# Patient Record
Sex: MALE | Race: WHITE | NOT HISPANIC OR LATINO | Employment: UNEMPLOYED | URBAN - NONMETROPOLITAN AREA
[De-identification: names, ages, dates, MRNs, and addresses within clinical notes are randomized per-mention and may not be internally consistent; named-entity substitution may affect disease eponyms.]

---

## 2017-01-03 ENCOUNTER — TRANSFERRED RECORDS (OUTPATIENT)
Dept: HEALTH INFORMATION MANAGEMENT | Facility: HOSPITAL | Age: 32
End: 2017-01-03

## 2017-01-06 DIAGNOSIS — K21.9 GASTROESOPHAGEAL REFLUX DISEASE WITHOUT ESOPHAGITIS: Primary | ICD-10-CM

## 2017-01-06 RX ORDER — FAMOTIDINE 20 MG/1
20 TABLET, FILM COATED ORAL 2 TIMES DAILY
Qty: 60 TABLET | Refills: 3 | Status: SHIPPED | OUTPATIENT
Start: 2017-01-06 | End: 2017-04-27

## 2017-01-16 ENCOUNTER — TRANSFERRED RECORDS (OUTPATIENT)
Dept: HEALTH INFORMATION MANAGEMENT | Facility: HOSPITAL | Age: 32
End: 2017-01-16

## 2017-01-18 ENCOUNTER — OFFICE VISIT (OUTPATIENT)
Dept: PSYCHIATRY | Facility: OTHER | Age: 32
End: 2017-01-18
Attending: PSYCHIATRY & NEUROLOGY
Payer: COMMERCIAL

## 2017-01-18 VITALS
SYSTOLIC BLOOD PRESSURE: 142 MMHG | DIASTOLIC BLOOD PRESSURE: 78 MMHG | WEIGHT: 270 LBS | TEMPERATURE: 99.3 F | HEART RATE: 100 BPM | HEIGHT: 73 IN | BODY MASS INDEX: 35.78 KG/M2

## 2017-01-18 DIAGNOSIS — F20.0 CHRONIC PARANOID SCHIZOPHRENIA (H): Primary | ICD-10-CM

## 2017-01-18 PROCEDURE — 99214 OFFICE O/P EST MOD 30 MIN: CPT | Performed by: PSYCHIATRY & NEUROLOGY

## 2017-01-18 PROCEDURE — 99212 OFFICE O/P EST SF 10 MIN: CPT

## 2017-01-18 RX ORDER — BENZTROPINE MESYLATE 0.5 MG/1
0.5 TABLET ORAL 2 TIMES DAILY PRN
Qty: 60 TABLET | Refills: 3 | Status: SHIPPED | OUTPATIENT
Start: 2017-01-18 | End: 2017-08-20

## 2017-01-18 ASSESSMENT — ANXIETY QUESTIONNAIRES
3. WORRYING TOO MUCH ABOUT DIFFERENT THINGS: SEVERAL DAYS
IF YOU CHECKED OFF ANY PROBLEMS ON THIS QUESTIONNAIRE, HOW DIFFICULT HAVE THESE PROBLEMS MADE IT FOR YOU TO DO YOUR WORK, TAKE CARE OF THINGS AT HOME, OR GET ALONG WITH OTHER PEOPLE: EXTREMELY DIFFICULT
7. FEELING AFRAID AS IF SOMETHING AWFUL MIGHT HAPPEN: NOT AT ALL
6. BECOMING EASILY ANNOYED OR IRRITABLE: SEVERAL DAYS
2. NOT BEING ABLE TO STOP OR CONTROL WORRYING: SEVERAL DAYS
1. FEELING NERVOUS, ANXIOUS, OR ON EDGE: SEVERAL DAYS

## 2017-01-18 ASSESSMENT — PAIN SCALES - GENERAL: PAINLEVEL: NO PAIN (0)

## 2017-01-18 ASSESSMENT — PATIENT HEALTH QUESTIONNAIRE - PHQ9: 5. POOR APPETITE OR OVEREATING: NOT AT ALL

## 2017-01-18 NOTE — MR AVS SNAPSHOT
"              After Visit Summary   1/18/2017    Miles Montez    MRN: [id number]           Patient Information     Date Of Birth          1985        Visit Information        Provider Department      1/18/2017 3:20 PM Marysol Maki MD [facility]        Today's Diagnoses     Chronic paranoid schizophrenia (H)    -  1        Follow-ups after your visit        Your next 10 appointments already scheduled     Feb 27, 2017  2:00 PM   (Arrive by 1:45 PM)   Return Visit with Marysol Maki MD   [facility] ([facility])    [address] [place] [id number] [phone]              Who to contact     If you have questions or need follow up information about today's clinic visit or your schedule please contact [facility] directly at [phone].  Normal or non-critical lab and imaging results will be communicated to you by MyChart, letter or phone within 4 business days after the clinic has received the results. If you do not hear from us within 7 days, please contact the clinic through MyChart or phone. If you have a critical or abnormal lab result, we will notify you by phone as soon as possible.  Submit refill requests through [employer] or call your pharmacy and they will forward the refill request to us. Please allow 3 business days for your refill to be completed.          Additional Information About Your Visit        MyChart Information     incuBET lets you send messages to your doctor, view your test results, renew your prescriptions, schedule appointments and more. To sign up, go to [email or web address] . Click on \"Log in\" on the left side of the screen, which will take you to the Welcome page. Then click on \"Sign up Now\" on the right side of the page.     You will be asked to enter the access code listed below, as well as some personal information. Please follow the directions to create your username and password.     Your access code " "is: [email or web address]  Expires: 2017 11:24 AM     Your access code will  in 90 days. If you need help or a new code, please call your [facility] clinic or [phone].        Care EveryWhere ID     This is your Care EveryWhere ID. This could be used by other organizations to access your [facility] medical records  [id number]        Your Vitals Were     Pulse Temperature Height BMI (Body Mass Index)          100 99.3  F (37.4  C) (Tympanic) 6' 1\" (1.854 m) 35.63 kg/m2         Blood Pressure from Last 3 Encounters:   17 142/78   16 126/86   16 142/86    Weight from Last 3 Encounters:   17 270 lb (122.471 kg)   16 260 lb (117.935 kg)   16 260 lb (117.935 kg)              Today, you had the following     No orders found for display         Today's Medication Changes          These changes are accurate as of: 17  4:02 PM.  If you have any questions, ask your nurse or doctor.               Start taking these medicines.        Dose/Directions    benztropine 0.5 MG tablet   Commonly known as:  COGENTIN   Used for:  Chronic paranoid schizophrenia (H)   Started by:  Marysol Maki MD        Dose:  0.5 mg   Take 1 tablet (0.5 mg) by mouth 2 times daily as needed for agitation   Quantity:  60 tablet   Refills:  3            Where to get your medicines      These medications were sent to [facility] [address]     Phone:  [phone]    - benztropine 0.5 MG tablet             Primary Care Provider Office Phone # Fax #    Maria Eugenia Mcgrath [phone]       [facility] [address] [facility] [address]        Thank you!     Thank you for choosing [facility]  for your care. Our goal is always to provide you with excellent care. Hearing back from our patients is one way we can continue to improve our services. Please take a few minutes to complete the written survey that you may " receive in the mail after your visit with us. Thank you!             Your Updated Medication List - Protect others around you: Learn how to safely use, store and throw away your medicines at www.disposemymeds.org.          This list is accurate as of: 1/18/17  4:02 PM.  Always use your most recent med list.                   Brand Name Dispense Instructions for use    benztropine 0.5 MG tablet    COGENTIN    60 tablet    Take 1 tablet (0.5 mg) by mouth 2 times daily as needed for agitation       cholecalciferol 2000 UNITS tablet     30 tablet    Take 2,000 Units by mouth daily       * cloZAPine 100 MG tablet    CLOZARIL    7 tablet    TAKE 1 TABLET BY MOUTH ALONG WITH 200MG TAB AT BEDTIME FOR A DOSE OF 300MG       * Clozapine 200 MG tablet    CLOZARIL    14 tablet    TAKE 1 TABLET (200MG) BY MOUTH IN THE MORNING AND TAKE 1 TABLET AT BEDTIME ALONG WITH 100MG TAB FOR A TOTAL DAILY DOSE OF       famotidine 20 MG tablet    PEPCID    60 tablet    Take 1 tablet (20 mg) by mouth 2 times daily       * Notice:  This list has 2 medication(s) that are the same as other medications prescribed for you. Read the directions carefully, and ask your doctor or other care provider to review them with you.

## 2017-01-18 NOTE — NURSING NOTE
"Chief Complaint   Patient presents with     RECHECK     concerned with weight gain, stiffness and tremors.        Initial /78 mmHg  Pulse 100  Temp(Src) 99.3  F (37.4  C) (Tympanic)  Ht 6' 1\" (1.854 m)  Wt 270 lb (122.471 kg)  BMI 35.63 kg/m2 Estimated body mass index is 35.63 kg/(m^2) as calculated from the following:    Height as of this encounter: 6' 1\" (1.854 m).    Weight as of this encounter: 270 lb (122.471 kg).  BP completed using cuff size: frank Hoff LPN      "
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
## 2017-01-19 ASSESSMENT — PATIENT HEALTH QUESTIONNAIRE - PHQ9: SUM OF ALL RESPONSES TO PHQ QUESTIONS 1-9: 11

## 2017-01-23 ENCOUNTER — TRANSFERRED RECORDS (OUTPATIENT)
Dept: HEALTH INFORMATION MANAGEMENT | Facility: HOSPITAL | Age: 32
End: 2017-01-23

## 2017-01-27 DIAGNOSIS — F20.0 PARANOID SCHIZOPHRENIA (H): Primary | ICD-10-CM

## 2017-02-01 RX ORDER — CLOZAPINE 100 MG/1
TABLET ORAL
Qty: 7 TABLET | Refills: 0 | Status: SHIPPED | OUTPATIENT
Start: 2017-02-01 | End: 2017-02-08

## 2017-02-01 RX ORDER — CLOZAPINE 200 MG/1
TABLET ORAL
Qty: 14 TABLET | Refills: 0 | Status: SHIPPED | OUTPATIENT
Start: 2017-02-01 | End: 2017-02-10

## 2017-02-08 DIAGNOSIS — F20.0 PARANOID SCHIZOPHRENIA (H): Primary | ICD-10-CM

## 2017-02-10 RX ORDER — CLOZAPINE 200 MG/1
TABLET ORAL
Qty: 14 TABLET | Refills: 0 | Status: SHIPPED | OUTPATIENT
Start: 2017-02-10 | End: 2017-02-10

## 2017-02-10 RX ORDER — CLOZAPINE 100 MG/1
TABLET ORAL
Qty: 7 TABLET | Refills: 0 | Status: SHIPPED | OUTPATIENT
Start: 2017-02-10 | End: 2017-02-10

## 2017-02-20 ENCOUNTER — TRANSFERRED RECORDS (OUTPATIENT)
Dept: HEALTH INFORMATION MANAGEMENT | Facility: HOSPITAL | Age: 32
End: 2017-02-20

## 2017-02-22 ENCOUNTER — TELEPHONE (OUTPATIENT)
Dept: PSYCHIATRY | Facility: OTHER | Age: 32
End: 2017-02-22

## 2017-02-22 NOTE — TELEPHONE ENCOUNTER
Received VM from mom, Malika with some thoughts about medications (anti-psychotics) for Miles.  He has a f/u on 2-27.  She goes on to say that most anti-psychotics keep him calm, but the delusions are still there.  SE of fatigue and weight gain.  He really does not get rid of the delusions / hallucinations.  She is wondering about using some other medications that would keep him calm  and not use antipsychotics.  Malika wanted Dr. Maki to know these thoughts.  Thank you.

## 2017-03-13 ENCOUNTER — TRANSFERRED RECORDS (OUTPATIENT)
Dept: HEALTH INFORMATION MANAGEMENT | Facility: HOSPITAL | Age: 32
End: 2017-03-13

## 2017-03-24 DIAGNOSIS — F20.0 PARANOID SCHIZOPHRENIA (H): ICD-10-CM

## 2017-03-27 RX ORDER — CLOZAPINE 200 MG/1
TABLET ORAL
Qty: 14 TABLET | Refills: 0 | Status: SHIPPED | OUTPATIENT
Start: 2017-03-27 | End: 2017-04-05

## 2017-03-27 RX ORDER — CLOZAPINE 100 MG/1
TABLET ORAL
Qty: 7 TABLET | Refills: 0 | Status: SHIPPED | OUTPATIENT
Start: 2017-03-27 | End: 2017-04-05

## 2017-03-27 NOTE — TELEPHONE ENCOUNTER
Clozaril 100mg, Clozaril 200mg      Last Written Prescription Date:  3.18.17  Last Fill Quantity: #7, #14,   # refills: 0  Last Office Visit with [facility], [facility] or [facility] prescribing provider: 1.18.17  Future Office visit:    Next 5 appointments (look out 90 days)     Apr 05, 2017  2:40 PM CDT   (Arrive by 2:25 PM)   Return Visit with Marysol Maki MD   [facility] ([facility])    [address] [place] [id number] [phone]                   Routing refill request to provider for review/approval because:  Drug not on the [facility], [facility] or [facility] refill protocol or controlled substance

## 2017-03-31 DIAGNOSIS — F20.0 PARANOID SCHIZOPHRENIA (H): ICD-10-CM

## 2017-03-31 DIAGNOSIS — Z00.00 HEALTHCARE MAINTENANCE: Primary | ICD-10-CM

## 2017-04-04 NOTE — TELEPHONE ENCOUNTER
clozapine 200 mg  Last Written Prescription Date: 3/27/17  Last Fill Quantity: 14, # refills: 0  Last Office Visit with [facility], [facility] or  Health prescribing provider: 1/18/17  Next 5 appointments (look out 90 days)     Apr 05, 2017  2:40 PM CDT   (Arrive by 2:25 PM)   Return Visit with Marysol Maki MD   [facility] [place] ([facility] [place] [facility])    [address] [place] [address]  [place] [address] [phone]                   BP Readings from Last 3 Encounters:   01/18/17 142/78   12/23/16 126/86   12/02/16 142/86     Pulse Readings from Last 2 Encounters:   01/18/17 100   12/23/16 112     Lab Results   Component Value Date     10/04/2016     Lab Results   Component Value Date    WBC 8.2 10/11/2016     Lab Results   Component Value Date    RBC 4.74 10/02/2016     Lab Results   Component Value Date    HGB 14.6 10/02/2016     Lab Results   Component Value Date    HCT 42.4 10/02/2016     No components found for: MCT  Lab Results   Component Value Date    MCV 90 10/02/2016     Lab Results   Component Value Date    MCH 30.8 10/02/2016     Lab Results   Component Value Date    MCHC 34.4 10/02/2016     Lab Results   Component Value Date    RDW 13.0 10/02/2016     Lab Results   Component Value Date     10/02/2016     Lab Results   Component Value Date    CHOL 192 09/27/2016     Lab Results   Component Value Date    HDL 46 09/27/2016     Lab Results   Component Value Date     09/27/2016     Lab Results   Component Value Date    TRIG 193 09/27/2016     Lab Results   Component Value Date    CHOLHDLRATIO 3.7 07/09/2015     Clozapine 100 mg     Last Written Prescription Date: 3/27/17  Last Fill Quantity: 7, # refills: 0  Last Office Visit with [facility], [facility] or [facility] prescribing provider: 1/18/17  Next 5 appointments (look out 90 days)     Apr 05, 2017  2:40 PM CDT   (Arrive by 2:25 PM)   Return Visit with Marysol Maki MD   [facility] [place] ([facility] [place] [facility])    [address]  [place]  [address]   [phone]                   BP Readings from Last 3 Encounters:   01/18/17 142/78   12/23/16 126/86   12/02/16 142/86     Pulse Readings from Last 2 Encounters:   01/18/17 100   12/23/16 112     Lab Results   Component Value Date     10/04/2016     Lab Results   Component Value Date    WBC 8.2 10/11/2016     Lab Results   Component Value Date    RBC 4.74 10/02/2016     Lab Results   Component Value Date    HGB 14.6 10/02/2016     Lab Results   Component Value Date    HCT 42.4 10/02/2016     No components found for: MCT  Lab Results   Component Value Date    MCV 90 10/02/2016     Lab Results   Component Value Date    MCH 30.8 10/02/2016     Lab Results   Component Value Date    MCHC 34.4 10/02/2016     Lab Results   Component Value Date    RDW 13.0 10/02/2016     Lab Results   Component Value Date     10/02/2016     Lab Results   Component Value Date    CHOL 192 09/27/2016     Lab Results   Component Value Date    HDL 46 09/27/2016     Lab Results   Component Value Date     09/27/2016     Lab Results   Component Value Date    TRIG 193 09/27/2016     Lab Results   Component Value Date    CHOLHDLRATIO 3.7 07/09/2015     Vitamin d      Last Written Prescription Date: 10/15/16  Last Fill Quantity: 30,  # refills: 0   Last Office Visit with [facility], [facility] or [facility] prescribing provider: 1/18/17                                         Next 5 appointments (look out 90 days)     Apr 05, 2017  2:40 PM CDT   (Arrive by 2:25 PM)   Return Visit with Marysol Maki MD   [facility] [place] ([facility] [place] [facility])    [address] [place] [address]  [place] [address] [phone]

## 2017-04-05 ENCOUNTER — OFFICE VISIT (OUTPATIENT)
Dept: PSYCHIATRY | Facility: OTHER | Age: 32
End: 2017-04-05
Attending: PSYCHIATRY & NEUROLOGY
Payer: COMMERCIAL

## 2017-04-05 ENCOUNTER — TELEPHONE (OUTPATIENT)
Dept: PSYCHIATRY | Facility: OTHER | Age: 32
End: 2017-04-05

## 2017-04-05 VITALS
HEART RATE: 92 BPM | HEIGHT: 74 IN | WEIGHT: 260 LBS | TEMPERATURE: 98.2 F | BODY MASS INDEX: 33.37 KG/M2 | DIASTOLIC BLOOD PRESSURE: 60 MMHG | SYSTOLIC BLOOD PRESSURE: 130 MMHG

## 2017-04-05 DIAGNOSIS — Z71.89 ACP (ADVANCE CARE PLANNING): Chronic | ICD-10-CM

## 2017-04-05 DIAGNOSIS — Z00.00 HEALTHCARE MAINTENANCE: Primary | ICD-10-CM

## 2017-04-05 DIAGNOSIS — F20.0 PARANOID SCHIZOPHRENIA (H): ICD-10-CM

## 2017-04-05 PROCEDURE — 99212 OFFICE O/P EST SF 10 MIN: CPT

## 2017-04-05 PROCEDURE — 99214 OFFICE O/P EST MOD 30 MIN: CPT | Performed by: PSYCHIATRY & NEUROLOGY

## 2017-04-05 RX ORDER — CLOZAPINE 200 MG/1
TABLET ORAL
Qty: 14 TABLET | Refills: 0 | Status: CANCELLED | OUTPATIENT
Start: 2017-04-05

## 2017-04-05 RX ORDER — ACETAMINOPHEN 160 MG
TABLET,DISINTEGRATING ORAL
Qty: 28 CAPSULE | Refills: 11 | Status: ON HOLD | OUTPATIENT
Start: 2017-04-05 | End: 2017-11-05

## 2017-04-05 RX ORDER — CLOZAPINE 200 MG/1
TABLET ORAL
Qty: 7 TABLET | Refills: 0 | Status: SHIPPED | OUTPATIENT
Start: 2017-04-05 | End: 2017-04-07

## 2017-04-05 RX ORDER — CLOZAPINE 100 MG/1
TABLET ORAL
Qty: 7 TABLET | Refills: 0 | Status: CANCELLED | OUTPATIENT
Start: 2017-04-05

## 2017-04-05 RX ORDER — CLOZAPINE 100 MG/1
100 TABLET ORAL DAILY
Status: CANCELLED | OUTPATIENT
Start: 2017-04-05

## 2017-04-05 RX ORDER — CLOZAPINE 100 MG/1
TABLET ORAL
Qty: 18 TABLET | Refills: 0 | Status: SHIPPED | OUTPATIENT
Start: 2017-04-05 | End: 2017-04-07

## 2017-04-05 ASSESSMENT — ANXIETY QUESTIONNAIRES
6. BECOMING EASILY ANNOYED OR IRRITABLE: MORE THAN HALF THE DAYS
IF YOU CHECKED OFF ANY PROBLEMS ON THIS QUESTIONNAIRE, HOW DIFFICULT HAVE THESE PROBLEMS MADE IT FOR YOU TO DO YOUR WORK, TAKE CARE OF THINGS AT HOME, OR GET ALONG WITH OTHER PEOPLE: VERY DIFFICULT
5. BEING SO RESTLESS THAT IT IS HARD TO SIT STILL: NOT AT ALL
7. FEELING AFRAID AS IF SOMETHING AWFUL MIGHT HAPPEN: MORE THAN HALF THE DAYS
3. WORRYING TOO MUCH ABOUT DIFFERENT THINGS: MORE THAN HALF THE DAYS
1. FEELING NERVOUS, ANXIOUS, OR ON EDGE: MORE THAN HALF THE DAYS
GAD7 TOTAL SCORE: 12
2. NOT BEING ABLE TO STOP OR CONTROL WORRYING: MORE THAN HALF THE DAYS

## 2017-04-05 ASSESSMENT — PAIN SCALES - GENERAL: PAINLEVEL: NO PAIN (0)

## 2017-04-05 ASSESSMENT — PATIENT HEALTH QUESTIONNAIRE - PHQ9: 5. POOR APPETITE OR OVEREATING: MORE THAN HALF THE DAYS

## 2017-04-05 NOTE — NURSING NOTE
"Chief Complaint   Patient presents with     RECHECK     Mental Health.       Initial /60 (BP Location: Right arm, Patient Position: Chair, Cuff Size: Adult Large)  Pulse 92  Temp 98.2  F (36.8  C) (Tympanic)  Ht 6' 2\" (1.88 m)  Wt 260 lb (117.9 kg)  BMI 33.38 kg/m2 Estimated body mass index is 33.38 kg/(m^2) as calculated from the following:    Height as of this encounter: 6' 2\" (1.88 m).    Weight as of this encounter: 260 lb (117.9 kg).  Medication Reconciliation: complete   SUSHIL CISNEROS      "

## 2017-04-05 NOTE — MR AVS SNAPSHOT
"              After Visit Summary   4/5/2017    Miles Montez    MRN: [id number]           Patient Information     Date Of Birth          1985        Visit Information        Provider Department      4/5/2017 2:40 PM Marysol Maki MD [facility]        Today's Diagnoses     Healthcare maintenance    -  1    Paranoid schizophrenia (H)        ACP (advance care planning)           Follow-ups after your visit        Your next 10 appointments already scheduled     May 23, 2017  1:20 PM CDT   (Arrive by 1:05 PM)   Return Visit with Marysol Maki MD   [facility] ([facility] [place] [facility])    [address] [place] [address]  [place] [id number] [phone]              Who to contact     If you have questions or need follow up information about today's clinic visit or your schedule please contact [facility] directly at [phone].  Normal or non-critical lab and imaging results will be communicated to you by MyChart, letter or phone within 4 business days after the clinic has received the results. If you do not hear from us within 7 days, please contact the clinic through MyChart or phone. If you have a critical or abnormal lab result, we will notify you by phone as soon as possible.  Submit refill requests through [employer] or call your pharmacy and they will forward the refill request to us. Please allow 3 business days for your refill to be completed.          Additional Information About Your Visit        MyChart Information     [employer] lets you send messages to your doctor, view your test results, renew your prescriptions, schedule appointments and more. To sign up, go to [email or web address] . Click on \"Log in\" on the left side of the screen, which will take you to the Welcome page. Then click on \"Sign up Now\" on the right side of the page.     You will be asked to enter the access code listed below, as well as some personal information. Please follow the directions to " "create your username and password.     Your access code is: [email or web address]  Expires: 2017  3:26 PM     Your access code will  in 90 days. If you need help or a new code, please call your [facility] or [phone].        Care EveryWhere ID     This is your Care EveryWhere ID. This could be used by other organizations to access your [facility] medical records  [id number]        Your Vitals Were     Pulse Temperature Height BMI (Body Mass Index)          92 98.2  F (36.8  C) (Tympanic) 6' 2\" (1.88 m) 33.38 kg/m2         Blood Pressure from Last 3 Encounters:   17 130/60   17 142/78   16 126/86    Weight from Last 3 Encounters:   17 260 lb (117.9 kg)   17 270 lb (122.5 kg)   16 260 lb (117.9 kg)              Today, you had the following     No orders found for display         Today's Medication Changes          These changes are accurate as of: 17  3:30 PM.  If you have any questions, ask your nurse or doctor.               These medicines have changed or have updated prescriptions.        Dose/Directions    * cloZAPine 100 MG tablet   Commonly known as:  CLOZARIL   This may have changed:  See the new instructions.   Used for:  Paranoid schizophrenia (H)   Changed by:  Marysol Maki MD        Take 1-1/2 tab am and take 1 tab night along with 200 mg tab total daily dose of 450   Quantity:  18 tablet   Refills:  0       * Clozapine 200 MG tablet   Commonly known as:  CLOZARIL   This may have changed:  See the new instructions.   Used for:  Paranoid schizophrenia (H)   Changed by:  Marysol Maki MD        Take 1 tab at night along with 1 - 100 mg tab for 300 mg at night   Quantity:  7 tablet   Refills:  0       * Notice:  This list has 2 medication(s) that are the same as other medications prescribed for you. Read the directions carefully, and ask your doctor or other care provider to review them with you.         Where to get your medicines      These " medications were sent to [place], [place] - [address]  [address] [place] [address]     Phone:  [phone]     cloZAPine 100 MG tablet    Clozapine 200 MG tablet                Primary Care Provider Office Phone # Fax #    Maria Eugenia Mcgrath [phone]       [facility] [address] [facility] [address]        Thank you!     Thank you for choosing [facility]  for your care. Our goal is always to provide you with excellent care. Hearing back from our patients is one way we can continue to improve our services. Please take a few minutes to complete the written survey that you may receive in the mail after your visit with us. Thank you!             Your Updated Medication List - Protect others around you: Learn how to safely use, store and throw away your medicines at www.disposemymeds.org.          This list is accurate as of: 4/5/17  3:30 PM.  Always use your most recent med list.                   Brand Name Dispense Instructions for use    benztropine 0.5 MG tablet    COGENTIN    60 tablet    Take 1 tablet (0.5 mg) by mouth 2 times daily as needed for agitation       cholecalciferol 2000 UNITS tablet     30 tablet    Take 2,000 Units by mouth daily       * cloZAPine 100 MG tablet    CLOZARIL    18 tablet    Take 1-1/2 tab am and take 1 tab night along with 200 mg tab total daily dose of 450       * Clozapine 200 MG tablet    CLOZARIL    7 tablet    Take 1 tab at night along with 1 - 100 mg tab for 300 mg at night       famotidine 20 MG tablet    PEPCID    60 tablet    Take 1 tablet (20 mg) by mouth 2 times daily       * Notice:  This list has 2 medication(s) that are the same as other medications prescribed for you. Read the directions carefully, and ask your doctor or other care provider to review them with you.

## 2017-04-05 NOTE — TELEPHONE ENCOUNTER
Received VM from Malika, mom.  She has concerns about side effects with the medication Clozapine that Miles is taking.  Patient has c/o hard time breathing, throat, chest pain.  He is struggling with drowsiness, sedative feeling, no energy and sleeps a lot,  weight gain and other side effects that she has read up on.  She goes on to say that medication does not take care of delusions.  She would like to find a different medication for him.  She feels bad for what her son is struggling with.  He has an appt today, but  Malika finds it hard to discuss issues during appt. time when Miles is present.  Thank you
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
## 2017-04-06 ASSESSMENT — PATIENT HEALTH QUESTIONNAIRE - PHQ9: SUM OF ALL RESPONSES TO PHQ QUESTIONS 1-9: 10

## 2017-04-06 ASSESSMENT — ANXIETY QUESTIONNAIRES: GAD7 TOTAL SCORE: 12

## 2017-04-07 DIAGNOSIS — F20.0 PARANOID SCHIZOPHRENIA (H): ICD-10-CM

## 2017-04-10 RX ORDER — CLOZAPINE 100 MG/1
TABLET ORAL
Qty: 18 TABLET | Refills: 0 | Status: SHIPPED | OUTPATIENT
Start: 2017-04-10 | End: 2017-04-21

## 2017-04-10 RX ORDER — CLOZAPINE 200 MG/1
TABLET ORAL
Qty: 7 TABLET | Refills: 0 | Status: SHIPPED | OUTPATIENT
Start: 2017-04-10 | End: 2017-04-21

## 2017-04-24 ENCOUNTER — TRANSFERRED RECORDS (OUTPATIENT)
Dept: HEALTH INFORMATION MANAGEMENT | Facility: HOSPITAL | Age: 32
End: 2017-04-24

## 2017-04-27 DIAGNOSIS — K21.9 GASTROESOPHAGEAL REFLUX DISEASE WITHOUT ESOPHAGITIS: ICD-10-CM

## 2017-04-27 RX ORDER — FAMOTIDINE 20 MG/1
TABLET, FILM COATED ORAL
Qty: 56 TABLET | Refills: 11 | Status: SHIPPED | OUTPATIENT
Start: 2017-04-27 | End: 2018-01-09 | Stop reason: ALTCHOICE

## 2017-04-27 NOTE — TELEPHONE ENCOUNTER
pepcid      Last Written Prescription Date: 1/6/17  Last Fill Quantity: 60,  # refills: 3   Last Office Visit with [facility], [facility] or [facility] prescribing provider: 4/5/17                                         Next 5 appointments (look out 90 days)     May 23, 2017  1:20 PM CDT   (Arrive by 1:05 PM)   Return Visit with Marysol Maki MD   [facility] ([facility])    [facility] [address] [place] [id number] [phone]

## 2017-04-28 DIAGNOSIS — F20.0 PARANOID SCHIZOPHRENIA (H): ICD-10-CM

## 2017-05-01 ENCOUNTER — TRANSFERRED RECORDS (OUTPATIENT)
Dept: HEALTH INFORMATION MANAGEMENT | Facility: HOSPITAL | Age: 32
End: 2017-05-01

## 2017-05-01 RX ORDER — CLOZAPINE 200 MG/1
TABLET ORAL
Qty: 7 TABLET | Refills: 0 | Status: SHIPPED | OUTPATIENT
Start: 2017-05-01 | End: 2017-05-05

## 2017-05-01 RX ORDER — CLOZAPINE 100 MG/1
TABLET ORAL
Qty: 18 TABLET | Refills: 0 | Status: SHIPPED | OUTPATIENT
Start: 2017-05-01 | End: 2017-05-05

## 2017-05-01 NOTE — TELEPHONE ENCOUNTER
Clozapine 100 mg/200 mg      Last Written Prescription Date:  4/25/17  Last Fill Quantity: 100 mg-18; 200 mg-7,   # refills: 0  Last Office Visit with [facility] or [facility] prescribing provider: 4/5/17  Future Office visit:    Next 5 appointments (look out 90 days)     May 23, 2017  1:20 PM CDT   (Arrive by 1:05 PM)   Return Visit with Marysol Maki MD   [facility] ([facility])    [address] [place] [id number] [phone]                   Routing refill request to provider for review/approval because:  Drug not on the [facility], P or M reeplay.it refill protocol or controlled substance

## 2017-05-05 DIAGNOSIS — F20.0 PARANOID SCHIZOPHRENIA (H): ICD-10-CM

## 2017-05-08 RX ORDER — CLOZAPINE 100 MG/1
TABLET ORAL
Qty: 18 TABLET | Refills: 0 | Status: SHIPPED | OUTPATIENT
Start: 2017-05-08 | End: 2017-05-12

## 2017-05-08 RX ORDER — CLOZAPINE 200 MG/1
TABLET ORAL
Qty: 7 TABLET | Refills: 0 | Status: SHIPPED | OUTPATIENT
Start: 2017-05-08 | End: 2017-05-12

## 2017-05-12 DIAGNOSIS — F20.0 PARANOID SCHIZOPHRENIA (H): ICD-10-CM

## 2017-05-12 NOTE — TELEPHONE ENCOUNTER
Clozapine 200mg      Last Written Prescription Date: 5/8/2017  Last Fill Quantity: 7,   # refills: 0  Last Office Visit with [facility], [facility] or [facility] prescribing provider: 4/5/2017 Dr. Maki  Future Office visit:    Next 5 appointments (look out 90 days)     May 23, 2017  1:20 PM CDT   (Arrive by 1:05 PM)   Return Visit with Marysol Maki MD   [facility] ([facility])    [address] [place] [address]   [phone]                 Clozapine 100mg      Last Written Prescription Date:  5/8/2017  Last Fill Quantity: 18,   # refills: 0  Last Office Visit with [facility], [facility] or [facility] prescribing provider: 4/5/2017 Dr. Maki  Future Office visit:    Next 5 appointments (look out 90 days)     May 23, 2017  1:20 PM CDT   (Arrive by 1:05 PM)   Return Visit with Marysol Maki MD   [facility] ([facility])    [address] [place], [place] [address] [place] [address]   [phone]                   Routing refill request to provider for review/approval because:  Drug not on the [facility], [facility] or [facility] refill protocol or controlled substance

## 2017-05-15 ENCOUNTER — TRANSFERRED RECORDS (OUTPATIENT)
Dept: HEALTH INFORMATION MANAGEMENT | Facility: HOSPITAL | Age: 32
End: 2017-05-15
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
## 2017-05-19 RX ORDER — CLOZAPINE 100 MG/1
TABLET ORAL
Qty: 18 TABLET | Refills: 0 | Status: SHIPPED | OUTPATIENT
Start: 2017-05-19 | End: 2017-05-23

## 2017-05-19 RX ORDER — CLOZAPINE 200 MG/1
TABLET ORAL
Qty: 7 TABLET | Refills: 0 | Status: SHIPPED | OUTPATIENT
Start: 2017-05-19 | End: 2017-05-23

## 2017-05-23 ENCOUNTER — OFFICE VISIT (OUTPATIENT)
Dept: PSYCHIATRY | Facility: OTHER | Age: 32
End: 2017-05-23
Attending: PSYCHIATRY & NEUROLOGY
Payer: COMMERCIAL

## 2017-05-23 VITALS
DIASTOLIC BLOOD PRESSURE: 70 MMHG | SYSTOLIC BLOOD PRESSURE: 130 MMHG | HEIGHT: 73 IN | HEART RATE: 100 BPM | TEMPERATURE: 98 F | RESPIRATION RATE: 16 BRPM | WEIGHT: 250 LBS | BODY MASS INDEX: 33.13 KG/M2

## 2017-05-23 DIAGNOSIS — F20.0 PARANOID SCHIZOPHRENIA (H): ICD-10-CM

## 2017-05-23 PROCEDURE — 99212 OFFICE O/P EST SF 10 MIN: CPT

## 2017-05-23 PROCEDURE — 99214 OFFICE O/P EST MOD 30 MIN: CPT | Performed by: PSYCHIATRY & NEUROLOGY

## 2017-05-23 RX ORDER — CLOZAPINE 200 MG/1
TABLET ORAL
Qty: 7 TABLET | Refills: 0 | Status: SHIPPED | OUTPATIENT
Start: 2017-05-23 | End: 2017-05-26

## 2017-05-23 RX ORDER — CLOZAPINE 100 MG/1
TABLET ORAL
Qty: 14 TABLET | Refills: 0 | Status: SHIPPED | OUTPATIENT
Start: 2017-05-23 | End: 2017-05-26

## 2017-05-23 ASSESSMENT — PAIN SCALES - GENERAL: PAINLEVEL: NO PAIN (0)

## 2017-05-23 NOTE — MR AVS SNAPSHOT
"              After Visit Summary   2017    Miles Montez    MRN: [id number]           Patient Information     Date Of Birth          1985        Visit Information        Provider Department      2017 1:20 PM Marysol Maki MD [facility]        Today's Diagnoses     Paranoid schizophrenia (H)           Follow-ups after your visit        Who to contact     If you have questions or need follow up information about today's clinic visit or your schedule please contact [facility] directly at [phone].  Normal or non-critical lab and imaging results will be communicated to you by MyChart, letter or phone within 4 business days after the clinic has received the results. If you do not hear from us within 7 days, please contact the clinic through [employer] or phone. If you have a critical or abnormal lab result, we will notify you by phone as soon as possible.  Submit refill requests through [employer] or call your pharmacy and they will forward the refill request to us. Please allow 3 business days for your refill to be completed.          Additional Information About Your Visit        MyChart Information     [facility] lets you send messages to your doctor, view your test results, renew your prescriptions, schedule appointments and more. To sign up, go to [email or web address] . Click on \"Log in\" on the left side of the screen, which will take you to the Welcome page. Then click on \"Sign up Now\" on the right side of the page.     You will be asked to enter the access code listed below, as well as some personal information. Please follow the directions to create your username and password.     Your access code is: [email or web address]  Expires: 2017  3:26 PM     Your access code will  in 90 days. If you need help or a new code, please call your [facility] or [phone].        Care EveryWhere ID     This is your Care EveryWhere ID. This could be used by other " "organizations to access your [facility] medical records  [id number]        Your Vitals Were     Pulse Temperature Respirations Height BMI (Body Mass Index)       100 98  F (36.7  C) (Tympanic) 16 6' 1\" (1.854 m) 32.98 kg/m2        Blood Pressure from Last 3 Encounters:   05/23/17 130/70   04/05/17 130/60   01/18/17 142/78    Weight from Last 3 Encounters:   05/23/17 250 lb (113.4 kg)   04/05/17 260 lb (117.9 kg)   01/18/17 270 lb (122.5 kg)              Today, you had the following     No orders found for display         Today's Medication Changes          These changes are accurate as of: 5/23/17  6:14 PM.  If you have any questions, ask your nurse or doctor.               These medicines have changed or have updated prescriptions.        Dose/Directions    * cloZAPine 100 MG tablet   Commonly known as:  CLOZARIL   This may have changed:  See the new instructions.   Used for:  Paranoid schizophrenia (H)   Changed by:  Marysol Maki MD        Take 1 tablet every morning and take 1 tablet at night along with 200 mg tablet for total daily dose of 400 mg   Quantity:  14 tablet   Refills:  0       * Clozapine 200 MG tablet   Commonly known as:  CLOZARIL   This may have changed:  See the new instructions.   Used for:  Paranoid schizophrenia (H)   Changed by:  Marysol Maki MD        TAKE 1 TABLET (200MG) BY MOUTH EVERY EVENING ALONG WITH 1-100 MG TABLET FOR A TOTAL NIGHTLY DOSE  MG   Quantity:  7 tablet   Refills:  0       vitamin D3 2000 UNITS Caps   This may have changed:  Another medication with the same name was removed. Continue taking this medication, and follow the directions you see here.   Used for:  Healthcare maintenance   Changed by:  Marysol Maki MD        TAKE 1 CAPSULE BY MOUTH DAILY   Quantity:  28 capsule   Refills:  11       * Notice:  This list has 2 medication(s) that are the same as other medications prescribed for you. Read the directions carefully, and ask your doctor or other " care provider to review them with you.         Where to get your medicines      [place] [facility] [place] [address]  [address] [place] [address]     Phone:  [phone]     cloZAPine 100 MG tablet    Clozapine 200 MG tablet                Primary Care Provider Office Phone # Fax #    Maria Eugenia Mcgrath [phone]       [facility] [address] [facility] [address]        Thank you!     Thank you for choosing [facility]  for your care. Our goal is always to provide you with excellent care. Hearing back from our patients is one way we can continue to improve our services. Please take a few minutes to complete the written survey that you may receive in the mail after your visit with us. Thank you!             Your Updated Medication List - Protect others around you: Learn how to safely use, store and throw away your medicines at www.disposemymeds.org.          This list is accurate as of: 5/23/17  6:14 PM.  Always use your most recent med list.                   Brand Name Dispense Instructions for use    benztropine 0.5 MG tablet    COGENTIN    60 tablet    Take 1 tablet (0.5 mg) by mouth 2 times daily as needed for agitation       * cloZAPine 100 MG tablet    CLOZARIL    14 tablet    Take 1 tablet every morning and take 1 tablet at night along with 200 mg tablet for total daily dose of 400 mg       * Clozapine 200 MG tablet    CLOZARIL    7 tablet    TAKE 1 TABLET (200MG) BY MOUTH EVERY EVENING ALONG WITH 1-100 MG TABLET FOR A TOTAL NIGHTLY DOSE  MG       famotidine 20 MG tablet    PEPCID    56 tablet    TAKE 1 TABLET BY MOUTH TWICE A DAY       vitamin D3 2000 UNITS Caps     28 capsule    TAKE 1 CAPSULE BY MOUTH DAILY       * Notice:  This list has 2 medication(s) that are the same as other medications prescribed for you. Read the directions carefully, and ask your doctor or other care provider to review them with  you.

## 2017-05-23 NOTE — NURSING NOTE
"Chief Complaint   Patient presents with     Psychiatric Problem     Follow up.       Initial /70  Pulse 100  Temp 98  F (36.7  C) (Tympanic)  Resp 16  Ht 6' 1\" (1.854 m)  Wt 250 lb (113.4 kg)  BMI 32.98 kg/m2 Estimated body mass index is 32.98 kg/(m^2) as calculated from the following:    Height as of this encounter: 6' 1\" (1.854 m).    Weight as of this encounter: 250 lb (113.4 kg).  Medication Reconciliation: complete   Luz Kwan      "

## 2017-05-26 DIAGNOSIS — F20.0 PARANOID SCHIZOPHRENIA (H): ICD-10-CM

## 2017-05-30 RX ORDER — CLOZAPINE 100 MG/1
TABLET ORAL
Qty: 28 TABLET | Refills: 0 | Status: SHIPPED | OUTPATIENT
Start: 2017-05-30 | End: 2017-06-20

## 2017-05-30 RX ORDER — CLOZAPINE 100 MG/1
TABLET ORAL
Qty: 14 TABLET | Refills: 0 | Status: SHIPPED | OUTPATIENT
Start: 2017-05-30 | End: 2017-05-30

## 2017-05-30 RX ORDER — CLOZAPINE 200 MG/1
TABLET ORAL
Qty: 7 TABLET | Refills: 0 | Status: SHIPPED | OUTPATIENT
Start: 2017-05-30 | End: 2017-05-30

## 2017-05-30 RX ORDER — CLOZAPINE 200 MG/1
TABLET ORAL
Qty: 14 TABLET | Refills: 0 | Status: SHIPPED | OUTPATIENT
Start: 2017-05-30 | End: 2017-06-20

## 2017-05-30 NOTE — TELEPHONE ENCOUNTER
Last visit 5/23/17.  Last filled Clozaril 200 mg #7, 0 R on 5/23/17.  Last filled Clozaril 100 mg #14, 0 R on 5/23/17.  Thanks

## 2017-06-20 DIAGNOSIS — F20.0 PARANOID SCHIZOPHRENIA (H): ICD-10-CM

## 2017-06-22 ENCOUNTER — TELEPHONE (OUTPATIENT)
Dept: PSYCHIATRY | Facility: OTHER | Age: 32
End: 2017-06-22

## 2017-06-22 DIAGNOSIS — F20.0 PARANOID SCHIZOPHRENIA (H): Primary | ICD-10-CM

## 2017-06-22 NOTE — TELEPHONE ENCOUNTER
I spoke with Malika.  She is wondering if Dr. Maki had sent in an Rx for a medication that Miles could take that would calm him when he gets agitated.  She could not remember what the name of the medication was.  I discussed last visit notes with her and did not see a medication noting this.  Thank you, please advise.  Next f/u appt. Is 7-10-17.

## 2017-06-22 NOTE — TELEPHONE ENCOUNTER
Clozaril 100 MG  Last office visit: 05/23/17  Last refill: 05/30/17 #28    Clozaril 200 MG  Last office visit: 05/23/17  Last refill: 05/30/17 #14        Thank you.

## 2017-06-23 RX ORDER — CLOZAPINE 100 MG/1
TABLET ORAL
Qty: 28 TABLET | Refills: 0 | Status: SHIPPED | OUTPATIENT
Start: 2017-06-23 | End: 2017-07-21

## 2017-06-23 RX ORDER — HYDROXYZINE HYDROCHLORIDE 25 MG/1
50-100 TABLET, FILM COATED ORAL 2 TIMES DAILY PRN
Qty: 60 TABLET | Refills: 3 | Status: SHIPPED | OUTPATIENT
Start: 2017-06-23 | End: 2017-08-20

## 2017-06-23 RX ORDER — CLOZAPINE 200 MG/1
TABLET ORAL
Qty: 14 TABLET | Refills: 0 | Status: SHIPPED | OUTPATIENT
Start: 2017-06-23 | End: 2017-07-21

## 2017-06-23 NOTE — TELEPHONE ENCOUNTER
Thanks Jorge L. I will fill similar to last time with #28 of 100 mg tabs since he takes two tabs per day and #14 tabs of the 200 mg tabs since he takes once daily. THus enough for two weeks. He is on every other week of blood draws I have to sign off on before I can dispense clozapine and I have his most recent CBC from this past Monday so we're good.    THanks!

## 2017-06-23 NOTE — TELEPHONE ENCOUNTER
Okay, I sent in hydroxyzine 50 to 100 mg up to twice daily prn prn anxiety to [facility]. Main side effect: tired..

## 2017-06-23 NOTE — TELEPHONE ENCOUNTER
LM through  for Malika that a medication was sent by Dr. Maki to  pharmacy in [place]  for Miles.  Gave call back # for questions.

## 2017-07-03 ENCOUNTER — TRANSFERRED RECORDS (OUTPATIENT)
Dept: HEALTH INFORMATION MANAGEMENT | Facility: CLINIC | Age: 32
End: 2017-07-03

## 2017-07-10 ENCOUNTER — OFFICE VISIT (OUTPATIENT)
Dept: PSYCHIATRY | Facility: OTHER | Age: 32
End: 2017-07-10
Attending: PSYCHIATRY & NEUROLOGY
Payer: COMMERCIAL

## 2017-07-10 VITALS
HEIGHT: 73 IN | BODY MASS INDEX: 31.81 KG/M2 | DIASTOLIC BLOOD PRESSURE: 82 MMHG | OXYGEN SATURATION: 97 % | TEMPERATURE: 99.4 F | SYSTOLIC BLOOD PRESSURE: 136 MMHG | HEART RATE: 130 BPM | WEIGHT: 240 LBS

## 2017-07-10 DIAGNOSIS — F20.0 PARANOID SCHIZOPHRENIA (H): Primary | ICD-10-CM

## 2017-07-10 PROCEDURE — 99212 OFFICE O/P EST SF 10 MIN: CPT

## 2017-07-10 PROCEDURE — 99213 OFFICE O/P EST LOW 20 MIN: CPT | Performed by: PSYCHIATRY & NEUROLOGY

## 2017-07-10 ASSESSMENT — ANXIETY QUESTIONNAIRES
3. WORRYING TOO MUCH ABOUT DIFFERENT THINGS: NOT AT ALL
7. FEELING AFRAID AS IF SOMETHING AWFUL MIGHT HAPPEN: NOT AT ALL
2. NOT BEING ABLE TO STOP OR CONTROL WORRYING: NOT AT ALL
6. BECOMING EASILY ANNOYED OR IRRITABLE: NOT AT ALL
GAD7 TOTAL SCORE: 0
5. BEING SO RESTLESS THAT IT IS HARD TO SIT STILL: NOT AT ALL
1. FEELING NERVOUS, ANXIOUS, OR ON EDGE: NOT AT ALL

## 2017-07-10 ASSESSMENT — PATIENT HEALTH QUESTIONNAIRE - PHQ9: 5. POOR APPETITE OR OVEREATING: NOT AT ALL

## 2017-07-10 ASSESSMENT — PAIN SCALES - GENERAL: PAINLEVEL: NO PAIN (0)

## 2017-07-10 NOTE — NURSING NOTE
"Chief Complaint   Patient presents with     Psychiatric Problem       Initial /82 (BP Location: Right arm, Cuff Size: Adult Regular)  Pulse 130  Temp 99.4  F (37.4  C) (Tympanic)  Ht 6' 1\" (1.854 m)  Wt 240 lb (108.9 kg)  SpO2 97%  BMI 31.66 kg/m2 Estimated body mass index is 31.66 kg/(m^2) as calculated from the following:    Height as of this encounter: 6' 1\" (1.854 m).    Weight as of this encounter: 240 lb (108.9 kg).  Medication Reconciliation: complete   Martita Bueno LPN      "

## 2017-07-10 NOTE — MR AVS SNAPSHOT
"              After Visit Summary   7/10/2017    Miles Montez    MRN: [id number]           Patient Information     Date Of Birth          1985        Visit Information        Provider Department      7/10/2017 3:00 PM Marysol Maki MD [facility] [place]         Follow-ups after your visit        Your next 10 appointments already scheduled     Aug 02, 2017  2:40 PM CDT   (Arrive by 2:25 PM)   Return Visit with Marysol Maki MD   [facility] [place] ([facility] [place] )    [address] [place] [address]  [place] [address] [phone]              Who to contact     If you have questions or need follow up information about today's clinic visit or your schedule please contact [facility] directly at [phone].  Normal or non-critical lab and imaging results will be communicated to you by MyChart, letter or phone within 4 business days after the clinic has received the results. If you do not hear from us within 7 days, please contact the clinic through MyChart or phone. If you have a critical or abnormal lab result, we will notify you by phone as soon as possible.  Submit refill requests through [employer] or call your pharmacy and they will forward the refill request to us. Please allow 3 business days for your refill to be completed.          Additional Information About Your Visit        MyChart Information     [employer] lets you send messages to your doctor, view your test results, renew your prescriptions, schedule appointments and more. To sign up, go to [email or web address] . Click on \"Log in\" on the left side of the screen, which will take you to the Welcome page. Then click on \"Sign up Now\" on the right side of the page.     You will be asked to enter the access code listed below, as well as some personal information. Please follow the directions to create your username and password.     Your access code is: [email or web address]  Expires: 10/8/2017  5:31 PM     Your " "access code will  in 90 days. If you need help or a new code, please call your [facility] clinic or [phone].        Care EveryWhere ID     This is your Care EveryWhere ID. This could be used by other organizations to access your [facility] medical records  [id number]        Your Vitals Were     Pulse Temperature Height Pulse Oximetry BMI (Body Mass Index)       130 99.4  F (37.4  C) (Tympanic) 6' 1\" (1.854 m) 97% 31.66 kg/m2        Blood Pressure from Last 3 Encounters:   07/10/17 136/82   17 130/70   17 130/60    Weight from Last 3 Encounters:   07/10/17 240 lb (108.9 kg)   17 250 lb (113.4 kg)   17 260 lb (117.9 kg)              Today, you had the following     No orders found for display       Primary Care Provider Office Phone # Fax #    Maria Eugenia Mcgrath [phone]       [facility] [address] [facility] [address]        Equal Access to Services     [facility]: Hadii aad ku hadasho Soomaali, waaxda luqadaha, qaybta kaalmada adeegyajose m, vanessa lopez . So [facility] [phone].    ATENCIÓN: Si habla español, tiene a colon disposición servicios gratuitos de asistencia lingüística. Llame al [phone].    We comply with applicable federal civil rights laws and [place] laws. We do not discriminate on the basis of race, color, national origin, age, disability sex, sexual orientation or gender identity.            Thank you!     Thank you for choosing [facility]  for your care. Our goal is always to provide you with excellent care. Hearing back from our patients is one way we can continue to improve our services. Please take a few minutes to complete the written survey that you may receive in the mail after your visit with us. Thank you!             Your Updated Medication List - Protect others around you: Learn how to safely use, store and throw away your medicines at www.disposemymeds.org.          This list is accurate as " of: 7/10/17  5:31 PM.  Always use your most recent med list.                   Brand Name Dispense Instructions for use Diagnosis    benztropine 0.5 MG tablet    COGENTIN    60 tablet    Take 1 tablet (0.5 mg) by mouth 2 times daily as needed for agitation    Chronic paranoid schizophrenia (H)       * Clozapine 200 MG tablet    CLOZARIL    14 tablet    TAKE 1 TABLET (200MG) BY MOUTH EVERY EVENING ALONG WITH 100MG TABLET FOR A TOTAL NIGHTLY DOSE OF 300MG    Paranoid schizophrenia (H)       * cloZAPine 100 MG tablet    CLOZARIL    28 tablet    TAKE 1 TABLET EVERY MORNING AND TAKE 1 TABLET AT NIGHT ALONG WITH 200MG TABLET FOR A TOTAL DAILY DOSE OF 400MG    Paranoid schizophrenia (H)       famotidine 20 MG tablet    PEPCID    56 tablet    TAKE 1 TABLET BY MOUTH TWICE A DAY    Gastroesophageal reflux disease without esophagitis       hydrOXYzine 25 MG tablet    ATARAX    60 tablet    Take 2-4 tablets ( mg) by mouth 2 times daily as needed for anxiety    Paranoid schizophrenia (H)       OMEGA-3 PLUS 1000 MG Caps      Take 2,000 mg by mouth        vitamin D3 2000 UNITS Caps     28 capsule    TAKE 1 CAPSULE BY MOUTH DAILY    Healthcare maintenance       * Notice:  This list has 2 medication(s) that are the same as other medications prescribed for you. Read the directions carefully, and ask your doctor or other care provider to review them with you.
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
## 2017-07-11 ASSESSMENT — ANXIETY QUESTIONNAIRES: GAD7 TOTAL SCORE: 0

## 2017-07-21 DIAGNOSIS — F20.0 PARANOID SCHIZOPHRENIA (H): ICD-10-CM

## 2017-07-25 RX ORDER — CLOZAPINE 200 MG/1
TABLET ORAL
Qty: 14 TABLET | Refills: 0 | Status: SHIPPED | OUTPATIENT
Start: 2017-07-25 | End: 2017-08-04

## 2017-07-25 RX ORDER — CLOZAPINE 100 MG/1
TABLET ORAL
Qty: 28 TABLET | Refills: 0 | Status: SHIPPED | OUTPATIENT
Start: 2017-07-25 | End: 2017-08-04

## 2017-07-31 ENCOUNTER — TRANSFERRED RECORDS (OUTPATIENT)
Dept: HEALTH INFORMATION MANAGEMENT | Facility: HOSPITAL | Age: 32
End: 2017-07-31

## 2017-08-02 ENCOUNTER — TELEPHONE (OUTPATIENT)
Dept: PSYCHIATRY | Facility: OTHER | Age: 32
End: 2017-08-02

## 2017-08-02 NOTE — TELEPHONE ENCOUNTER
Pt mom called, pt has appt today with you and previous discussion was to stop pt's meds and try Latuda, but mom states pt has already tried this med in 2014.  She has done some research and wants you to consider

## 2017-08-02 NOTE — TELEPHONE ENCOUNTER
Pt cancelled appt today because he didn't feel like leaving the house, per his mother.  She also is wondering if pt could try Mirtzapine?  She thinks this would be a good med for him to try.  Please advise  [facility]  SUSHIL CISNEROS

## 2017-08-04 ENCOUNTER — TELEPHONE (OUTPATIENT)
Dept: PSYCHIATRY | Facility: OTHER | Age: 32
End: 2017-08-04

## 2017-08-04 DIAGNOSIS — F20.0 PARANOID SCHIZOPHRENIA (H): ICD-10-CM

## 2017-08-04 DIAGNOSIS — F33.1 MAJOR DEPRESSIVE DISORDER, RECURRENT EPISODE, MODERATE (H): Primary | ICD-10-CM

## 2017-08-04 RX ORDER — MIRTAZAPINE 15 MG/1
7.5 TABLET, FILM COATED ORAL AT BEDTIME
Qty: 15 TABLET | Refills: 3 | Status: ON HOLD | OUTPATIENT
Start: 2017-08-04 | End: 2017-11-05

## 2017-08-04 NOTE — TELEPHONE ENCOUNTER
K, mirtazapine 7.5 mg evening (if he'll even take it?) Sent [facility]. Side effects: tired hence I dose at night. Other SE I see most often appetite increase / weight gain.

## 2017-08-04 NOTE — TELEPHONE ENCOUNTER
Contacted and notified Malika that Dr. Maki sent Rx Mirtazapine 7.5 mg to pharmacy for Miles.  Went over dosage, instructions and side effects with her.  She is happy with this.

## 2017-08-04 NOTE — TELEPHONE ENCOUNTER
Spoke with Malika, Miles's mother.  She states that Miles is not taking his medications.  Also states that she would like to avoid a traumatic experience by calling the  and being hospitalized.  He has quit every medication in the last 10 - 12 years.  She has been doing research on the medication Mirtazepine and would like Dr. Maki to consider this medication for Miles.  Next f/u is 8-30-17.  Thank you, please advise.

## 2017-08-07 ENCOUNTER — MEDICAL CORRESPONDENCE (OUTPATIENT)
Dept: HEALTH INFORMATION MANAGEMENT | Facility: HOSPITAL | Age: 32
End: 2017-08-07

## 2017-08-07 RX ORDER — CLOZAPINE 100 MG/1
TABLET ORAL
Qty: 28 TABLET | Refills: 0 | Status: SHIPPED | OUTPATIENT
Start: 2017-08-07 | End: 2017-08-20

## 2017-08-07 RX ORDER — CLOZAPINE 200 MG/1
TABLET ORAL
Qty: 14 TABLET | Refills: 0 | Status: SHIPPED | OUTPATIENT
Start: 2017-08-07 | End: 2017-08-20

## 2017-08-18 DIAGNOSIS — F20.0 PARANOID SCHIZOPHRENIA (H): ICD-10-CM

## 2017-08-18 NOTE — TELEPHONE ENCOUNTER
Clozaril      Last Written Prescription Date: 8/7/17  Last Fill Quantity: 14,  # refills: 0   Last Office Visit with [facility], [facility] or [facility] prescribing provider: 7/10/17                                         Next 5 appointments (look out 90 days)     Aug 30, 2017  9:20 AM CDT   (Arrive by 9:05 AM)   Return Visit with Marysol Maki MD   [facility] ([facility] [place] )    [facility] [address] [place] [id number] [phone]

## 2017-08-20 ENCOUNTER — HOSPITAL ENCOUNTER (INPATIENT)
Facility: HOSPITAL | Age: 32
LOS: 78 days | Discharge: NURSING, REHAB OR LONG-TERM CARE | DRG: 885 | End: 2017-11-06
Admitting: PSYCHIATRY & NEUROLOGY
Payer: COMMERCIAL

## 2017-08-20 ENCOUNTER — TRANSFERRED RECORDS (OUTPATIENT)
Dept: HEALTH INFORMATION MANAGEMENT | Facility: HOSPITAL | Age: 32
End: 2017-08-20

## 2017-08-20 DIAGNOSIS — F39 EPISODIC MOOD DISORDER (H): ICD-10-CM

## 2017-08-20 DIAGNOSIS — Z00.00 HEALTHCARE MAINTENANCE: ICD-10-CM

## 2017-08-20 DIAGNOSIS — F20.0 SCHIZOPHRENIA, PARANOID, SUBCHRONIC WITH ACUTE EXACERBATION (H): ICD-10-CM

## 2017-08-20 DIAGNOSIS — E55.9 VITAMIN D DEFICIENCY: Primary | ICD-10-CM

## 2017-08-20 LAB
ALBUMIN SERPL-MCNC: 4.3 G/DL (ref 3.4–5)
ALBUMIN UR-MCNC: 30 MG/DL
ALP SERPL-CCNC: 61 U/L (ref 40–150)
ALT SERPL W P-5'-P-CCNC: 95 U/L (ref 0–70)
AMPHETAMINES UR QL SCN: NEGATIVE
ANION GAP SERPL CALCULATED.3IONS-SCNC: 10 MMOL/L (ref 3–14)
APAP SERPL-MCNC: <2 MG/L (ref 10–20)
APPEARANCE UR: ABNORMAL
AST SERPL W P-5'-P-CCNC: 41 U/L (ref 0–45)
BACTERIA #/AREA URNS HPF: ABNORMAL /HPF
BARBITURATES UR QL: NEGATIVE
BASOPHILS # BLD AUTO: 0.1 10E9/L (ref 0–0.2)
BASOPHILS NFR BLD AUTO: 0.7 %
BENZODIAZ UR QL: NEGATIVE
BILIRUB SERPL-MCNC: 0.8 MG/DL (ref 0.2–1.3)
BILIRUB UR QL STRIP: NEGATIVE
BUN SERPL-MCNC: 12 MG/DL (ref 7–30)
CALCIUM SERPL-MCNC: 9.4 MG/DL (ref 8.5–10.1)
CANNABINOIDS UR QL SCN: POSITIVE
CHLORIDE SERPL-SCNC: 107 MMOL/L (ref 94–109)
CO2 SERPL-SCNC: 21 MMOL/L (ref 20–32)
COCAINE UR QL: NEGATIVE
COLOR UR AUTO: YELLOW
CREAT SERPL-MCNC: 0.95 MG/DL (ref 0.66–1.25)
CRP SERPL-MCNC: <2.9 MG/L (ref 0–8)
DIFFERENTIAL METHOD BLD: ABNORMAL
EOSINOPHIL # BLD AUTO: 0.1 10E9/L (ref 0–0.7)
EOSINOPHIL NFR BLD AUTO: 0.7 %
ERYTHROCYTE [DISTWIDTH] IN BLOOD BY AUTOMATED COUNT: 13 % (ref 10–15)
ETHANOL SERPL-MCNC: <0.01 G/DL
GFR SERPL CREATININE-BSD FRML MDRD: >90 ML/MIN/1.7M2
GLUCOSE SERPL-MCNC: 94 MG/DL (ref 70–99)
GLUCOSE UR STRIP-MCNC: NEGATIVE MG/DL
HCT VFR BLD AUTO: 44.8 % (ref 40–53)
HGB BLD-MCNC: 15.8 G/DL (ref 13.3–17.7)
HGB UR QL STRIP: NEGATIVE
IMM GRANULOCYTES # BLD: 0.1 10E9/L (ref 0–0.4)
IMM GRANULOCYTES NFR BLD: 0.4 %
KETONES UR STRIP-MCNC: 80 MG/DL
LEUKOCYTE ESTERASE UR QL STRIP: NEGATIVE
LYMPHOCYTES # BLD AUTO: 2 10E9/L (ref 0.8–5.3)
LYMPHOCYTES NFR BLD AUTO: 15.1 %
MCH RBC QN AUTO: 30.3 PG (ref 26.5–33)
MCHC RBC AUTO-ENTMCNC: 35.3 G/DL (ref 31.5–36.5)
MCV RBC AUTO: 86 FL (ref 78–100)
METHADONE UR QL SCN: NEGATIVE
MONOCYTES # BLD AUTO: 1.1 10E9/L (ref 0–1.3)
MONOCYTES NFR BLD AUTO: 8.4 %
MUCOUS THREADS #/AREA URNS LPF: PRESENT /LPF
NEUTROPHILS # BLD AUTO: 10 10E9/L (ref 1.6–8.3)
NEUTROPHILS NFR BLD AUTO: 74.7 %
NITRATE UR QL: NEGATIVE
NRBC # BLD AUTO: 0 10*3/UL
NRBC BLD AUTO-RTO: 0 /100
OPIATES UR QL SCN: NEGATIVE
PCP UR QL SCN: NEGATIVE
PH UR STRIP: 7 PH (ref 4.7–8)
PLATELET # BLD AUTO: 377 10E9/L (ref 150–450)
POTASSIUM SERPL-SCNC: 3.7 MMOL/L (ref 3.4–5.3)
PROT SERPL-MCNC: 8.1 G/DL (ref 6.8–8.8)
RBC # BLD AUTO: 5.22 10E12/L (ref 4.4–5.9)
RBC #/AREA URNS AUTO: 0 /HPF (ref 0–2)
SALICYLATES SERPL-MCNC: 4 MG/DL
SODIUM SERPL-SCNC: 138 MMOL/L (ref 133–144)
SOURCE: ABNORMAL
SP GR UR STRIP: 1.03 (ref 1–1.03)
TSH SERPL DL<=0.005 MIU/L-ACNC: 1.38 MU/L (ref 0.4–4)
UROBILINOGEN UR STRIP-MCNC: 2 MG/DL (ref 0–2)
WBC # BLD AUTO: 13.4 10E9/L (ref 4–11)
WBC #/AREA URNS AUTO: 0 /HPF (ref 0–2)

## 2017-08-20 PROCEDURE — 99285 EMERGENCY DEPT VISIT HI MDM: CPT

## 2017-08-20 PROCEDURE — 80329 ANALGESICS NON-OPIOID 1 OR 2: CPT

## 2017-08-20 PROCEDURE — 80307 DRUG TEST PRSMV CHEM ANLYZR: CPT

## 2017-08-20 PROCEDURE — 36415 COLL VENOUS BLD VENIPUNCTURE: CPT

## 2017-08-20 PROCEDURE — 80320 DRUG SCREEN QUANTALCOHOLS: CPT

## 2017-08-20 PROCEDURE — 12400000 ZZH R&B MH

## 2017-08-20 PROCEDURE — 25000132 ZZH RX MED GY IP 250 OP 250 PS 637

## 2017-08-20 PROCEDURE — 74020 ZZHC X-RAY ABDOMEN COMPLETE: CPT | Mod: TC

## 2017-08-20 PROCEDURE — 85025 COMPLETE CBC W/AUTO DIFF WBC: CPT

## 2017-08-20 PROCEDURE — 86140 C-REACTIVE PROTEIN: CPT

## 2017-08-20 PROCEDURE — 80053 COMPREHEN METABOLIC PANEL: CPT

## 2017-08-20 PROCEDURE — 84443 ASSAY THYROID STIM HORMONE: CPT

## 2017-08-20 PROCEDURE — 81001 URINALYSIS AUTO W/SCOPE: CPT

## 2017-08-20 RX ORDER — HYDROXYZINE HYDROCHLORIDE 25 MG/1
25-50 TABLET, FILM COATED ORAL EVERY 4 HOURS PRN
Status: DISCONTINUED | OUTPATIENT
Start: 2017-08-20 | End: 2017-11-06 | Stop reason: HOSPADM

## 2017-08-20 RX ORDER — ACETAMINOPHEN 325 MG/1
650 TABLET ORAL EVERY 4 HOURS PRN
Status: DISCONTINUED | OUTPATIENT
Start: 2017-08-20 | End: 2017-11-06 | Stop reason: HOSPADM

## 2017-08-20 RX ORDER — LORAZEPAM 1 MG/1
1 TABLET ORAL ONCE
Status: COMPLETED | OUTPATIENT
Start: 2017-08-20 | End: 2017-08-20

## 2017-08-20 RX ORDER — ALUMINA, MAGNESIA, AND SIMETHICONE 2400; 2400; 240 MG/30ML; MG/30ML; MG/30ML
30 SUSPENSION ORAL EVERY 4 HOURS PRN
Status: DISCONTINUED | OUTPATIENT
Start: 2017-08-20 | End: 2017-11-06 | Stop reason: HOSPADM

## 2017-08-20 RX ORDER — OLANZAPINE 10 MG/2ML
10 INJECTION, POWDER, FOR SOLUTION INTRAMUSCULAR
Status: DISCONTINUED | OUTPATIENT
Start: 2017-08-20 | End: 2017-11-06 | Stop reason: HOSPADM

## 2017-08-20 RX ORDER — OLANZAPINE 10 MG/1
10 TABLET ORAL
Status: DISCONTINUED | OUTPATIENT
Start: 2017-08-20 | End: 2017-11-06 | Stop reason: HOSPADM

## 2017-08-20 RX ORDER — TRAZODONE HYDROCHLORIDE 50 MG/1
50 TABLET, FILM COATED ORAL
Status: DISCONTINUED | OUTPATIENT
Start: 2017-08-20 | End: 2017-11-06 | Stop reason: HOSPADM

## 2017-08-20 RX ADMIN — LORAZEPAM 1 MG: 1 TABLET ORAL at 17:00

## 2017-08-20 ASSESSMENT — ENCOUNTER SYMPTOMS
NERVOUS/ANXIOUS: 1
HEADACHES: 0
DECREASED CONCENTRATION: 1
NECK STIFFNESS: 0
HALLUCINATIONS: 1
CONSTIPATION: 1
ARTHRALGIAS: 0
FATIGUE: 1
ACTIVITY CHANGE: 1
DIFFICULTY URINATING: 0
CONFUSION: 0
FEVER: 0
EYE REDNESS: 0
COLOR CHANGE: 0
DYSPHORIC MOOD: 1
APPETITE CHANGE: 1
ABDOMINAL PAIN: 1
SLEEP DISTURBANCE: 1
SHORTNESS OF BREATH: 0

## 2017-08-20 ASSESSMENT — ACTIVITIES OF DAILY LIVING (ADL)
TRANSFERRING: 0-->INDEPENDENT
FALL_HISTORY_WITHIN_LAST_SIX_MONTHS: NO
TOILETING: 0-->INDEPENDENT
SWALLOWING: 0-->SWALLOWS FOODS/LIQUIDS WITHOUT DIFFICULTY
RETIRED_EATING: 0-->INDEPENDENT
COGNITION: 1 - ATTENTION OR MEMORY DEFICITS
ORAL_HYGIENE: PROMPTS
GROOMING: PROMPTS
DRESS: SCRUBS (BEHAVIORAL HEALTH)
BATHING: 0-->INDEPENDENT
DRESS: 0-->INDEPENDENT
AMBULATION: 0-->INDEPENDENT
RETIRED_COMMUNICATION: 0-->UNDERSTANDS/COMMUNICATES WITHOUT DIFFICULTY

## 2017-08-20 NOTE — IP AVS SNAPSHOT
MRN:[id number]                      After Visit Summary   8/20/2017    Miles Montez    MRN: [id number]           Thank you!     Thank you for choosing [facility] for your care. Our goal is always to provide you with excellent care. Hearing back from our patients is one way we can continue to improve our services. Please take a few minutes to complete the written survey that you may receive in the mail after you visit with us. Thank you!        Patient Information     Date Of Birth          1985        Designated Caregiver       Most Recent Value    Caregiver    Will someone help with your care after discharge? no      About your hospital stay     You were admitted on:  August 20, 2017 You last received care in the:  [facility]    You were discharged on:  November 6, 2017       Who to Call     For medical emergencies, please call 911.  For non-urgent questions about your medical care, please call your primary care provider or clinic, [phone]          Attending Provider     Provider Specialty    Lucina Beckham APRN FNP Nurse Practitioner - Keanu Brock MD Psychiatry    Kim Cardozo NP Nurse Practitioner       Primary Care Provider Office Phone # Fax #    Maria Eugenia Mcgrath [phone]      Further instructions from your care team       Behavioral Discharge Planning and Instructions    Summary: Miles was admitted to  with increased symptoms of psychosis.     Main Diagnosis:  Paranoid schizophrenia, schizo affective disorder bipolar type, Amphetamine use disorder, mild    Major Treatments, Procedures and Findings: Stabilize with medications, connect with community programs.    Symptoms to Report: feeling more aggressive, increased confusion, losing more sleep, mood getting worse or thoughts of suicide    Lifestyle Adjustment: Take all medications as prescribed, meet with doctor/ medication provider, out patient therapist, , and [facility]  worker as scheduled. Abstain from alcohol or any unprescribed drugs.    Psychiatry Follow-up:     [facility]  Med management - Marysol Maki -    Phone: [phone]   Fax: [phone]    [facility] ACT Team  Jenise Calix  [address], [address]  [place] [address]  Phone: [phone]  Fax: [phone]    [facility]  [address] [place] [address]  Phone: [phone]  Fax: [phone]    [facility]:   [address] [place] [address]  Phone:  [phone]   Fax - [phone]    [facility]   Care Coordinator- Ilda Hodge [phone]      Resources:   Crisis Intervention: 868.925.2587 or 707-893-2238 (TTY: 578.263.9977).  Call anytime for help.  National [place] on Mental Illness (www.[place].phu.org): 675.246.3832 or 607-557-2864.  Alcoholics Anonymous (www.alcoholics-anonymous.org): Check your phone book for your local chapter.  Suicide Awareness Voices of Education (SAVE) (www.save.org): 650-457-TFWL (9040)  National Suicide Prevention Line (www.mentalhealthmn.org): 836-763-ZPJJ (3404)  Mental Health Consumer/Survivor Network of [place] (www.mhcsn.net): 700.434.3237 or 707-558-3454  Mental Health Association of [place] (www.mentalhealth.org): 885.882.9710 or 064-497-0240    General Medication Instructions:   See your medication sheet(s) for instructions.   Take all medicines as directed.  Make no changes unless your doctor suggests them.   Go to all your doctor visits.  Be sure to have all your required lab tests. This way, your medicines can be refilled on time.  Do not use any drugs not prescribed by your doctor.  Avoid alcohol.    [place] Area:  [facility], [facility] stabilization [facility]- [phone]  [facility] Crisis Line: 4-386-960-1913  Advocates For Family Peace: 849-9949  Sexual Assault Program of [facility]: 820-971-8465 or 2-258-217-4564  [place] [facility] Women's Program: 9-213-438-9697 Ext: 279       Calls  "answered Mon-Fri-8:00 am--4:30 pm    [place]:  Advocates for Family Peace: 1-573.167.9697  [facility] first call for help: 1-469.986.9925  [facility] [place] [facility] Counseling Crisis Center:  (709) 555-1243      [place] Area:  Warm Line: 5-794-739-2878       Calls answered Tuesday--Saturday 4:00 pm--10:00 pm  [place] Crisis Line - 185.229.5424  [place] [place] [facility] 630-510-9585    [place] Statewide:  [place] Crisis and Referral Services: 1-433.228.4753  National Suicide Prevention Lifeline: 9-661-193-TALK (6966)   - joj4kljh- Text  Life  to [address]1446  First Call for Help:   [facility] Helpline- 4-616-AYHJ-HELP      Pending Results     No orders found from 2017 to 2017.            Statement of Approval     Ordered          17 1143  I have reviewed and agree with all the recommendations and orders detailed in this document.  EFFECTIVE NOW     Approved and electronically signed by:  Supa Espinoza NP             Admission Information     Date & Time Provider Department Dept. Phone    2017 Kim Cardozo NP [facility] [phone]      Your Vitals Were     Blood Pressure Pulse Temperature Respirations Height Weight    110/65 88 97.3  F (36.3  C) (Tympanic) 16 1.829 m (6') 108.6 kg (239 lb 6.4 oz)    Pulse Oximetry BMI (Body Mass Index)                96% 32.47 kg/m2          [employer] Information     [employer] lets you send messages to your doctor, view your test results, renew your prescriptions, schedule appointments and more. To sign up, go to [email or web address] . Click on \"Log in\" on the left side of the screen, which will take you to the Welcome page. Then click on \"Sign up Now\" on the right side of the page.     You will be asked to enter the access code listed below, as well as some personal information. Please follow the directions to create your username and password.     Your access code is: [email or web address]  Expires: 2018  7:38 AM     Your access code will  in 90 days. If " you need help or a new code, please call your [facility] clinic or [phone].        Care EveryWhere ID     This is your Care EveryWhere ID. This could be used by other organizations to access your [facility] medical records  [id number]        Equal Access to Services     BLAIREKIM YORDY : Hadii aad ku hadjadeinder Ruiz, wajudeda luqadaha, qaybta kaalmada adealfonzo, waxarcadio joan carminadalia nolanrhonda wilsonlashae avitia. So [facility] [phone].    ATENCIÓN: Si habla español, tiene a colon disposición servicios gratuitos de asistencia lingüística. Llame al [phone].    We comply with applicable federal civil rights and [place] laws. We do not discriminate on the basis of race, color, national origin, age, disability, sex, sexual orientation or gender identity.                             Review of your medicines      START taking        Dose / Directions    benztropine 0.5 MG tablet   Commonly known as:  COGENTIN   Used for:  Schizophrenia, paranoid, subchronic with acute exacerbation (H)        Dose:  0.5 mg   Take 1 tablet (0.5 mg) by mouth 2 times daily   Quantity:  60 tablet   Refills:  0       cholecalciferol 2000 UNITS tablet   Used for:  Vitamin D deficiency   Replaces:  vitamin D3 2000 UNITS Caps        Dose:  2000 Units   Take 2,000 Units by mouth daily   Quantity:  30 tablet   Refills:  0       cloNIDine 0.1 MG tablet   Commonly known as:  CATAPRES   Used for:  Schizophrenia, paranoid, subchronic with acute exacerbation (H)        Dose:  0.1 mg   Take 1 tablet (0.1 mg) by mouth 2 times daily   Quantity:  60 tablet   Refills:  0         CONTINUE these medicines which may have CHANGED, or have new prescriptions. If we are uncertain of the size of tablets/capsules you have at home, strength may be listed as something that might have changed.        Dose / Directions    * Clozapine 200 MG tablet   Commonly known as:  CLOZARIL   This may have changed:  See the new instructions.   Used for:  Schizophrenia, paranoid, subchronic  with acute exacerbation (H)        Dose:  200 mg   Take 1 tablet (200 mg) by mouth daily   Quantity:  30 tablet   Refills:  0       * cloZAPine 100 MG tablet   Commonly known as:  CLOZARIL   This may have changed:  See the new instructions.   Used for:  Schizophrenia, paranoid, subchronic with acute exacerbation (H)        Dose:  500 mg   Take 5 tablets (500 mg) by mouth At Bedtime   Quantity:  150 tablet   Refills:  0       * Notice:  This list has 2 medication(s) that are the same as other medications prescribed for you. Read the directions carefully, and ask your doctor or other care provider to review them with you.      CONTINUE these medicines which have NOT CHANGED        Dose / Directions    famotidine 20 MG tablet   Commonly known as:  PEPCID   Used for:  Gastroesophageal reflux disease without esophagitis        TAKE 1 TABLET BY MOUTH TWICE A DAY   Quantity:  56 tablet   Refills:  11         STOP taking     HYDROXYZINE HCL PO           mirtazapine 15 MG tablet   Commonly known as:  REMERON           vitamin D3 2000 UNITS Caps   Replaced by:  cholecalciferol 2000 UNITS tablet                Where to get your medicines      These medications were sent to [facility] [address] [address]     Phone:  [phone]     benztropine 0.5 MG tablet    cholecalciferol 2000 UNITS tablet    cloNIDine 0.1 MG tablet    cloZAPine 100 MG tablet    Clozapine 200 MG tablet                Protect others around you: Learn how to safely use, store and throw away your medicines at www.disposemymeds.org.             Medication List: This is a list of all your medications and when to take them. Check marks below indicate your daily home schedule. Keep this list as a reference.      Medications           Morning Afternoon Evening Bedtime As Needed    benztropine 0.5 MG tablet   Commonly known as:  COGENTIN   Take 1 tablet (0.5 mg) by mouth 2 times daily   Last time  this was given:  0.5 mg on 11/6/2017  8:06 AM                                cholecalciferol 2000 UNITS tablet   Take 2,000 Units by mouth daily   Last time this was given:  2,000 Units on 11/6/2017  8:06 AM                                cloNIDine 0.1 MG tablet   Commonly known as:  CATAPRES   Take 1 tablet (0.1 mg) by mouth 2 times daily   Last time this was given:  0.1 mg on 11/6/2017  8:06 AM                                * Clozapine 200 MG tablet   Commonly known as:  CLOZARIL   Take 1 tablet (200 mg) by mouth daily   Last time this was given:  200 mg on 11/6/2017  8:05 AM                                * cloZAPine 100 MG tablet   Commonly known as:  CLOZARIL   Take 5 tablets (500 mg) by mouth At Bedtime   Last time this was given:  200 mg on 11/6/2017  8:05 AM                                famotidine 20 MG tablet   Commonly known as:  PEPCID   TAKE 1 TABLET BY MOUTH TWICE A DAY   Last time this was given:  20 mg on 11/6/2017  8:06 AM                                * Notice:  This list has 2 medication(s) that are the same as other medications prescribed for you. Read the directions carefully, and ask your doctor or other care provider to review them with you.

## 2017-08-20 NOTE — ED NOTES
Pt presents to the ED with mom.  Mom states pt has not been taking his home medications for the past 2 weeks.  States she was able to get him to take his remeron the last two nights.  Mom states pt is paranoid, having auditory and visual hallucinations, and when she brought him to the ED he started breathing harder when he got here.   Mom states pt not eating or sleeping or bathing for 2-3 weeks.  Pt states he is seeing things and having flashbacks.  States he is seeing himself dead and being killed. And also his family being murdered.  States when he looks at his shoes he is seeing black.  Mom reports pt has been smoking marijuana which is being given to him by his younger brother who thought it would help him feel better.  Denies alcohol or drug use.  Pt is cooperative with assessment and then when provider gave him a warm blanket he became tearful and started to cry.  Mom at the bedside comforting him.  Assessment is complete.

## 2017-08-20 NOTE — IP AVS SNAPSHOT
[facility]    [address] [place] [address]    Phone:  [phone]    Fax:  [phone]                                       After Visit Summary   8/20/2017    Miles Montez    MRN: [id number]           After Visit Summary Signature Page     I have received my discharge instructions, and my questions have been answered. I have discussed any challenges I see with this plan with the nurse or doctor.    ..........................................................................................................................................  Patient/Patient Representative Signature      ..........................................................................................................................................  Patient Representative Print Name and Relationship to Patient    ..................................................               ................................................  Date                                            Time    ..........................................................................................................................................  Reviewed by Signature/Title    ...................................................              ..............................................  Date                                                            Time

## 2017-08-20 NOTE — IP AVS SNAPSHOT
MRN:[id number]                      After Visit Summary   8/20/2017    Miles Montez    MRN: [id number]           Thank you!     Thank you for choosing [facility] for your care. Our goal is always to provide you with excellent care. Hearing back from our patients is one way we can continue to improve our services. Please take a few minutes to complete the written survey that you may receive in the mail after you visit with us. Thank you!        Patient Information     Date Of Birth          1985        Designated Caregiver       Most Recent Value    Caregiver    Will someone help with your care after discharge? no      About your hospital stay     You were admitted on:  August 20, 2017 You last received care in the:  [facility]    You were discharged on:  November 6, 2017       Who to Call     For medical emergencies, please call 911.  For non-urgent questions about your medical care, please call your primary care provider or clinic, [phone]          Attending Provider     Provider Specialty    Lucina Beckham APRN FNP Nurse Practitioner - Keanu Brock MD Psychiatry    Kim Cardozo NP Nurse Practitioner       Primary Care Provider Office Phone # Fax #    Maria Eugenia Mcgrath [phone]      Further instructions from your care team       Behavioral Discharge Planning and Instructions    Summary: Miles was admitted to  with increased symptoms of psychosis.     Main Diagnosis:  Paranoid schizophrenia, schizo affective disorder bipolar type, Amphetamine use disorder, mild    Major Treatments, Procedures and Findings: Stabilize with medications, connect with community programs.    Symptoms to Report: feeling more aggressive, increased confusion, losing more sleep, mood getting worse or thoughts of suicide    Lifestyle Adjustment: Take all medications as prescribed, meet with doctor/ medication provider, out patient therapist, , and [facility]  worker as scheduled. Abstain from alcohol or any unprescribed drugs.    Psychiatry Follow-up:     [facility]  Med management - Marysol Maki -    Phone: [phone]   Fax: [phone]    [facility]  Jenise Calix  [address], [address]  [place] [address]  Phone: [phone]  Fax: [phone]    [facility]  [address] [place] [address]  Phone: [phone]  Fax: [phone]    [facility]:   [address] [place] [address]  Phone:  [phone]   Fax - [phone]    [facility]   Care Coordinator- Ilda Hodge [phone]      Resources:   Crisis Intervention: 309.864.6963 or 211-627-0758 (TTY: 594.199.5228).  Call anytime for help.  National [place] on Mental Illness (www.mn.phu.org): 576.580.8049 or 410-758-3862.  Alcoholics Anonymous (www.alcoholics-anonymous.org): Check your phone book for your local chapter.  Suicide Awareness Voices of Education (SAVE) (www.save.org): 607-491-QQKG (0226)  National Suicide Prevention Line (www.mentalhealthmn.org): 006-865-CXQP (2856)  Mental Health Consumer/Survivor Network of [place] (www.mhcsn.net): 133.135.9162 or 925-941-9956  Mental Health Association of [place] (www.mentalhealth.org): 944.608.4652 or 801-762-3821    General Medication Instructions:   See your medication sheet(s) for instructions.   Take all medicines as directed.  Make no changes unless your doctor suggests them.   Go to all your doctor visits.  Be sure to have all your required lab tests. This way, your medicines can be refilled on time.  Do not use any drugs not prescribed by your doctor.  Avoid alcohol.    [place] Area:  [facility], [facility] stabilization Lists of hospitals in the [place]- 455.198.5338  [facility] Crisis Line: 7-781-967-0784  Advocates For Family Peace: 087-3328  Sexual Assault Program of [facility]: 821-889-6750 or 1-284-675-7199  [facility] [place] Battered Women's Program: 3-432-579-9892 Ext: 279       Calls  "answered Mon-Fri-8:00 am--4:30 pm    [place]:  Advocates for Family Peace: 1-923.291.7365  [facility] first call for help: 1-969.915.3465  [facility] Crisis Center:  (764) 698-3174      [place] Area:  Warm Line: 1-346-644-1346       Calls answered Tuesday--Saturday 4:00 pm--10:00 pm  [place] Crisis Line - 417.663.7529  [place] [facility] Stabilization 955-064-4350    [place] Statewide:  [place] Crisis and Referral Services: 1-807.606.2799  National Suicide Prevention Lifeline: 6-707-251-TALK (5285)   - kxm1aokg- Text  Life  to 29417  First Call for Help:   [facility] Helpline- 1-522-NDST-HELP      Pending Results     No orders found from 2017 to 2017.            Statement of Approval     Ordered          17 1143  I have reviewed and agree with all the recommendations and orders detailed in this document.  EFFECTIVE NOW     Approved and electronically signed by:  Supa Espinoza NP             Admission Information     Date & Time Provider Department Dept. Phone    2017 Kim Cardozo NP [facility] [phone]      Your Vitals Were     Blood Pressure Pulse Temperature Respirations Height Weight    110/65 88 97.3  F (36.3  C) (Tympanic) 16 1.829 m (6') 108.6 kg (239 lb 6.4 oz)    Pulse Oximetry BMI (Body Mass Index)                96% 32.47 kg/m2          inSelly Information     inSelly lets you send messages to your doctor, view your test results, renew your prescriptions, schedule appointments and more. To sign up, go to [email or web address] . Click on \"Log in\" on the left side of the screen, which will take you to the Welcome page. Then click on \"Sign up Now\" on the right side of the page.     You will be asked to enter the access code listed below, as well as some personal information. Please follow the directions to create your username and password.     Your access code is: [email or web address]  Expires: 2018  7:38 AM     Your access code will  in 90 days. If " you need help or a new code, please call your [place] clinic or [phone].        Care EveryWhere ID     This is your Care EveryWhere ID. This could be used by other organizations to access your [place] medical records  [id number]        Equal Access to Services     BLAIREKIM YORDY : Hadii aad ku hadjadeinder Soeula, wajudeda luqadaha, qaybta kaalmada adealfonzo, waxarcadio joan carminadalia nolanrhonda wilsonlashae avitia. So [facility] [phone].    ATENCIÓN: Si habla español, tiene a colon disposición servicios gratuitos de asistencia lingüística. Llame al [phone].    We comply with applicable federal civil rights laws and [place] laws. We do not discriminate on the basis of race, color, national origin, age, disability, sex, sexual orientation, or gender identity.               Review of your medicines      START taking        Dose / Directions    benztropine 0.5 MG tablet   Commonly known as:  COGENTIN   Used for:  Schizophrenia, paranoid, subchronic with acute exacerbation (H)        Dose:  0.5 mg   Take 1 tablet (0.5 mg) by mouth 2 times daily   Quantity:  60 tablet   Refills:  0       cholecalciferol 2000 UNITS tablet   Used for:  Vitamin D deficiency   Replaces:  vitamin D3 2000 UNITS Caps        Dose:  2000 Units   Take 2,000 Units by mouth daily   Quantity:  30 tablet   Refills:  0       cloNIDine 0.1 MG tablet   Commonly known as:  CATAPRES   Used for:  Schizophrenia, paranoid, subchronic with acute exacerbation (H)        Dose:  0.1 mg   Take 1 tablet (0.1 mg) by mouth 2 times daily   Quantity:  60 tablet   Refills:  0         CONTINUE these medicines which may have CHANGED, or have new prescriptions. If we are uncertain of the size of tablets/capsules you have at home, strength may be listed as something that might have changed.        Dose / Directions    * Clozapine 200 MG tablet   Commonly known as:  CLOZARIL   This may have changed:  See the new instructions.   Used for:  Schizophrenia, paranoid, subchronic with acute  exacerbation (H)        Dose:  200 mg   Take 1 tablet (200 mg) by mouth daily   Quantity:  30 tablet   Refills:  0       * cloZAPine 100 MG tablet   Commonly known as:  CLOZARIL   This may have changed:  See the new instructions.   Used for:  Schizophrenia, paranoid, subchronic with acute exacerbation (H)        Dose:  500 mg   Take 5 tablets (500 mg) by mouth At Bedtime   Quantity:  150 tablet   Refills:  0       * Notice:  This list has 2 medication(s) that are the same as other medications prescribed for you. Read the directions carefully, and ask your doctor or other care provider to review them with you.      CONTINUE these medicines which have NOT CHANGED        Dose / Directions    famotidine 20 MG tablet   Commonly known as:  PEPCID   Used for:  Gastroesophageal reflux disease without esophagitis        TAKE 1 TABLET BY MOUTH TWICE A DAY   Quantity:  56 tablet   Refills:  11         STOP taking     HYDROXYZINE HCL PO           mirtazapine 15 MG tablet   Commonly known as:  REMERON           vitamin D3 2000 UNITS Caps   Replaced by:  cholecalciferol 2000 UNITS tablet                Where to get your medicines      These medications were sent to [facility] [address] [address]     Phone:  [phone]     benztropine 0.5 MG tablet    cholecalciferol 2000 UNITS tablet    cloNIDine 0.1 MG tablet    cloZAPine 100 MG tablet    Clozapine 200 MG tablet                Protect others around you: Learn how to safely use, store and throw away your medicines at www.disposemymeds.org.             Medication List: This is a list of all your medications and when to take them. Check marks below indicate your daily home schedule. Keep this list as a reference.      Medications           Morning Afternoon Evening Bedtime As Needed    benztropine 0.5 MG tablet   Commonly known as:  COGENTIN   Take 1 tablet (0.5 mg) by mouth 2 times daily   Last time this was  given:  0.5 mg on 11/5/2017  8:25 PM                                cholecalciferol 2000 UNITS tablet   Take 2,000 Units by mouth daily   Last time this was given:  2,000 Units on 11/5/2017  8:45 AM                                cloNIDine 0.1 MG tablet   Commonly known as:  CATAPRES   Take 1 tablet (0.1 mg) by mouth 2 times daily   Last time this was given:  0.1 mg on 11/5/2017  8:25 PM                                * Clozapine 200 MG tablet   Commonly known as:  CLOZARIL   Take 1 tablet (200 mg) by mouth daily   Last time this was given:  500 mg on 11/5/2017  8:25 PM                                * cloZAPine 100 MG tablet   Commonly known as:  CLOZARIL   Take 5 tablets (500 mg) by mouth At Bedtime   Last time this was given:  500 mg on 11/5/2017  8:25 PM                                famotidine 20 MG tablet   Commonly known as:  PEPCID   TAKE 1 TABLET BY MOUTH TWICE A DAY   Last time this was given:  20 mg on 11/5/2017  8:25 PM                                * Notice:  This list has 2 medication(s) that are the same as other medications prescribed for you. Read the directions carefully, and ask your doctor or other care provider to review them with you.

## 2017-08-20 NOTE — ED PROVIDER NOTES
History     Chief Complaint   Patient presents with     Psychiatric Evaluation     note taking his meds, eating, sleeping or taking his med for the last 2 weeks     HPI  Miles Montez is a 30 year old male with hx of paranoid schizophrenia, capgras syndrome, presents to ED with mother for evaluation of anxiety, poor self care, delusional behavior. Mom stated pt has not taken meds regularly over the past 2 months. Pt lives with mother, she states he has had increased delusions, hallucinations over the past month with exacerbation over the past couple days where he has not slept or ate a meal in 3 days. Mom states he became very anxious, talking irrational. Denies desire to hurt himself or others at present time. Pt reports to abdominal fullness, no cough or cold sx, no cp or sob.  Pt has not showered/bathed in weeks.    Allergies   Allergen Reactions     Pcn [Bicillin C-R,] Rash     Provider wants to try amoxicillin(benadryl ordered as well) 9/10/16     Bupropion Other (See Comments)     delusions      Depakote [Valproic Acid] Other (See Comments)     Heart racing     Divalproex Sodium-Fd&C Red #40      Increase heart rate       No current facility-administered medications on file prior to encounter.   Current Outpatient Prescriptions on File Prior to Encounter:  cloZAPine (CLOZARIL) 100 MG tablet TAKE 1 TABLET EVERY MORNING AND TAKE 1 TABLET AT NIGHT ALONG WITH 200MG TABLET FOR A TOTAL DAILY DOSE OF 400MG   Clozapine (CLOZARIL) 200 MG tablet TAKE 1 TABLET (200MG) BY MOUTH EVERY EVENING ALONG WITH 100MG TABLET FOR A TOTAL NIGHTLY DOSE OF 300MG   mirtazapine (REMERON) 15 MG tablet Take 0.5 tablets (7.5 mg) by mouth At Bedtime   famotidine (PEPCID) 20 MG tablet TAKE 1 TABLET BY MOUTH TWICE A DAY   Cholecalciferol (VITAMIN D3) 2000 UNITS CAPS TAKE 1 CAPSULE BY MOUTH DAILY     Patient Active Problem List   Diagnosis     Episodic mood disorder (H)     Poisoning by Methamphetamines     Cannabis abuse     Hypertension  goal BP (blood pressure) < 140/90     Psychosis     Depression     Paranoid schizophrenia, chronic condition with acute exacerbation (H)     Schizophrenia, paranoid, subchronic with acute exacerbation (H)     Schizophrenia, paranoid type (H)     Drug eruption     ACP (advance care planning)       Past Surgical History:   Procedure Laterality Date     HYDROCELECTOMY INGUINAL CHILD         Social History   Substance Use Topics     Smoking status: Current Every Day Smoker     Packs/day: 1.50     Years: 13.00     Types: Cigarettes     Smokeless tobacco: Never Used     Alcohol use No      Comment: in the past       Most Recent Immunizations   Administered Date(s) Administered     DTAP (<7y) 08/24/1993     HepB-Peds 10/12/2000     MMR 03/28/2000     OPV 08/24/1993     TD (ADULT, 7+) 03/28/2000     TDAP Vaccine (Adacel) 01/01/2009       BMI: Estimated body mass index is 30.22 kg/(m^2) as calculated from the following:    Height as of this encounter: 1.829 m (6').    Weight as of this encounter: 101.1 kg (222 lb 12.8 oz).      Review of Systems   Constitutional: Positive for activity change, appetite change and fatigue. Negative for fever.   HENT: Negative for congestion.    Eyes: Negative for redness.   Respiratory: Negative for shortness of breath.    Cardiovascular: Negative for chest pain.   Gastrointestinal: Positive for abdominal pain and constipation.   Genitourinary: Negative for difficulty urinating.   Musculoskeletal: Negative for arthralgias and neck stiffness.   Skin: Negative for color change.   Neurological: Negative for headaches.   Psychiatric/Behavioral: Positive for behavioral problems, decreased concentration, dysphoric mood, hallucinations and sleep disturbance. Negative for confusion. The patient is nervous/anxious.        Physical Exam   BP: 135/72  Heart Rate: 92  Temp: 98.6  F (37  C)  Resp: 22  SpO2: 99 %  Physical Exam   Constitutional: No distress.   Disheveled, very poor hygiene   HENT:   Head:  Normocephalic and atraumatic.   Eyes: Conjunctivae are normal.   Neck: Normal range of motion. Neck supple.   Cardiovascular: Normal rate and regular rhythm.    Pulmonary/Chest: Effort normal and breath sounds normal. No respiratory distress.   Abdominal: Soft. There is generalized tenderness.   Musculoskeletal: Normal range of motion.   Neurological: He is alert. No cranial nerve deficit.   Skin: Skin is warm and dry. He is not diaphoretic.   Dark thick curling toe nails both feet   Psychiatric: His mood appears anxious. His affect is blunt. His speech is tangential. He is actively hallucinating. Thought content is delusional. Cognition and memory are impaired.   Nursing note and vitals reviewed.    ED Course     Procedures         Labs Ordered and Resulted from Time of ED Arrival Up to the Time of Departure from the ED   UA MACROSCOPIC WITH REFLEX TO MICRO AND CULTURE - Abnormal; Notable for the following:        Result Value    Ketones Urine 80 (*)     Protein Albumin Urine 30 (*)     Bacteria Urine None (*)     Mucous Urine Present (*)     All other components within normal limits   DRUG SCREEN URINE (RANGE) - Abnormal; Notable for the following:     Cannabinoids Qual Urine Positive (*)     All other components within normal limits   CBC WITH PLATELETS DIFFERENTIAL - Abnormal; Notable for the following:     WBC 13.4 (*)     Absolute Neutrophil 10.0 (*)     All other components within normal limits   COMPREHENSIVE METABOLIC PANEL - Abnormal; Notable for the following:     ALT 95 (*)     All other components within normal limits   ACETAMINOPHEN LEVEL   ALCOHOL ETHYL   CRP INFLAMMATION   SALICYLATE LEVEL   TSH WITH FREE T4 REFLEX     ABDOMEN SUPINE AND UPRIGHT VIEWS    FINDINGS:  There is no free intraperitoneal air.  Gas and stool are  seen within the colon to the level of the rectosigmoid.  There are no  dilated gas-filled small bowel loops.    No mass or suspicious calcification is seen.  There is no  significant  spine abnormality.  There is some  narrowing of hip joint spaces  bilaterally, especially superolaterally.    IMPRESSION:  BOWEL GAS PATTERN IS WITHIN NORMAL LIMITS.  Exam Date: Aug 20, 2017 05:23:08 PM  Author: JUAN STEVENS    Assessments & Plan (with Medical Decision Making)   Pt with hx of paranoid schizophrenia with worsening sx over the past 2-3 weeks, poor self care. Vitally stable, medically cleared. Consulted with Central Intake, pt accepted to N, mom and pt verbalize understanding and agree with plan  Pt transferred to floor in stable condition.  I have reviewed the nursing notes.    I have reviewed the findings, diagnosis, plan and need for follow up with the patient.    Final diagnoses:   Schizophrenia, paranoid, subchronic with acute exacerbation (H)   Episodic mood disorder (H)       8/20/2017   [facility]     Lucina Beckham APRN FNP  08/22/17 [id number]

## 2017-08-20 NOTE — ED NOTES
Nurse to nurse report given to Vannesa GRAY.  Pt transferring to [address] [place] [address].  Cooperative with transfer.  VS as charted.  Denies pain.  Mom at pt side.  Prior to transfer pt starting to pace some in room.

## 2017-08-21 ENCOUNTER — TRANSFERRED RECORDS (OUTPATIENT)
Dept: HEALTH INFORMATION MANAGEMENT | Facility: HOSPITAL | Age: 32
End: 2017-08-21

## 2017-08-21 PROCEDURE — 25000132 ZZH RX MED GY IP 250 OP 250 PS 637: Performed by: NURSE PRACTITIONER

## 2017-08-21 PROCEDURE — S0136 CLOZAPINE, 25 MG: HCPCS | Performed by: NURSE PRACTITIONER

## 2017-08-21 PROCEDURE — 99223 1ST HOSP IP/OBS HIGH 75: CPT | Performed by: NURSE PRACTITIONER

## 2017-08-21 PROCEDURE — 12400000 ZZH R&B MH

## 2017-08-21 RX ORDER — BENZTROPINE MESYLATE 0.5 MG/1
0.5 TABLET ORAL 2 TIMES DAILY PRN
Status: DISCONTINUED | OUTPATIENT
Start: 2017-08-21 | End: 2017-11-04

## 2017-08-21 RX ORDER — CLOZAPINE 100 MG/1
TABLET ORAL
Qty: 28 TABLET | Refills: 0 | Status: SHIPPED | OUTPATIENT
Start: 2017-08-21 | End: 2017-11-05

## 2017-08-21 RX ORDER — CLOZAPINE 25 MG/1
25 TABLET ORAL AT BEDTIME
Status: DISCONTINUED | OUTPATIENT
Start: 2017-08-21 | End: 2017-08-22

## 2017-08-21 RX ORDER — FAMOTIDINE 20 MG/1
20 TABLET, FILM COATED ORAL 2 TIMES DAILY
Status: DISCONTINUED | OUTPATIENT
Start: 2017-08-21 | End: 2017-11-06 | Stop reason: HOSPADM

## 2017-08-21 RX ORDER — CLOZAPINE 200 MG/1
TABLET ORAL
Qty: 14 TABLET | Refills: 0 | Status: SHIPPED | OUTPATIENT
Start: 2017-08-21 | End: 2017-11-05

## 2017-08-21 RX ADMIN — FAMOTIDINE 20 MG: 20 TABLET ORAL at 20:45

## 2017-08-21 RX ADMIN — CLOZAPINE 25 MG: 25 TABLET ORAL at 20:45

## 2017-08-21 RX ADMIN — BENZTROPINE MESYLATE 0.5 MG: 0.5 TABLET ORAL at 18:01

## 2017-08-21 ASSESSMENT — ACTIVITIES OF DAILY LIVING (ADL)
ORAL_HYGIENE: PROMPTS
LAUNDRY: UNABLE TO COMPLETE
GROOMING: PROMPTS
DRESS: PROMPTS;SCRUBS (BEHAVIORAL HEALTH)
GROOMING: INDEPENDENT
ORAL_HYGIENE: INDEPENDENT
DRESS: INDEPENDENT;SCRUBS (BEHAVIORAL HEALTH)
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
## 2017-08-21 NOTE — PLAN OF CARE
"Face to face end of shift report received from MARINA Gaston. Rounding completed. Patient observed in hallway. Responses delayed, states \"Its like back to the future up here.\" Pt then laughs and walks away.     Lisa Dia  8/21/2017  3:46 PM        "

## 2017-08-21 NOTE — PROGRESS NOTES
"CLINICAL NUTRITION SERVICES  -  ASSESSMENT NOTE    REASON FOR ASSESSMENT  Miles Montez is a 31 year old male seen by Registered Dietitian for Admission Nutrition Risk Screen - Reduced oral intake      NUTRITION HISTORY  - Information obtained from patient's nurse. Po intake has increased since admission. Patient eating 100% of meals.    CURRENT NUTRITION ORDERS  Diet Order:     Regular     Current Intake/Tolerance:  Patient tolerating meals with 100% po intake.      PHYSICAL FINDINGS  Obtained from Chart/Interdisciplinary Team  None noted    ANTHROPOMETRICS  Height: 6' 0\"  Weight: 222 lbs 12.8 oz  Body mass index is 30.22 kg/(m^2).  Weight Status:  Obesity Grade I BMI 30-34.9  IBW: 178 lb  % IBW: 125%    LABS  Labs reviewed    MEDICATIONS  Medications reviewed    ASSESSED NUTRITION NEEDS PER APPROVED PRACTICE GUIDELINES:  Estimated Energy Needs: 2145-1725 kcals (20-25 Kcal/Kg)  Justification: overweight  Estimated Protein Needs: 65-81 g grams protein (0.8-1 g pro/Kg IBW)  Justification: obesity guidelines   Estimated Fluid Needs: 2000  mL (1 mL/Kcal)  Justification: maintenance    MALNUTRITION:  % Weight Loss:  None noted  % Intake:  No decreased intake noted    Malnutrition Diagnosis: Patient does not meet two of the above criteria necessary for diagnosing malnutrition  In Context of:  Chronic illness or disease    NUTRITION DIAGNOSIS:  No nutrition diagnosis identified at this time related to adequate po intake as evidenced by eating 100% of meals.      NUTRITION INTERVENTIONS  Recommendations / Nutrition Prescription  Recommend continue current diet order to meet 100% of estimated nutrition needs.    Implementation  Nutrition education: No education needs assessed at this time    Nutrition Goals  1) Continue adequate po intake % of meals.    MONITORING AND EVALUATION:  RD to monitor po intake, diet tolerance, weight trend and make recommendations as needed.    Negar Adams, LUL, LD              "

## 2017-08-21 NOTE — PLAN OF CARE
Problem: Discharge Planning  Goal: Discharge Planning (Adult, OB, Behavioral, Peds)  Writer called pt's case manger; Eri Pitts with [facility] who stated pt is not on commitment at this point.  Stated he does well when he is on commitment but when he is not his pattern is to go off of his medications and decompensate.

## 2017-08-21 NOTE — PLAN OF CARE
Face to face end of shift report received from Aly BOSTON RN. Rounding completed. Patient observed in room.     Marilin Vaz  8/21/2017  8:23 AM

## 2017-08-21 NOTE — PROVIDER NOTIFICATION
08/20/17 1905   Patient Belongings   Did you bring any home meds/supplements to the hospital?  No   Patient Belongings plastic bag;shoes;clothing   Disposition of Belongings inventoried/searched and put in pt belongings room   Belongings Search Yes   Clothing Search Yes   Second Staff Sara GRAY   General Info Comment Black carhart hoodie, 1 pair white socks, 1 black t shirt, 1 pair jeans, 1 pair black tennis shoes, 1 green belt    List items sent to safe: none    Addendum: shaving cream, deodorant, electric razor, razor heads, black bag    All other belongings put in assigned cubby in belongings room.     I have reviewed my belongings list on admission and verify that it is correct.     Patient signature_______________________________    Second staff witness (if patient unable to sign) ______________________________       I have received all my belongings at discharge.    Patient signature________________________________    Stacia Saunders  8/20/2017  7:07 PM

## 2017-08-21 NOTE — PLAN OF CARE
"Problem: General Plan of Care (Inpatient Behavioral)  Goal: Individualization/Patient Specific Goal (IP Behavioral)  The patient and/or their representative will achieve their patient-specific goals related to the plan of care.    The patient-specific goals include:   Patient will have an absence or decrease in hallucinations by time of discharge.  Patient will be medication compliant while hospitalized.  Patient will be able to have a reality based conversation prior to discharge.  Patient will be independent with his ADL s while hospitalized and maintain hygiene.  8/21/17 Pt able to wean to open unit if behaviors are apporiate. Will reassess in treatment team daily.    Outcome: No Change  Pt appears very preoccupied this shift. Is noted to be pacing the unit, talking and laughing to himself. Seems to be responding to internal stimuli. Pt's conversations have been nonsensical, responses are delayed. Pt appears untidy and disheveled, noted body odor. Pt has been weaning for majority of shift. Noted to be very restless.      1600- Approached pt to introduce myself. When pt is asked how he is doing today, pt pauses for quite some time then finally states \"I don't even know.\" Pt then stating \"This place is like back to the future man.\" Pt then laughs and walks away.     1720- Pt's mother called, was given an update on pt's status. Pt's mother states that she wrote out info about pt's medical history and was upset that staff had not read this document. Reassured her that this writer would read it and leave a sticky note for provider to review it. Pt's mother feels that pt's psychosis could be caused by \"pernicious anemia and hormonal imbalances.\" Mother was informed that pt had been restarted on clozaril. She states that she does not feel this med works for pt. Requests that the clozaril be stopped, wants pt started on mirtazapine because she read online that this med was successful in treating capgras syndrome.  Pt's " "mother states that she will be visiting [facility].      1730- Pt informed that his mother was coming to visit tonight. Pt continues to pace. When asked if he is feeling restless, pt states \"I feel like those metal things are on my ankles, you know like handcuffs?\" Pt states that he feet and ankles \"like sting.\" Unknown if this is a tactile hallucination or a symptom of restlessness. This writer encouraged pt to try a PRN cogentin but pt refuses, stating that he does not want to take any medication at this time.     1800- Pt continues to pace. This writer approached pt with PRN cogentin. Pt did reluctantly agree to take med after closely examining packaging and pill. Pt remains nonsensical, states that he is worried about going through withdrawal from taking meds. Pt was informed that this med would not cause him to go through withdrawal. Pt then walks away laughing.     1830- Pt's mother here to visit. Pt does sit with his mother for brief periods of time but then gets up to stand or pace.    1930- Pt showered after prompting from staff. Pt returned to open unit after shower.    2045- Pt seems less restless at this time, states that he does not know if the PRN cogentin he had earlier helped. Pt took scheduled HS clozaril and pepcid reluctantly after closely examining pill packages. When asked how his visit with his mother went, pt states that he does not want to go home because everyone is mad at him. When asked why they are mad, pt [place] \"[place]ed.\" Pt then walks away laughing. A few mins later, pt approached this writer, asking what meds he was given by this writer. Did explain to pt the meds he had received. Pt wrote down the names of these meds \"so I don't forget what I've gotten.\" Pt continues to pace on open unit.   "

## 2017-08-21 NOTE — PLAN OF CARE
Problem: General Plan of Care (Inpatient Behavioral)  Goal: Team Discussion  Team Plan:   BEHAVIORAL TEAM DISCUSSION     Participants: Kim Cardozo NP, Amy Briggs [facility] Center[facility],  Tish Boswell LICSW, Carolyn Holloway Discharge Plannner,Priscilla Henry RN, Geneiss Hines RN, Jazzy Sood OT, Nneka Sprague OT   Progress: None  Continued Stay Criteria/Rationale: Paranoid, delusional  Medical/Physical: None  Precautions:   Behavioral Orders   Procedures     Code 1 - Restrict to Unit     Routine Programming       As clinically indicated     Self Injury Precaution       Bathroom door to remain locked until after provider evaluation     Status 15       Every 15 minutes.     Plan: Stabilize on medications, discharge to home when stable.  Rationale for change in precautions or plan: None
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
## 2017-08-21 NOTE — PLAN OF CARE
Face to face end of shift report received from pm RN. Rounding completed. Patient observed.     Aly Delacruz  8/20/2017  11:40 PM
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
## 2017-08-21 NOTE — PLAN OF CARE
Problem: General Plan of Care (Inpatient Behavioral)  Goal: Individualization/Patient Specific Goal (IP Behavioral)  The patient and/or their representative will achieve their patient-specific goals related to the plan of care.    The patient-specific goals include:   Patient will have an absence or decrease in hallucinations by time of discharge.  Patient will be medication compliant while hospitalized.  Patient will be able to have a reality based conversation prior to discharge.  Patient will be independent with his ADL s while hospitalized and maintain hygiene.   8/21/17 Pt able to wean to open unit if behaviors are apporiate.  Will reassess in treatment team daily.   Pt is cooperative with writer.  Denies all criteria but dose appear to be responding to internal stimuli.  Pt answers question delayed.  He is disorganized and making paranoidal statements. Pt ate 100% of his breakfast and lunch this shift.  Has been pacing hallways in the MHICU.  Pt weaned open unit this afternoon.  Pt was appopriate with staff and peers.

## 2017-08-22 PROCEDURE — 25000132 ZZH RX MED GY IP 250 OP 250 PS 637: Performed by: NURSE PRACTITIONER

## 2017-08-22 PROCEDURE — S0136 CLOZAPINE, 25 MG: HCPCS | Performed by: NURSE PRACTITIONER

## 2017-08-22 PROCEDURE — 99232 SBSQ HOSP IP/OBS MODERATE 35: CPT | Performed by: NURSE PRACTITIONER

## 2017-08-22 PROCEDURE — 12400000 ZZH R&B MH

## 2017-08-22 RX ORDER — CLOZAPINE 25 MG/1
50 TABLET ORAL AT BEDTIME
Status: DISCONTINUED | OUTPATIENT
Start: 2017-08-22 | End: 2017-08-23

## 2017-08-22 RX ORDER — CLOTRIMAZOLE 1 %
CREAM (GRAM) TOPICAL 2 TIMES DAILY
Status: DISCONTINUED | OUTPATIENT
Start: 2017-08-22 | End: 2017-09-05

## 2017-08-22 RX ADMIN — FAMOTIDINE 20 MG: 20 TABLET ORAL at 20:38

## 2017-08-22 RX ADMIN — VITAMIN D, TAB 1000IU (100/BT) 2000 UNITS: 25 TAB at 08:15

## 2017-08-22 RX ADMIN — CLOZAPINE 50 MG: 25 TABLET ORAL at 20:38

## 2017-08-22 RX ADMIN — FAMOTIDINE 20 MG: 20 TABLET ORAL at 08:15

## 2017-08-22 ASSESSMENT — ACTIVITIES OF DAILY LIVING (ADL)
GROOMING: PROMPTS
ORAL_HYGIENE: PROMPTS
GROOMING: PROMPTS
DRESS: PROMPTS;SCRUBS (BEHAVIORAL HEALTH)
LAUNDRY: UNABLE TO COMPLETE
ORAL_HYGIENE: PROMPTS

## 2017-08-22 NOTE — PLAN OF CARE
Problem: General Plan of Care (Inpatient Behavioral)  Goal: Individualization/Patient Specific Goal (IP Behavioral)  The patient and/or their representative will achieve their patient-specific goals related to the plan of care.    The patient-specific goals include:   Patient will have an absence or decrease in hallucinations by time of discharge.  Patient will be medication compliant while hospitalized.  Patient will be able to have a reality based conversation prior to discharge.  Patient will be independent with his ADL s while hospitalized and maintain hygiene.  8/21/17 Pt able to wean to open unit if behaviors are apporiate. Will reassess in treatment team daily.    2330--in bed with eyes closed and breathing regular. 0544--still in bed with eyes closed and breathing regular.

## 2017-08-22 NOTE — PLAN OF CARE
Face to face end of shift report received from Marilin RAINES RN. Rounding completed. Patient observed.     Key Vaugahn  8/22/2017  4:10 PM

## 2017-08-22 NOTE — PLAN OF CARE
Face to face end of shift report received from Lisa ALBARADO RN. Rounding completed. Patient observed.     Maurice Mcintyre  8/21/2017  11:50 PM

## 2017-08-22 NOTE — PLAN OF CARE
Face to face end of shift report received from Maurice LEOS RN. Rounding completed. Patient observed in [facility].     Marilin Vaz  8/22/2017  7:46 AM

## 2017-08-22 NOTE — PLAN OF CARE
Problem: General Plan of Care (Inpatient Behavioral)  Goal: Team Discussion  Team Plan:   Outcome: No Change  BEHAVIORAL TEAM DISCUSSION     Participants:  Kim Cardozo NP, Amy [facility], Tish Boswell [facility], Carolyn Holloway Discharge Plannner, Priscilla Henry RN, Dyan GRAY, Nneka Sprague OT   Progress: will be moved out to open unit today  Continued Stay Criteria/Rationale: medication adjustment and managment  Medical/Physical: none  Precautions:   Behavioral Orders   Procedures     Code 1 - Restrict to Unit     Routine Programming       As clinically indicated     Status 15       Every 15 minutes.     Plan: continued medication management and adjustment  Rationale for change in precautions or plan:

## 2017-08-22 NOTE — PLAN OF CARE
"Problem: General Plan of Care (Inpatient Behavioral)  Goal: Individualization/Patient Specific Goal (IP Behavioral)  The patient and/or their representative will achieve their patient-specific goals related to the plan of care.    The patient-specific goals include:   Patient will have an absence or decrease in hallucinations by time of discharge.  Patient will be medication compliant while hospitalized.  Patient will be able to have a reality based conversation prior to discharge.  Patient will be independent with his ADL s while hospitalized and maintain hygiene.     Pt is disorganized and delusional today.  He continues to talk about \"flasbacks\" about his \"other self\".  He answers all questions to nursing assessment but is delayed with some answers at times and presents with a flight of ideas at other times.  Pt was moved to the open unit in late am.  Pt is doing well on open unit.  Has been pacing hallways talking and laughing to himself.  Cooperative with all medications this shift.       "

## 2017-08-23 PROCEDURE — 12400000 ZZH R&B MH

## 2017-08-23 PROCEDURE — S0136 CLOZAPINE, 25 MG: HCPCS | Performed by: NURSE PRACTITIONER

## 2017-08-23 PROCEDURE — 25000132 ZZH RX MED GY IP 250 OP 250 PS 637: Performed by: NURSE PRACTITIONER

## 2017-08-23 RX ORDER — CLOZAPINE 25 MG/1
75 TABLET ORAL AT BEDTIME
Status: DISCONTINUED | OUTPATIENT
Start: 2017-08-23 | End: 2017-08-24

## 2017-08-23 RX ADMIN — FAMOTIDINE 20 MG: 20 TABLET ORAL at 08:24

## 2017-08-23 RX ADMIN — FAMOTIDINE 20 MG: 20 TABLET ORAL at 20:06

## 2017-08-23 RX ADMIN — VITAMIN D, TAB 1000IU (100/BT) 2000 UNITS: 25 TAB at 08:24

## 2017-08-23 RX ADMIN — CLOZAPINE 75 MG: 25 TABLET ORAL at 20:06

## 2017-08-23 ASSESSMENT — ACTIVITIES OF DAILY LIVING (ADL)
ORAL_HYGIENE: PROMPTS
GROOMING: PROMPTS

## 2017-08-23 NOTE — PLAN OF CARE
Face to face end of shift report received from Samina MCCARTHY RN. Rounding completed. Patient observed.     Key Vaughan  8/23/2017  3:49 PM

## 2017-08-23 NOTE — PLAN OF CARE
Face to face end of shift report received from Key SARABIA RN. Rounding completed. Patient observed.     Maurice Mcintyre  8/23/2017  12:36 AM

## 2017-08-23 NOTE — PLAN OF CARE
Problem: General Plan of Care (Inpatient Behavioral)  Goal: Individualization/Patient Specific Goal (IP Behavioral)  The patient and/or their representative will achieve their patient-specific goals related to the plan of care.    The patient-specific goals include:   Patient will have an absence or decrease in hallucinations by time of discharge.  Patient will be medication compliant while hospitalized.  Patient will be able to have a reality based conversation prior to discharge.  Patient will be independent with his ADL s while hospitalized and maintain hygiene.     2345--in bed with eyes closed and breathing regular. 0611--in bed until awakened for vitals. 0639--continues to refuse vitals and wanted to go back to sleep.

## 2017-08-23 NOTE — PLAN OF CARE
Problem: General Plan of Care (Inpatient Behavioral)  Goal: Individualization/Patient Specific Goal (IP Behavioral)  The patient and/or their representative will achieve their patient-specific goals related to the plan of care.    The patient-specific goals include:   Patient will have an absence or decrease in hallucinations by time of discharge.  Patient will be medication compliant while hospitalized.  Patient will be able to have a reality based conversation prior to discharge.  Patient will be independent with his ADL s while hospitalized and maintain hygiene.     Outcome: Therapy, progress toward functional goals is gradual  Pt. Pacing in halls frequently, appears to be responding to internal stimuli, not able to hold a reality based conversation, independent with ADL's, but refusing to shower at this time, will continue to monitor.

## 2017-08-23 NOTE — PLAN OF CARE
Face to face end of shift report received from Maurice GRAY. Rounding completed. Patient observed in [facility].    Samina Ponce  8/23/2017  7:46 AM

## 2017-08-23 NOTE — PLAN OF CARE
Problem: General Plan of Care (Inpatient Behavioral)  Goal: Individualization/Patient Specific Goal (IP Behavioral)  The patient and/or their representative will achieve their patient-specific goals related to the plan of care.    The patient-specific goals include:   Patient will have an absence or decrease in hallucinations by time of discharge.  Patient will be medication compliant while hospitalized.  Patient will be able to have a reality based conversation prior to discharge.  Patient will be independent with his ADL s while hospitalized and maintain hygiene.     Patient pacing the halls most of the shift. Patient answered assessment question but was very short and irritable. Patient goes in and out of groups. Patient was cooperative with medications. Patient does appear to be responding to some internal stimuli. Patient has a flat affect. Patient was encouraged to shower. Patient not able to have a reality based conversation during assessment. Patient denied anxiety, depression, pain, SI, and SIB. Patient does contract to find staff if he is feeling like he needs PRN medication. Will continue to monitor.

## 2017-08-23 NOTE — PLAN OF CARE
Problem: General Plan of Care (Inpatient Behavioral)  Goal: Team Discussion  Team Plan:   BEHAVIORAL TEAM DISCUSSION     Participants: Rowan Alford NP, Supa Espinoza NP, Kim Cardozo NP, Yvonne Pedersen NP, Lilly Villarreal [facility], Amy Briggs [facility], Bianca Morales LSW, [facility], Celina Hannah Discharge Planner, Carolyn Holloway Discharge Plannner, Brina Choudhary RN, Priscilla Henry RN,  Alesha Quevedo [facility], Jazzy Sood OT, Nneka Sprague OT   Progress: Minimal  Continued Stay Criteria/Rationale: Increase clozapine  Medical/Physical: Foot fungus  Precautions:   Behavioral Orders   Procedures     Code 1 - Restrict to Unit     Routine Programming       As clinically indicated     Status 15       Every 15 minutes.     Plan: Continue to stabilize  Rationale for change in precautions or plan: none

## 2017-08-23 NOTE — PROGRESS NOTES
"[facility]  Psychiatric Progress Note      Impression:   This is a 31 year old yo male with a history of paranoid schizophrenia.    Miles is up and pacing the halls when I see him today. He tells me that he is \"fine\". He states that he is not happy that he is in the hospital, then starts talking about building several portions of the unit like the doors and the trim. When he is talking he will reference \"my other self\". He states that he does not feel that he needs medications, though will take them if he needs to. Clozaril was restarted last night. In review of recent notes from his outpatient provider, Clozaril was noted to have had the best effect in treating symptoms out of the antipsychotics tried up to this point. Reportedly his mother called and stated that she did not want him to take the Clozaril and would instead like him to take Remeron. He does appear to be somewhat preoccupied and is hesitant in answering some questions. His behavior has been controlled and cooperative and he has been pleasant in conversation. Nursing reports that he likely needs an antifungal ordered for his feet, as he had not been showering regularly for several weeks, so I will order this.       Educated regarding medication indications, risks, benefits, side effects, contraindications and possible interactions. Verbally expressed understanding.        DIagnoses:   Paranoid schizophrenia (by history)  R/O schizoaffective disorder- bipolar type      Attestation:  Patient has been seen and evaluated by me,  Kim Cardozo NP          Interim History:   The patient's care was discussed with the treatment team and chart notes were reviewed.          Medications:     Current Facility-Administered Medications Ordered in Epic   Medication Dose Route Frequency Last Rate Last Dose     clotrimazole (LOTRIMIN) 1 % cream   Topical BID         cloZAPine (CLOZARIL) tablet 50 mg  50 mg Oral At Bedtime         cholecalciferol " "(vitamin D) tablet 2,000 Units  2,000 Units Oral Daily   2,000 Units at 08/22/17 0815     famotidine (PEPCID) tablet 20 mg  20 mg Oral BID   20 mg at 08/22/17 0815     benztropine (COGENTIN) tablet 0.5 mg  0.5 mg Oral BID PRN   0.5 mg at 08/21/17 1801     hydrOXYzine (ATARAX) tablet 25-50 mg  25-50 mg Oral Q4H PRN         acetaminophen (TYLENOL) tablet 650 mg  650 mg Oral Q4H PRN         alum & mag hydroxide-simethicone (MYLANTA ES/MAALOX  ES) suspension 30 mL  30 mL Oral Q4H PRN         magnesium hydroxide (MILK OF MAGNESIA) suspension 30 mL  30 mL Oral At Bedtime PRN         traZODone (DESYREL) tablet 50 mg  50 mg Oral At Bedtime PRN         OLANZapine (zyPREXA) tablet 10 mg  10 mg Oral Q2H PRN        Or     OLANZapine (zyPREXA) injection 10 mg  10 mg Intramuscular Q2H PRN         nicotine polacrilex (NICORETTE) gum 2-4 mg  2-4 mg Buccal Q1H PRN         Current Outpatient Prescriptions Ordered in Epic   Medication     cloZAPine (CLOZARIL) 100 MG tablet     Clozapine (CLOZARIL) 200 MG tablet          10 point ROS reviewed- nurses report need for antifungal for feet, he did not show me his feet today       Allergies:     Allergies   Allergen Reactions     Pcn [Bicillin C-R,] Rash     Provider wants to try amoxicillin(benadryl ordered as well) 9/10/16     Bupropion Other (See Comments)     delusions      Depakote [Valproic Acid] Other (See Comments)     Heart racing     Divalproex Sodium-Fd&C Red #40      Increase heart rate            Psychiatric Examination:   /70  Pulse 76  Temp 98.7  F (37.1  C) (Tympanic)  Resp 18  Ht 1.829 m (6')  Wt 101.1 kg (222 lb 12.8 oz)  SpO2 99%  BMI 30.22 kg/m2  Weight is 222 lbs 12.8 oz  Body mass index is 30.22 kg/(m^2).    Appearance:  awake, alert, dressed in hospital scrubs and disheveled   Attitude:  cooperative  Eye Contact:  intermittent  Mood: \"fine\" appears mildly anxious  Affect:  intensity is blunted  Speech:  Clear, hesitant, regular rate and " rhythm  Psychomotor Behavior:  no evidence of tardive dyskinesia, dystonia, or tics  Thought Process:  disorganized and evidence of thought blocking present  Associations:  loosening of associations present  Thought Content:  no evidence of suicidal ideation or homicidal ideation and patient appears to be responding to internal stimuli  Insight:  limited  Judgment:  limited  Oriented to:  time, person, and place  Attention Span and Concentration:  limited  Recent and Remote Memory:  fair  Fund of Knowledge: low-normal  Muscle Strength and Tone: normal  Gait and Station: Normal           Labs:   No results found for this or any previous visit (from the past 24 hour(s)).           Plan:   Increase Clozaril as tolerated  Antifungal cream ordered for feet

## 2017-08-24 PROCEDURE — 25000132 ZZH RX MED GY IP 250 OP 250 PS 637: Performed by: NURSE PRACTITIONER

## 2017-08-24 PROCEDURE — 99232 SBSQ HOSP IP/OBS MODERATE 35: CPT | Performed by: NURSE PRACTITIONER

## 2017-08-24 PROCEDURE — 12400000 ZZH R&B MH

## 2017-08-24 PROCEDURE — S0136 CLOZAPINE, 25 MG: HCPCS | Performed by: NURSE PRACTITIONER

## 2017-08-24 RX ORDER — CLOZAPINE 100 MG/1
100 TABLET ORAL AT BEDTIME
Status: DISCONTINUED | OUTPATIENT
Start: 2017-08-24 | End: 2017-08-25

## 2017-08-24 RX ADMIN — VITAMIN D, TAB 1000IU (100/BT) 2000 UNITS: 25 TAB at 08:20

## 2017-08-24 RX ADMIN — CLOZAPINE 100 MG: 100 TABLET ORAL at 20:45

## 2017-08-24 RX ADMIN — FAMOTIDINE 20 MG: 20 TABLET ORAL at 08:20

## 2017-08-24 RX ADMIN — FAMOTIDINE 20 MG: 20 TABLET ORAL at 20:45

## 2017-08-24 ASSESSMENT — ACTIVITIES OF DAILY LIVING (ADL)
ORAL_HYGIENE: PROMPTS
DRESS: SCRUBS (BEHAVIORAL HEALTH)
LAUNDRY: UNABLE TO COMPLETE
GROOMING: PROMPTS

## 2017-08-24 NOTE — PLAN OF CARE
Face to face end of shift report received from Jackie CORRIGAN RN. Rounding completed. Patient observed in [place].     Abigail Frost  8/24/2017  4:41 PM

## 2017-08-24 NOTE — PLAN OF CARE
Face to face end of shift report received from Key GREENBERG RN. Rounding completed. Patient observed resting in bed.     Kana Mar  8/23/2017  11:53 PM

## 2017-08-24 NOTE — PLAN OF CARE
Problem: General Plan of Care (Inpatient Behavioral)  Goal: Team Discussion  Team Plan:   BEHAVIORAL TEAM DISCUSSION     Participants: Supa Espinoza NP, Kim Cardozo NP, Tish PHILLIPSSW, Carolyn Holloway Discharge Plannner, Priscilla Henry RN, Jackie Sotomayor RN, Alesha Quevedo [facility], Jazzy Sood OT, Nneka Sprague OT      Progress: minimal   Continued Stay Criteria/Rationale: medication adjustments, psychotic  Medical/Physical: None known  Precautions:   Behavioral Orders   Procedures     Code 1 - Restrict to Unit     Routine Programming       As clinically indicated     Status 15       Every 15 minutes.     Plan: continue with medication adjustments and to stabilize on medications  Rationale for change in precautions or plan: None

## 2017-08-24 NOTE — PROGRESS NOTES
"[facility]  Psychiatric Progress Note      Impression:   This is a 31 year old yo male with a history of paranoid schizophrenia.    Miles continues to pace the halls non-stop. He does speak with me today and tells me that he remembers me from his last stay. He is even able to recall my name.  He has been resting in bed at night, but it is unclear how much he is sleeping. He denies any issues with his medications but tells me, \"I don't know what the fuck is going on.\" He is distraught about how he got to be here and seems to feel that he has been locked up. Reminded him that he is actually here voluntarily, to which he replies, \"well, I don't have anywhere better to go.\" He then tells me about how his eyes changed brown and then he didn't know what happened. When asked what color his eyes are supposed to be he states, \"I have no idea.\" Miles then indicates that he recalls when we used to \"hang out all the time ten years ago and we lived around here and never got in trouble.\" Suggested that he is thinking of someone else and he states, \"you just must remind me of someone, but nope, it is you.\" I did approach the subject of an IRTS program following discharge as he does not seem to be doing well at his mother's right now. Miles tells me that he believes that he was \"doing just fine and taking my meds and I was healthy and then she took my meds away. It happens every time things are good and I'm healthy and they take everything away and here I am, why?.\" I do not believe Miles has showered since he was admitted and he is very disheveled. When this is discussed, he again refers to 10 years ago when he was healthy and then \"gassed\" and \"just like before I came here, the shower and the gas and then I was deathly ill.\" Reassured him that the shower here will not harm him. He agrees that he will ask for supplies and shower today. Will continue to need much encouragement.     Mother continues to call and " insist that Miles needs to be off of the Clozapine and on Remeron for capgras delusions. A review of Miles's records and notes, as well as experience working Miles on the unit during previous admissions and stays, demonstrates clear improvement with Clozapine as well as a clear pattern of digression when he stops taking it consistently or at all.           DIagnoses:   Paranoid schizophrenia with capgras delusions    Attestation:  Patient has been seen and evaluated by me,  Supa Espinoza NP          Interim History:   The patient's care was discussed with the treatment team and chart notes were reviewed.          Medications:     Current Facility-Administered Medications Ordered in Epic   Medication Dose Route Frequency Last Rate Last Dose     clotrimazole (LOTRIMIN) 1 % cream   Topical BID         cloZAPine (CLOZARIL) tablet 50 mg  50 mg Oral At Bedtime         cholecalciferol (vitamin D) tablet 2,000 Units  2,000 Units Oral Daily   2,000 Units at 08/22/17 0815     famotidine (PEPCID) tablet 20 mg  20 mg Oral BID   20 mg at 08/22/17 0815     benztropine (COGENTIN) tablet 0.5 mg  0.5 mg Oral BID PRN   0.5 mg at 08/21/17 1801     hydrOXYzine (ATARAX) tablet 25-50 mg  25-50 mg Oral Q4H PRN         acetaminophen (TYLENOL) tablet 650 mg  650 mg Oral Q4H PRN         alum & mag hydroxide-simethicone (MYLANTA ES/MAALOX  ES) suspension 30 mL  30 mL Oral Q4H PRN         magnesium hydroxide (MILK OF MAGNESIA) suspension 30 mL  30 mL Oral At Bedtime PRN         traZODone (DESYREL) tablet 50 mg  50 mg Oral At Bedtime PRN         OLANZapine (zyPREXA) tablet 10 mg  10 mg Oral Q2H PRN        Or     OLANZapine (zyPREXA) injection 10 mg  10 mg Intramuscular Q2H PRN         nicotine polacrilex (NICORETTE) gum 2-4 mg  2-4 mg Buccal Q1H PRN         Current Outpatient Prescriptions Ordered in Epic   Medication     cloZAPine (CLOZARIL) 100 MG tablet     Clozapine (CLOZARIL) 200 MG tablet          10 point ROS reviewed- nurses  report need for antifungal for feet, he did not show me his feet today       Allergies:     Allergies   Allergen Reactions     Pcn [Bicillin C-R,] Rash     Provider wants to try amoxicillin(benadryl ordered as well) 9/10/16     Bupropion Other (See Comments)     delusions      Depakote [Valproic Acid] Other (See Comments)     Heart racing     Divalproex Sodium-Fd&C Red #40      Increase heart rate            Psychiatric Examination:   /79  Pulse 97  Temp 97  F (36.1  C) (Tympanic)  Resp 16  Ht 1.829 m (6')  Wt 101.1 kg (222 lb 12.8 oz)  SpO2 98%  BMI 30.22 kg/m2  Weight is 222 lbs 12.8 oz  Body mass index is 30.22 kg/(m^2).    Appearance: awake, alert, pacing, disheveled  Attitude: cooperative  Eye Contact:  intermittent  Mood: anxious, mild lability  Affect:  Sometimes blunted, often inappropriate and labile  Speech:  Clear, illogical, sometimes rambling  Psychomotor Behavior:  no evidence of tardive dyskinesia, dystonia, or tics  Thought Process:  disorganized and evidence of thought blocking present frequently expresses feeling confused  Associations:  loosening of associations present  Thought Content:  no evidence of suicidal ideation or homicidal ideation and patient appears to be responding to internal stimuli  Insight:  limited  Judgment:  limited  Oriented to:  time, person, and place  Attention Span and Concentration:  limited  Recent and Remote Memory:  fair  Fund of Knowledge: low-normal  Muscle Strength and Tone: normal  Gait and Station: Normal           Labs:   No results found for this or any previous visit (from the past 24 hour(s)).           Plan:   Increase Clozaril again tonight.   Antifungal cream ordered for feet

## 2017-08-24 NOTE — PLAN OF CARE
"Problem: General Plan of Care (Inpatient Behavioral)  Goal: Individualization/Patient Specific Goal (IP Behavioral)  The patient and/or their representative will achieve their patient-specific goals related to the plan of care.    The patient-specific goals include:   Patient will have an absence or decrease in hallucinations by time of discharge.  Patient will be medication compliant while hospitalized.  Patient will be able to have a reality based conversation prior to discharge.  Patient will be independent with his ADL s while hospitalized and maintain hygiene.     Outcome: No Change  Patient disorganized and confused during assessment. Patient looking at floor with minimal responses of \"I don't know\" and quietly mumbles during conversation attempts from this writer. Patient appears very disheveled/untidy. This writer encouraged a shower numerous times this shift. Patient appeared confused and stated once, \"I actually feel dirtier with a shower\". Pacing hallways and day room all shift, declining all encouragements to rest. Appears to be responding to internal stimuli, quietly laughing and mumbling to self occasionally. Compliant with scheduled medications.   Patient's mother called and updated this shift.       "

## 2017-08-24 NOTE — PLAN OF CARE
Problem: General Plan of Care (Inpatient Behavioral)  Goal: Individualization/Patient Specific Goal (IP Behavioral)  The patient and/or their representative will achieve their patient-specific goals related to the plan of care.    The patient-specific goals include:   Patient will have an absence or decrease in hallucinations by time of discharge.  Patient will be medication compliant while hospitalized.  Patient will be able to have a reality based conversation prior to discharge.  Patient will be independent with his ADL s while hospitalized and maintain hygiene.     Outcome: No Change  Pt has been in bed with eyes closed and regular respirations observed all night. Will continue to monitor.

## 2017-08-24 NOTE — PLAN OF CARE
Problem: General Plan of Care (Inpatient Behavioral)  Goal: Individualization/Patient Specific Goal (IP Behavioral)  The patient and/or their representative will achieve their patient-specific goals related to the plan of care.    The patient-specific goals include:   Patient will have an absence or decrease in hallucinations by time of discharge.  Patient will be medication compliant while hospitalized.  Patient will be able to have a reality based conversation prior to discharge.  Patient will be independent with his ADL s while hospitalized and maintain hygiene.     Outcome: No Change  Patient cooperative with assessment questioning. Patient pacing the lounge area and halls. States he is tired of being prosecuted, and states he knows everyone and that he is sure he knows writer, that we have met before. States he has anxiety and depression due to be prosecuted by so many people. Is untidy and disheveled, affect is flat. Speech is pressured, rambling, tangential, with difficulty tracking thoughts. Has not been attending groups, or socializing with peers.   2045-writer encouraged patient to shower, patient states he gets dirtier from showering and that he showered an hour ago. States his birthdate is not the same as it used to be, he got a new one, and that his family is not really his family, they are different people. Is paranoid and delusional, pacing the halls and attending groups for a short time, then leaving abruptly. Posture is slouched, very little eye contact made with peers and staff.

## 2017-08-24 NOTE — PLAN OF CARE
Face to face end of shift report received from Kana LOMELI RN. Rounding completed. Patient observed pacing hallways. No requests at this time.     Jackie Sotomayor  8/24/2017  8:15 AM

## 2017-08-24 NOTE — PLAN OF CARE
Problem: Discharge Planning  Goal: Discharge Planning (Adult, OB, Behavioral, Peds)  Pt has been pacing the hallways all day.  When writer tried to engage with him he did not acknowledge writer and walked past.  Writer received a message from pt 's [facility]'s Care Coordinator Ilda Hodge [phone]- writer returned the call and left a voicemail.

## 2017-08-25 PROCEDURE — S0136 CLOZAPINE, 25 MG: HCPCS | Performed by: NURSE PRACTITIONER

## 2017-08-25 PROCEDURE — 25000132 ZZH RX MED GY IP 250 OP 250 PS 637: Performed by: NURSE PRACTITIONER

## 2017-08-25 PROCEDURE — 12400000 ZZH R&B MH

## 2017-08-25 RX ORDER — CLOZAPINE 25 MG/1
25 TABLET ORAL DAILY
Status: DISCONTINUED | OUTPATIENT
Start: 2017-08-26 | End: 2017-08-26

## 2017-08-25 RX ADMIN — VITAMIN D, TAB 1000IU (100/BT) 2000 UNITS: 25 TAB at 08:17

## 2017-08-25 RX ADMIN — FAMOTIDINE 20 MG: 20 TABLET ORAL at 20:59

## 2017-08-25 RX ADMIN — FAMOTIDINE 20 MG: 20 TABLET ORAL at 08:17

## 2017-08-25 RX ADMIN — CLOZAPINE 125 MG: 25 TABLET ORAL at 20:59

## 2017-08-25 ASSESSMENT — ACTIVITIES OF DAILY LIVING (ADL)
ORAL_HYGIENE: PROMPTS
GROOMING: PROMPTS
LAUNDRY: UNABLE TO COMPLETE
DRESS: PROMPTS

## 2017-08-25 NOTE — PROGRESS NOTES
Patient with adequate po intake, good appetite.    Noted no significant weight loss or gain.    Will continue to monitor.    Negra Adams RD, LD

## 2017-08-25 NOTE — PLAN OF CARE
Problem: General Plan of Care (Inpatient Behavioral)  Goal: Team Discussion  Team Plan:   BEHAVIORAL TEAM DISCUSSION     Participants:Supa Espinoza NP, Amy Briggs LICSW,Tish Boswell LICSW, Carolyn Holloway Discharge Plannner, Priscilla Henry RN, Marilin Vaz RN,  Jazzy Sood OT, Nneka Sprague OT   Progress: Slow   Continued Stay Criteria/Rationale:Psychosis. Increase in HS clozapine dose   Medical/Physical: None   Precautions:   Behavioral Orders   Procedures     Code 1 - Restrict to Unit     Routine Programming       As clinically indicated     Status 15       Every 15 minutes.     Plan: Continued stabilization and IRTS referral  Rationale for change in precautions or plan: None

## 2017-08-25 NOTE — PLAN OF CARE
Face to face end of shift report received from Abigail LENTZ RN. Rounding completed. Patient observed resting in bed.     Kana Mar  8/24/2017  11:43 PM

## 2017-08-25 NOTE — PLAN OF CARE
"Problem: General Plan of Care (Inpatient Behavioral)  Goal: Individualization/Patient Specific Goal (IP Behavioral)  The patient and/or their representative will achieve their patient-specific goals related to the plan of care.    The patient-specific goals include:   Patient will have an absence or decrease in hallucinations by time of discharge.  Patient will be medication compliant while hospitalized.  Patient will be able to have a reality based conversation prior to discharge.  Patient will be independent with his ADL s while hospitalized and maintain hygiene.     Pt has blunt affect.  Is cooperative with nursing assessment presents with flight of ideas. Presents delusional and paranoid.  Rambles on about his \"other self\" and \" being prosecuted\".  Admits to some anxiety denies all other criteria.  Has been isolative to self and pacing hallway this shift.  Pt talks and laughs to himself while pacing.  Dose not attend groups or interact with others.  Did encourage pt to shower this shift.  Cooperative with all medication       "

## 2017-08-25 NOTE — PLAN OF CARE
Face to face end of shift report received from Kana LOMELI RN. Rounding completed. Patient observed awake in room.     Marilin Vaz  8/25/2017  8:04 AM

## 2017-08-25 NOTE — PLAN OF CARE
Face to face end of shift report received from Marilin Basurto. Rounding completed. Patient observed pacing the unit.     Amy Browne  8/25/2017  3:55 PM

## 2017-08-26 PROCEDURE — 99232 SBSQ HOSP IP/OBS MODERATE 35: CPT | Performed by: NURSE PRACTITIONER

## 2017-08-26 PROCEDURE — S0136 CLOZAPINE, 25 MG: HCPCS | Performed by: NURSE PRACTITIONER

## 2017-08-26 PROCEDURE — 25000132 ZZH RX MED GY IP 250 OP 250 PS 637: Performed by: NURSE PRACTITIONER

## 2017-08-26 PROCEDURE — 12400000 ZZH R&B MH

## 2017-08-26 RX ORDER — CLOZAPINE 25 MG/1
50 TABLET ORAL DAILY
Status: DISCONTINUED | OUTPATIENT
Start: 2017-08-27 | End: 2017-08-27

## 2017-08-26 RX ADMIN — CLOZAPINE 125 MG: 25 TABLET ORAL at 20:09

## 2017-08-26 RX ADMIN — CLOZAPINE 25 MG: 25 TABLET ORAL at 08:19

## 2017-08-26 RX ADMIN — VITAMIN D, TAB 1000IU (100/BT) 2000 UNITS: 25 TAB at 08:16

## 2017-08-26 RX ADMIN — FAMOTIDINE 20 MG: 20 TABLET ORAL at 20:09

## 2017-08-26 RX ADMIN — FAMOTIDINE 20 MG: 20 TABLET ORAL at 08:16

## 2017-08-26 ASSESSMENT — ACTIVITIES OF DAILY LIVING (ADL)
LAUNDRY: UNABLE TO COMPLETE
DRESS: PROMPTS
GROOMING: PROMPTS
ORAL_HYGIENE: PROMPTS
ORAL_HYGIENE: PROMPTS
GROOMING: PROMPTS
LAUNDRY: UNABLE TO COMPLETE
DRESS: PROMPTS;INDEPENDENT

## 2017-08-26 NOTE — PLAN OF CARE
"Problem: General Plan of Care (Inpatient Behavioral)  Goal: Individualization/Patient Specific Goal (IP Behavioral)  The patient and/or their representative will achieve their patient-specific goals related to the plan of care.    The patient-specific goals include:   Patient will have an absence or decrease in hallucinations by time of discharge.  Patient will be medication compliant while hospitalized.  Patient will be able to have a reality based conversation prior to discharge.  Patient will be independent with his ADL s while hospitalized and maintain hygiene.     Pt denies SI, HI, and hallucinations. He paces the halls talking to himself. Denies anxiety and depression. Says \"sometimes I just get a bad memory from the past.\" Says \"weird things have been happening.\" Pt does not elaborate. Pt stops during conversation and laughs and then continues to talk. Encouraged pt to take a shower but he declines, points at his legs and finger nails and mumbles something about it making him more dirty. Pt hasn't attended group this shift. Encourages pt to attend group, he states, \"I try to sit in there for a few minutes.\" Will continue to monitor.       "

## 2017-08-26 NOTE — PLAN OF CARE
Face to face end of shift report received from Maurice LEOS RN. Rounding completed. Patient observed supine in bed.  Appears to be sleeping with a normal breathing pattern.  Did not further distrub.     Marilin Vaz  8/26/2017  7:52 AM

## 2017-08-26 NOTE — PLAN OF CARE
Face to face end of shift report received from Amy KAM RN. Rounding completed. Patient observed.     Maurice Mcintyre  8/26/2017  12:00 AM

## 2017-08-26 NOTE — PLAN OF CARE
Problem: General Plan of Care (Inpatient Behavioral)  Goal: Individualization/Patient Specific Goal (IP Behavioral)  The patient and/or their representative will achieve their patient-specific goals related to the plan of care.    The patient-specific goals include:   Patient will have an absence or decrease in hallucinations by time of discharge.  Patient will be medication compliant while hospitalized.  Patient will be able to have a reality based conversation prior to discharge.  Patient will be independent with his ADL s while hospitalized and maintain hygiene.     Pt is slightly irritable this shift.  Pt is upset that his medications are being increased.  He dose not belive he needs them.  Did educate the pt.  Pt denies all criteria this shift.  States that there is nothing wrong with him. Did encourage pt to shower.  Did get pt to change his socks this shift.  Pt has been pacing the hallways talking to himself this shift. Pt isolates to himself.  Did not attend any groups this shift.

## 2017-08-26 NOTE — PLAN OF CARE
Face to face end of shift report received from MARINA Gaston. Rounding completed. Patient observed walking halls.     Lety Evangelista  8/26/2017  3:45 PM

## 2017-08-26 NOTE — PLAN OF CARE
Problem: General Plan of Care (Inpatient Behavioral)  Goal: Individualization/Patient Specific Goal (IP Behavioral)  The patient and/or their representative will achieve their patient-specific goals related to the plan of care.    The patient-specific goals include:   Patient will have an absence or decrease in hallucinations by time of discharge.  Patient will be medication compliant while hospitalized.  Patient will be able to have a reality based conversation prior to discharge.  Patient will be independent with his ADL s while hospitalized and maintain hygiene.     2330--in bed with eyes closed and breathing regular. 0651--still in bed with eyes closed and breathing regular. Up to bathroom several times.

## 2017-08-27 LAB
BASOPHILS # BLD AUTO: 0.1 10E9/L (ref 0–0.2)
BASOPHILS NFR BLD AUTO: 1.5 %
DIFFERENTIAL METHOD BLD: ABNORMAL
EOSINOPHIL # BLD AUTO: 0.3 10E9/L (ref 0–0.7)
EOSINOPHIL NFR BLD AUTO: 5.3 %
ERYTHROCYTE [DISTWIDTH] IN BLOOD BY AUTOMATED COUNT: 13.2 % (ref 10–15)
HCT VFR BLD AUTO: 44.6 % (ref 40–53)
HGB BLD-MCNC: 15.4 G/DL (ref 13.3–17.7)
IMM GRANULOCYTES # BLD: 0 10E9/L (ref 0–0.4)
IMM GRANULOCYTES NFR BLD: 0.3 %
LYMPHOCYTES # BLD AUTO: 1.7 10E9/L (ref 0.8–5.3)
LYMPHOCYTES NFR BLD AUTO: 26.9 %
MCH RBC QN AUTO: 30.3 PG (ref 26.5–33)
MCHC RBC AUTO-ENTMCNC: 34.5 G/DL (ref 31.5–36.5)
MCV RBC AUTO: 88 FL (ref 78–100)
MONOCYTES # BLD AUTO: 1.5 10E9/L (ref 0–1.3)
MONOCYTES NFR BLD AUTO: 24.3 %
NEUTROPHILS # BLD AUTO: 2.6 10E9/L (ref 1.6–8.3)
NEUTROPHILS NFR BLD AUTO: 41.7 %
NRBC # BLD AUTO: 0 10*3/UL
NRBC BLD AUTO-RTO: 0 /100
PLATELET # BLD AUTO: 317 10E9/L (ref 150–450)
RBC # BLD AUTO: 5.08 10E12/L (ref 4.4–5.9)
WBC # BLD AUTO: 6.2 10E9/L (ref 4–11)

## 2017-08-27 PROCEDURE — 36415 COLL VENOUS BLD VENIPUNCTURE: CPT | Performed by: NURSE PRACTITIONER

## 2017-08-27 PROCEDURE — 25000132 ZZH RX MED GY IP 250 OP 250 PS 637: Performed by: NURSE PRACTITIONER

## 2017-08-27 PROCEDURE — 85025 COMPLETE CBC W/AUTO DIFF WBC: CPT | Performed by: NURSE PRACTITIONER

## 2017-08-27 PROCEDURE — 12400000 ZZH R&B MH

## 2017-08-27 PROCEDURE — 99232 SBSQ HOSP IP/OBS MODERATE 35: CPT | Performed by: NURSE PRACTITIONER

## 2017-08-27 PROCEDURE — S0136 CLOZAPINE, 25 MG: HCPCS | Performed by: NURSE PRACTITIONER

## 2017-08-27 RX ORDER — CLOZAPINE 25 MG/1
75 TABLET ORAL DAILY
Status: DISCONTINUED | OUTPATIENT
Start: 2017-08-28 | End: 2017-08-29

## 2017-08-27 RX ADMIN — FAMOTIDINE 20 MG: 20 TABLET ORAL at 08:17

## 2017-08-27 RX ADMIN — FAMOTIDINE 20 MG: 20 TABLET ORAL at 20:06

## 2017-08-27 RX ADMIN — CLOZAPINE 50 MG: 25 TABLET ORAL at 08:17

## 2017-08-27 RX ADMIN — CLOZAPINE 150 MG: 25 TABLET ORAL at 20:06

## 2017-08-27 RX ADMIN — VITAMIN D, TAB 1000IU (100/BT) 2000 UNITS: 25 TAB at 08:17

## 2017-08-27 RX ADMIN — ACETAMINOPHEN 650 MG: 325 TABLET, FILM COATED ORAL at 17:07

## 2017-08-27 ASSESSMENT — ACTIVITIES OF DAILY LIVING (ADL)
LAUNDRY: UNABLE TO COMPLETE
ORAL_HYGIENE: PROMPTS
DRESS: PROMPTS
LAUNDRY: UNABLE TO COMPLETE
ORAL_HYGIENE: PROMPTS
GROOMING: PROMPTS
GROOMING: PROMPTS
DRESS: PROMPTS;INDEPENDENT

## 2017-08-27 NOTE — PLAN OF CARE
Problem: General Plan of Care (Inpatient Behavioral)  Goal: Individualization/Patient Specific Goal (IP Behavioral)  The patient and/or their representative will achieve their patient-specific goals related to the plan of care.    The patient-specific goals include:   Patient will have an absence or decrease in hallucinations by time of discharge.  Patient will be medication compliant while hospitalized.  Patient will be able to have a reality based conversation prior to discharge.  Patient will be independent with his ADL s while hospitalized and maintain hygiene.     2330--in bed with eyes closed and breathing regular. 0610--still in bed with eyes closed and breathing regular. 0614--opened his eyes, but would not extend an arm or sit up for vitals; will attempt later.0713--very uncooperative with lab draw and very paranoid about it. Required much reassurance to obtain sample.

## 2017-08-27 NOTE — PROGRESS NOTES
"[facility]  Psychiatric Progress Note    Subjective   This is a 31 year old male with paranoid schizophrenia with capgras delusions. Miles remains up pacing about the hallway. He stopped to speak with me, smiled and was able to remain in place for 3-4 minutes. Sharif reports sleeping \"pretty fairly\". Describes mood as \"pretty much a lot of head shit\" some anxiety. Appears preoccupied. Less emotional today, no tears. .         DIagnoses:   Paranoid schizophrenia with capgras delusions      Attestation:  Patient has been seen and evaluated by me, Mara Rai, APRN CNP, in the presence of the house staff team          Interim History:   The patient's care was discussed with the treatment team and chart notes were reviewed.          Medications:       cloZAPine  150 mg Oral At Bedtime     cloZAPine  50 mg Oral Daily     clotrimazole   Topical BID     cholecalciferol  2,000 Units Oral Daily     famotidine  20 mg Oral BID     benztropine, hydrOXYzine, acetaminophen, alum & mag hydroxide-simethicone, magnesium hydroxide, traZODone, OLANZapine **OR** OLANZapine, nicotine polacrilex          Allergies:     Allergies   Allergen Reactions     Pcn [Bicillin C-R,] Rash     Provider wants to try amoxicillin(benadryl ordered as well) 9/10/16     Bupropion Other (See Comments)     delusions      Depakote [Valproic Acid] Other (See Comments)     Heart racing     Divalproex Sodium-Fd&C Red #40      Increase heart rate     10 point ROS negative       Psychiatric Examination:   /66  Pulse 99  Temp 99  F (37.2  C) (Tympanic)  Resp 16  Ht 6' (1.829 m)  Wt 228 lb (103.4 kg)  SpO2 98%  BMI 30.92 kg/m2  Weight is 228 lbs 0 oz  Body mass index is 30.92 kg/(m^2).    Appearance:  awake, alert, poorly groomed and disheveled   Attitude:  guarded  Eye Contact:  poor   Mood:  Some anxiety, see HPI  Affect:  intensity is blunted  Speech:  Improved, speaking more clearly today  Psychomotor Behavior:  no evidence of " tardive dyskinesia, dystonia, or tics  Thought Process:  disorganized  Associations:  loosening of associations present  Thought Content:  auditory hallucinations present  Insight:  limited  Judgment:  limited  Oriented to:  time, person, and place  Attention Span and Concentration:  limited  Recent and Remote Memory:  limited  Fund of Knowledge: delayed  Muscle Strength and Tone: normal  Gait and Station: Normal  Perception: pt is internally stimulated         Labs:     Recent Results (from the past 24 hour(s))   CBC with platelets differential    Collection Time: 08/27/17  7:04 AM   Result Value Ref Range    WBC 6.2 4.0 - 11.0 10e9/L    RBC Count 5.08 4.4 - 5.9 10e12/L    Hemoglobin 15.4 13.3 - 17.7 g/dL    Hematocrit 44.6 40.0 - 53.0 %    MCV 88 78 - 100 fl    MCH 30.3 26.5 - 33.0 pg    MCHC 34.5 31.5 - 36.5 g/dL    RDW 13.2 10.0 - 15.0 %    Platelet Count 317 150 - 450 10e9/L    Diff Method Automated Method     % Neutrophils 41.7 %    % Lymphocytes 26.9 %    % Monocytes 24.3 %    % Eosinophils 5.3 %    % Basophils 1.5 %    % Immature Granulocytes 0.3 %    Nucleated RBCs 0 0 /100    Absolute Neutrophil 2.6 1.6 - 8.3 10e9/L    Absolute Lymphocytes 1.7 0.8 - 5.3 10e9/L    Absolute Monocytes 1.5 (H) 0.0 - 1.3 10e9/L    Absolute Eosinophils 0.3 0.0 - 0.7 10e9/L    Absolute Basophils 0.1 0.0 - 0.2 10e9/L    Abs Immature Granulocytes 0.0 0 - 0.4 10e9/L    Absolute Nucleated RBC 0.0              Assessment/ Plan:    Will continue to increase AM clozaril 50 mg to 75 mg to begin 8.28.17

## 2017-08-27 NOTE — PLAN OF CARE
Problem: General Plan of Care (Inpatient Behavioral)  Goal: Individualization/Patient Specific Goal (IP Behavioral)  The patient and/or their representative will achieve their patient-specific goals related to the plan of care.    The patient-specific goals include:   Patient will have an absence or decrease in hallucinations by time of discharge.  Patient will be medication compliant while hospitalized.  Patient will be able to have a reality based conversation prior to discharge.  Patient will be independent with his ADL s while hospitalized and maintain hygiene.     Pt was quite irritable at the beginning of the shift.  States he was woken up and dose not know why his blood was needed.  Did educated pt on why blood draw was nessiary.  Pt was not very receptive to this.  Pt became upset when writer gave him his scheduled am medications.  Pt states I don't know what these are, I haven't had these before.  Why do the keep changing them.  Writer did educate pt on each medication, pt was not receptive to this. Pt took medications and then walked away from writer.  Pt was later cooperative with nursing assessment.  Denies all criteria.  States he is sometimes good and sometimes not.  Pt is not able to elaborate on this.  That been pacing in the hallways.  Did encourage pt to shower this shift.

## 2017-08-27 NOTE — PROGRESS NOTES
"[facility]  Psychiatric Progress Note    Subjective   This is a 31 year old male with paranoid schizophrenia with capgras delusions. Miles walked up to me as soon as I walked on to the unit. He became teary and stated \"please don't be upset with me, I don't know how this happened- it happens every time. I didn't do it you have to believe me\". Pt is visibly upset, he goes on to say he gets better then goes away only to get sick and come back. Miles is very unkept, he is pacing in the hallways. He is able to recall many of the staff by name \"you are my friends I don't mean to get you upset\". Despite multiple attempts to reassure Miles no one is upset with him he is rather fixated on the this.          DIagnoses:   Paranoid schizophrenia with capgras delusions      Attestation:  Patient has been seen and evaluated by me, Mara Rai, APRN CNP, in the presence of the house staff team          Interim History:   The patient's care was discussed with the treatment team and chart notes were reviewed.          Medications:       cloZAPine  125 mg Oral At Bedtime     cloZAPine  25 mg Oral Daily     clotrimazole   Topical BID     cholecalciferol  2,000 Units Oral Daily     famotidine  20 mg Oral BID     benztropine, hydrOXYzine, acetaminophen, alum & mag hydroxide-simethicone, magnesium hydroxide, traZODone, OLANZapine **OR** OLANZapine, nicotine polacrilex          Allergies:     Allergies   Allergen Reactions     Pcn [Bicillin C-R,] Rash     Provider wants to try amoxicillin(benadryl ordered as well) 9/10/16     Bupropion Other (See Comments)     delusions      Depakote [Valproic Acid] Other (See Comments)     Heart racing     Divalproex Sodium-Fd&C Red #40      Increase heart rate            Psychiatric Examination:   /85  Pulse 95  Temp 98.9  F (37.2  C) (Tympanic)  Resp 17  Ht 6' (1.829 m)  Wt 222 lb 12.8 oz (101.1 kg)  SpO2 99%  BMI 30.22 kg/m2  Weight is 222 lbs 12.8 oz  Body mass index " is 30.22 kg/(m^2).    Appearance:  awake, alert, poorly groomed and disheveled   Attitude:  guarded  Eye Contact:  poor   Mood:  anxious  Affect:  intensity is blunted  Speech:  mumbling  Psychomotor Behavior:  no evidence of tardive dyskinesia, dystonia, or tics  Thought Process:  disorganized  Associations:  loosening of associations present  Thought Content:  auditory hallucinations present  Insight:  limited  Judgment:  limited  Oriented to:  time, person, and place  Attention Span and Concentration:  limited  Recent and Remote Memory:  limited  Fund of Knowledge: delayed  Muscle Strength and Tone: normal  Gait and Station: Normal  Perception: pt is internally stimulated         Labs:   No results found for this or any previous visit (from the past 24 hour(s)).          Assessment/ Plan:    Will continue to increase clozaril.

## 2017-08-27 NOTE — PLAN OF CARE
Face to face end of shift report received from Lety SAMAYOA RN. Rounding completed. Patient observed.     Maurice Mcintyre  8/27/2017  12:55 AM

## 2017-08-27 NOTE — PLAN OF CARE
Face to face end of shift report received from Maurice LEOS RN. Rounding completed. Patient observed pacing hallways.     Marilin Vaz  8/27/2017  7:39 AM

## 2017-08-27 NOTE — PLAN OF CARE
"Problem: General Plan of Care (Inpatient Behavioral)  Goal: Individualization/Patient Specific Goal (IP Behavioral)  The patient and/or their representative will achieve their patient-specific goals related to the plan of care.    The patient-specific goals include:   Patient will have an absence or decrease in hallucinations by time of discharge.  Patient will be medication compliant while hospitalized.  Patient will be able to have a reality based conversation prior to discharge.  Patient will be independent with his ADL s while hospitalized and maintain hygiene.     Pt is calm and cooperative with nursing assessment. Denies SI, HI, and hallucinations. Though he does appear to be responding in internal stimuli. Pt spends much of the shift pacing the unit, talking to himself. He stops and looks over his shoulder a lot when walking. Says he feels good now but then \"I will feel bad again.\" Pt is frustrated, says \"they keep changing my med's.\" Educated pt that his medication have not been changed, the dosage as just been increased. Pt does not seem to be receptive of this. Pt denies anxiety and depression. Encouraged pt to shower but he continues to say that it will \"make me more dirty and my head gets hot.\" Pt did finally agree to get in the shower with lots of encouragement. This writer had to give pt step by step cues during the shower. He would just stand there appearing like he didn't know what to do next. Pt complained of pain in back, ankles and knees. PRN tylenol 650 mg po was given at 1707. Pt has poor boundaries with peers and staff. He has been told several times about walking so close to people. Will continue to monitor.       "

## 2017-08-27 NOTE — PHARMACY
Pharmacy Clozapine Note    Date of Service: 2017  Patient's : 1985  31 year old, male    Current clozapine regimen: 75 mg daily and 150 mg at bedtime.  Has there been a known interruption in therapy for greater than/equal to 48 hours? No    Recent ANC Value(s):  Recent Labs   Lab Test  17   0704  17   1640  10/11/16   0830  10/04/16   0812  10/02/16   0706  16   0811  16   0956   ANEU  2.6  10.0*  5.0  3.5  6.3  3.5  4.8       Is the patient enrolled in the clozapine REMS program? Yes  Ordering prescriber: pratik  Is this provider certified in the clozapine REMS program? Yes  Is the ANC within recommended limits? Yes  Does the patient have any signs or symptoms of infection, including fever or sore throat? No    Plan:  1. Continue clozapine therapy at 75 mg daily and 150 mg at bedtime PO.  2. A WBC with differential will be ordered at least weekly.  ANC values will be entered into the REMS program.  3. Signs/symptoms of infection will be monitored daily.    Ethan Araujo [facility]  Phone / Pager: [phone]

## 2017-08-27 NOTE — PLAN OF CARE
Face to face end of shift report received from MARINA Gaston. Rounding completed. Patient observed in [facility].     Lety Evangelista  8/27/2017  3:40 PM

## 2017-08-28 PROCEDURE — 12400000 ZZH R&B MH

## 2017-08-28 PROCEDURE — 25000132 ZZH RX MED GY IP 250 OP 250 PS 637: Performed by: NURSE PRACTITIONER

## 2017-08-28 PROCEDURE — S0136 CLOZAPINE, 25 MG: HCPCS | Performed by: NURSE PRACTITIONER

## 2017-08-28 RX ADMIN — CLOZAPINE 150 MG: 25 TABLET ORAL at 20:24

## 2017-08-28 RX ADMIN — FAMOTIDINE 20 MG: 20 TABLET ORAL at 20:24

## 2017-08-28 RX ADMIN — VITAMIN D, TAB 1000IU (100/BT) 2000 UNITS: 25 TAB at 08:36

## 2017-08-28 RX ADMIN — CLOZAPINE 75 MG: 25 TABLET ORAL at 08:36

## 2017-08-28 RX ADMIN — FAMOTIDINE 20 MG: 20 TABLET ORAL at 08:36

## 2017-08-28 ASSESSMENT — ACTIVITIES OF DAILY LIVING (ADL)
LAUNDRY: UNABLE TO COMPLETE
DRESS: SCRUBS (BEHAVIORAL HEALTH);INDEPENDENT
GROOMING: PROMPTS
DRESS: SCRUBS (BEHAVIORAL HEALTH);INDEPENDENT
ORAL_HYGIENE: PROMPTS
ORAL_HYGIENE: PROMPTS
GROOMING: PROMPTS

## 2017-08-28 NOTE — PLAN OF CARE
Face to face end of shift report received from Amy KAISER RN. Rounding completed. Patient observed in the lounge.      Sara Ho  8/28/2017  3:47 PM

## 2017-08-28 NOTE — PLAN OF CARE
"Problem: General Plan of Care (Inpatient Behavioral)  Goal: Individualization/Patient Specific Goal (IP Behavioral)  The patient and/or their representative will achieve their patient-specific goals related to the plan of care.    The patient-specific goals include:   Patient will have an absence or decrease in hallucinations by time of discharge.  Patient will be medication compliant while hospitalized.  Patient will be able to have a reality based conversation prior to discharge.  Patient will be independent with his ADL s while hospitalized and maintain hygiene.     Pt uncooperative with nursing assessment this morning. He became upset when writer was administering his morning medications. Stated, \"I don't take all these meds, what the fuck! \" Pt then proceeded to take the scheduled medications and walked away from writer. He has been pacing for most of this shift. Will continue to monitor.      "

## 2017-08-28 NOTE — PLAN OF CARE
"Problem: General Plan of Care (Inpatient Behavioral)  Goal: Individualization/Patient Specific Goal (IP Behavioral)  The patient and/or their representative will achieve their patient-specific goals related to the plan of care.    The patient-specific goals include:   Patient will have an absence or decrease in hallucinations by time of discharge.  Patient will be medication compliant while hospitalized.  Patient will be able to have a reality based conversation prior to discharge.  Patient will be independent with his ADL s while hospitalized and maintain hygiene.     Outcome: No Change  Patient has been up on the unit the majority of the shift and has been pacing in the hallway. Pt appears to be responding to internal stimuli. He made several grandiose and delusional statements this shift. He stated \"Everyone up here got jobs because of my other self. 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 Pt had rambling speech and a flight of ideas. Appearance is disheveled. Pt became irritable during medication administration. He was compliant with his PO medications. He refused his Lotrimin cream to his feet. Pt stated \"I used to be a  and I made all of the medications. There are some bad batches out there.\"       "

## 2017-08-28 NOTE — PLAN OF CARE
Face to face end of shift report received from Jaime LEOS RN. Rounding completed. Patient observed in bed sleeping, respirations even and unlabored.     Amy Colbert  8/28/2017  7:37 AM

## 2017-08-28 NOTE — PLAN OF CARE
Problem: General Plan of Care (Inpatient Behavioral)  Goal: Individualization/Patient Specific Goal (IP Behavioral)  The patient and/or their representative will achieve their patient-specific goals related to the plan of care.    The patient-specific goals include:   Patient will have an absence or decrease in hallucinations by time of discharge.  Patient will be medication compliant while hospitalized.  Patient will be able to have a reality based conversation prior to discharge.  Patient will be independent with his ADL s while hospitalized and maintain hygiene.     2330--in bed with eyes closed and breathing regular. 0606--still in bed with eyes closed and breathing regular. 0625--in bed, looked at staff, and turned away and not following instructions to allow vitals to be taken.

## 2017-08-28 NOTE — PLAN OF CARE
Face to face end of shift report received from Lety SAMAYOA RN. Rounding completed. Patient observed.     Maurice Mcintyre  8/28/2017  12:27 AM

## 2017-08-28 NOTE — PLAN OF CARE
Problem: General Plan of Care (Inpatient Behavioral)  Goal: Team Discussion  Team Plan:   BEHAVIORAL TEAM DISCUSSION     Participants: Rowan Alford NP, Supa Espinoza NP, Amy Briggs [facility], Bianca Morales LSW,, Carolyn Holloway Discharge Plannner,Alesha Quevedo [facility], Jazzy Sood OT, Nneka Sprague OT, Samina Ponce RN  Progress: Minimal   Continued Stay Criteria/Rationale: Psychosis, Medication adjustments- monitor for side effects    Medical/Physical: None known   Precautions:   Behavioral Orders   Procedures     Code 1 - Restrict to Unit     Routine Programming       As clinically indicated     Status 15       Every 15 minutes.     Plan: Continued stabilization- IRTS referrals, Increase services    Rationale for change in precautions or plan: None

## 2017-08-29 PROCEDURE — 12400000 ZZH R&B MH

## 2017-08-29 PROCEDURE — 25000132 ZZH RX MED GY IP 250 OP 250 PS 637: Performed by: NURSE PRACTITIONER

## 2017-08-29 PROCEDURE — S0136 CLOZAPINE, 25 MG: HCPCS | Performed by: NURSE PRACTITIONER

## 2017-08-29 PROCEDURE — 99232 SBSQ HOSP IP/OBS MODERATE 35: CPT | Performed by: NURSE PRACTITIONER

## 2017-08-29 RX ORDER — CLOZAPINE 100 MG/1
100 TABLET ORAL DAILY
Status: DISCONTINUED | OUTPATIENT
Start: 2017-08-30 | End: 2017-08-30

## 2017-08-29 RX ADMIN — VITAMIN D, TAB 1000IU (100/BT) 2000 UNITS: 25 TAB at 08:30

## 2017-08-29 RX ADMIN — CLOZAPINE 150 MG: 25 TABLET ORAL at 20:32

## 2017-08-29 RX ADMIN — FAMOTIDINE 20 MG: 20 TABLET ORAL at 20:32

## 2017-08-29 RX ADMIN — FAMOTIDINE 20 MG: 20 TABLET ORAL at 08:30

## 2017-08-29 RX ADMIN — CLOZAPINE 75 MG: 25 TABLET ORAL at 08:30

## 2017-08-29 ASSESSMENT — ACTIVITIES OF DAILY LIVING (ADL)
LAUNDRY: UNABLE TO COMPLETE
ORAL_HYGIENE: PROMPTS
GROOMING: PROMPTS
DRESS: SCRUBS (BEHAVIORAL HEALTH);INDEPENDENT

## 2017-08-29 NOTE — PLAN OF CARE
Face to face end of shift report received from Samina FROST RN. Rounding completed. Patient observed in [facility].     Amy Mcintoshnegro  8/29/2017  3:35 PM

## 2017-08-29 NOTE — PLAN OF CARE
Problem: General Plan of Care (Inpatient Behavioral)  Goal: Individualization/Patient Specific Goal (IP Behavioral)  The patient and/or their representative will achieve their patient-specific goals related to the plan of care.    The patient-specific goals include:   Patient will have an absence or decrease in hallucinations by time of discharge.  Patient will be medication compliant while hospitalized.  Patient will be able to have a reality based conversation prior to discharge.  Patient will be independent with his ADL s while hospitalized and maintain hygiene.     2330--in bed with eyes closed and breathing regular. 0619--still in bed with eyes closed and breathing regular.

## 2017-08-29 NOTE — PLAN OF CARE
Face to face end of shift report received from Maurice GRAY. Rounding completed. Patient observed in room.    Samina Ponce  8/29/2017  7:43 AM

## 2017-08-29 NOTE — PLAN OF CARE
"Problem: General Plan of Care (Inpatient Behavioral)  Goal: Individualization/Patient Specific Goal (IP Behavioral)  The patient and/or their representative will achieve their patient-specific goals related to the plan of care.    The patient-specific goals include:   Patient will have an absence or decrease in hallucinations by time of discharge.  Patient will be medication compliant while hospitalized.  Patient will be able to have a reality based conversation prior to discharge.  Patient will be independent with his ADL s while hospitalized and maintain hygiene.     Patient out pacing the halls most of the morning. Patient is in and out of group. Patient still irritable but seems to be decreased. Patient will smile and joke at times. Patient dosen't answer assessment questions. Patient did take all scheduled medications. Patient at times does appear to be responding to internal stimuli. Patient states \" Its crazy how many people up here are so stupid.\"      "

## 2017-08-29 NOTE — PLAN OF CARE
Problem: Discharge Planning  Goal: Discharge Planning (Adult, OB, Behavioral, Peds)  Outcome: Improving  Referrals were faxed to the following IRTS: [facility], [facility], [place]

## 2017-08-29 NOTE — PROGRESS NOTES
"[facility]  Psychiatric Progress Note      Impression:   This is a 31 year old yo male with a history of paranoid schizophrenia.    Miles does not say much to me today, but he does smile several times. He continues to pace and only stops briefly to converse with me. Reports sleep, appetite, and mood as \"a-ok,\" and then he laughs. Continues to appear preoccupied. He has showered and he changed his socks (they were pretty dirty) this morning. He continues to believe that the staff are all \"alters\" and tells me that he is also an imposter. Has been taking medications as ordered. When asked about side effects, he states, \"I still don't know what the hell is going on. That hasn't changed.\"       DIagnoses:   Paranoid schizophrenia with capgras delusions    Attestation:  Patient has been seen and evaluated by me,  Supa Espinoza NP          Interim History:   The patient's care was discussed with the treatment team and chart notes were reviewed.          Medications:     Current Facility-Administered Medications Ordered in Epic   Medication Dose Route Frequency Last Rate Last Dose     clotrimazole (LOTRIMIN) 1 % cream   Topical BID         cloZAPine (CLOZARIL) tablet 50 mg  50 mg Oral At Bedtime         cholecalciferol (vitamin D) tablet 2,000 Units  2,000 Units Oral Daily   2,000 Units at 08/22/17 0815     famotidine (PEPCID) tablet 20 mg  20 mg Oral BID   20 mg at 08/22/17 0815     benztropine (COGENTIN) tablet 0.5 mg  0.5 mg Oral BID PRN   0.5 mg at 08/21/17 1801     hydrOXYzine (ATARAX) tablet 25-50 mg  25-50 mg Oral Q4H PRN         acetaminophen (TYLENOL) tablet 650 mg  650 mg Oral Q4H PRN         alum & mag hydroxide-simethicone (MYLANTA ES/MAALOX  ES) suspension 30 mL  30 mL Oral Q4H PRN         magnesium hydroxide (MILK OF MAGNESIA) suspension 30 mL  30 mL Oral At Bedtime PRN         traZODone (DESYREL) tablet 50 mg  50 mg Oral At Bedtime PRN         OLANZapine (zyPREXA) tablet 10 mg  10 mg Oral " Q2H PRN        Or     OLANZapine (zyPREXA) injection 10 mg  10 mg Intramuscular Q2H PRN         nicotine polacrilex (NICORETTE) gum 2-4 mg  2-4 mg Buccal Q1H PRN         Current Outpatient Prescriptions Ordered in Epic   Medication     cloZAPine (CLOZARIL) 100 MG tablet     Clozapine (CLOZARIL) 200 MG tablet          10 point ROS attempted - offered no complaints.       Allergies:     Allergies   Allergen Reactions     Pcn [Bicillin C-R,] Rash     Provider wants to try amoxicillin(benadryl ordered as well) 9/10/16     Bupropion Other (See Comments)     delusions      Depakote [Valproic Acid] Other (See Comments)     Heart racing     Divalproex Sodium-Fd&C Red #40      Increase heart rate            Psychiatric Examination:   /69  Pulse 88  Temp 97.7  F (36.5  C) (Tympanic)  Resp 16  Ht 1.829 m (6')  Wt 103.4 kg (228 lb)  SpO2 97%  BMI 30.92 kg/m2  Weight is 228 lbs 0 oz  Body mass index is 30.92 kg/(m^2).    Appearance: awake, alert, continues to pace  Attitude: attempts to be cooperative  Eye Contact: brief  Mood: improving with some intermittent irritability  Affect:  Smiles more today, less blunted  Speech:  Clear, rarely logical, content delusional  Psychomotor Behavior:  no evidence of tardive dyskinesia, dystonia, or tics  Thought Process:  disorganized and evidence of thought blocking present frequently expresses feeling confused  Associations:  loosening of associations present  Thought Content:  no evidence of suicidal ideation or homicidal ideation and patient appears to be responding to internal stimuli  Insight:  limited  Judgment:  limited  Oriented to:  time, person, and place  Attention Span and Concentration:  limited  Recent and Remote Memory:  fair  Fund of Knowledge: low-normal  Muscle Strength and Tone: normal  Gait and Station: Normal           Labs:   No results found for this or any previous visit (from the past 24 hour(s)).           Plan:   Increase Clozaril to 100mg in the AM.      JOSE: Questionable - it seems to take Miles longer and longer to recover when his medications are stopped. Hoping to stabilize him to the point of being able to participate in groups and refer to an IRTS program. He remains voluntary and has previously expressed a willingness to do this. Remains delusional, preoccupied, and disorganized. Unable to care for self and unsafe to discharge.

## 2017-08-29 NOTE — PLAN OF CARE
Face to face end of shift report received from Sara KAM RN. Rounding completed. Patient observed.     Maurice Mcintyre  8/29/2017  12:42 AM

## 2017-08-29 NOTE — PLAN OF CARE
Problem: General Plan of Care (Inpatient Behavioral)  Goal: Team Discussion  Team Plan:   BEHAVIORAL TEAM DISCUSSION     Participants: Rowan Alford NP, Supa Espinoza NP, Amy Briggs [facility], Carolyn Holloway Dis[facility] Plannner, Priscilla Henry RN, Samina Ponce RN , Doctor Vannesa Stratton Supervisor, Alesha Quevedo [facility], Nneka Sprague OT, Maude NGUYEN   Progress: fair attends groups, social   Continued Stay Criteria/Rationale: increase clozapine    Medical/Physical: none known   Precautions:   Behavioral Orders   Procedures     Code 1 - Restrict to Unit     Routine Programming       As clinically indicated     Status 15       Every 15 minutes.     Plan: Continue to stabilize with medication and work on discharge planning IRTS referrals sent   Rationale for change in precautions or plan: none

## 2017-08-30 PROCEDURE — 12400000 ZZH R&B MH

## 2017-08-30 PROCEDURE — 25000132 ZZH RX MED GY IP 250 OP 250 PS 637: Performed by: NURSE PRACTITIONER

## 2017-08-30 PROCEDURE — S0136 CLOZAPINE, 25 MG: HCPCS | Performed by: NURSE PRACTITIONER

## 2017-08-30 PROCEDURE — 99232 SBSQ HOSP IP/OBS MODERATE 35: CPT | Performed by: NURSE PRACTITIONER

## 2017-08-30 RX ADMIN — CLOTRIMAZOLE: 10 CREAM TOPICAL at 08:08

## 2017-08-30 RX ADMIN — VITAMIN D, TAB 1000IU (100/BT) 2000 UNITS: 25 TAB at 08:06

## 2017-08-30 RX ADMIN — CLOZAPINE 150 MG: 25 TABLET ORAL at 21:48

## 2017-08-30 RX ADMIN — FAMOTIDINE 20 MG: 20 TABLET ORAL at 21:49

## 2017-08-30 RX ADMIN — CLOZAPINE 100 MG: 100 TABLET ORAL at 08:07

## 2017-08-30 RX ADMIN — FAMOTIDINE 20 MG: 20 TABLET ORAL at 08:07

## 2017-08-30 ASSESSMENT — ACTIVITIES OF DAILY LIVING (ADL)
ORAL_HYGIENE: PROMPTS;INDEPENDENT
GROOMING: PROMPTS
LAUNDRY: UNABLE TO COMPLETE
DRESS: SCRUBS (BEHAVIORAL HEALTH);PROMPTS;INDEPENDENT
DRESS: INDEPENDENT;SCRUBS (BEHAVIORAL HEALTH)
GROOMING: SHOWER;PROMPTS;INDEPENDENT

## 2017-08-30 NOTE — PLAN OF CARE
Face to face end of shift report received from MARINA Prakash. Rounding completed. Patient observed.     Aretha Hodge  8/30/2017  4:45 PM

## 2017-08-30 NOTE — PLAN OF CARE
Problem: General Plan of Care (Inpatient Behavioral)  Goal: Individualization/Patient Specific Goal (IP Behavioral)  The patient and/or their representative will achieve their patient-specific goals related to the plan of care.    The patient-specific goals include:   Patient will have an absence or decrease in hallucinations by time of discharge.  Patient will be medication compliant while hospitalized.  Patient will be able to have a reality based conversation prior to discharge.  Patient will be independent with his ADL s while hospitalized and maintain hygiene.     2330--in bed with eyes closed and breathing regular. 0628--still in bed with eyes closed and breathing regular.

## 2017-08-30 NOTE — PLAN OF CARE
"Problem: General Plan of Care (Inpatient Behavioral)  Goal: Individualization/Patient Specific Goal (IP Behavioral)  The patient and/or their representative will achieve their patient-specific goals related to the plan of care.    The patient-specific goals include:   Patient will have an absence or decrease in hallucinations by time of discharge.  Patient will be medication compliant while hospitalized.  Patient will be able to have a reality based conversation prior to discharge.  Patient will be independent with his ADL s while hospitalized and maintain hygiene.     Pt denies having suicidal ideation, homicidal ideation, anxiety, or depression. Pt denies having hallucinations but appears to be responding. He has been pacing back and fourth laughing with self. He has been observed talking to himself as well. Pt has been making delusional statements. He stated today, \" I keep making these log cabins and they keep turning them into treatment centers. What are the odds of that.\" Pt also make numerous about his \"other self\" being on our unit before and he wants to know how to contact him. He is pleasant and cooperative with this writer. He is adherent to medication regimen. Pt needs encouragement for showers and hygiene. Pt did take a shower this morning after numerous prompts from staff but remains unkempt. Pt denies having bowel or bladder issues. He has no questions regarding plan of care. Will continue to monitor.   1345: Pt has been walking the hallways much of afternoon. He states he is feeling good. Pt does not attend groups and reports he has no interest. Will continue to monitor.     "

## 2017-08-30 NOTE — PLAN OF CARE
Face to face end of shift report received from Jaime Mcintyre RN. Rounding completed. Patient observed in bed. Eyes closed and non-labored respirations noted. Pt appears asleep. Will continue to monitor.     Olinda Bobo  8/30/2017  7:28 AM

## 2017-08-30 NOTE — PLAN OF CARE
Face to face end of shift report received from Amy KAISER RN. Rounding completed. Patient observed.     Maurice Mcintyre  8/29/2017  11:41 PM

## 2017-08-30 NOTE — PLAN OF CARE
Problem: General Plan of Care (Inpatient Behavioral)  Goal: Team Discussion  Team Plan:   BEHAVIORAL TEAM DISCUSSION     Participants: Rowan Alford NP, Kim Cardozo NP, Yvonne Pedersen NP, Amy Briggs [facility], Bianca Morales LSW, Carolyn Holloway Discharge Plannner, Gilda Bobo RN, Jazzy Sood OT, Nneka Sprague OT   Progress: bathing  Continued Stay Criteria/Rationale: increasing clozapine  Medical/Physical: None known  Precautions:  Behavioral Orders   Procedures     Code 1 - Restrict to Unit     Routine Programming       As clinically indicated     Status 15       Every 15 minutes.     Plan: IRTS referrals and continue to stabilize on medications  Rationale for change in precautions or plan: None

## 2017-08-31 DIAGNOSIS — F20.0 PARANOID SCHIZOPHRENIA (H): ICD-10-CM

## 2017-08-31 PROCEDURE — 25000132 ZZH RX MED GY IP 250 OP 250 PS 637: Performed by: NURSE PRACTITIONER

## 2017-08-31 PROCEDURE — S0136 CLOZAPINE, 25 MG: HCPCS | Performed by: NURSE PRACTITIONER

## 2017-08-31 PROCEDURE — 12400000 ZZH R&B MH

## 2017-08-31 RX ORDER — CLOZAPINE 200 MG/1
TABLET ORAL
Qty: 14 TABLET | OUTPATIENT
Start: 2017-08-31

## 2017-08-31 RX ORDER — CLOZAPINE 100 MG/1
TABLET ORAL
Qty: 28 TABLET | OUTPATIENT
Start: 2017-08-31

## 2017-08-31 RX ADMIN — CLOTRIMAZOLE: 10 CREAM TOPICAL at 20:30

## 2017-08-31 RX ADMIN — CLOZAPINE 125 MG: 25 TABLET ORAL at 08:31

## 2017-08-31 RX ADMIN — FAMOTIDINE 20 MG: 20 TABLET ORAL at 08:31

## 2017-08-31 RX ADMIN — CLOZAPINE 150 MG: 25 TABLET ORAL at 20:28

## 2017-08-31 RX ADMIN — VITAMIN D, TAB 1000IU (100/BT) 2000 UNITS: 25 TAB at 08:31

## 2017-08-31 RX ADMIN — FAMOTIDINE 20 MG: 20 TABLET ORAL at 20:28

## 2017-08-31 ASSESSMENT — ACTIVITIES OF DAILY LIVING (ADL)
DRESS: SCRUBS (BEHAVIORAL HEALTH);INDEPENDENT
GROOMING: SHOWER;INDEPENDENT
ORAL_HYGIENE: INDEPENDENT
LAUNDRY: UNABLE TO COMPLETE

## 2017-08-31 NOTE — PLAN OF CARE
Problem: General Plan of Care (Inpatient Behavioral)  Goal: Individualization/Patient Specific Goal (IP Behavioral)  The patient and/or their representative will achieve their patient-specific goals related to the plan of care.    The patient-specific goals include:   Patient will have an absence or decrease in hallucinations by time of discharge.  Patient will be medication compliant while hospitalized.  Patient will be able to have a reality based conversation prior to discharge.  Patient will be independent with his ADL s while hospitalized and maintain hygiene.     Pt continues to appear preoccupied and is delusional in conversation. Pleasant with staff, non-threatening with peers and generally keeps to self while pacing in hallway. Nonsensical and grandiose in thoughts, tells writer that he has created all the medications while he was a  in the past. Only wanders in and out of group, does not stop to participate. Is medication compliant but tells writer that he may not actually be the person that the medication label says he is. No physical complaints today.

## 2017-08-31 NOTE — PLAN OF CARE
Problem: General Plan of Care (Inpatient Behavioral)  Goal: Team Discussion  Team Plan:   BEHAVIORAL TEAM DISCUSSION     Participants:  Kim Cardozo NP, Yvonne Pedersen NP, Bianca Morales LSW, Tish Boswell [facility], Carolyn Holloway Discharge Plannner, Priscilla Henry RN, Jackie Sotomayor RN, Jazzy Sood OT, Nneka Sprague OT   Progress: remains disorganized, managing his self care better  Continued Stay Criteria/Rationale: increasing clozaril, disorganized, delusional  Medical/Physical: None known  Precautions:   Behavioral Orders   Procedures     Code 1 - Restrict to Unit     Routine Programming       As clinically indicated     Status 15       Every 15 minutes.     Plan: IRTS referrals sent  Rationale for change in precautions or plan: None

## 2017-08-31 NOTE — PLAN OF CARE
Problem: General Plan of Care (Inpatient Behavioral)  Goal: Individualization/Patient Specific Goal (IP Behavioral)  The patient and/or their representative will achieve their patient-specific goals related to the plan of care.    The patient-specific goals include:   Patient will have an absence or decrease in hallucinations by time of discharge.  Patient will be medication compliant while hospitalized.  Patient will be able to have a reality based conversation prior to discharge.  Patient will be independent with his ADL s while hospitalized and maintain hygiene.     2345--in bed with eyes closed and breathing regular. 0614--awakened for vitals.

## 2017-08-31 NOTE — PLAN OF CARE
Problem: General Plan of Care (Inpatient Behavioral)  Goal: Individualization/Patient Specific Goal (IP Behavioral)  The patient and/or their representative will achieve their patient-specific goals related to the plan of care.    The patient-specific goals include:   Patient will have an absence or decrease in hallucinations by time of discharge.  Patient will be medication compliant while hospitalized.  Patient will be able to have a reality based conversation prior to discharge.  Patient will be independent with his ADL s while hospitalized and maintain hygiene.     Outcome: No Change  Patient continues to have flight of ideas during conversation with this writer. Short periods of clear speech but not answering any assessment questions. Compliant with scheduled medications. Pacing hallways all shift, only walking through groups for a minute at a time. Patient continues to be disheveled but appears more tidy than previous shifts. Declines shower encouragement this shift. Continue to monitor at this time.

## 2017-08-31 NOTE — PLAN OF CARE
Face to face end of shift report received from Aretha MCCARTHY RN. Rounding completed. Patient observed.     Maurice Mcintyre  8/31/2017  12:15 AM

## 2017-08-31 NOTE — PLAN OF CARE
Face to face end of shift report received from Maurice LEOS RN. Rounding completed. Patient observed laying in bed with eyes closed, right side, non-labored breathing.     Jackie Sotomayor  8/31/2017  8:40 AM

## 2017-09-01 PROCEDURE — S0136 CLOZAPINE, 25 MG: HCPCS | Performed by: NURSE PRACTITIONER

## 2017-09-01 PROCEDURE — 12400000 ZZH R&B MH

## 2017-09-01 PROCEDURE — 25000132 ZZH RX MED GY IP 250 OP 250 PS 637: Performed by: NURSE PRACTITIONER

## 2017-09-01 RX ADMIN — CLOZAPINE 125 MG: 25 TABLET ORAL at 08:09

## 2017-09-01 RX ADMIN — CLOZAPINE 150 MG: 25 TABLET ORAL at 20:30

## 2017-09-01 RX ADMIN — FAMOTIDINE 20 MG: 20 TABLET ORAL at 08:09

## 2017-09-01 RX ADMIN — VITAMIN D, TAB 1000IU (100/BT) 2000 UNITS: 25 TAB at 08:09

## 2017-09-01 RX ADMIN — FAMOTIDINE 20 MG: 20 TABLET ORAL at 20:32

## 2017-09-01 ASSESSMENT — ACTIVITIES OF DAILY LIVING (ADL)
ORAL_HYGIENE: INDEPENDENT
GROOMING: INDEPENDENT
ORAL_HYGIENE: INDEPENDENT
GROOMING: INDEPENDENT;PROMPTS
DRESS: SCRUBS (BEHAVIORAL HEALTH)
DRESS: SCRUBS (BEHAVIORAL HEALTH)
LAUNDRY: UNABLE TO COMPLETE
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
## 2017-09-01 NOTE — PLAN OF CARE
Face to face end of shift report received from Jackie BRYANT RN. Rounding completed. Patient observed in hallway pacing.     Almita Beverly  8/31/2017  4:04 PM

## 2017-09-01 NOTE — PLAN OF CARE
Face to face end of shift report received from Key GREENBERG RN. Rounding completed. Patient observed in [facility].     Karina Bailey  9/1/2017  4:05 PM
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
## 2017-09-01 NOTE — PLAN OF CARE
Problem: General Plan of Care (Inpatient Behavioral)  Goal: Team Discussion  Team Plan:   BEHAVIORAL TEAM DISCUSSION     Participants: Rowan Alford NP, Kim Cardozo NP, Yvonne Pedersen NP, Tish Boswell [facility], Carolyn Holloway Dis[facility] Plannner, Priscilla Henry RN, Key Vaughan RN, Jazzy Sood OT, Nneka Sprague OT   Progress: minimal  Continued Stay Criteria/Rationale: remains preoccupied, delusional, increasing clozaril  Medical/Physical: None known  Precautions:   Behavioral Orders   Procedures     Code 1 - Restrict to Unit     Routine Programming       As clinically indicated     Status 15       Every 15 minutes.     Plan: DC to IRTS when stabilized  Rationale for change in precautions or plan: None

## 2017-09-01 NOTE — PLAN OF CARE
Face to face end of shift report received from pm RN. Rounding completed. Patient observed.     Aly Delacruz  8/31/2017  11:44 PM

## 2017-09-01 NOTE — PLAN OF CARE
Face to face end of shift report received from Aly RAINES RN. Rounding completed. Patient observed.     Key Vaughan  9/1/2017  7:36 AM

## 2017-09-01 NOTE — PLAN OF CARE
Problem: General Plan of Care (Inpatient Behavioral)  Goal: Individualization/Patient Specific Goal (IP Behavioral)  The patient and/or their representative will achieve their patient-specific goals related to the plan of care.    The patient-specific goals include:   Patient will have an absence or decrease in hallucinations by time of discharge.  Patient will be medication compliant while hospitalized.  Patient will be able to have a reality based conversation prior to discharge.  Patient will be independent with his ADL s while hospitalized and maintain hygiene.     Outcome: Therapy, progress toward functional goals is gradual  Pt. Is medication compliant, not able to have reality based conversation, irritable with questions, independent with ADL's, but not wanting to shower at this time, appears to be responding to internal stimuli, as pt. Talking to self frequently, walks into group room frequently then walks out, will continue to monitor.

## 2017-09-02 PROCEDURE — 99233 SBSQ HOSP IP/OBS HIGH 50: CPT | Performed by: NURSE PRACTITIONER

## 2017-09-02 PROCEDURE — 25000132 ZZH RX MED GY IP 250 OP 250 PS 637: Performed by: NURSE PRACTITIONER

## 2017-09-02 PROCEDURE — S0136 CLOZAPINE, 25 MG: HCPCS | Performed by: NURSE PRACTITIONER

## 2017-09-02 PROCEDURE — 12400000 ZZH R&B MH

## 2017-09-02 RX ADMIN — CLOZAPINE 150 MG: 25 TABLET ORAL at 20:34

## 2017-09-02 RX ADMIN — CLOZAPINE 150 MG: 25 TABLET ORAL at 08:13

## 2017-09-02 RX ADMIN — FAMOTIDINE 20 MG: 20 TABLET ORAL at 08:13

## 2017-09-02 RX ADMIN — VITAMIN D, TAB 1000IU (100/BT) 2000 UNITS: 25 TAB at 08:13

## 2017-09-02 RX ADMIN — FAMOTIDINE 20 MG: 20 TABLET ORAL at 20:34

## 2017-09-02 ASSESSMENT — ACTIVITIES OF DAILY LIVING (ADL)
ORAL_HYGIENE: INDEPENDENT
DRESS: SCRUBS (BEHAVIORAL HEALTH)
GROOMING: INDEPENDENT
ORAL_HYGIENE: INDEPENDENT
LAUNDRY: UNABLE TO COMPLETE
GROOMING: INDEPENDENT

## 2017-09-02 NOTE — PLAN OF CARE
Face to face end of shift report received from pm RN. Rounding completed. Patient observed.     Aly Delacruz  9/1/2017  11:40 PM

## 2017-09-02 NOTE — PROGRESS NOTES
[facility]  Psychiatric Progress Note      Impression:   This is a 31 year old yo male with a history of paranoid schizophrenia.    Miles today laying in bed, and said he is all right, but slow to ansser and did not give logical responses to questions. Cinues to have some disorganized speech and thoughts today. Stated he on stress overload and everthing just keeps going . Continues to pace most of day. Denies Auditory or visual hallucinations. Appears paranoid in his thinking but stated he is not. Clozaril increase this am.  Discussed his medicaitons and making sure his delusions and disorganized thinking has decreased significantly from admit so he can function outside of the hospital setting. Has been med compliant.          DIagnoses:    Paranoid schizophrenia with capgras delusions    Attestation:  Patient has been seen and evaluated by me,  Lucy Chapa NP          Interim History:   The patient's care was discussed with the treatment team and chart notes were reviewed.          Medications:     Current Facility-Administered Medications Ordered in Epic   Medication Dose Route Frequency Last Rate Last Dose     clotrimazole (LOTRIMIN) 1 % cream   Topical BID         cloZAPine (CLOZARIL) tablet 50 mg  50 mg Oral At Bedtime         cholecalciferol (vitamin D) tablet 2,000 Units  2,000 Units Oral Daily   2,000 Units at 08/22/17 0815     famotidine (PEPCID) tablet 20 mg  20 mg Oral BID   20 mg at 08/22/17 0815     benztropine (COGENTIN) tablet 0.5 mg  0.5 mg Oral BID PRN   0.5 mg at 08/21/17 1801     hydrOXYzine (ATARAX) tablet 25-50 mg  25-50 mg Oral Q4H PRN         acetaminophen (TYLENOL) tablet 650 mg  650 mg Oral Q4H PRN         alum & mag hydroxide-simethicone (MYLANTA ES/MAALOX  ES) suspension 30 mL  30 mL Oral Q4H PRN         magnesium hydroxide (MILK OF MAGNESIA) suspension 30 mL  30 mL Oral At Bedtime PRN         traZODone (DESYREL) tablet 50 mg  50 mg Oral At Bedtime PRN          OLANZapine (zyPREXA) tablet 10 mg  10 mg Oral Q2H PRN        Or     OLANZapine (zyPREXA) injection 10 mg  10 mg Intramuscular Q2H PRN         nicotine polacrilex (NICORETTE) gum 2-4 mg  2-4 mg Buccal Q1H PRN         Current Outpatient Prescriptions Ordered in Epic   Medication     cloZAPine (CLOZARIL) 100 MG tablet     Clozapine (CLOZARIL) 200 MG tablet          10 point ROS attempted - offered no complaints.       Allergies:     Allergies   Allergen Reactions     Pcn [Bicillin C-R,] Rash     Provider wants to try amoxicillin(benadryl ordered as well) 9/10/16     Bupropion Other (See Comments)     delusions      Depakote [Valproic Acid] Other (See Comments)     Heart racing     Divalproex Sodium-Fd&C Red #40      Increase heart rate            Psychiatric Examination:   /81  Pulse 101  Temp 98.3  F (36.8  C) (Tympanic)  Resp 18  Ht 1.829 m (6')  Wt 103.4 kg (228 lb)  SpO2 95%  BMI 30.92 kg/m2  Weight is 228 lbs 0 oz  Body mass index is 30.92 kg/(m^2).    Appearance: awake, alert, continues to pace  Attitude: attempts to be cooperative  Eye Contact: poor  Mood: some irritability  Affect:  Blunted and delusional   Speech:  Clear, rarely logical with mostly content delusional  Psychomotor Behavior:  no evidence of tardive dyskinesia, dystonia, or tics  Thought Process:  disorganized and evidence of thought blocking present frequently expresses feeling confused  Associations:  loosening of associations present  Thought Content:  no evidence of suicidal ideation or homicidal ideation and patient appears to be responding to internal stimuli  Insight:  limited  Judgment:  limited  Oriented to:  time, person, and place  Attention Span and Concentration:  limited  Recent and Remote Memory:  fair  Fund of Knowledge: low-normal  Muscle Strength and Tone: normal  Gait and Station: Normal           Labs:   No results found for this or any previous visit (from the past 24 hour(s)).           Plan:   Increase Clozaril  to 150 mg in the AM 9/2.     ELOS: Questionable - it seems to take Miles longer and longer to recover when his medications are stopped. Hoping to stabilize him to the point of being able to participate in groups and refer to an IRTS program. He remains voluntary and has previously expressed a willingness to do this. Remains delusional, preoccupied, and disorganized. Unable to care for self and unsafe to discharge.

## 2017-09-02 NOTE — PLAN OF CARE
Face to face end of shift report received from Aly RAINES RN. Rounding completed. Patient observed.     Key Vaughan  9/2/2017  7:31 AM

## 2017-09-02 NOTE — PLAN OF CARE
Face to face end of shift report received from Key GREENBERG RN. Rounding completed. Patient observed pacing [facility].     Karina Bailey  9/2/2017  3:41 PM

## 2017-09-02 NOTE — PLAN OF CARE
Problem: General Plan of Care (Inpatient Behavioral)  Goal: Individualization/Patient Specific Goal (IP Behavioral)  The patient and/or their representative will achieve their patient-specific goals related to the plan of care.    The patient-specific goals include:   Patient will have an absence or decrease in hallucinations by time of discharge.  Patient will be medication compliant while hospitalized.  Patient will be able to have a reality based conversation prior to discharge.  Patient will be independent with his ADL s while hospitalized and maintain hygiene.     Outcome: No Change  Pt. Appears to be responding to internal stimuli, talking to self frequently, is medication compliant, reluctantly took prescribed medications, no reality based conversation, poor with ADL's, disheveled in appearance, refusing to shower, will continue to monitor.

## 2017-09-02 NOTE — PLAN OF CARE
Problem: General Plan of Care (Inpatient Behavioral)  Goal: Individualization/Patient Specific Goal (IP Behavioral)  The patient and/or their representative will achieve their patient-specific goals related to the plan of care.    The patient-specific goals include:   Patient will have an absence or decrease in hallucinations by time of discharge.  Patient will be medication compliant while hospitalized.  Patient will be able to have a reality based conversation prior to discharge.  Patient will be independent with his ADL s while hospitalized and maintain hygiene.     Outcome: No Change  Patient has been up pacing hallways most of this shift.  He attended some groups.  Speech was pressured during assessment with delusions and flights of ideas present.  He denies any mental health issues but appears responding to internal stimuli.  VS WNL.  No complaints of pain.

## 2017-09-03 LAB
BASOPHILS # BLD AUTO: 0.1 10E9/L (ref 0–0.2)
BASOPHILS NFR BLD AUTO: 0.5 %
DIFFERENTIAL METHOD BLD: ABNORMAL
EOSINOPHIL # BLD AUTO: 0.2 10E9/L (ref 0–0.7)
EOSINOPHIL NFR BLD AUTO: 1.9 %
ERYTHROCYTE [DISTWIDTH] IN BLOOD BY AUTOMATED COUNT: 13.2 % (ref 10–15)
HCT VFR BLD AUTO: 44.1 % (ref 40–53)
HGB BLD-MCNC: 15.3 G/DL (ref 13.3–17.7)
IMM GRANULOCYTES # BLD: 0.1 10E9/L (ref 0–0.4)
IMM GRANULOCYTES NFR BLD: 0.4 %
LYMPHOCYTES # BLD AUTO: 1.9 10E9/L (ref 0.8–5.3)
LYMPHOCYTES NFR BLD AUTO: 15.9 %
MCH RBC QN AUTO: 30.5 PG (ref 26.5–33)
MCHC RBC AUTO-ENTMCNC: 34.7 G/DL (ref 31.5–36.5)
MCV RBC AUTO: 88 FL (ref 78–100)
MONOCYTES # BLD AUTO: 1.3 10E9/L (ref 0–1.3)
MONOCYTES NFR BLD AUTO: 10.7 %
NEUTROPHILS # BLD AUTO: 8.6 10E9/L (ref 1.6–8.3)
NEUTROPHILS NFR BLD AUTO: 70.6 %
NRBC # BLD AUTO: 0 10*3/UL
NRBC BLD AUTO-RTO: 0 /100
PLATELET # BLD AUTO: 335 10E9/L (ref 150–450)
RBC # BLD AUTO: 5.01 10E12/L (ref 4.4–5.9)
WBC # BLD AUTO: 12.2 10E9/L (ref 4–11)

## 2017-09-03 PROCEDURE — S0136 CLOZAPINE, 25 MG: HCPCS | Performed by: NURSE PRACTITIONER

## 2017-09-03 PROCEDURE — 85025 COMPLETE CBC W/AUTO DIFF WBC: CPT | Performed by: NURSE PRACTITIONER

## 2017-09-03 PROCEDURE — 99233 SBSQ HOSP IP/OBS HIGH 50: CPT | Performed by: NURSE PRACTITIONER

## 2017-09-03 PROCEDURE — 25000132 ZZH RX MED GY IP 250 OP 250 PS 637: Performed by: NURSE PRACTITIONER

## 2017-09-03 PROCEDURE — 12400000 ZZH R&B MH

## 2017-09-03 PROCEDURE — 36415 COLL VENOUS BLD VENIPUNCTURE: CPT | Performed by: NURSE PRACTITIONER

## 2017-09-03 RX ADMIN — CLOZAPINE 175 MG: 25 TABLET ORAL at 20:37

## 2017-09-03 RX ADMIN — VITAMIN D, TAB 1000IU (100/BT) 2000 UNITS: 25 TAB at 08:16

## 2017-09-03 RX ADMIN — CLOZAPINE 150 MG: 25 TABLET ORAL at 08:16

## 2017-09-03 RX ADMIN — FAMOTIDINE 20 MG: 20 TABLET ORAL at 08:16

## 2017-09-03 RX ADMIN — FAMOTIDINE 20 MG: 20 TABLET ORAL at 20:37

## 2017-09-03 ASSESSMENT — ACTIVITIES OF DAILY LIVING (ADL)
GROOMING: INDEPENDENT
ORAL_HYGIENE: INDEPENDENT
GROOMING: INDEPENDENT
ORAL_HYGIENE: INDEPENDENT
LAUNDRY: UNABLE TO COMPLETE
DRESS: SCRUBS (BEHAVIORAL HEALTH)
DRESS: SCRUBS (BEHAVIORAL HEALTH)
LAUNDRY: UNABLE TO COMPLETE

## 2017-09-03 NOTE — PLAN OF CARE
Face to face end of shift report received from MARINA Tanner. Rounding completed. Patient observed laying in bed eyes closed with normal non labored respirations.     Mayra Sotelo  9/3/2017  7:30 AM

## 2017-09-03 NOTE — PLAN OF CARE
Face to face end of shift report received from PM RN. Rounding completed. Patient observed.     Jeison Easley RN

## 2017-09-03 NOTE — PLAN OF CARE
"Problem: General Plan of Care (Inpatient Behavioral)  Goal: Individualization/Patient Specific Goal (IP Behavioral)  The patient and/or their representative will achieve their patient-specific goals related to the plan of care.    The patient-specific goals include:   Patient will have an absence or decrease in hallucinations by time of discharge.  Patient will be medication compliant while hospitalized.  Patient will be able to have a reality based conversation prior to discharge.  Patient will be independent with his ADL s while hospitalized and maintain hygiene.     Outcome: No Change  Patient has been up pacing hallways most of this shift. Denies all mental health issues but made multiple odd statements this shift.  Told this writer that he was born in 1985 and 1989.  Also stated \"I know everything about everyone from every time.\"  Patient refused to explain statement further.  Took all medications as prescribed.  VS WNL.  No complaints of pain.  Will continue to monitor.       "

## 2017-09-03 NOTE — PROGRESS NOTES
"[facility]  Psychiatric Progress Note    Subjective        This is a 31 year old male with paranoid schizophrenia. Miles was admitted psychotic and paranoid after a period of clozaril non adherence, Miles has been non responsive to all other medications. Given pts past history re initiation of Clozaril began with titration ongoing as paranoia, agitation, AH/VH persist. Miles's mood is labile this am I went and asked if I could check his feet  \"no one is going to look at my fucking feet, the holy father\". He became agitated and walked away, avoiding me the remainder of the am.         Due to multiple episodes of medication non adherence,stabilizing the negitive symptoms of the disease state along with the return to baseline state of functioning becomes increasing complicated taking longer with each episode of non adherence. Patient is unsafe to discharge due to paranoia, delusions, and inability to care for self. He will be transferred to an IRTS at sometime in the future.    10 point ROS negative except for what is noted in HPI       DIagnoses:      Paranoid Schizophrenia w/ capgras delusions  Attestation:  Patient has been seen and evaluated by me, Mara Rai, APRN CNP, in the presence of the house staff team          Interim History:   The patient's care was discussed with the treatment team and chart notes were reviewed.          Medications:       cloZAPine  150 mg Oral Daily     cloZAPine  150 mg Oral At Bedtime     clotrimazole   Topical BID     cholecalciferol  2,000 Units Oral Daily     famotidine  20 mg Oral BID     benztropine, hydrOXYzine, acetaminophen, alum & mag hydroxide-simethicone, magnesium hydroxide, traZODone, OLANZapine **OR** OLANZapine, nicotine polacrilex          Allergies:     Allergies   Allergen Reactions     Pcn [Bicillin C-R,] Rash     Provider wants to try amoxicillin(benadryl ordered as well) 9/10/16     Bupropion Other (See Comments)     delusions      Depakote " [Valproic Acid] Other (See Comments)     Heart racing     Divalproex Sodium-Fd&C Red #40      Increase heart rate            Psychiatric Examination:   /81  Pulse 109  Temp 98.2  F (36.8  C) (Tympanic)  Resp 18  Ht 6' (1.829 m)  Wt 225 lb 5 oz (102.2 kg)  SpO2 97%  BMI 30.56 kg/m2  Weight is 225 lbs 4.96 oz  Body mass index is 30.56 kg/(m^2).    Appearance:  awake, alert, poorly groomed and disheveled   Attitude:  uncooperative  Eye Contact:  poor   Mood:  irritable  Affect:  mood congruent, intensity is dramatic and labile  Speech:  rambling  Psychomotor Behavior:  intact station, gait and muscle tone  Thought Process:  disorganized, illogical and tangental  Associations:  loosening of associations present  Thought Content:  no evidence of suicidal ideation or homicidal ideation, auditory hallucinations present and visual hallucinations present  Insight:  limited  Judgment:  poor  Oriented to:  self only  Attention Span and Concentration:  poor  Recent and Remote Memory:  poor  Fund of Knowledge: delayed  Muscle Strength and Tone: normal  Gait and Station: Normal  Perception: pt is internally stimulated         Labs:     Recent Results (from the past 24 hour(s))   CBC with platelets differential    Collection Time: 09/03/17  7:14 AM   Result Value Ref Range    WBC 12.2 (H) 4.0 - 11.0 10e9/L    RBC Count 5.01 4.4 - 5.9 10e12/L    Hemoglobin 15.3 13.3 - 17.7 g/dL    Hematocrit 44.1 40.0 - 53.0 %    MCV 88 78 - 100 fl    MCH 30.5 26.5 - 33.0 pg    MCHC 34.7 31.5 - 36.5 g/dL    RDW 13.2 10.0 - 15.0 %    Platelet Count 335 150 - 450 10e9/L    Diff Method Automated Method     % Neutrophils 70.6 %    % Lymphocytes 15.9 %    % Monocytes 10.7 %    % Eosinophils 1.9 %    % Basophils 0.5 %    % Immature Granulocytes 0.4 %    Nucleated RBCs 0 0 /100    Absolute Neutrophil 8.6 (H) 1.6 - 8.3 10e9/L    Absolute Lymphocytes 1.9 0.8 - 5.3 10e9/L    Absolute Monocytes 1.3 0.0 - 1.3 10e9/L    Absolute Eosinophils 0.2 0.0  - 0.7 10e9/L    Absolute Basophils 0.1 0.0 - 0.2 10e9/L    Abs Immature Granulocytes 0.1 0 - 0.4 10e9/L    Absolute Nucleated RBC 0.0              Assessment/ Plan:     Will increase Clozaril from 150 mg BID to 175 mg BID. This is be 25 mg this PM and 25 in am.

## 2017-09-03 NOTE — PLAN OF CARE
"Problem: General Plan of Care (Inpatient Behavioral)  Goal: Individualization/Patient Specific Goal (IP Behavioral)  The patient and/or their representative will achieve their patient-specific goals related to the plan of care.    The patient-specific goals include:   Patient will have an absence or decrease in hallucinations by time of discharge.  Patient will be medication compliant while hospitalized.  Patient will be able to have a reality based conversation prior to discharge.  Patient will be independent with his ADL s while hospitalized and maintain hygiene.     Outcome: No Change  Up for breakfast, pacing in hallway. Irritable, delusional thinking, poor eye contact.  Stated that he \"use to work here\". Upset that they keep taking blood from him said, \"the more blood they take the further back I go to bad places.\" denies having any trouble with his feet. Appears to be responding to internal stimuli. Isolating in room. Did take medications today with no problems. Needs encouragement to shower.      "

## 2017-09-03 NOTE — PLAN OF CARE
Problem: General Plan of Care (Inpatient Behavioral)  Goal: Individualization/Patient Specific Goal (IP Behavioral)  The patient and/or their representative will achieve their patient-specific goals related to the plan of care.    The patient-specific goals include:   Patient will have an absence or decrease in hallucinations by time of discharge.  Patient will be medication compliant while hospitalized.  Patient will be able to have a reality based conversation prior to discharge.  Patient will be independent with his ADL s while hospitalized and maintain hygiene.     Outcome: Improving  Patient observed resting in his room. Respirations equal and non-labored.  Pt refused Vitals Signs this AM

## 2017-09-03 NOTE — PLAN OF CARE
Face to face end of shift report received from Mayra LENTZ RN. Rounding completed. Patient observed in group room.     Karina Bailey  9/3/2017  3:38 PM

## 2017-09-04 PROCEDURE — S0136 CLOZAPINE, 25 MG: HCPCS | Performed by: NURSE PRACTITIONER

## 2017-09-04 PROCEDURE — 12400000 ZZH R&B MH

## 2017-09-04 PROCEDURE — 25000132 ZZH RX MED GY IP 250 OP 250 PS 637: Performed by: NURSE PRACTITIONER

## 2017-09-04 PROCEDURE — 99233 SBSQ HOSP IP/OBS HIGH 50: CPT | Performed by: NURSE PRACTITIONER

## 2017-09-04 RX ORDER — CLOZAPINE 100 MG/1
200 TABLET ORAL DAILY
Status: DISCONTINUED | OUTPATIENT
Start: 2017-09-05 | End: 2017-11-06 | Stop reason: HOSPADM

## 2017-09-04 RX ADMIN — CLOZAPINE 175 MG: 25 TABLET ORAL at 20:27

## 2017-09-04 RX ADMIN — FAMOTIDINE 20 MG: 20 TABLET ORAL at 20:27

## 2017-09-04 RX ADMIN — CLOZAPINE 175 MG: 25 TABLET ORAL at 08:24

## 2017-09-04 RX ADMIN — VITAMIN D, TAB 1000IU (100/BT) 2000 UNITS: 25 TAB at 08:25

## 2017-09-04 RX ADMIN — FAMOTIDINE 20 MG: 20 TABLET ORAL at 08:25

## 2017-09-04 ASSESSMENT — ACTIVITIES OF DAILY LIVING (ADL)
GROOMING: INDEPENDENT;PROMPTS
ORAL_HYGIENE: INDEPENDENT
GROOMING: INDEPENDENT
LAUNDRY: UNABLE TO COMPLETE
ORAL_HYGIENE: INDEPENDENT
LAUNDRY: UNABLE TO COMPLETE
DRESS: SCRUBS (BEHAVIORAL HEALTH)
DRESS: SCRUBS (BEHAVIORAL HEALTH);INDEPENDENT

## 2017-09-04 NOTE — PLAN OF CARE
"Problem: General Plan of Care (Inpatient Behavioral)  Goal: Individualization/Patient Specific Goal (IP Behavioral)  The patient and/or their representative will achieve their patient-specific goals related to the plan of care.    The patient-specific goals include:   Patient will have an absence or decrease in hallucinations by time of discharge.  Patient will be medication compliant while hospitalized.  Patient will be able to have a reality based conversation prior to discharge.  Patient will be independent with his ADL s while hospitalized and maintain hygiene.     Outcome: No Change  Patient irritable this shift. Does not participate fully in nursing assessment. Speech is pressured and tangential. States \"all these niggers and judges forcing medications and labs are all off\". \"You guys in this place just medicate people to make them what you want\". Paranoid and delusional statements. Walks in and out of groups but does not actively participate. Does deny SI and HI. Agrees to contact staff if having thoughts of self harm. When asked about his stay here he states \"i\"ll just be a good boy and take my meds\". Patient pacing throughout shift. Encouraged to shower but refused. Unable to maintain reality based conversation. Refused cream to feet denying there was an issue.       "

## 2017-09-04 NOTE — PLAN OF CARE
Face to face end of shift report received from Quintin LOMELI RN. Rounding completed. Patient observed in hallway pacing.     Karina Bailey  9/4/2017  3:33 PM
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
## 2017-09-04 NOTE — PLAN OF CARE
Problem: General Plan of Care (Inpatient Behavioral)  Goal: Individualization/Patient Specific Goal (IP Behavioral)  The patient and/or their representative will achieve their patient-specific goals related to the plan of care.    The patient-specific goals include:   Patient will have an absence or decrease in hallucinations by time of discharge.  Patient will be medication compliant while hospitalized.  Patient will be able to have a reality based conversation prior to discharge.  Patient will be independent with his ADL s while hospitalized and maintain hygiene.     Outcome: No Change  Patient has been pacing hallway all shift.  He is cooperative with nursing staff.  He had a visitor and said he was happy to have snacks brought in.  Denies any mental health issues.  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  Refused Vs.  No complaints of pain.

## 2017-09-04 NOTE — PLAN OF CARE
Face to face end of shift report received from Jeison SAMAYOA RN. Rounding completed. Patient observed sleeping. Unlabored respirations.     Lety Chaparro  9/4/2017  7:41 AM

## 2017-09-05 DIAGNOSIS — F20.0 PARANOID SCHIZOPHRENIA (H): ICD-10-CM

## 2017-09-05 PROCEDURE — 25000132 ZZH RX MED GY IP 250 OP 250 PS 637: Performed by: NURSE PRACTITIONER

## 2017-09-05 PROCEDURE — 12400000 ZZH R&B MH

## 2017-09-05 PROCEDURE — S0136 CLOZAPINE, 25 MG: HCPCS | Performed by: NURSE PRACTITIONER

## 2017-09-05 PROCEDURE — 99233 SBSQ HOSP IP/OBS HIGH 50: CPT | Performed by: NURSE PRACTITIONER

## 2017-09-05 RX ORDER — CLOTRIMAZOLE 1 %
CREAM (GRAM) TOPICAL 2 TIMES DAILY PRN
Status: DISCONTINUED | OUTPATIENT
Start: 2017-09-05 | End: 2017-11-06 | Stop reason: HOSPADM

## 2017-09-05 RX ADMIN — CLOZAPINE 175 MG: 25 TABLET ORAL at 20:13

## 2017-09-05 RX ADMIN — FAMOTIDINE 20 MG: 20 TABLET ORAL at 08:27

## 2017-09-05 RX ADMIN — CLOZAPINE 200 MG: 100 TABLET ORAL at 08:27

## 2017-09-05 RX ADMIN — NICOTINE POLACRILEX 4 MG: 2 GUM, CHEWING ORAL at 19:55

## 2017-09-05 RX ADMIN — FAMOTIDINE 20 MG: 20 TABLET ORAL at 20:13

## 2017-09-05 RX ADMIN — VITAMIN D, TAB 1000IU (100/BT) 2000 UNITS: 25 TAB at 08:27

## 2017-09-05 ASSESSMENT — ACTIVITIES OF DAILY LIVING (ADL)
LAUNDRY: UNABLE TO COMPLETE
DRESS: SCRUBS (BEHAVIORAL HEALTH);INDEPENDENT
GROOMING: INDEPENDENT
GROOMING: INDEPENDENT
ORAL_HYGIENE: INDEPENDENT
ORAL_HYGIENE: INDEPENDENT
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
## 2017-09-05 NOTE — PLAN OF CARE
Face to face end of shift report received from Fadumo SANTIAGO RN. Rounding completed. Patient observed sleeping. Unlabored respirations.     Lety Chaparro  9/5/2017  7:42 AM

## 2017-09-05 NOTE — PLAN OF CARE
"Problem: General Plan of Care (Inpatient Behavioral)  Goal: Individualization/Patient Specific Goal (IP Behavioral)  The patient and/or their representative will achieve their patient-specific goals related to the plan of care.    The patient-specific goals include:   Patient will have an absence or decrease in hallucinations by time of discharge.  Patient will be medication compliant while hospitalized.  Patient will be able to have a reality based conversation prior to discharge.  Patient will be independent with his ADL s while hospitalized and maintain hygiene.     Outcome: No Change  Patient minimally cooperative with nursing assessment. Speech is tangential and delusional. Difficult to follow in conversation but is calm and polite with staff. Agrees to make needs known to staff. Does deny SI, HI, and pain. Appears responding to internal stimuli. Compliant with scheduled medications. Without difficulty. Reports not needing his cream to feet anymore but declines to let this writer look at them. When asked his name and date of birth for medications he states \"I've had too many names over the years\" then laughs and gives correct information. Walks in and out of groups but minimally participates. Pacing halls throughout shift and is withdrawn to self.       "

## 2017-09-05 NOTE — PLAN OF CARE
Problem: General Plan of Care (Inpatient Behavioral)  Goal: Team Discussion  Team Plan:   BEHAVIORAL TEAM DISCUSSION     Participants: Rowan Alford NP,Kim Cardozo NP,Amy Briggs [facility], Tish Boswell [facility],Carolyn Holloway [facility], Priscilla Henry RN, Dolores Daniel RN, Alesha Quevedo [facility], Nneka Sprague OT   Progress: Minimal  Continued Stay Criteria/Rationale: Continued clozaril adjustment  Medical/Physical: Dry feet, refusing cream  Precautions:   Behavioral Orders   Procedures     Code 1 - Restrict to Unit     Routine Programming       As clinically indicated     Status 15       Every 15 minutes.     Plan: Continue to titrate clozaril  Rationale for change in precautions or plan: None

## 2017-09-05 NOTE — PLAN OF CARE
Problem: General Plan of Care (Inpatient Behavioral)  Goal: Individualization/Patient Specific Goal (IP Behavioral)  The patient and/or their representative will achieve their patient-specific goals related to the plan of care.    The patient-specific goals include:   Patient will have an absence or decrease in hallucinations by time of discharge.  Patient will be medication compliant while hospitalized.  Patient will be able to have a reality based conversation prior to discharge.  Patient will be independent with his ADL s while hospitalized and maintain hygiene.     Outcome: No Change  Patient has been up pacing hallways most of this shift.  He attended a few groups and had a visitor this afternoon. 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  Laughs inappropriately during conversations.  Took all medications as prescribed.  Denies all mental health issues.  VS WNL.  No complaints of pain.

## 2017-09-05 NOTE — PLAN OF CARE
Face to face end of shift report received from MARINA Collins. Rounding completed. Patient observed resting in bed.     MIKEY FITZPATRICK  9/5/2017  12:27 AM

## 2017-09-05 NOTE — PLAN OF CARE
Face to face end of shift report received from Lety KAM RN. Rounding completed. Patient observed.     Key Vaughan  9/5/2017  4:01 PM

## 2017-09-05 NOTE — PLAN OF CARE
Problem: General Plan of Care (Inpatient Behavioral)  Goal: Individualization/Patient Specific Goal (IP Behavioral)  The patient and/or their representative will achieve their patient-specific goals related to the plan of care.    The patient-specific goals include:   Patient will have an absence or decrease in hallucinations by time of discharge.  Patient will be medication compliant while hospitalized.  Patient will be able to have a reality based conversation prior to discharge.  Patient will be independent with his ADL s while hospitalized and maintain hygiene.     Pt appears to be sleeping in bed with eyes closed and having regular respirations. 15 minutes and PRN safety checks completed with no noted complains. Will continue to monitor.      MIKEY FITZPATRICK  9/5/2017  5:20 AM

## 2017-09-06 PROCEDURE — 12400000 ZZH R&B MH

## 2017-09-06 PROCEDURE — S0136 CLOZAPINE, 25 MG: HCPCS | Performed by: NURSE PRACTITIONER

## 2017-09-06 PROCEDURE — 25000132 ZZH RX MED GY IP 250 OP 250 PS 637: Performed by: NURSE PRACTITIONER

## 2017-09-06 PROCEDURE — 99232 SBSQ HOSP IP/OBS MODERATE 35: CPT | Performed by: NURSE PRACTITIONER

## 2017-09-06 RX ORDER — CLOZAPINE 100 MG/1
200 TABLET ORAL AT BEDTIME
Status: DISCONTINUED | OUTPATIENT
Start: 2017-09-06 | End: 2017-09-09

## 2017-09-06 RX ADMIN — ACETAMINOPHEN 650 MG: 325 TABLET, FILM COATED ORAL at 16:37

## 2017-09-06 RX ADMIN — VITAMIN D, TAB 1000IU (100/BT) 2000 UNITS: 25 TAB at 08:42

## 2017-09-06 RX ADMIN — CLOZAPINE 200 MG: 100 TABLET ORAL at 08:38

## 2017-09-06 RX ADMIN — FAMOTIDINE 20 MG: 20 TABLET ORAL at 08:38

## 2017-09-06 RX ADMIN — FAMOTIDINE 20 MG: 20 TABLET ORAL at 20:13

## 2017-09-06 RX ADMIN — CLOZAPINE 200 MG: 100 TABLET ORAL at 20:13

## 2017-09-06 ASSESSMENT — ACTIVITIES OF DAILY LIVING (ADL)
ORAL_HYGIENE: INDEPENDENT
GROOMING: INDEPENDENT
DRESS: INDEPENDENT;SCRUBS (BEHAVIORAL HEALTH)
GROOMING: INDEPENDENT
ORAL_HYGIENE: INDEPENDENT
LAUNDRY: UNABLE TO COMPLETE

## 2017-09-06 NOTE — PLAN OF CARE
Face to face end of shift report received from MARINA Mackey. Rounding completed. Patient observed resting in bed.     MIKEY FITZPATRICK  9/6/2017  12:31 AM

## 2017-09-06 NOTE — PLAN OF CARE
Face to face end of shift report received from Almita KAM RN. Rounding completed. Patient observed.     Key Vaughan  9/6/2017  3:33 PM

## 2017-09-06 NOTE — PLAN OF CARE
Problem: General Plan of Care (Inpatient Behavioral)  Goal: Individualization/Patient Specific Goal (IP Behavioral)  The patient and/or their representative will achieve their patient-specific goals related to the plan of care.    The patient-specific goals include:   Patient will have an absence or decrease in hallucinations by time of discharge.  Patient will be medication compliant while hospitalized.  Patient will be able to have a reality based conversation prior to discharge.  Patient will be independent with his ADL s while hospitalized and maintain hygiene.     Outcome: No Change  Pt. Continues to have hallucinations, pacing in the hallway, talking to self frequently, not able to have reality based conversation, independent with ADL's, will continue to monitor.

## 2017-09-06 NOTE — PLAN OF CARE
"Problem: General Plan of Care (Inpatient Behavioral)  Goal: Individualization/Patient Specific Goal (IP Behavioral)  The patient and/or their representative will achieve their patient-specific goals related to the plan of care.    The patient-specific goals include:   Patient will have an absence or decrease in hallucinations by time of discharge.  Patient will be medication compliant while hospitalized.  Patient will be able to have a reality based conversation prior to discharge.  Patient will be independent with his ADL s while hospitalized and maintain hygiene.     Outcome: Improving  Pt continues to have hallucinations, pt denies having hallucinations but during conversation pt does turn his head making comments such as \"just like all of them\" Nurse questions what he was talking about, pt states \"just talking about something that happened yesterday. Pt is distracted during conversations. He continues to have an odor and is disheveled. Pt room is disorganized with items on the floor, nurse does encourage patient to clean these items off the floor. Pt is is med compliant. PT denies all criteria. When asked to state his name and date of birth prior to receiving mediations pt states \"well today its\" stating his correct name and date of birth. Pt is pleasant during conversation but is withdrawn from others.        "

## 2017-09-06 NOTE — PLAN OF CARE
Face to face end of shift report received from Fadumo GOMEZ RN. Rounding completed. Patient observed in room laying in bed.     Almita Beverly  9/6/2017  7:59 AM

## 2017-09-06 NOTE — PLAN OF CARE
Problem: General Plan of Care (Inpatient Behavioral)  Goal: Individualization/Patient Specific Goal (IP Behavioral)  The patient and/or their representative will achieve their patient-specific goals related to the plan of care.    The patient-specific goals include:   Patient will have an absence or decrease in hallucinations by time of discharge.  Patient will be medication compliant while hospitalized.  Patient will be able to have a reality based conversation prior to discharge.  Patient will be independent with his ADL s while hospitalized and maintain hygiene.     Pt appeared to be sleeping in bed with eyes closed and having regular respirations. 15 minutes and PRN safety checks completed with no noted complains. Will continue to monitor.      MIKEY FITZPATRICK  9/6/2017  5:22 AM

## 2017-09-06 NOTE — PROGRESS NOTES
Weight stable. Patient consuming adequate nutrition to meet estimated nutrition needs.    RD will continue to monitor.    Negra Adams RD, LD

## 2017-09-06 NOTE — PLAN OF CARE
Problem: General Plan of Care (Inpatient Behavioral)  Goal: Team Discussion  Team Plan:   Outcome: Improving  BEHAVIORAL TEAM DISCUSSION     Participants: Supa Espinoza NP, Yvonne Pedersen NP, [facility], [facility], Bianca Morales LSW, Tish Boswell SW, Carolyn Holloway [facility], Almita GRAY, [facility]les[facility]vedo [facility], [facility]  Progress: disorganized, continued talked to himself  Continued Stay Criteria/Rationale: continued medication adjustment and monitoring  Medical/Physical: none  Precautions:   Behavioral Orders   Procedures     Code 1 - Restrict to Unit     Routine Programming       As clinically indicated     Status 15       Every 15 minutes.     Plan: continued medication adjustment  Rationale for change in precautions or plan:

## 2017-09-06 NOTE — PLAN OF CARE
Problem: General Plan of Care (Inpatient Behavioral)  Goal: Individualization/Patient Specific Goal (IP Behavioral)  The patient and/or their representative will achieve their patient-specific goals related to the plan of care.    The patient-specific goals include:   Patient will have an absence or decrease in hallucinations by time of discharge.  Patient will be medication compliant while hospitalized.  Patient will be able to have a reality based conversation prior to discharge.  Patient will be independent with his ADL s while hospitalized and maintain hygiene.     Outcome: No Change  Pt. Talking to self frequently while pacing in the halls, continues to have bizarre statements during conversation, not able to have a reality based conversation, independent with ADL's, but is refusing to shower so far this shift, will continue to monitor.

## 2017-09-06 NOTE — PROGRESS NOTES
"[facility]  Psychiatric Progress Note    Subjective     Miles ji remains grandiose. 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 He states \"im a \". He asks for ativan for anxiety. He desribes racing thoughts. We discussed starting depakote and he did not want to though he did seem to consider it. He does seem a bit less disorganized today though remains very delusional still. He reports that he slept all day and just woke up. He has been compliant with medications     10 point ROS negative except for what is noted in HPI       DIagnoses:      Paranoid Schizophrenia w/ capgras delusions  Attestation:  Patient has been seen and evaluated by me, Rowan Alford NP, in the presence of the house staff team          Interim History:   The patient's care was discussed with the treatment team and chart notes were reviewed.          Medications:       cloZAPine  200 mg Oral At Bedtime     cloZAPine  200 mg Oral Daily     cholecalciferol  2,000 Units Oral Daily     famotidine  20 mg Oral BID     clotrimazole, benztropine, hydrOXYzine, acetaminophen, alum & mag hydroxide-simethicone, magnesium hydroxide, traZODone, OLANZapine **OR** OLANZapine, nicotine polacrilex          Allergies:     Allergies   Allergen Reactions     Pcn [Bicillin C-R,] Rash     Provider wants to try amoxicillin(benadryl ordered as well) 9/10/16     Bupropion Other (See Comments)     delusions      Depakote [Valproic Acid] Other (See Comments)     Heart racing     Divalproex Sodium-Fd&C Red #40      Increase heart rate            Psychiatric Examination:   /88  Pulse 89  Temp 96.3  F (35.7  C) (Tympanic)  Resp 14  Ht 1.829 m (6')  Wt 102.2 kg (225 lb 5 oz)  SpO2 95%  BMI 30.56 kg/m2  Weight is 225 lbs 4.96 oz  Body mass index is 30.56 kg/(m^2).    Appearance:  awake, alert, poorly groomed and disheveled   Attitude:  Intense  Eye Contact:  poor   Mood:  euphoric  Affect:  mood congruent, intensity is " dramatic and labile  Speech:  ramblingdisorganized and pressured  Psychomotor Behavior:  intact station, gait and muscle tone  Thought Process:  disorganized, illogical and tangental  Associations:  loosening of associations present  Thought Content:  Likely AH  Insight:  none  Judgment:  poor  Oriented to:  self only  Attention Span and Concentration:  poor  Recent and Remote Memory:  poor  Fund of Knowledge: delayed  Muscle Strength and Tone: normal  Gait and Station: Normal  Perception: pt is internally stimulated         Labs:     No results found for this or any previous visit (from the past 24 hour(s)).          Assessment/ Plan:     No change in medicaitons today. Reports significant sedation    Will order Clozaril level.(Clozaril level to be drawn when he is on 200 mg twice a day)    His white blood cell count was elevated to 12 and his ANC is also elevated.  He has not complained of any medical issues one going to discuss this with him again.

## 2017-09-07 PROCEDURE — 12400000 ZZH R&B MH

## 2017-09-07 PROCEDURE — 25000132 ZZH RX MED GY IP 250 OP 250 PS 637: Performed by: NURSE PRACTITIONER

## 2017-09-07 PROCEDURE — S0136 CLOZAPINE, 25 MG: HCPCS | Performed by: NURSE PRACTITIONER

## 2017-09-07 PROCEDURE — 36415 COLL VENOUS BLD VENIPUNCTURE: CPT | Performed by: NURSE PRACTITIONER

## 2017-09-07 PROCEDURE — 80159 DRUG ASSAY CLOZAPINE: CPT | Performed by: NURSE PRACTITIONER

## 2017-09-07 RX ORDER — CLOZAPINE 100 MG/1
TABLET ORAL
Qty: 28 TABLET | OUTPATIENT
Start: 2017-09-07

## 2017-09-07 RX ORDER — CLOZAPINE 200 MG/1
TABLET ORAL
Qty: 14 TABLET | OUTPATIENT
Start: 2017-09-07

## 2017-09-07 RX ADMIN — FAMOTIDINE 20 MG: 20 TABLET ORAL at 20:07

## 2017-09-07 RX ADMIN — CLOZAPINE 200 MG: 100 TABLET ORAL at 20:07

## 2017-09-07 RX ADMIN — CLOZAPINE 200 MG: 100 TABLET ORAL at 08:08

## 2017-09-07 RX ADMIN — VITAMIN D, TAB 1000IU (100/BT) 2000 UNITS: 25 TAB at 08:08

## 2017-09-07 RX ADMIN — FAMOTIDINE 20 MG: 20 TABLET ORAL at 08:07

## 2017-09-07 ASSESSMENT — ACTIVITIES OF DAILY LIVING (ADL)
GROOMING: INDEPENDENT
ORAL_HYGIENE: INDEPENDENT
LAUNDRY: UNABLE TO COMPLETE
DRESS: INDEPENDENT;SCRUBS (BEHAVIORAL HEALTH)

## 2017-09-07 NOTE — PLAN OF CARE
Face to face end of shift report received from MARINA Prakash. Rounding completed. Patient observed in [facility].     Toño Antunez  9/7/2017  3:39 PM

## 2017-09-07 NOTE — PLAN OF CARE
Problem: General Plan of Care (Inpatient Behavioral)  Goal: Individualization/Patient Specific Goal (IP Behavioral)  The patient and/or their representative will achieve their patient-specific goals related to the plan of care.    The patient-specific goals include:   Patient will have an absence or decrease in hallucinations by time of discharge.  Patient will be medication compliant while hospitalized.  Patient will be able to have a reality based conversation prior to discharge.  Patient will be independent with his ADL s while hospitalized and maintain hygiene.     Patient is up on the unit and is walking in the hallways. He apparently showered today but continues to look disheveled. He doesn't comb his hair. He is preoccupied and responding to internal stimuli. He walks in the halls and is noted to be talking to himself. He denies feeling depressed or suicidal. He is compliant with his medications. He walks in and out of groups and occasionally uses the exercise equipment.

## 2017-09-07 NOTE — PLAN OF CARE
Problem: General Plan of Care (Inpatient Behavioral)  Goal: Team Discussion  Team Plan:   BEHAVIORAL TEAM DISCUSSION     Participants Supa Espinoza NP, Yvonne Pedersen NP, [facility], Tish Boswell [facility], Carolyn Holloway [facility], Priscilla Henry RN, Megha RN. [facility]lesha Loera [facility], Jazzy Sood OT, Nneka Sprague OT   Progress: disorganized, continued talked to himself  Continued Stay Criteria/Rationale: continued medication adjustment and monitoring, increasing Clozaril.   Medical/Physical: none  Precautions:   Behavioral Orders   Procedures     Code 1 - Restrict to Unit     Routine Programming       As clinically indicated     Status 15       Every 15 minutes.     Plan: Continue to stabilize on medications.   Rationale for change in precautions or plan: none

## 2017-09-07 NOTE — PLAN OF CARE
Face to face end of shift report received from Kana Mar RN. Rounding completed. Patient observed in bed. Eyes closed and non-labored respirations noted. Will continue to monitor.      Olinda Bobo  9/7/2017  7:35 AM

## 2017-09-07 NOTE — PROGRESS NOTES
[facility]  Psychiatric Progress Note    Subjective   Miles is up and about on the unit but has minimal interaction with peers. He does not participate in groups. Miles is disheveled and malodorous in appearance. Did encourage Josafat to take a shower and he said he would later. Miles was quite disorganized and rambled about imposters taking money, him getting sick after going to [employer]. 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 He tells me he sleeps okay but he does not know if he is sleeping or if its all a dream.     10 point ROS negative except for what is noted in HPI       DIagnoses:      Paranoid Schizophrenia w/ capgras delusions  Attestation:  Patient has been seen and evaluated by me, Yvonne Pedersen NP, in the presence of the house staff team          Interim History:   The patient's care was discussed with the treatment team and chart notes were reviewed.          Medications:       cloZAPine  200 mg Oral At Bedtime     cloZAPine  200 mg Oral Daily     cholecalciferol  2,000 Units Oral Daily     famotidine  20 mg Oral BID     clotrimazole, benztropine, hydrOXYzine, acetaminophen, alum & mag hydroxide-simethicone, magnesium hydroxide, traZODone, OLANZapine **OR** OLANZapine, nicotine polacrilex          Allergies:     Allergies   Allergen Reactions     Pcn [Bicillin C-R,] Rash     Provider wants to try amoxicillin(benadryl ordered as well) 9/10/16     Bupropion Other (See Comments)     delusions      Depakote [Valproic Acid] Other (See Comments)     Heart racing     Divalproex Sodium-Fd&C Red #40      Increase heart rate            Psychiatric Examination:   /72  Pulse 86  Temp 97  F (36.1  C) (Tympanic)  Resp 16  Ht 1.829 m (6')  Wt 102.2 kg (225 lb 5 oz)  SpO2 98%  BMI 30.56 kg/m2  Weight is 225 lbs 4.96 oz  Body mass index is 30.56 kg/(m^2).    Appearance:  awake, alert, poorly groomed and disheveled   Attitude:  Intense but cooperative  Eye Contact:  poor   Mood:   euphoric  Affect:  mood congruent, intensity is dramatic and labile  Speech:  ramblingdisorganized and pressured  Psychomotor Behavior:  intact station, gait and muscle tone  Thought Process:  disorganized, illogical and tangental  Associations:  loosening of associations present  Thought Content:  Likely AH  Insight:  none  Judgment:  poor  Oriented to:  self only  Attention Span and Concentration:  poor  Recent and Remote Memory:  poor  Fund of Knowledge: delayed  Muscle Strength and Tone: normal  Gait and Station: Normal  Perception: pt is internally stimulated         Labs:     Results for orders placed or performed during the hospital encounter of 08/20/17   XR Abdomen 2 Views    Narrative    ABDOMEN SUPINE AND UPRIGHT VIEWS    FINDINGS:  There is no free intraperitoneal air.  Gas and stool are  seen within the colon to the level of the rectosigmoid.  There are no  dilated gas-filled small bowel loops.    No mass or suspicious calcification is seen.  There is no significant  spine abnormality.  There is some  narrowing of hip joint spaces  bilaterally, especially superolaterally.    IMPRESSION:  BOWEL GAS PATTERN IS WITHIN NORMAL LIMITS.  Exam Date: Aug 20, 2017 05:23:08 PM  Author: JUAN STEVENS  This report is final and signed     UA reflex to Microscopic and Culture   Result Value Ref Range    Color Urine Yellow     Appearance Urine Slightly Cloudy     Glucose Urine Negative NEG^Negative mg/dL    Bilirubin Urine Negative NEG^Negative    Ketones Urine 80 (A) NEG^Negative mg/dL    Specific Gravity Urine 1.026 1.003 - 1.035    Blood Urine Negative NEG^Negative    pH Urine 7.0 4.7 - 8.0 pH    Protein Albumin Urine 30 (A) NEG^Negative mg/dL    Urobilinogen mg/dL 2.0 0.0 - 2.0 mg/dL    Nitrite Urine Negative NEG^Negative    Leukocyte Esterase Urine Negative NEG^Negative    Source Midstream Urine     RBC Urine 0 0 - 2 /HPF    WBC Urine 0 0 - 2 /HPF    Bacteria Urine None (A) NEG^Negative /HPF    Mucous Urine  Present (A) NEG^Negative /LPF   Drug Screen Urine (Range)   Result Value Ref Range    Amphetamine Qual Urine Negative NEG^Negative    Barbiturates Qual Urine Negative NEG^Negative    Benzodiazepine Qual Urine Negative NEG^Negative    Cannabinoids Qual Urine Positive (A) NEG^Negative    Cocaine Qual Urine Negative NEG^Negative    Opiates Qualitative Urine Negative NEG^Negative    Methadone Qual Urine Negative NEG^Negative    PCP Qual Urine Negative NEG^Negative   Acetaminophen level   Result Value Ref Range    Acetaminophen Level <2 mg/L   Alcohol ethyl   Result Value Ref Range    Ethanol g/dL <0.01 0.01 g/dL   CBC with platelets differential   Result Value Ref Range    WBC 13.4 (H) 4.0 - 11.0 10e9/L    RBC Count 5.22 4.4 - 5.9 10e12/L    Hemoglobin 15.8 13.3 - 17.7 g/dL    Hematocrit 44.8 40.0 - 53.0 %    MCV 86 78 - 100 fl    MCH 30.3 26.5 - 33.0 pg    MCHC 35.3 31.5 - 36.5 g/dL    RDW 13.0 10.0 - 15.0 %    Platelet Count 377 150 - 450 10e9/L    Diff Method Automated Method     % Neutrophils 74.7 %    % Lymphocytes 15.1 %    % Monocytes 8.4 %    % Eosinophils 0.7 %    % Basophils 0.7 %    % Immature Granulocytes 0.4 %    Nucleated RBCs 0 0 /100    Absolute Neutrophil 10.0 (H) 1.6 - 8.3 10e9/L    Absolute Lymphocytes 2.0 0.8 - 5.3 10e9/L    Absolute Monocytes 1.1 0.0 - 1.3 10e9/L    Absolute Eosinophils 0.1 0.0 - 0.7 10e9/L    Absolute Basophils 0.1 0.0 - 0.2 10e9/L    Abs Immature Granulocytes 0.1 0 - 0.4 10e9/L    Absolute Nucleated RBC 0.0    Comprehensive metabolic panel   Result Value Ref Range    Sodium 138 133 - 144 mmol/L    Potassium 3.7 3.4 - 5.3 mmol/L    Chloride 107 94 - 109 mmol/L    Carbon Dioxide 21 20 - 32 mmol/L    Anion Gap 10 3 - 14 mmol/L    Glucose 94 70 - 99 mg/dL    Urea Nitrogen 12 7 - 30 mg/dL    Creatinine 0.95 0.66 - 1.25 mg/dL    GFR Estimate >90 >60 mL/min/1.7m2    GFR Estimate If Black >90 >60 mL/min/1.7m2    Calcium 9.4 8.5 - 10.1 mg/dL    Bilirubin Total 0.8 0.2 - 1.3 mg/dL     Albumin 4.3 3.4 - 5.0 g/dL    Protein Total 8.1 6.8 - 8.8 g/dL    Alkaline Phosphatase 61 40 - 150 U/L    ALT 95 (H) 0 - 70 U/L    AST 41 0 - 45 U/L   CRP inflammation   Result Value Ref Range    CRP Inflammation <2.9 0.0 - 8.0 mg/L   Salicylate level   Result Value Ref Range    Salicylate Level 4 mg/dL   TSH with free T4 reflex   Result Value Ref Range    TSH 1.38 0.40 - 4.00 mU/L   CBC with platelets differential   Result Value Ref Range    WBC 6.2 4.0 - 11.0 10e9/L    RBC Count 5.08 4.4 - 5.9 10e12/L    Hemoglobin 15.4 13.3 - 17.7 g/dL    Hematocrit 44.6 40.0 - 53.0 %    MCV 88 78 - 100 fl    MCH 30.3 26.5 - 33.0 pg    MCHC 34.5 31.5 - 36.5 g/dL    RDW 13.2 10.0 - 15.0 %    Platelet Count 317 150 - 450 10e9/L    Diff Method Automated Method     % Neutrophils 41.7 %    % Lymphocytes 26.9 %    % Monocytes 24.3 %    % Eosinophils 5.3 %    % Basophils 1.5 %    % Immature Granulocytes 0.3 %    Nucleated RBCs 0 0 /100    Absolute Neutrophil 2.6 1.6 - 8.3 10e9/L    Absolute Lymphocytes 1.7 0.8 - 5.3 10e9/L    Absolute Monocytes 1.5 (H) 0.0 - 1.3 10e9/L    Absolute Eosinophils 0.3 0.0 - 0.7 10e9/L    Absolute Basophils 0.1 0.0 - 0.2 10e9/L    Abs Immature Granulocytes 0.0 0 - 0.4 10e9/L    Absolute Nucleated RBC 0.0    CBC with platelets differential   Result Value Ref Range    WBC 12.2 (H) 4.0 - 11.0 10e9/L    RBC Count 5.01 4.4 - 5.9 10e12/L    Hemoglobin 15.3 13.3 - 17.7 g/dL    Hematocrit 44.1 40.0 - 53.0 %    MCV 88 78 - 100 fl    MCH 30.5 26.5 - 33.0 pg    MCHC 34.7 31.5 - 36.5 g/dL    RDW 13.2 10.0 - 15.0 %    Platelet Count 335 150 - 450 10e9/L    Diff Method Automated Method     % Neutrophils 70.6 %    % Lymphocytes 15.9 %    % Monocytes 10.7 %    % Eosinophils 1.9 %    % Basophils 0.5 %    % Immature Granulocytes 0.4 %    Nucleated RBCs 0 0 /100    Absolute Neutrophil 8.6 (H) 1.6 - 8.3 10e9/L    Absolute Lymphocytes 1.9 0.8 - 5.3 10e9/L    Absolute Monocytes 1.3 0.0 - 1.3 10e9/L    Absolute Eosinophils 0.2 0.0  - 0.7 10e9/L    Absolute Basophils 0.1 0.0 - 0.2 10e9/L    Abs Immature Granulocytes 0.1 0 - 0.4 10e9/L    Absolute Nucleated RBC 0.0            Assessment/ Plan:   Continue current medications    Will order Clozaril level.(Clozaril level to be drawn when he is on 200 mg twice a day)

## 2017-09-07 NOTE — PLAN OF CARE
"Problem: General Plan of Care (Inpatient Behavioral)  Goal: Individualization/Patient Specific Goal (IP Behavioral)  The patient and/or their representative will achieve their patient-specific goals related to the plan of care.    The patient-specific goals include:   Patient will have an absence or decrease in hallucinations by time of discharge.  Patient will be medication compliant while hospitalized.  Patient will be able to have a reality based conversation prior to discharge.  Patient will be independent with his ADL s while hospitalized and maintain hygiene.     Outcome: No Change  Pt denies having suicidal ideation, homicidal ideation, depression, or pain. Pt denies having hallucinations but appears to be responding to internal stimuli. He is observed pacing the hallways and talking with self. He does endorse having anxiety. Writer offered patient PRN for anxiety. He declines and states, \"I will be fine. I am just walking.\" Pt still has delusional statements. He talks about his \"second self\" [place] and [place] the \"feds.\" He states, \"I wonder what my other self is doing.\"  He is medication compliant. He verbalizes he is \"frustrated\" that he is here. Pt disheveled. Pt approached a couple times and encouraged to take a shower. Will re approach patient again during shift.  Will continue to monitor.     1434: Pt has been up and on the unit throughout shift. He did get a shower today and changed clothes and socks. He has no further questions. He has been pleasant. Will continue to monitor.     "
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
## 2017-09-07 NOTE — PLAN OF CARE
Face to face end of shift report received from Key GREENBERG RN. Rounding completed. Patient observed resting in bed.     Kana Mar  9/6/2017  11:32 PM

## 2017-09-08 PROCEDURE — 25000132 ZZH RX MED GY IP 250 OP 250 PS 637: Performed by: NURSE PRACTITIONER

## 2017-09-08 PROCEDURE — 36415 COLL VENOUS BLD VENIPUNCTURE: CPT | Performed by: NURSE PRACTITIONER

## 2017-09-08 PROCEDURE — S0136 CLOZAPINE, 25 MG: HCPCS | Performed by: NURSE PRACTITIONER

## 2017-09-08 PROCEDURE — 80159 DRUG ASSAY CLOZAPINE: CPT | Performed by: NURSE PRACTITIONER

## 2017-09-08 PROCEDURE — 12400000 ZZH R&B MH

## 2017-09-08 RX ADMIN — FAMOTIDINE 20 MG: 20 TABLET ORAL at 08:29

## 2017-09-08 RX ADMIN — CLOZAPINE 200 MG: 100 TABLET ORAL at 20:10

## 2017-09-08 RX ADMIN — CLOZAPINE 200 MG: 100 TABLET ORAL at 08:29

## 2017-09-08 RX ADMIN — FAMOTIDINE 20 MG: 20 TABLET ORAL at 20:10

## 2017-09-08 RX ADMIN — VITAMIN D, TAB 1000IU (100/BT) 2000 UNITS: 25 TAB at 08:29

## 2017-09-08 ASSESSMENT — ACTIVITIES OF DAILY LIVING (ADL)
LAUNDRY: UNABLE TO COMPLETE
DRESS: SCRUBS (BEHAVIORAL HEALTH);INDEPENDENT
ORAL_HYGIENE: INDEPENDENT
ORAL_HYGIENE: INDEPENDENT
LAUNDRY: UNABLE TO COMPLETE
GROOMING: INDEPENDENT
DRESS: INDEPENDENT;SCRUBS (BEHAVIORAL HEALTH)
GROOMING: INDEPENDENT

## 2017-09-08 NOTE — PLAN OF CARE
Face to face end of shift report received from Kana LOMELI RN. Rounding completed. Patient observed in on room laying in bed.     Almita Beverly  9/8/2017  8:00 AM

## 2017-09-08 NOTE — PLAN OF CARE
Problem: General Plan of Care (Inpatient Behavioral)  Goal: Individualization/Patient Specific Goal (IP Behavioral)  The patient and/or their representative will achieve their patient-specific goals related to the plan of care.    The patient-specific goals include:   Patient will have an absence or decrease in hallucinations by time of discharge.  Patient will be medication compliant while hospitalized.  Patient will be able to have a reality based conversation prior to discharge.  Patient will be independent with his ADL s while hospitalized and maintain hygiene.     Outcome: No Change  Pt continues to talk about having different names, and living as a millionaire. 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 Pt denies all criteria but is witnessed talking to himself as if he is responding to a question asked of him. Pt denies pain, he is withdrawn from peers. Pt does have an body odor that is offensive to other patients. Nurse does encourage pt to shower.

## 2017-09-08 NOTE — PLAN OF CARE
Face to face end of shift report received from Almita LOMELI RN. Rounding completed. [place] [facility]     Yoselyn Kelly  9/8/2017  3:52 PM

## 2017-09-08 NOTE — PLAN OF CARE
Face to face end of shift report received from Toño FROST RN. Rounding completed. Patient observed resting in bed.     Kana Mar  9/7/2017  11:28 PM

## 2017-09-08 NOTE — PLAN OF CARE
Problem: General Plan of Care (Inpatient Behavioral)  Goal: Team Discussion  Team Plan:   BEHAVIORAL TEAM DISCUSSION     Participants: Supa Espinoza NP, Yvonne Pedersen NP, Amy Briggs [facility],  Tish Boswell [facility],  Carolyn Holloway Discharge Plannner, Priscilla Henry RN, Almita Beverly RN, Nneka Sprague OT   Progress: None  Continued Stay Criteria/Rationale: Delusional   Medical/Physical: None  Precautions:   Behavioral Orders   Procedures     Code 1 - Restrict to Unit     Routine Programming       As clinically indicated     Status 15       Every 15 minutes.     Plan: Continued medication stabilization. Referrals for services  Rationale for change in precautions or plan: None

## 2017-09-09 PROCEDURE — 25000132 ZZH RX MED GY IP 250 OP 250 PS 637: Performed by: NURSE PRACTITIONER

## 2017-09-09 PROCEDURE — 12400000 ZZH R&B MH

## 2017-09-09 PROCEDURE — 36415 COLL VENOUS BLD VENIPUNCTURE: CPT | Performed by: NURSE PRACTITIONER

## 2017-09-09 PROCEDURE — 80159 DRUG ASSAY CLOZAPINE: CPT | Performed by: NURSE PRACTITIONER

## 2017-09-09 PROCEDURE — S0136 CLOZAPINE, 25 MG: HCPCS | Performed by: NURSE PRACTITIONER

## 2017-09-09 RX ORDER — CLONIDINE HYDROCHLORIDE 0.1 MG/1
0.1 TABLET ORAL 2 TIMES DAILY
Status: DISCONTINUED | OUTPATIENT
Start: 2017-09-09 | End: 2017-11-06 | Stop reason: HOSPADM

## 2017-09-09 RX ADMIN — NICOTINE POLACRILEX 4 MG: 2 GUM, CHEWING ORAL at 20:41

## 2017-09-09 RX ADMIN — CLOZAPINE 200 MG: 100 TABLET ORAL at 08:11

## 2017-09-09 RX ADMIN — FAMOTIDINE 20 MG: 20 TABLET ORAL at 08:12

## 2017-09-09 RX ADMIN — BENZTROPINE MESYLATE 0.5 MG: 0.5 TABLET ORAL at 17:57

## 2017-09-09 RX ADMIN — CLONIDINE HYDROCHLORIDE 0.1 MG: 0.1 TABLET ORAL at 17:57

## 2017-09-09 RX ADMIN — CLOZAPINE 225 MG: 25 TABLET ORAL at 20:12

## 2017-09-09 RX ADMIN — FAMOTIDINE 20 MG: 20 TABLET ORAL at 20:12

## 2017-09-09 RX ADMIN — VITAMIN D, TAB 1000IU (100/BT) 2000 UNITS: 25 TAB at 08:11

## 2017-09-09 ASSESSMENT — ACTIVITIES OF DAILY LIVING (ADL)
GROOMING: INDEPENDENT;PROMPTS
DRESS: INDEPENDENT;SCRUBS (BEHAVIORAL HEALTH)
DRESS: SCRUBS (BEHAVIORAL HEALTH)
GROOMING: INDEPENDENT
ORAL_HYGIENE: INDEPENDENT
ORAL_HYGIENE: INDEPENDENT;PROMPTS
LAUNDRY: UNABLE TO COMPLETE
LAUNDRY: UNABLE TO COMPLETE

## 2017-09-09 NOTE — PLAN OF CARE
Face to face end of shift report received from MARINA Topete. Rounding completed. Patient observed resting in bed.     MIKEY FITZPATRICK  9/9/2017  12:05 AM

## 2017-09-09 NOTE — PLAN OF CARE
Face to face end of shift report received from Lety LOMELI RN. Rounding completed. Patient observed pacing in [facility].    Yoselyn Kelly  9/9/2017  4:02 PM

## 2017-09-09 NOTE — PLAN OF CARE
"Problem: General Plan of Care (Inpatient Behavioral)  Goal: Individualization/Patient Specific Goal (IP Behavioral)  The patient and/or their representative will achieve their patient-specific goals related to the plan of care.    The patient-specific goals include:   Patient will have an absence or decrease in hallucinations by time of discharge.  Patient will be medication compliant while hospitalized.  Patient will be able to have a reality based conversation prior to discharge.  Patient will be independent with his ADL s while hospitalized and maintain hygiene.     Outcome: No Change  Patient refuses to shower this shift.  He is disheveled, untidy, and has neglected grooming.  Patient paces the hallways most of the shift. He maintains poor eye contact.  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        "
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
## 2017-09-09 NOTE — PLAN OF CARE
Problem: General Plan of Care (Inpatient Behavioral)  Goal: Individualization/Patient Specific Goal (IP Behavioral)  The patient and/or their representative will achieve their patient-specific goals related to the plan of care.    The patient-specific goals include:   Patient will have an absence or decrease in hallucinations by time of discharge.  Patient will be medication compliant while hospitalized.  Patient will be able to have a reality based conversation prior to discharge.  Patient will be independent with his ADL s while hospitalized and maintain hygiene.     Pt appears to be sleeping in bed with eyes closed and having regular respirations. 15 minutes and PRN safety checks completed with no noted complains. Will continue to monitor.      MIKEY FITZPATRICK  9/9/2017  5:36 AM

## 2017-09-09 NOTE — PROGRESS NOTES
[facility]  Psychiatric Progress Note    Subjective   Miles is often up and about on the unit. He is pacing much of the time. He tells me he slept well last night and that sleep is never really a problem for him. He continues to ramble with disorganized speech and thoughts. Still very disheveled and malodorous. Will increase fazaclo tonight.    10 point ROS negative except for what is noted in HPI       DIagnoses:      Paranoid Schizophrenia w/ capgras delusions  Attestation:  Patient has been seen and evaluated by me, Yvonne Pedersen NP, in the presence of the house staff team          Interim History:   The patient's care was discussed with the treatment team and chart notes were reviewed.          Medications:       cloZAPine  225 mg Oral At Bedtime     cloZAPine  200 mg Oral Daily     cholecalciferol  2,000 Units Oral Daily     famotidine  20 mg Oral BID     clotrimazole, benztropine, hydrOXYzine, acetaminophen, alum & mag hydroxide-simethicone, magnesium hydroxide, traZODone, OLANZapine **OR** OLANZapine, nicotine polacrilex          Allergies:     Allergies   Allergen Reactions     Pcn [Bicillin C-R,] Rash     Provider wants to try amoxicillin(benadryl ordered as well) 9/10/16     Bupropion Other (See Comments)     delusions      Depakote [Valproic Acid] Other (See Comments)     Heart racing     Divalproex Sodium-Fd&C Red #40      Increase heart rate            Psychiatric Examination:   /84 (BP Location: Right arm)  Pulse 83  Temp 97.3  F (36.3  C) (Tympanic)  Resp 16  Ht 1.829 m (6')  Wt 102.2 kg (225 lb 5 oz)  SpO2 98%  BMI 30.56 kg/m2  Weight is 225 lbs 4.96 oz  Body mass index is 30.56 kg/(m^2).    Appearance:  awake, alert, poorly groomed and disheveled   Attitude:  Intense but cooperative  Eye Contact:  poor   Mood:  euphoric  Affect:  mood congruent, intensity is dramatic and labile  Speech:  rambling, disorganized, and pressured  Psychomotor Behavior:  intact station, gait  and muscle tone  Thought Process:  disorganized, illogical and tangental  Associations:  loosening of associations present  Thought Content:  Likely AH  Insight:  none  Judgment:  poor  Oriented to:  self only  Attention Span and Concentration:  poor  Recent and Remote Memory:  poor  Fund of Knowledge: delayed  Muscle Strength and Tone: normal  Gait and Station: Normal  Perception: pt is internally stimulated         Labs:     Results for orders placed or performed during the hospital encounter of 08/20/17   XR Abdomen 2 Views    Narrative    ABDOMEN SUPINE AND UPRIGHT VIEWS    FINDINGS:  There is no free intraperitoneal air.  Gas and stool are  seen within the colon to the level of the rectosigmoid.  There are no  dilated gas-filled small bowel loops.    No mass or suspicious calcification is seen.  There is no significant  spine abnormality.  There is some  narrowing of hip joint spaces  bilaterally, especially superolaterally.    IMPRESSION:  BOWEL GAS PATTERN IS WITHIN NORMAL LIMITS.  Exam Date: Aug 20, 2017 05:23:08 PM  Author: JUAN STEVENS  This report is final and signed     UA reflex to Microscopic and Culture   Result Value Ref Range    Color Urine Yellow     Appearance Urine Slightly Cloudy     Glucose Urine Negative NEG^Negative mg/dL    Bilirubin Urine Negative NEG^Negative    Ketones Urine 80 (A) NEG^Negative mg/dL    Specific Gravity Urine 1.026 1.003 - 1.035    Blood Urine Negative NEG^Negative    pH Urine 7.0 4.7 - 8.0 pH    Protein Albumin Urine 30 (A) NEG^Negative mg/dL    Urobilinogen mg/dL 2.0 0.0 - 2.0 mg/dL    Nitrite Urine Negative NEG^Negative    Leukocyte Esterase Urine Negative NEG^Negative    Source Midstream Urine     RBC Urine 0 0 - 2 /HPF    WBC Urine 0 0 - 2 /HPF    Bacteria Urine None (A) NEG^Negative /HPF    Mucous Urine Present (A) NEG^Negative /LPF   Drug Screen Urine (Range)   Result Value Ref Range    Amphetamine Qual Urine Negative NEG^Negative    Barbiturates Qual Urine  Negative NEG^Negative    Benzodiazepine Qual Urine Negative NEG^Negative    Cannabinoids Qual Urine Positive (A) NEG^Negative    Cocaine Qual Urine Negative NEG^Negative    Opiates Qualitative Urine Negative NEG^Negative    Methadone Qual Urine Negative NEG^Negative    PCP Qual Urine Negative NEG^Negative   Acetaminophen level   Result Value Ref Range    Acetaminophen Level <2 mg/L   Alcohol ethyl   Result Value Ref Range    Ethanol g/dL <0.01 0.01 g/dL   CBC with platelets differential   Result Value Ref Range    WBC 13.4 (H) 4.0 - 11.0 10e9/L    RBC Count 5.22 4.4 - 5.9 10e12/L    Hemoglobin 15.8 13.3 - 17.7 g/dL    Hematocrit 44.8 40.0 - 53.0 %    MCV 86 78 - 100 fl    MCH 30.3 26.5 - 33.0 pg    MCHC 35.3 31.5 - 36.5 g/dL    RDW 13.0 10.0 - 15.0 %    Platelet Count 377 150 - 450 10e9/L    Diff Method Automated Method     % Neutrophils 74.7 %    % Lymphocytes 15.1 %    % Monocytes 8.4 %    % Eosinophils 0.7 %    % Basophils 0.7 %    % Immature Granulocytes 0.4 %    Nucleated RBCs 0 0 /100    Absolute Neutrophil 10.0 (H) 1.6 - 8.3 10e9/L    Absolute Lymphocytes 2.0 0.8 - 5.3 10e9/L    Absolute Monocytes 1.1 0.0 - 1.3 10e9/L    Absolute Eosinophils 0.1 0.0 - 0.7 10e9/L    Absolute Basophils 0.1 0.0 - 0.2 10e9/L    Abs Immature Granulocytes 0.1 0 - 0.4 10e9/L    Absolute Nucleated RBC 0.0    Comprehensive metabolic panel   Result Value Ref Range    Sodium 138 133 - 144 mmol/L    Potassium 3.7 3.4 - 5.3 mmol/L    Chloride 107 94 - 109 mmol/L    Carbon Dioxide 21 20 - 32 mmol/L    Anion Gap 10 3 - 14 mmol/L    Glucose 94 70 - 99 mg/dL    Urea Nitrogen 12 7 - 30 mg/dL    Creatinine 0.95 0.66 - 1.25 mg/dL    GFR Estimate >90 >60 mL/min/1.7m2    GFR Estimate If Black >90 >60 mL/min/1.7m2    Calcium 9.4 8.5 - 10.1 mg/dL    Bilirubin Total 0.8 0.2 - 1.3 mg/dL    Albumin 4.3 3.4 - 5.0 g/dL    Protein Total 8.1 6.8 - 8.8 g/dL    Alkaline Phosphatase 61 40 - 150 U/L    ALT 95 (H) 0 - 70 U/L    AST 41 0 - 45 U/L   CRP  inflammation   Result Value Ref Range    CRP Inflammation <2.9 0.0 - 8.0 mg/L   Salicylate level   Result Value Ref Range    Salicylate Level 4 mg/dL   TSH with free T4 reflex   Result Value Ref Range    TSH 1.38 0.40 - 4.00 mU/L   CBC with platelets differential   Result Value Ref Range    WBC 6.2 4.0 - 11.0 10e9/L    RBC Count 5.08 4.4 - 5.9 10e12/L    Hemoglobin 15.4 13.3 - 17.7 g/dL    Hematocrit 44.6 40.0 - 53.0 %    MCV 88 78 - 100 fl    MCH 30.3 26.5 - 33.0 pg    MCHC 34.5 31.5 - 36.5 g/dL    RDW 13.2 10.0 - 15.0 %    Platelet Count 317 150 - 450 10e9/L    Diff Method Automated Method     % Neutrophils 41.7 %    % Lymphocytes 26.9 %    % Monocytes 24.3 %    % Eosinophils 5.3 %    % Basophils 1.5 %    % Immature Granulocytes 0.3 %    Nucleated RBCs 0 0 /100    Absolute Neutrophil 2.6 1.6 - 8.3 10e9/L    Absolute Lymphocytes 1.7 0.8 - 5.3 10e9/L    Absolute Monocytes 1.5 (H) 0.0 - 1.3 10e9/L    Absolute Eosinophils 0.3 0.0 - 0.7 10e9/L    Absolute Basophils 0.1 0.0 - 0.2 10e9/L    Abs Immature Granulocytes 0.0 0 - 0.4 10e9/L    Absolute Nucleated RBC 0.0    CBC with platelets differential   Result Value Ref Range    WBC 12.2 (H) 4.0 - 11.0 10e9/L    RBC Count 5.01 4.4 - 5.9 10e12/L    Hemoglobin 15.3 13.3 - 17.7 g/dL    Hematocrit 44.1 40.0 - 53.0 %    MCV 88 78 - 100 fl    MCH 30.5 26.5 - 33.0 pg    MCHC 34.7 31.5 - 36.5 g/dL    RDW 13.2 10.0 - 15.0 %    Platelet Count 335 150 - 450 10e9/L    Diff Method Automated Method     % Neutrophils 70.6 %    % Lymphocytes 15.9 %    % Monocytes 10.7 %    % Eosinophils 1.9 %    % Basophils 0.5 %    % Immature Granulocytes 0.4 %    Nucleated RBCs 0 0 /100    Absolute Neutrophil 8.6 (H) 1.6 - 8.3 10e9/L    Absolute Lymphocytes 1.9 0.8 - 5.3 10e9/L    Absolute Monocytes 1.3 0.0 - 1.3 10e9/L    Absolute Eosinophils 0.2 0.0 - 0.7 10e9/L    Absolute Basophils 0.1 0.0 - 0.2 10e9/L    Abs Immature Granulocytes 0.1 0 - 0.4 10e9/L    Absolute Nucleated RBC 0.0             Assessment/ Plan:   Increase clozaril tonight    Will order Clozaril level.(Clozaril level to be drawn when he is on 200 mg twice a day)

## 2017-09-09 NOTE — PLAN OF CARE
"Problem: General Plan of Care (Inpatient Behavioral)  Goal: Individualization/Patient Specific Goal (IP Behavioral)  The patient and/or their representative will achieve their patient-specific goals related to the plan of care.    The patient-specific goals include:   Patient will have an absence or decrease in hallucinations by time of discharge.  Patient will be medication compliant while hospitalized.  Patient will be able to have a reality based conversation prior to discharge.  Patient will be independent with his ADL s while hospitalized and maintain hygiene.     Outcome: No Change  Patient irritable this shift. Denies depression, SI, HI, and pain. Agrees to contact staff if having thoughts of self harm. Paces halls throughout shift. Declined shower and is visibly disheveled. Responding to internal stimuli. When asked about his last name he states \"I don't fucking know\" but then gives the correct last name and date of birth. Wanders in and out of groups but minimally participates. Compliant with medications. Making delusional statements that \"you are just making me sicker with these blood draws and you guys will all be sued\" and talking to self. Patient compliant with blood draw though stated \"now I'm all slimey from that shit it is fucked up\". Encouraged to shower and brought patient shower supplies.       "

## 2017-09-09 NOTE — PLAN OF CARE
Face to face end of shift report received from Fadumo SANTIAGO RN. Rounding completed. Patient observed sleeping. Unlabored respirations.     Lety Chaparro  9/9/2017  8:54 AM

## 2017-09-10 LAB
BASOPHILS # BLD AUTO: 0.1 10E9/L (ref 0–0.2)
BASOPHILS NFR BLD AUTO: 0.9 %
DIFFERENTIAL METHOD BLD: NORMAL
EOSINOPHIL # BLD AUTO: 0.2 10E9/L (ref 0–0.7)
EOSINOPHIL NFR BLD AUTO: 2.3 %
ERYTHROCYTE [DISTWIDTH] IN BLOOD BY AUTOMATED COUNT: 12.7 % (ref 10–15)
HCT VFR BLD AUTO: 46.4 % (ref 40–53)
HGB BLD-MCNC: 15.4 G/DL (ref 13.3–17.7)
IMM GRANULOCYTES # BLD: 0.1 10E9/L (ref 0–0.4)
IMM GRANULOCYTES NFR BLD: 0.7 %
LYMPHOCYTES # BLD AUTO: 2.6 10E9/L (ref 0.8–5.3)
LYMPHOCYTES NFR BLD AUTO: 31.7 %
MCH RBC QN AUTO: 29.8 PG (ref 26.5–33)
MCHC RBC AUTO-ENTMCNC: 33.2 G/DL (ref 31.5–36.5)
MCV RBC AUTO: 90 FL (ref 78–100)
MONOCYTES # BLD AUTO: 0.9 10E9/L (ref 0–1.3)
MONOCYTES NFR BLD AUTO: 11.2 %
NEUTROPHILS # BLD AUTO: 4.3 10E9/L (ref 1.6–8.3)
NEUTROPHILS NFR BLD AUTO: 53.2 %
NRBC # BLD AUTO: 0 10*3/UL
NRBC BLD AUTO-RTO: 0 /100
PLATELET # BLD AUTO: 362 10E9/L (ref 150–450)
RBC # BLD AUTO: 5.17 10E12/L (ref 4.4–5.9)
WBC # BLD AUTO: 8.1 10E9/L (ref 4–11)

## 2017-09-10 PROCEDURE — 36416 COLLJ CAPILLARY BLOOD SPEC: CPT | Performed by: NURSE PRACTITIONER

## 2017-09-10 PROCEDURE — 25000132 ZZH RX MED GY IP 250 OP 250 PS 637: Performed by: NURSE PRACTITIONER

## 2017-09-10 PROCEDURE — S0136 CLOZAPINE, 25 MG: HCPCS | Performed by: NURSE PRACTITIONER

## 2017-09-10 PROCEDURE — 12400000 ZZH R&B MH

## 2017-09-10 PROCEDURE — 85025 COMPLETE CBC W/AUTO DIFF WBC: CPT | Performed by: NURSE PRACTITIONER

## 2017-09-10 RX ADMIN — FAMOTIDINE 20 MG: 20 TABLET ORAL at 20:17

## 2017-09-10 RX ADMIN — CLOZAPINE 225 MG: 25 TABLET ORAL at 20:16

## 2017-09-10 RX ADMIN — CLONIDINE HYDROCHLORIDE 0.1 MG: 0.1 TABLET ORAL at 20:17

## 2017-09-10 RX ADMIN — CLONIDINE HYDROCHLORIDE 0.1 MG: 0.1 TABLET ORAL at 08:18

## 2017-09-10 RX ADMIN — VITAMIN D, TAB 1000IU (100/BT) 2000 UNITS: 25 TAB at 08:18

## 2017-09-10 RX ADMIN — CLOZAPINE 200 MG: 100 TABLET ORAL at 08:18

## 2017-09-10 RX ADMIN — FAMOTIDINE 20 MG: 20 TABLET ORAL at 08:18

## 2017-09-10 ASSESSMENT — ACTIVITIES OF DAILY LIVING (ADL)
ORAL_HYGIENE: INDEPENDENT
GROOMING: PROMPTS;INDEPENDENT
DRESS: SCRUBS (BEHAVIORAL HEALTH);INDEPENDENT
DRESS: INDEPENDENT;SCRUBS (BEHAVIORAL HEALTH)
LAUNDRY: UNABLE TO COMPLETE
LAUNDRY: UNABLE TO COMPLETE
GROOMING: PROMPTS;INDEPENDENT
ORAL_HYGIENE: INDEPENDENT

## 2017-09-10 NOTE — PLAN OF CARE
Face to face end of shift report received from Fadumo SANTIAGO RN. Rounding completed. Patient observed in bed sleeping. Unlabored respirations.     Lety Chaparro  9/10/2017  7:53 AM

## 2017-09-10 NOTE — PLAN OF CARE
Face to face end of shift report received from Lety LOMELI RN. Rounding completed. Patient observed pacing [facility].    Yoselyn Kelly  9/10/2017  3:47 PM

## 2017-09-10 NOTE — PLAN OF CARE
Problem: General Plan of Care (Inpatient Behavioral)  Goal: Individualization/Patient Specific Goal (IP Behavioral)  The patient and/or their representative will achieve their patient-specific goals related to the plan of care.    The patient-specific goals include:   Patient will have an absence or decrease in hallucinations by time of discharge.  Patient will be medication compliant while hospitalized.  Patient will be able to have a reality based conversation prior to discharge.  Patient will be independent with his ADL s while hospitalized and maintain hygiene.     Pt appears to be sleeping in bed with eyes closed and having regular respirations. 15 minutes and PRN safety checks completed with no noted complains. Will continue to monitor.      MIKEY FITZPATRICK  9/10/2017  5:06 AM

## 2017-09-10 NOTE — PLAN OF CARE
Face to face end of shift report received from MARINA Topete. Rounding completed. Patient observed resting in bed.     MIKEY FITZPATRICK  9/10/2017  12:18 AM

## 2017-09-10 NOTE — PLAN OF CARE
"Problem: General Plan of Care (Inpatient Behavioral)  Goal: Individualization/Patient Specific Goal (IP Behavioral)  The patient and/or their representative will achieve their patient-specific goals related to the plan of care.    The patient-specific goals include:   Patient will have an absence or decrease in hallucinations by time of discharge.  Patient will be medication compliant while hospitalized.  Patient will be able to have a reality based conversation prior to discharge.  Patient will be independent with his ADL s while hospitalized and maintain hygiene.     Outcome: No Change  Patient minimally cooperative with nursing assessment. Denies pain, SI, and HI. Appears to be responding to internal stimuli and talking to self. Continues to make delusional statements about \"I own a lot of helicopters. I might just have one come and get me outta this shit today\" then laughs inappropriately. 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 Laughs at staff when asked for his last name and date of birth then gives his date of birth for medications administration. Compliant with medications. Patient reports \"I'm sick of you people sticking me\" in regards to lab draws. Encouraged patient to have lab draw from different arms which he responded \"it doesn't matter\". Pacing throughout shift which is not abnormal. Encouraged to shower due to disheveled appearance and he replies with \"whatever, maybe later\". Wanders in and out of groups but minimally participates. Unable to maintain reality based conversation.     "

## 2017-09-11 LAB
CLOZAPINE AND METABOLITES TOTAL: 447 NG/ML
CLOZAPINE AND METABOLITES TOTAL: 450 NG/ML
CLOZAPINE SERPL-MCNC: 310 NG/ML
CLOZAPINE SERPL-MCNC: 338 NG/ML
CLOZAPINE-N-OXIDE QUANT: NOT DETECTED NG/ML
CLOZAPINE-N-OXIDE QUANT: NOT DETECTED NG/ML
NORCLOZAPINE SERPL-MCNC: 112 NG/ML
NORCLOZAPINE SERPL-MCNC: 137 NG/ML

## 2017-09-11 PROCEDURE — S0136 CLOZAPINE, 25 MG: HCPCS | Performed by: NURSE PRACTITIONER

## 2017-09-11 PROCEDURE — 25000132 ZZH RX MED GY IP 250 OP 250 PS 637: Performed by: NURSE PRACTITIONER

## 2017-09-11 PROCEDURE — 99232 SBSQ HOSP IP/OBS MODERATE 35: CPT | Performed by: NURSE PRACTITIONER

## 2017-09-11 PROCEDURE — 12400000 ZZH R&B MH

## 2017-09-11 RX ADMIN — FAMOTIDINE 20 MG: 20 TABLET ORAL at 20:09

## 2017-09-11 RX ADMIN — FAMOTIDINE 20 MG: 20 TABLET ORAL at 08:19

## 2017-09-11 RX ADMIN — CLONIDINE HYDROCHLORIDE 0.1 MG: 0.1 TABLET ORAL at 08:19

## 2017-09-11 RX ADMIN — CLOZAPINE 200 MG: 100 TABLET ORAL at 08:19

## 2017-09-11 RX ADMIN — CLONIDINE HYDROCHLORIDE 0.1 MG: 0.1 TABLET ORAL at 20:09

## 2017-09-11 RX ADMIN — VITAMIN D, TAB 1000IU (100/BT) 2000 UNITS: 25 TAB at 08:19

## 2017-09-11 RX ADMIN — CLOZAPINE 225 MG: 25 TABLET ORAL at 20:09

## 2017-09-11 ASSESSMENT — ACTIVITIES OF DAILY LIVING (ADL)
GROOMING: PROMPTS
ORAL_HYGIENE: PROMPTS
ORAL_HYGIENE: INDEPENDENT
GROOMING: INDEPENDENT
DRESS: PROMPTS;SCRUBS (BEHAVIORAL HEALTH)

## 2017-09-11 NOTE — PROGRESS NOTES
"[facility]  Psychiatric Progress Note    Subjective   Miles is in bed today when I go see him. He tells me that he is \"really tired.\" He does appear more lucid when we speak, and asks me if his medication has been changed. Discussed the increase in Clozapine and he tells me, \"I have really bad stomach cramps, and I guess that explains why I'm so tired today.\" Miles indicates that his mother asked him about discharge planning, and then states that he is wondering, too. Reminded him that he is here voluntarily. He tells me that he knows this and came in because his little brother took off with all of his clothing. He then laughs and states, \"my mom knew I wasn't right, too. Sometimes I'm perfectly fine and then all of a sudden I'm not. That upsets her and scares her a little. She knows that sometimes I'm not right, but she also knows that I'm perfectly fine.\" Told him that we would prefer to see him \"more right\" before we talk about discharge, to which he responds, \"that's probably a good idea.\"     Nursing reports that he has only gotten up for meals today and is irritable and uncooperative. Continues to refuse to shower. Continues to make delusional statements and appears to be responding to internal stimuli. He has not complained to nursing of stomach cramping. No c/o nausea or reported vomiting.     10 point ROS positive for stomach cramps       DIagnoses:      Paranoid Schizophrenia w/ capgras delusions    Attestation:  Patient has been seen and evaluated by me, Supa Espinoza NP          Interim History:   The patient's care was discussed with the treatment team and chart notes were reviewed.          Medications:       cloZAPine  225 mg Oral At Bedtime     cloNIDine  0.1 mg Oral BID     cloZAPine  200 mg Oral Daily     cholecalciferol  2,000 Units Oral Daily     famotidine  20 mg Oral BID     clotrimazole, benztropine, hydrOXYzine, acetaminophen, alum & mag hydroxide-simethicone, magnesium " hydroxide, traZODone, OLANZapine **OR** OLANZapine, nicotine polacrilex          Allergies:     Allergies   Allergen Reactions     Pcn [Bicillin C-R,] Rash     Provider wants to try amoxicillin(benadryl ordered as well) 9/10/16     Bupropion Other (See Comments)     delusions      Depakote [Valproic Acid] Other (See Comments)     Heart racing     Divalproex Sodium-Fd&C Red #40      Increase heart rate            Psychiatric Examination:   /72  Pulse 89  Temp 97.7  F (36.5  C) (Tympanic)  Resp 16  Ht 1.829 m (6')  Wt 104.4 kg (230 lb 3.2 oz)  SpO2 96%  BMI 31.22 kg/m2  Weight is 230 lbs 3.2 oz  Body mass index is 31.22 kg/(m^2).    Appearance:  Awake but in bed and clearly fatigued; remains disheveled and malodorous. Refusing to shower.  Attitude: Cooperative  Eye Contact:  poor   Mood:  Labile - varying from euphoric with inappropriate laughter to irritability and agitation to brief periods of lucidity.   Affect:  mood congruent, intensity is dramatic and labile  Speech:  No rambling when we speak today. Asks clear, concise questions and is agreeable to suggestions and responds appropriately. Noted on unit to be pressured, rambling and swearing at nurses.  Psychomotor Behavior:  No abnormal movements noted  Thought Process:  Organized when we speak. Goal directed. This remains intermittent and brief as well.   Associations:  loosening of associations present  Thought Content:  Appears to be respond to internal stimuli at most times.  Insight:  None with very brief windows of understanding  Judgment:  poor  Oriented to:  Person, place   Attention Span and Concentration:  poor  Recent and Remote Memory:  poor  Fund of Knowledge: delayed  Muscle Strength and Tone: normal  Gait and Station: Normal           Labs:     Clozapine levels obtained on 9/7, 9/8 and 9/9 - unclear why 3 levels were obtain.   Results: 9/7 338; 9/8 310; 9/9 Pending    Assessment and Plan:  Will hold off on increasing clozaril tonight  and increase tomorrow night.     ELOS: Miles remains voluntarily and appears to understand this; however, his insight remains lacking and he continues to struggle with delusional thoughts, internal preoccupation and most likely visual and auditory hallucinations. He has very minimal improvement with only brief episodes of lucidity. Clozapine level appears to be at the low end of beneficial. When he was last discharged, he was on a dose of 350mg at bed and 200mg in the AM. Goal is to reach this dose.

## 2017-09-11 NOTE — PLAN OF CARE
Problem: General Plan of Care (Inpatient Behavioral)  Goal: Individualization/Patient Specific Goal (IP Behavioral)  The patient and/or their representative will achieve their patient-specific goals related to the plan of care.    The patient-specific goals include:   Patient will have an absence or decrease in hallucinations by time of discharge.  Patient will be medication compliant while hospitalized.  Patient will be able to have a reality based conversation prior to discharge.  Patient will be independent with his ADL s while hospitalized and maintain hygiene.     Pt appears to be sleeping in bed with eyes closed and having regular respirations. 15 minutes and PRN safety checks completed with no noted complains. Will continue to monitor.      MIKEY FITZPATRICK  9/11/2017  5:26 AM

## 2017-09-11 NOTE — PLAN OF CARE
Problem: General Plan of Care (Inpatient Behavioral)  Goal: Team Discussion  Team Plan:   BEHAVIORAL TEAM DISCUSSION     Participants: Supa Espinoza NP, Yvonne Pedersen NP, Lilly Villarreal LICSW, Amy Briggs LICSW, Carolyn Holloway Discharge Plannner, Kana Mar RN. Alesha Loera Recreation Therapy, Jazzy Sood OT, Nneka Sprague OT   Progress: None  Continued Stay Criteria/Rationale: Lab draw completed to check Clozaril level prior to increase, Delusional, talks to self  Medical/Physical: None  Precautions:   Behavioral Orders   Procedures     Code 1 - Restrict to Unit     Routine Programming       As clinically indicated     Status 15       Every 15 minutes.     Plan: Continued medication stabilization.   Rationale for change in precautions or plan: none

## 2017-09-11 NOTE — PLAN OF CARE
Face to face end of shift report received from MANNY Germain RN. Rounding completed. Patient observed, resting in bed with eyes closed.     Gabino Stevens  9/11/2017  8:00 AM

## 2017-09-11 NOTE — PLAN OF CARE
"Problem: General Plan of Care (Inpatient Behavioral)  Goal: Individualization/Patient Specific Goal (IP Behavioral)  The patient and/or their representative will achieve their patient-specific goals related to the plan of care.    The patient-specific goals include:   Patient will have an absence or decrease in hallucinations by time of discharge.  Patient will be medication compliant while hospitalized.  Patient will be able to have a reality based conversation prior to discharge.  Patient will be independent with his ADL s while hospitalized and maintain hygiene.     Outcome: No Change  Patient does not cooperate with nursing assessment.  He walks away from this writer and paces the hallways.  Patient refuses to shower when prompted multiple times this shift.  He makes multiple delusional statements and talks to himself throughout the shift.  Patient's mother visited [facility] and he paced or stood by her during her visit.  Patient is cooperative with medications.  He states he has anxiety sometimes and asks about Ativan.  Patient does not have an order for Ativan.  He states \"That happened to me before.  I was in a place where I took all of my medications but when I went to the pharmacy they had no record of me like I didn't exist.  So I didn't take medications after that.\"  Patient had clozapine lab draw 9/9/17 but results are not back yet.  1600: Pulse 106.  2000: Pulse 106.      "

## 2017-09-11 NOTE — PLAN OF CARE
"Problem: General Plan of Care (Inpatient Behavioral)  Goal: Individualization/Patient Specific Goal (IP Behavioral)  The patient and/or their representative will achieve their patient-specific goals related to the plan of care.    The patient-specific goals include:   Patient will have an absence or decrease in hallucinations by time of discharge.  Patient will be medication compliant while hospitalized.  Patient will be able to have a reality based conversation prior to discharge.  Patient will be independent with his ADL s while hospitalized and maintain hygiene.     Pt has spent the entire shift in bed, resting--up on the unit for meals only. Extremely irritable and uncooperative on assessment this morning. Would not make eye contact with this writer and kept stating \"I'm tired of fucking retards.\" Did take scheduled medications without issue. When asked to give his birth date, pt stated \"Which one? I've had many.\" Continues to appear to be responding to internal stimuli. Remains malodorous and unkempt. Continues to refuse to shower, despite repeated prompts and encouragement from staff.      "

## 2017-09-11 NOTE — PLAN OF CARE
Problem: General Plan of Care (Inpatient Behavioral)  Goal: Individualization/Patient Specific Goal (IP Behavioral)  The patient and/or their representative will achieve their patient-specific goals related to the plan of care.    The patient-specific goals include:   Patient will have an absence or decrease in hallucinations by time of discharge.  Patient will be medication compliant while hospitalized.  Patient will be able to have a reality based conversation prior to discharge.  Patient will be independent with his ADL s while hospitalized and maintain hygiene.     Outcome: Therapy, progress toward functional goals is gradual  Pt. Is responding to internal stimuli, talking to self frequently while ambulating in the halls, has been medication compliant, not able to hold a reality based conversation, has flight of ideas, independent with ADL's, showered at the beginning of this shift, will continue to monitor.

## 2017-09-11 NOTE — PLAN OF CARE
Face to face end of shift report received from Gabino KAM RN. Rounding completed. Patient observed.     Key Vaughan  9/11/2017  3:50 PM

## 2017-09-11 NOTE — PLAN OF CARE
Face to face end of shift report received from MARINA Topete. Rounding completed. Patient observed resting in bed.     MIKEY FITZPATRICK  9/11/2017  12:05 AM

## 2017-09-12 PROCEDURE — 25000132 ZZH RX MED GY IP 250 OP 250 PS 637: Performed by: NURSE PRACTITIONER

## 2017-09-12 PROCEDURE — 12400000 ZZH R&B MH

## 2017-09-12 PROCEDURE — S0136 CLOZAPINE, 25 MG: HCPCS | Performed by: NURSE PRACTITIONER

## 2017-09-12 RX ADMIN — CLOZAPINE 250 MG: 25 TABLET ORAL at 20:06

## 2017-09-12 RX ADMIN — CLONIDINE HYDROCHLORIDE 0.1 MG: 0.1 TABLET ORAL at 20:06

## 2017-09-12 RX ADMIN — VITAMIN D, TAB 1000IU (100/BT) 2000 UNITS: 25 TAB at 08:26

## 2017-09-12 RX ADMIN — FAMOTIDINE 20 MG: 20 TABLET ORAL at 08:26

## 2017-09-12 RX ADMIN — FAMOTIDINE 20 MG: 20 TABLET ORAL at 20:06

## 2017-09-12 RX ADMIN — CLOZAPINE 200 MG: 100 TABLET ORAL at 08:26

## 2017-09-12 RX ADMIN — CLONIDINE HYDROCHLORIDE 0.1 MG: 0.1 TABLET ORAL at 08:26

## 2017-09-12 ASSESSMENT — ACTIVITIES OF DAILY LIVING (ADL)
ORAL_HYGIENE: PROMPTS
ORAL_HYGIENE: PROMPTS
GROOMING: PROMPTS
GROOMING: PROMPTS
DRESS: SCRUBS (BEHAVIORAL HEALTH);INDEPENDENT

## 2017-09-12 NOTE — PLAN OF CARE
Problem: General Plan of Care (Inpatient Behavioral)  Goal: Team Discussion  Team Plan:   BEHAVIORAL TEAM DISCUSSION     Participants: Supa Espinoza NP, Yvonne Pedersen NP, Amy Briggs [facility], Tish Boswell [facility], Toño Antunez RN, Alesha Quevedo [employer], Nneka Sprague OT   Progress: minimal, delusional, brief periods of being lucid  Continued Stay Criteria/Rationale: medication adjsutments  Medical/Physical: None known  Precautions:   Behavioral Orders   Procedures     Code 1 - Restrict to Unit     Routine Programming       As clinically indicated     Status 15       Every 15 minutes.     Plan: continue to stabilize on medications  Rationale for change in precautions or plan: None

## 2017-09-12 NOTE — PLAN OF CARE
Problem: General Plan of Care (Inpatient Behavioral)  Goal: Individualization/Patient Specific Goal (IP Behavioral)  The patient and/or their representative will achieve their patient-specific goals related to the plan of care.    The patient-specific goals include:   Patient will have an absence or decrease in hallucinations by time of discharge.  Patient will be medication compliant while hospitalized.  Patient will be able to have a reality based conversation prior to discharge.  Patient will be independent with his ADL s while hospitalized and maintain hygiene.     Pt appeared to be sleeping throughout shift, normal respirations and position changes noted.

## 2017-09-12 NOTE — PLAN OF CARE
Face to face end of shift report received from Key GRAY. Rounding completed. Patient observed in bed.     Beth Rand  9/11/2017  11:42 PM

## 2017-09-12 NOTE — PLAN OF CARE
Problem: General Plan of Care (Inpatient Behavioral)  Goal: Individualization/Patient Specific Goal (IP Behavioral)  The patient and/or their representative will achieve their patient-specific goals related to the plan of care.    The patient-specific goals include:   Patient will have an absence or decrease in hallucinations by time of discharge.  Patient will be medication compliant while hospitalized.  Patient will be able to have a reality based conversation prior to discharge.  Patient will be independent with his ADL s while hospitalized and maintain hygiene.     Pt remains disheveled and unkempt in appearance. Malodorous. Continues to refuse to shower, despite repeated encouragement and prompts from staff. Pt continues to appear preoccupied and is obviously responding to internal stimuli. Irritable and somewhat uncooperative with assessment--frequently walking away from staff during conversation. Did take scheduled morning medications without issue. Has been up on the unit much of the shift, pacing in the hallway and lounge. Denied pain.

## 2017-09-12 NOTE — PLAN OF CARE
Face to face end of shift report received from Gabino KAM RN. Rounding completed. Patient observed.     Key Vaughan  9/12/2017  3:51 PM

## 2017-09-13 LAB
CLOZAPINE AND METABOLITES TOTAL: 756 NG/ML
CLOZAPINE SERPL-MCNC: 472 NG/ML
CLOZAPINE-N-OXIDE QUANT: 131 NG/ML
NORCLOZAPINE SERPL-MCNC: 153 NG/ML

## 2017-09-13 PROCEDURE — 25000132 ZZH RX MED GY IP 250 OP 250 PS 637: Performed by: NURSE PRACTITIONER

## 2017-09-13 PROCEDURE — 99232 SBSQ HOSP IP/OBS MODERATE 35: CPT | Performed by: NURSE PRACTITIONER

## 2017-09-13 PROCEDURE — S0136 CLOZAPINE, 25 MG: HCPCS | Performed by: NURSE PRACTITIONER

## 2017-09-13 PROCEDURE — 12400000 ZZH R&B MH

## 2017-09-13 RX ADMIN — CLONIDINE HYDROCHLORIDE 0.1 MG: 0.1 TABLET ORAL at 08:27

## 2017-09-13 RX ADMIN — CLOZAPINE 250 MG: 25 TABLET ORAL at 20:22

## 2017-09-13 RX ADMIN — CLOZAPINE 200 MG: 100 TABLET ORAL at 08:27

## 2017-09-13 RX ADMIN — FAMOTIDINE 20 MG: 20 TABLET ORAL at 08:27

## 2017-09-13 RX ADMIN — FAMOTIDINE 20 MG: 20 TABLET ORAL at 20:22

## 2017-09-13 RX ADMIN — VITAMIN D, TAB 1000IU (100/BT) 2000 UNITS: 25 TAB at 08:27

## 2017-09-13 RX ADMIN — CLONIDINE HYDROCHLORIDE 0.1 MG: 0.1 TABLET ORAL at 20:22

## 2017-09-13 ASSESSMENT — ACTIVITIES OF DAILY LIVING (ADL)
DRESS: SCRUBS (BEHAVIORAL HEALTH);INDEPENDENT
GROOMING: SHOWER;INDEPENDENT
DRESS: INDEPENDENT;SCRUBS (BEHAVIORAL HEALTH)
ORAL_HYGIENE: INDEPENDENT
GROOMING: INDEPENDENT
GROOMING: INDEPENDENT
ORAL_HYGIENE: INDEPENDENT
LAUNDRY: UNABLE TO COMPLETE
DRESS: SCRUBS (BEHAVIORAL HEALTH);INDEPENDENT

## 2017-09-13 NOTE — PROGRESS NOTES
"[facility]  Psychiatric Progress Note    Subjective   Miles is up in the lounge when I see him today. He tells me that he is \"doing good\" and that \"the meds seem to be working\". We discussed his Clozaril and how we are trying to get him to his previous dosing as he seemed to do well on this. He seems okay with this and questions why he stopped taking it in the first place. He then tells me that an old ancestor of his build this hospital before it was a hospital and owns it now. He did shower yesterday, though appears disheveled yet. Has not combed his hair though is encouraged to today. He has been medication compliant.    10 point ROS reviewed- denies any current concerns       DIagnoses:      Paranoid Schizophrenia w/ capgras delusions    Attestation:  Patient has been seen and evaluated by me, Kim Cardozo NP          Interim History:   The patient's care was discussed with the treatment team and chart notes were reviewed.          Medications:       cloZAPine  250 mg Oral At Bedtime     cloNIDine  0.1 mg Oral BID     cloZAPine  200 mg Oral Daily     cholecalciferol  2,000 Units Oral Daily     famotidine  20 mg Oral BID     clotrimazole, benztropine, hydrOXYzine, acetaminophen, alum & mag hydroxide-simethicone, magnesium hydroxide, traZODone, OLANZapine **OR** OLANZapine, nicotine polacrilex          Allergies:     Allergies   Allergen Reactions     Pcn [Bicillin C-R,] Rash     Provider wants to try amoxicillin(benadryl ordered as well) 9/10/16     Bupropion Other (See Comments)     delusions      Depakote [Valproic Acid] Other (See Comments)     Heart racing     Divalproex Sodium-Fd&C Red #40      Increase heart rate            Psychiatric Examination:   /72  Pulse 89  Temp 98  F (36.7  C) (Tympanic)  Resp 14  Ht 1.829 m (6')  Wt 104.4 kg (230 lb 3.2 oz)  SpO2 98%  BMI 31.22 kg/m2  Weight is 230 lbs 3.2 oz  Body mass index is 31.22 kg/(m^2).    Appearance:  Awake, alert, dressed in " hospital scrubs, did shower though appears somewhat disheveled  Attitude: cooperative  Eye Contact: limited  Mood:  Labile, can appear animated one minute and irritable the next  Affect: blunted, though can become animated  Speech:  Is responding to questions, though our conversation is limited today, regular rate and rhythm noted    Psychomotor Behavior:  No abnormal movements noted  Thought Process:  More organized today  Associations:  loosening of associations present  Thought Content:  Appears to be respond to internal stimuli at most times. Denies SI or HI.  Insight: limited  Judgment:  limited  Oriented to:  Person, place   Attention Span and Concentration:  poor  Recent and Remote Memory:  poor  Fund of Knowledge: delayed  Muscle Strength and Tone: normal  Gait and Station: Normal           Labs:     Clozapine levels obtained on 9/7, 9/8   Results: 9/7 338; 9/8 310  Weekly CBC reviewed and WNL      Assessment and Plan:  Clozaril was increased last night to 250 mg, will increase to 275 mg at HS tomorrow    ELOS: Miles remains voluntarily and appears to understand this; however, his insight remains lacking and he continues to struggle with delusional thoughts, internal preoccupation and most likely visual and auditory hallucinations. He has very minimal improvement with only brief episodes of lucidity. Clozapine level appears to be at the low end of beneficial. When he was last discharged, he was on a dose of 350mg at bed and 200mg in the AM. Goal is to reach this dose.

## 2017-09-13 NOTE — PLAN OF CARE
Face to face end of shift report received from Gabino LOMELI RN. Rounding completed. Patient observed in room laying in bed.     Almita Beverly  9/13/2017  4:16 PM

## 2017-09-13 NOTE — PLAN OF CARE
Face to face end of shift report received from VINOD Rand RN. Rounding completed. Patient observed, resting in bed with eyes closed.     Gabino Stevens  9/13/2017  7:50 AM

## 2017-09-13 NOTE — PLAN OF CARE
Problem: General Plan of Care (Inpatient Behavioral)  Goal: Individualization/Patient Specific Goal (IP Behavioral)  The patient and/or their representative will achieve their patient-specific goals related to the plan of care.    The patient-specific goals include:   Patient will have an absence or decrease in hallucinations by time of discharge.  Patient will be medication compliant while hospitalized.  Patient will be able to have a reality based conversation prior to discharge.  Patient will be independent with his ADL s while hospitalized and maintain hygiene.     Outcome: Therapy, progress toward functional goals is gradual  Pt. Continues to be responding to internal stimuli, ambulating in halls frequently, talks to self frequently, not able to have a reality based conversation, independent with ADL's, but refused to shower this shift, will continue to monitor.

## 2017-09-13 NOTE — PLAN OF CARE
"Problem: General Plan of Care (Inpatient Behavioral)  Goal: Individualization/Patient Specific Goal (IP Behavioral)  The patient and/or their representative will achieve their patient-specific goals related to the plan of care.    The patient-specific goals include:   Patient will have an absence or decrease in hallucinations by time of discharge.  Patient will be medication compliant while hospitalized.  Patient will be able to have a reality based conversation prior to discharge.  Patient will be independent with his ADL s while hospitalized and maintain hygiene.     Pt continues to appear to be responding to internal stimuli. Remains delusional--stating that he has \"changed my birthday many times in my life.\" During conversation later in the day, pt making statements about having \"always lived in colder climates.\" Paces in the the hallway and lounge area. Irritable and fairly uncooperative on assessment. Pt showered this morning. Pt took scheduled morning medications without issue.      "

## 2017-09-13 NOTE — PLAN OF CARE
Problem: General Plan of Care (Inpatient Behavioral)  Goal: Team Discussion  Team Plan:   BEHAVIORAL TEAM DISCUSSION     Participants: Rowan Alford NP, Supa Espinoza NP, Kim Cardozo NP, Amy Briggs LICSW,Tish Boswell LICSW, Carolyn Holloway Discharge Plannner, Priscilla Henry RN, Toño Antunez RN. Alesha Quevedo [employer], Jazzy Sood OT, Nneka Sprague OT   Progress: Good. Sleeping better, showering  Continued Stay Criteria/Rationale: Continued adjustments on clozaril. Delusional   Medical/Physical: None  Precautions:   Behavioral Orders   Procedures     Code 1 - Restrict to Unit     Routine Programming       As clinically indicated     Status 15       Every 15 minutes.     Plan: Referral to IRTS  Rationale for change in precautions or plan: None

## 2017-09-14 PROCEDURE — S0136 CLOZAPINE, 25 MG: HCPCS | Performed by: NURSE PRACTITIONER

## 2017-09-14 PROCEDURE — 25000132 ZZH RX MED GY IP 250 OP 250 PS 637: Performed by: NURSE PRACTITIONER

## 2017-09-14 PROCEDURE — 12400000 ZZH R&B MH

## 2017-09-14 RX ADMIN — CLOZAPINE 275 MG: 25 TABLET ORAL at 20:40

## 2017-09-14 RX ADMIN — CLOZAPINE 200 MG: 100 TABLET ORAL at 08:13

## 2017-09-14 RX ADMIN — FAMOTIDINE 20 MG: 20 TABLET ORAL at 20:41

## 2017-09-14 RX ADMIN — CLONIDINE HYDROCHLORIDE 0.1 MG: 0.1 TABLET ORAL at 20:41

## 2017-09-14 RX ADMIN — CLONIDINE HYDROCHLORIDE 0.1 MG: 0.1 TABLET ORAL at 08:13

## 2017-09-14 RX ADMIN — FAMOTIDINE 20 MG: 20 TABLET ORAL at 08:13

## 2017-09-14 RX ADMIN — VITAMIN D, TAB 1000IU (100/BT) 2000 UNITS: 25 TAB at 08:13

## 2017-09-14 ASSESSMENT — ACTIVITIES OF DAILY LIVING (ADL)
ORAL_HYGIENE: INDEPENDENT;PROMPTS
GROOMING: INDEPENDENT;PROMPTS
DRESS: SCRUBS (BEHAVIORAL HEALTH);INDEPENDENT

## 2017-09-14 NOTE — PLAN OF CARE
Problem: General Plan of Care (Inpatient Behavioral)  Goal: Individualization/Patient Specific Goal (IP Behavioral)  The patient and/or their representative will achieve their patient-specific goals related to the plan of care.    The patient-specific goals include:   Patient will have an absence or decrease in hallucinations by time of discharge.  Patient will be medication compliant while hospitalized.  Patient will be able to have a reality based conversation prior to discharge.  Patient will be independent with his ADL s while hospitalized and maintain hygiene.     Outcome: Improving  Pt up and pacing the hallway this shift. [place]. Pt does not have realistic conversations here. Pt ate all of dinner. Pt's mom visited today, pt did have realistic conversation of with mom about bringing his clothing and feeling anxious about not having his clothing here. Pt talks about his next placement and wondering where he will go. Pt paces the [place] throughout the rest of the evening.

## 2017-09-14 NOTE — PLAN OF CARE
"Problem: General Plan of Care (Inpatient Behavioral)  Goal: Individualization/Patient Specific Goal (IP Behavioral)  The patient and/or their representative will achieve their patient-specific goals related to the plan of care.    The patient-specific goals include:   Patient will have an absence or decrease in hallucinations by time of discharge.  Patient will be medication compliant while hospitalized.  Patient will be able to have a reality based conversation prior to discharge.  Patient will be independent with his ADL s while hospitalized and maintain hygiene.     Pt has been up on the unit all shift, pacing in the hallway. Wanders in and out of groups, but does not actively participate. Remains irritable with any attempts at conversation/assessment. Continues to appear to be responding to internal stimuli and remains delusional--frequently making statements about being a doctor in the past and having \"invented all the medicines in the world.\" Did take scheduled morning medications without issue.        "

## 2017-09-14 NOTE — PROGRESS NOTES
"[facility]  Psychiatric Progress Note    Subjective   Miles laying in bed today when I see him. He tells me that he is \"doing fine\". He questions what his plan for discharge is, though then states that he does not feel that he is ready to go. He does tell me a story that references his \"other self\". He does state that he is glad that he is back on medications and does tell me that he intends to continue to take them after discharge. He denies any side effects. Will continue to increase Clozaril with plan to get back to previous dosing where he was functioning well. Is not yet back to baseline at this time.     10 point ROS reviewed- denies any current concerns       DIagnoses:      Paranoid Schizophrenia w/ capgras delusions    Attestation:  Patient has been seen and evaluated by me, Kim Cardozo NP          Interim History:   The patient's care was discussed with the treatment team and chart notes were reviewed.          Medications:       cloZAPine  275 mg Oral At Bedtime     cloNIDine  0.1 mg Oral BID     cloZAPine  200 mg Oral Daily     cholecalciferol  2,000 Units Oral Daily     famotidine  20 mg Oral BID     clotrimazole, benztropine, hydrOXYzine, acetaminophen, alum & mag hydroxide-simethicone, magnesium hydroxide, traZODone, OLANZapine **OR** OLANZapine, nicotine polacrilex          Allergies:     Allergies   Allergen Reactions     Pcn [Bicillin C-R,] Rash     Provider wants to try amoxicillin(benadryl ordered as well) 9/10/16     Bupropion Other (See Comments)     delusions      Depakote [Valproic Acid] Other (See Comments)     Heart racing     Divalproex Sodium-Fd&C Red #40      Increase heart rate            Psychiatric Examination:   /69 (BP Location: Right arm)  Pulse 89  Temp 97.8  F (36.6  C) (Tympanic)  Resp 16  Ht 1.829 m (6')  Wt 104.4 kg (230 lb 3.2 oz)  SpO2 95%  BMI 31.22 kg/m2  Weight is 230 lbs 3.2 oz  Body mass index is 31.22 kg/(m^2).    Appearance: awake, " "alert, dressed in hospital scrubs, casually groomed though hair is quite long and unkempt  Attitude: cooperative  Eye Contact: intermittent  Mood:  Labile, will go from laughing to feeling irritable throughout conversation  Affect: less blunted  Speech:  Is responding to questions, regular rate and rhythm   Psychomotor Behavior:  No abnormal movements noted  Thought Process:  More organized though remains tangential  Associations:  loosening of associations present  Thought Content:  Continues to make delusional statements about his \"other self\"  Insight: limited  Judgment:  limited  Oriented to: person, place  Attention Span and Concentration:  limited  Recent and Remote Memory:  limited  Fund of Knowledge: delayed  Muscle Strength and Tone: normal  Gait and Station: Normal           Labs:     Clozapine levels obtained on 9/7, 9/8   Results: 9/7 338; 9/8 310  Weekly CBC reviewed and WNL      Assessment and Plan:  Increase Clozaril to 275 mg at HS along with 200 mg in AM    ELOS: Miles remains voluntarily and appears to understand this; however, his insight remains lacking and he continues to struggle with delusional thoughts, internal preoccupation and most likely visual and auditory hallucinations. He is making gradual progress with steady increase in Clozaril. Clozapine level appears to be at the low end of beneficial. When he was last discharged, he was on a dose of 350mg at bed and 200mg in the AM. Goal is to reach this dose.     "

## 2017-09-14 NOTE — PLAN OF CARE
Problem: General Plan of Care (Inpatient Behavioral)  Goal: Team Discussion  Team Plan:   BEHAVIORAL TEAM DISCUSSION     Participants: Roawn Alford NP, Kim Cardozo NP, Amy Briggs [facility], [facility]ish [facility],Carolyn Holloway [facility], Priscilla Henry RN, Chrissy Stack RN, Key Garber RN, Alesha [facility], Jazzy Sood OT, Nneka Sprague OT   Progress: Minimal  Continued Stay Criteria/Rationale: Continue to stabilize .Increase clozaril dose. Delusions  Medical/Physical: None  Precautions:   Behavioral Orders   Procedures     Code 1 - Restrict to Unit     Routine Programming       As clinically indicated     Status 15       Every 15 minutes.     Plan: Referrals sent to [facility]. Preferred placement, not returning home.  Rationale for change in precautions or plan: None

## 2017-09-14 NOTE — PLAN OF CARE
Face to face end of shift report received from Gabino GRAY. Rounding completed. Patient observed in [facility].    Samina Ponce  9/14/2017  4:00 PM

## 2017-09-14 NOTE — PLAN OF CARE
Problem: Discharge Planning  Goal: Discharge Planning (Adult, OB, Behavioral, Peds)  Pt was up pacing the halls- pt is still not able to hold reality-based conversations and is still grandiose.     Alberto Torres from [employer] called to state that pt is almost to his 25 day vikas- 2 more days- and requested H & P, progress notes, treatment plan, and discharge date and plan if there was one.  Writer faxed the above to [phone].

## 2017-09-14 NOTE — PLAN OF CARE
Face to face end of shift report received from Michelle GRAY. Rounding completed. Patient observed in bed.     Beth Rand  9/13/2017  11:43 PM

## 2017-09-14 NOTE — PLAN OF CARE
Face to face end of shift report received from VINOD Rand RN. Rounding completed. Patient observed, resting in bed with eyes closed.     Gabino Stevens  9/14/2017  7:43 AM

## 2017-09-15 PROCEDURE — S0136 CLOZAPINE, 25 MG: HCPCS | Performed by: NURSE PRACTITIONER

## 2017-09-15 PROCEDURE — 25000132 ZZH RX MED GY IP 250 OP 250 PS 637: Performed by: NURSE PRACTITIONER

## 2017-09-15 PROCEDURE — 12400000 ZZH R&B MH

## 2017-09-15 RX ADMIN — CLONIDINE HYDROCHLORIDE 0.1 MG: 0.1 TABLET ORAL at 08:47

## 2017-09-15 RX ADMIN — CLONIDINE HYDROCHLORIDE 0.1 MG: 0.1 TABLET ORAL at 20:15

## 2017-09-15 RX ADMIN — VITAMIN D, TAB 1000IU (100/BT) 2000 UNITS: 25 TAB at 08:47

## 2017-09-15 RX ADMIN — FAMOTIDINE 20 MG: 20 TABLET ORAL at 20:16

## 2017-09-15 RX ADMIN — CLOZAPINE 275 MG: 25 TABLET ORAL at 20:15

## 2017-09-15 RX ADMIN — CLOZAPINE 200 MG: 100 TABLET ORAL at 08:47

## 2017-09-15 RX ADMIN — FAMOTIDINE 20 MG: 20 TABLET ORAL at 08:47

## 2017-09-15 ASSESSMENT — ACTIVITIES OF DAILY LIVING (ADL)
GROOMING: PROMPTS
DRESS: SCRUBS (BEHAVIORAL HEALTH);PROMPTS
ORAL_HYGIENE: PROMPTS

## 2017-09-15 NOTE — PLAN OF CARE
Problem: Discharge Planning  Goal: Discharge Planning (Adult, OB, Behavioral, Peds)  Writer received a call from Alberto at [facility] [phone]/ [phone] who stated they reviewed the notes and approved pt for another 2 weeks and would like writer to fax provider notes and  notes on Sept. 28th.

## 2017-09-15 NOTE — PLAN OF CARE
Problem: General Plan of Care (Inpatient Behavioral)  Goal: Team Discussion  Team Plan:   BEHAVIORAL TEAM DISCUSSION     Participants: Rowan Alford NP, Kim Cardozo NP, Yvonne Pedersen NP, Tish PHILLIPSSW, Toño Antunez RN, Jazzy Sood OT, Nneka Sprague OT   Progress: fair, tracking better, poor hygiene  Continued Stay Criteria/Rationale: delusional, monitoring for medication adjustments  Medical/Physical: None known  Precautions:   Behavioral Orders   Procedures     Code 1 - Restrict to Unit     Routine Programming       As clinically indicated     Status 15       Every 15 minutes.     Plan: increasing clozaril slowing, continue to stabilize on medications   Rationale for change in precautions or plan: None

## 2017-09-15 NOTE — PLAN OF CARE
Problem: General Plan of Care (Inpatient Behavioral)  Goal: Individualization/Patient Specific Goal (IP Behavioral)  The patient and/or their representative will achieve their patient-specific goals related to the plan of care.    The patient-specific goals include:   Patient will have an absence or decrease in hallucinations by time of discharge.  Patient will be medication compliant while hospitalized.  Patient will be able to have a reality based conversation prior to discharge.  Patient will be independent with his ADL s while hospitalized and maintain hygiene.     Patient paced the unit the whole shift.  Patient irritable the more you ask him questions.Patient denies anxiety, depression. SI, and SIB. Patient denies hallucinations but appears to be responding to internal stimuli. Patient making delusional statements about owning a 2 millions dollar house and his brother burning it down. Patient also making statements about the government trying to take the house away and he had to put his family name on the house so the government couldn't get it.  Patient denies pain. Patient walking into group but does not stay. Patient was cooperative with scheduled medications.

## 2017-09-15 NOTE — PROGRESS NOTES
[facility]  Psychiatric Progress Note    Subjective   Miles is up in the lounge today and is reporting he is feeling fine. He asks about being discharged this weekend and discussed that maybe next week would be better as we are still adjusting his medication doses. He does agree to that but feels as though sometimes his anxiety gets worse from being here. He then lost track of the conversation here and talked about [place] his girlfriend before he lost his keys. He clearly needs further stabilization but is showing some improvement.    10 point ROS reviewed- denies any current concerns       DIagnoses:      Paranoid Schizophrenia w/ capgras delusions    Attestation:  Patient has been seen and evaluated by me, Yvonne Pedersen NP          Interim History:   The patient's care was discussed with the treatment team and chart notes were reviewed.          Medications:       cloZAPine  275 mg Oral At Bedtime     cloNIDine  0.1 mg Oral BID     cloZAPine  200 mg Oral Daily     cholecalciferol  2,000 Units Oral Daily     famotidine  20 mg Oral BID     clotrimazole, benztropine, hydrOXYzine, acetaminophen, alum & mag hydroxide-simethicone, magnesium hydroxide, traZODone, OLANZapine **OR** OLANZapine, nicotine polacrilex          Allergies:     Allergies   Allergen Reactions     Pcn [Bicillin C-R,] Rash     Provider wants to try amoxicillin(benadryl ordered as well) 9/10/16     Bupropion Other (See Comments)     delusions      Depakote [Valproic Acid] Other (See Comments)     Heart racing     Divalproex Sodium-Fd&C Red #40      Increase heart rate            Psychiatric Examination:   /72  Pulse 79  Temp 96.6  F (35.9  C) (Tympanic)  Resp 14  Ht 1.829 m (6')  Wt 104.4 kg (230 lb 3.2 oz)  SpO2 95%  BMI 31.22 kg/m2  Weight is 230 lbs 3.2 oz  Body mass index is 31.22 kg/(m^2).    Appearance: awake, alert, dressed in hospital scrubs, unkempt and malodorous  Attitude:  "cooperative  Eye Contact: intermittent  Mood: brighter  Affect: less blunted  Speech:  Is responding to questions, regular rate and rhythm   Psychomotor Behavior:  No abnormal movements noted  Thought Process:  Still tangential but is improving  Associations:  loosening of associations present  Thought Content:  Continues to make delusional statements about his \"other self\"  Insight: limited  Judgment:  limited  Oriented to: person, place  Attention Span and Concentration:  limited  Recent and Remote Memory:  limited  Fund of Knowledge: delayed  Muscle Strength and Tone: normal  Gait and Station: Normal           Labs:     Clozapine levels obtained on 9/7, 9/8   Results: 9/7 338; 9/8 310  Weekly CBC reviewed and WNL      Assessment and Plan:  Continue current medications and increase clozaril dose slowly as dose is above 400 mg a day    ELOS: Miles remains voluntarily and appears to understand this; however, his insight remains lacking and he continues to struggle with delusional thoughts, internal preoccupation and most likely visual and auditory hallucinations. He is making gradual progress with steady increase in Clozaril. Clozapine level appears to be at the low end of beneficial. When he was last discharged, he was on a dose of 350mg at bed and 200mg in the AM. Goal is to reach this dose.     "

## 2017-09-15 NOTE — PLAN OF CARE
"Problem: General Plan of Care (Inpatient Behavioral)  Goal: Individualization/Patient Specific Goal (IP Behavioral)  The patient and/or their representative will achieve their patient-specific goals related to the plan of care.    The patient-specific goals include:   Patient will have an absence or decrease in hallucinations by time of discharge.  Patient will be medication compliant while hospitalized.  Patient will be able to have a reality based conversation prior to discharge.  Patient will be independent with his ADL s while hospitalized and maintain hygiene.     Outcome: No Change  Pt continues to respond to internal stimuli. He is able to focus on this writer for a brief second and then returns to his delusional thoughts. He continues to pace the unit. Pt does not answer when asked about anxiety or depression. When asked how he slept, pt states \"I don't know.\" Pt stating \"I don't know and I know everything. I know it all.\" Pt then laughs inappropriately. Pt appears disheveled and untidy, malodorous.       "

## 2017-09-15 NOTE — PLAN OF CARE
Face to face end of shift report received from MARINA Campos. Rounding completed. Patient observed in bed, appears asleep. Regular respirations.     Lisa Dia  9/15/2017  7:50 AM

## 2017-09-15 NOTE — PLAN OF CARE
Face to face end of shift report received from Lisa GRAY. Rounding completed. Patient observed in [facility].    Samina Ponce  9/15/2017  4:05 PM

## 2017-09-15 NOTE — PLAN OF CARE
Face to face end of shift report received from Samina GRAY. Rounding completed. Patient observed in bed.     Beth Rand  9/14/2017  11:46 PM

## 2017-09-16 PROCEDURE — 25000132 ZZH RX MED GY IP 250 OP 250 PS 637: Performed by: NURSE PRACTITIONER

## 2017-09-16 PROCEDURE — S0136 CLOZAPINE, 25 MG: HCPCS | Performed by: NURSE PRACTITIONER

## 2017-09-16 PROCEDURE — 12400000 ZZH R&B MH

## 2017-09-16 RX ADMIN — FAMOTIDINE 20 MG: 20 TABLET ORAL at 08:24

## 2017-09-16 RX ADMIN — CLOZAPINE 200 MG: 100 TABLET ORAL at 08:24

## 2017-09-16 RX ADMIN — CLOZAPINE 275 MG: 25 TABLET ORAL at 20:32

## 2017-09-16 RX ADMIN — CLONIDINE HYDROCHLORIDE 0.1 MG: 0.1 TABLET ORAL at 08:24

## 2017-09-16 RX ADMIN — CLONIDINE HYDROCHLORIDE 0.1 MG: 0.1 TABLET ORAL at 20:32

## 2017-09-16 RX ADMIN — VITAMIN D, TAB 1000IU (100/BT) 2000 UNITS: 25 TAB at 08:24

## 2017-09-16 RX ADMIN — FAMOTIDINE 20 MG: 20 TABLET ORAL at 20:32

## 2017-09-16 NOTE — PROGRESS NOTES
"[facility]  Psychiatric Progress Note    Subjective   Miles is laying in bed today when I see him. He states that she is just feeling tired this morning and wants to sleep. When engaged in conversation he readily interacts and responds, thoughts seem to be more linear. He does continue to make occasional delusional statements, today talking about how his brother kicked him out of his mansion and that he owns houses \"all over\". He denies any side effects from the Clozaril besides mild sedation. He states that overall he is feeling better and is hopeful that he will be able to discharge soon. He does ask about leaving this weekend, though understands that we are still adjusting his Clozaril dose so would like him to stay a few more days which he is agreeable to.     10 point ROS reviewed- denies any concerns today       DIagnoses:      Paranoid Schizophrenia w/ capgras delusions    Attestation:  Patient has been seen and evaluated by me, Kim Cardozo NP          Interim History:   The patient's care was discussed with the treatment team and chart notes were reviewed.          Medications:       cloZAPine  275 mg Oral At Bedtime     cloNIDine  0.1 mg Oral BID     cloZAPine  200 mg Oral Daily     cholecalciferol  2,000 Units Oral Daily     famotidine  20 mg Oral BID     clotrimazole, benztropine, hydrOXYzine, acetaminophen, alum & mag hydroxide-simethicone, magnesium hydroxide, traZODone, OLANZapine **OR** OLANZapine, nicotine polacrilex          Allergies:     Allergies   Allergen Reactions     Pcn [Bicillin C-R,] Rash     Provider wants to try amoxicillin(benadryl ordered as well) 9/10/16     Bupropion Other (See Comments)     delusions      Depakote [Valproic Acid] Other (See Comments)     Heart racing     Divalproex Sodium-Fd&C Red #40      Increase heart rate            Psychiatric Examination:   /62  Pulse 90  Temp 97.1  F (36.2  C) (Tympanic)  Resp 12  Ht 1.829 m (6')  Wt 104.4 kg " "(230 lb 3.2 oz)  SpO2 97%  BMI 31.22 kg/m2  Weight is 230 lbs 3.2 oz  Body mass index is 31.22 kg/(m^2).    Appearance: awake, alert, dressed in hospital scrubs and appears disheveled, laying in bed  Attitude: cooperative  Eye Contact: intermittent  Mood: reports he is \"fine\"  Affect: variability increasing, seems a little brighter  Speech:  Is responding to questions, regular rate and rhythm   Psychomotor Behavior:  No abnormal movements noted  Thought Process:  Still tangential but is improving  Associations:  loosening of associations present  Thought Content:  Continues to make occasional delusional statements about mansions and building the hospital  Insight: limited  Judgment:  limited  Oriented to: person, place, time  Attention Span and Concentration:  limited  Recent and Remote Memory:  limited  Fund of Knowledge: delayed  Muscle Strength and Tone: normal  Gait and Station: Normal           Labs:   Clozapine level drawn 9/14- pending  Weekly CBC reviewed and WNL      Assessment and Plan:  Continue current medications and increase clozaril dose slowly as dose is above 400 mg a day    Estimated LOS:  Clozapine continues to be titrated up, level drawn 9/14 and pending. When he was last discharged, he was on a dose of 350mg at bed and 200mg in the AM. Goal is to reach this dose.     "

## 2017-09-16 NOTE — PLAN OF CARE
Problem: General Plan of Care (Inpatient Behavioral)  Goal: Individualization/Patient Specific Goal (IP Behavioral)  The patient and/or their representative will achieve their patient-specific goals related to the plan of care.    The patient-specific goals include:   Patient will have an absence or decrease in hallucinations by time of discharge.  Patient will be medication compliant while hospitalized.  Patient will be able to have a reality based conversation prior to discharge.  Patient will be independent with his ADL s while hospitalized and maintain hygiene.     Outcome: No Change  Patient difficult to understand during assessment. Muffled unclear speech and minimal eye contact during conversation attempts with this writer. Patient continues to appear very disheveled/untidy and declined numerous shower encouragements this shift. Laying down to rest/nap for about 3 hours this morning. Pacing hallways continuously after lunch, walking through group room without sitting to participate. Compliant with scheduled medications. Continue to monitor.

## 2017-09-16 NOTE — PLAN OF CARE
Face to face end of shift report received from Jackie GRAY. Rounding completed. Patient observed in room.    Samina Ponce  9/16/2017  3:42 PM

## 2017-09-16 NOTE — PLAN OF CARE
Face to face end of shift report received from Beth BROCK RN. Rounding completed. Patient observed laying in bed, supine position, non-labored breathing.     Jackie oStomayor  9/16/2017  8:05 AM

## 2017-09-16 NOTE — PLAN OF CARE
Problem: General Plan of Care (Inpatient Behavioral)  Goal: Individualization/Patient Specific Goal (IP Behavioral)  The patient and/or their representative will achieve their patient-specific goals related to the plan of care.    The patient-specific goals include:   Patient will have an absence or decrease in hallucinations by time of discharge.  Patient will be medication compliant while hospitalized.  Patient will be able to have a reality based conversation prior to discharge.  Patient will be independent with his ADL s while hospitalized and maintain hygiene.     Patient pacing the unit all shift. Patient would walk into groups but would not stay. Patient denies hallucinations but appears to be responding to some internal stimuli. Patient will be whispering to self when walking in the halls. Patient continues to make delusional statements about previous situations and living in huge house and people going to half-way. Patient denies anxiety, pain, SI, and depression. Patient is social when talked too but gets irritable when asked too many assessment questions. Patient mom and dad came to visit this shift. Patient reports the visit went well.

## 2017-09-16 NOTE — PLAN OF CARE
Face to face end of shift report received from Samina GRAY. Rounding completed. Patient observed in bed.     Beth Rand  9/16/2017  12:38 AM

## 2017-09-17 LAB
BASOPHILS # BLD AUTO: 0.1 10E9/L (ref 0–0.2)
BASOPHILS NFR BLD AUTO: 0.5 %
DIFFERENTIAL METHOD BLD: NORMAL
EOSINOPHIL # BLD AUTO: 0.1 10E9/L (ref 0–0.7)
EOSINOPHIL NFR BLD AUTO: 1.1 %
ERYTHROCYTE [DISTWIDTH] IN BLOOD BY AUTOMATED COUNT: 12.6 % (ref 10–15)
HCT VFR BLD AUTO: 46.9 % (ref 40–53)
HGB BLD-MCNC: 15.9 G/DL (ref 13.3–17.7)
IMM GRANULOCYTES # BLD: 0.1 10E9/L (ref 0–0.4)
IMM GRANULOCYTES NFR BLD: 0.5 %
LYMPHOCYTES # BLD AUTO: 2.5 10E9/L (ref 0.8–5.3)
LYMPHOCYTES NFR BLD AUTO: 22.8 %
MCH RBC QN AUTO: 29.3 PG (ref 26.5–33)
MCHC RBC AUTO-ENTMCNC: 33.9 G/DL (ref 31.5–36.5)
MCV RBC AUTO: 87 FL (ref 78–100)
MONOCYTES # BLD AUTO: 1.1 10E9/L (ref 0–1.3)
MONOCYTES NFR BLD AUTO: 10.3 %
NEUTROPHILS # BLD AUTO: 7 10E9/L (ref 1.6–8.3)
NEUTROPHILS NFR BLD AUTO: 64.8 %
NRBC # BLD AUTO: 0 10*3/UL
NRBC BLD AUTO-RTO: 0 /100
PLATELET # BLD AUTO: 370 10E9/L (ref 150–450)
RBC # BLD AUTO: 5.42 10E12/L (ref 4.4–5.9)
WBC # BLD AUTO: 10.8 10E9/L (ref 4–11)

## 2017-09-17 PROCEDURE — S0136 CLOZAPINE, 25 MG: HCPCS | Performed by: NURSE PRACTITIONER

## 2017-09-17 PROCEDURE — 25000132 ZZH RX MED GY IP 250 OP 250 PS 637: Performed by: NURSE PRACTITIONER

## 2017-09-17 PROCEDURE — 36415 COLL VENOUS BLD VENIPUNCTURE: CPT | Performed by: NURSE PRACTITIONER

## 2017-09-17 PROCEDURE — 12400000 ZZH R&B MH

## 2017-09-17 PROCEDURE — 85025 COMPLETE CBC W/AUTO DIFF WBC: CPT | Performed by: NURSE PRACTITIONER

## 2017-09-17 RX ORDER — CLOZAPINE 100 MG/1
300 TABLET ORAL AT BEDTIME
Status: DISCONTINUED | OUTPATIENT
Start: 2017-09-17 | End: 2017-09-19

## 2017-09-17 RX ADMIN — CLONIDINE HYDROCHLORIDE 0.1 MG: 0.1 TABLET ORAL at 20:07

## 2017-09-17 RX ADMIN — CLOZAPINE 300 MG: 100 TABLET ORAL at 20:07

## 2017-09-17 RX ADMIN — FAMOTIDINE 20 MG: 20 TABLET ORAL at 08:34

## 2017-09-17 RX ADMIN — CLONIDINE HYDROCHLORIDE 0.1 MG: 0.1 TABLET ORAL at 08:34

## 2017-09-17 RX ADMIN — VITAMIN D, TAB 1000IU (100/BT) 2000 UNITS: 25 TAB at 08:34

## 2017-09-17 RX ADMIN — FAMOTIDINE 20 MG: 20 TABLET ORAL at 20:07

## 2017-09-17 RX ADMIN — CLOZAPINE 200 MG: 100 TABLET ORAL at 08:43

## 2017-09-17 ASSESSMENT — ACTIVITIES OF DAILY LIVING (ADL)
ORAL_HYGIENE: PROMPTS
GROOMING: PROMPTS
DRESS: SCRUBS (BEHAVIORAL HEALTH);INDEPENDENT

## 2017-09-17 NOTE — PLAN OF CARE
Problem: General Plan of Care (Inpatient Behavioral)  Goal: Individualization/Patient Specific Goal (IP Behavioral)  The patient and/or their representative will achieve their patient-specific goals related to the plan of care.    The patient-specific goals include:   Patient will have an absence or decrease in hallucinations by time of discharge.  Patient will be medication compliant while hospitalized.  Patient will be able to have a reality based conversation prior to discharge.  Patient will be independent with his ADL s while hospitalized and maintain hygiene.     Patient was cooperative with nurse assessment and medications. Patient in room resting at the start of the shift. Patient did come out for supper. Patient paced the halls for most of the evening. Patient denies all criteria and makes delusional statements about his past. Patient denies hallucinations but is seen in the halls talking to self. Patient denies pain. Patient is not able to have a reailty based conversation.

## 2017-09-17 NOTE — PLAN OF CARE
Face to face end of shift report received from Samina GRAY. Rounding completed. Patient observed in bed.     Beth Rand  9/16/2017  11:53 PM

## 2017-09-17 NOTE — PLAN OF CARE
Face to face end of shift report received from Gabino GRAY. Rounding completed. Patient observed in room.     Samina Ponce  9/17/2017  3:38 PM

## 2017-09-17 NOTE — PLAN OF CARE
"Problem: General Plan of Care (Inpatient Behavioral)  Goal: Individualization/Patient Specific Goal (IP Behavioral)  The patient and/or their representative will achieve their patient-specific goals related to the plan of care.    The patient-specific goals include:   Patient will have an absence or decrease in hallucinations by time of discharge.  Patient will be medication compliant while hospitalized.  Patient will be able to have a reality based conversation prior to discharge.  Patient will be independent with his ADL s while hospitalized and maintain hygiene.     Patient up pacing the halls most of the shift. Patient denies hallucinations but is seen talking to self in the halls. Patient will laugh at time during assessment. Patient continues to make delusional statements. Patient does not participate well with assessment. PAtinet just states \"just another day\". Patinet denies pain, SI, depression, and anxiety.      "

## 2017-09-17 NOTE — PLAN OF CARE
"Problem: General Plan of Care (Inpatient Behavioral)  Goal: Individualization/Patient Specific Goal (IP Behavioral)  The patient and/or their representative will achieve their patient-specific goals related to the plan of care.    The patient-specific goals include:   Patient will have an absence or decrease in hallucinations by time of discharge.  Patient will be medication compliant while hospitalized.  Patient will be able to have a reality based conversation prior to discharge.  Patient will be independent with his ADL s while hospitalized and maintain hygiene.     Up on unit intermittently throughout the shift. Spent much of the morning in bed, resting/sleeping. Irritable and \"intense\" on assessment this morning. When asked why he refused the early morning lab draw (weekly CBC), pt stated angrily: \"I'm sick of those idiots who don't know what they're doing--they have to poke me a bunch of times to get any blood! 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 Did take scheduled morning medications without issue. Remains disheveled and unkempt appearing. Malodorous.  Did allow lab draw later in the morning, but continued to mumble under his breath about \"idiots.\"      "

## 2017-09-17 NOTE — PLAN OF CARE
Problem: General Plan of Care (Inpatient Behavioral)  Goal: Individualization/Patient Specific Goal (IP Behavioral)  The patient and/or their representative will achieve their patient-specific goals related to the plan of care.    The patient-specific goals include:   Patient will have an absence or decrease in hallucinations by time of discharge.  Patient will be medication compliant while hospitalized.  Patient will be able to have a reality based conversation prior to discharge.  Patient will be independent with his ADL s while hospitalized and maintain hygiene.     Pt appeared to be sleeping throughout shift, normal respirations and position changes noted. Pt refused AM CBC lab draw. Pt said he has been on the same meds for a long time and never had to have blood taken before.

## 2017-09-17 NOTE — PLAN OF CARE
Face to face end of shift report received from VINOD Rand RN. Rounding completed. Patient observed, resting in bed with eyes closed.     Gabino Stevens  9/17/2017  7:47 AM

## 2017-09-18 PROCEDURE — 12400000 ZZH R&B MH

## 2017-09-18 PROCEDURE — S0136 CLOZAPINE, 25 MG: HCPCS | Performed by: NURSE PRACTITIONER

## 2017-09-18 PROCEDURE — 25000132 ZZH RX MED GY IP 250 OP 250 PS 637: Performed by: NURSE PRACTITIONER

## 2017-09-18 RX ADMIN — CLONIDINE HYDROCHLORIDE 0.1 MG: 0.1 TABLET ORAL at 20:02

## 2017-09-18 RX ADMIN — VITAMIN D, TAB 1000IU (100/BT) 2000 UNITS: 25 TAB at 08:22

## 2017-09-18 RX ADMIN — FAMOTIDINE 20 MG: 20 TABLET ORAL at 08:22

## 2017-09-18 RX ADMIN — CLONIDINE HYDROCHLORIDE 0.1 MG: 0.1 TABLET ORAL at 08:22

## 2017-09-18 RX ADMIN — CLOZAPINE 200 MG: 100 TABLET ORAL at 08:22

## 2017-09-18 RX ADMIN — CLOZAPINE 300 MG: 100 TABLET ORAL at 20:02

## 2017-09-18 RX ADMIN — FAMOTIDINE 20 MG: 20 TABLET ORAL at 20:02

## 2017-09-18 ASSESSMENT — ACTIVITIES OF DAILY LIVING (ADL)
DRESS: PROMPTS
GROOMING: PROMPTS
LAUNDRY: UNABLE TO COMPLETE
ORAL_HYGIENE: PROMPTS
ORAL_HYGIENE: PROMPTS
GROOMING: PROMPTS
LAUNDRY: UNABLE TO COMPLETE
DRESS: PROMPTS;SCRUBS (BEHAVIORAL HEALTH)

## 2017-09-18 NOTE — PLAN OF CARE
"Problem: General Plan of Care (Inpatient Behavioral)  Goal: Individualization/Patient Specific Goal (IP Behavioral)  The patient and/or their representative will achieve their patient-specific goals related to the plan of care.    The patient-specific goals include:   Patient will have an absence or decrease in hallucinations by time of discharge.  Patient will be medication compliant while hospitalized.  Patient will be able to have a reality based conversation prior to discharge.  Patient will be independent with his ADL s while hospitalized and maintain hygiene.     Pt denies SI, HI, and hallucinations. Pt is dismissive, does not participate much in nursing assessment. He is irritable this morning. Asked him how he slept and he mumbles something about his mom telling him he never gets any sleep. Pt is disheveled and malodors. Encouraged pt to shower but he says \"that just makes me more dirty.\" Pt paces the halls. Walks into group but doesn't stay long and walks back out. Appears to be responding to internal stimuli. When asked if he is hearing voices pt laughs at this writer. Affect is blunted and flat. He is compliant with prescribed medication. Doesn't respond when asked about anxiety and depression. No complaints of pain. Will continue to monitor.     1400: Pt showered with prompts.   "

## 2017-09-18 NOTE — PLAN OF CARE
"Problem: General Plan of Care (Inpatient Behavioral)  Goal: Individualization/Patient Specific Goal (IP Behavioral)  The patient and/or their representative will achieve their patient-specific goals related to the plan of care.    The patient-specific goals include:   Patient will have an absence or decrease in hallucinations by time of discharge.  Patient will be medication compliant while hospitalized.  Patient will be able to have a reality based conversation prior to discharge.  Patient will be independent with his ADL s while hospitalized and maintain hygiene.     Pt is dismissive and irritable. Pt mumbles during nursing assessment and is very short with answers.  Pt denies all criteria, states \"I'm fine.\"  Dose appear to be responding to internal stimuli.  Pt isolative to himself.  Has been pacing the hallways this shift.  Dose walk in and out of group.  Cooperative with all medications this shift.        "

## 2017-09-18 NOTE — PLAN OF CARE
Problem: General Plan of Care (Inpatient Behavioral)  Goal: Team Discussion  Team Plan:   BEHAVIORAL TEAM DISCUSSION     Participants: Rowan Alford NP, Kim Cardozo NP, Tish PHILLIPSSW, Carolyn Holloway Discharge Plannner, Priscilla Henry RN, Quintin Evangelista RN, Jazzy Sood OT, Nneka Sprague OT   Progress: minimal, remains delusional  Continued Stay Criteria/Rationale: increased clozaril, delusional  Medical/Physical: None known  Precautions:   Behavioral Orders   Procedures     Code 1 - Restrict to Unit     Routine Programming       As clinically indicated     Status 15       Every 15 minutes.     Plan: DC back home with services in place or DC to IRTS, continue to stabilize on medications  Rationale for change in precautions or plan: None

## 2017-09-18 NOTE — PLAN OF CARE
Face to face end of shift report received from MARINA Campos. Rounding completed. Patient observed in bed.     Lety Evangelista  9/18/2017  7:39 AM

## 2017-09-18 NOTE — PLAN OF CARE
Face to face end of shift report received from Samina GRAY. Rounding completed. Patient observed in bed.     Beth Rand  9/17/2017  11:56 PM

## 2017-09-18 NOTE — PLAN OF CARE
Face to face end of shift report received from Lety LEOS RN. Rounding completed. Patient observed in [facility].     Marilin Vaz  9/18/2017  3:41 PM

## 2017-09-18 NOTE — PLAN OF CARE
"Problem: Discharge Planning  Goal: Discharge Planning (Adult, OB, Behavioral, Peds)  Writer spoke with pt's mother who stated that pt has never been able to participated in group-based programs.  She stated that he had gone to the [facility] in the past and was unsuccessful there.  Writer explained that we would not refer pt to an IRTS program until or unless he was willing and able to participate in groups on the unit which he has not been able to do so far.  Mother asked for a \"heads up\" a few days before expected discharge so that she can prepare for it.       "

## 2017-09-19 PROCEDURE — 25000132 ZZH RX MED GY IP 250 OP 250 PS 637: Performed by: NURSE PRACTITIONER

## 2017-09-19 PROCEDURE — S0136 CLOZAPINE, 25 MG: HCPCS | Performed by: NURSE PRACTITIONER

## 2017-09-19 PROCEDURE — 12400000 ZZH R&B MH

## 2017-09-19 RX ADMIN — CLONIDINE HYDROCHLORIDE 0.1 MG: 0.1 TABLET ORAL at 20:11

## 2017-09-19 RX ADMIN — CLOZAPINE 200 MG: 100 TABLET ORAL at 08:19

## 2017-09-19 RX ADMIN — CLOZAPINE 325 MG: 25 TABLET ORAL at 20:11

## 2017-09-19 RX ADMIN — VITAMIN D, TAB 1000IU (100/BT) 2000 UNITS: 25 TAB at 08:19

## 2017-09-19 RX ADMIN — CLONIDINE HYDROCHLORIDE 0.1 MG: 0.1 TABLET ORAL at 08:19

## 2017-09-19 RX ADMIN — FAMOTIDINE 20 MG: 20 TABLET ORAL at 08:19

## 2017-09-19 RX ADMIN — FAMOTIDINE 20 MG: 20 TABLET ORAL at 20:11

## 2017-09-19 ASSESSMENT — ACTIVITIES OF DAILY LIVING (ADL)
GROOMING: PROMPTS
ORAL_HYGIENE: PROMPTS
LAUNDRY: UNABLE TO COMPLETE
DRESS: PROMPTS
DRESS: PROMPTS
GROOMING: PROMPTS
ORAL_HYGIENE: PROMPTS
LAUNDRY: UNABLE TO COMPLETE

## 2017-09-19 NOTE — PLAN OF CARE
Face to face end of shift report received from MARINA Gaston Rounding completed. Patient observed. Pt in bed, respirations non-labored    SWEETIE GONZALEZ  9/19/2017  12:01 AM

## 2017-09-19 NOTE — PLAN OF CARE
"Problem: General Plan of Care (Inpatient Behavioral)  Goal: Individualization/Patient Specific Goal (IP Behavioral)  The patient and/or their representative will achieve their patient-specific goals related to the plan of care.    The patient-specific goals include:   Patient will have an absence or decrease in hallucinations by time of discharge.  Patient will be medication compliant while hospitalized.  Patient will be able to have a reality based conversation prior to discharge.  Patient will be independent with his ADL s while hospitalized and maintain hygiene.     Pt has been pacing the unit most of this shift. He is flat and withdrawn. Doesn't participate much in nursing assessment. States, \"i'm just fine\" when asked assessment questions. Appears to be responding to internal stimuli. Walks into groups but walks out shortly after. Compliant with medication. Will continue to monitor.       "

## 2017-09-19 NOTE — PLAN OF CARE
Face to face end of shift report received from Lety LEOS RN. Rounding completed. Patient observed in [facility].     Marilin Vaz  9/19/2017  3:44 PM

## 2017-09-19 NOTE — PLAN OF CARE
Problem: General Plan of Care (Inpatient Behavioral)  Goal: Team Discussion  Team Plan:   BEHAVIORAL TEAM DISCUSSION     Participants: Quintin Evangelista RN, Alesha Loera TRS, Nneka Sprague OT, Rowan Alford NP, Kim Cardozo NP, Amy Arevalo SW, Carolyn Holloway DCP, Tish Boswell SW,   Progress: fair, attends groups but does not stay long, delusional and irritable   Continued Stay Criteria/Rationale: still delusional, increase bedtime clozaril today   Medical/Physical: none known   Precautions:   Behavioral Orders   Procedures     Code 1 - Restrict to Unit     Routine Programming       As clinically indicated     Status 15       Every 15 minutes.     Plan: discharge home with increased services or discharge to IRTS   Rationale for change in precautions or plan: none

## 2017-09-19 NOTE — PLAN OF CARE
Problem: General Plan of Care (Inpatient Behavioral)  Goal: Individualization/Patient Specific Goal (IP Behavioral)  The patient and/or their representative will achieve their patient-specific goals related to the plan of care.    The patient-specific goals include:   Patient will have an absence or decrease in hallucinations by time of discharge.  Patient will be medication compliant while hospitalized.  Patient will be able to have a reality based conversation prior to discharge.  Patient will be independent with his ADL s while hospitalized and maintain hygiene.     Outcome: No Change  Pt has been asleep in his room this shift. Safe on 15 minute checks and respirations non labored.

## 2017-09-19 NOTE — PROGRESS NOTES
Weight stable. Average po intake % of meals.    RD will continue to monitor.    Negra Adams, RD, LD

## 2017-09-19 NOTE — PLAN OF CARE
Face to face end of shift report received from MARINA Aden . Rounding completed. Patient observed in bed, sleeping.     Lety Evangelista  9/19/2017  7:28 AM

## 2017-09-19 NOTE — PLAN OF CARE
"Problem: General Plan of Care (Inpatient Behavioral)  Goal: Individualization/Patient Specific Goal (IP Behavioral)  The patient and/or their representative will achieve their patient-specific goals related to the plan of care.    The patient-specific goals include:   Patient will have an absence or decrease in hallucinations by time of discharge.  Patient will be medication compliant while hospitalized.  Patient will be able to have a reality based conversation prior to discharge.  Patient will be independent with his ADL s while hospitalized and maintain hygiene.     Pt has been pacing the hallways this shift.  Dose participate in nursing assessment but is very short with answers and he states \"I'm fine.\"  Denies all criteria.  Dose appear to be responding to internal stimuli this shift.  Has been walking in and out of groups this shift.  Did encourage pt to shower.  Pt cooperative with all medications this shift.       "

## 2017-09-20 PROCEDURE — 25000132 ZZH RX MED GY IP 250 OP 250 PS 637: Performed by: NURSE PRACTITIONER

## 2017-09-20 PROCEDURE — S0136 CLOZAPINE, 25 MG: HCPCS | Performed by: NURSE PRACTITIONER

## 2017-09-20 PROCEDURE — 12400000 ZZH R&B MH

## 2017-09-20 RX ADMIN — FAMOTIDINE 20 MG: 20 TABLET ORAL at 20:29

## 2017-09-20 RX ADMIN — CLOZAPINE 325 MG: 25 TABLET ORAL at 20:27

## 2017-09-20 RX ADMIN — FAMOTIDINE 20 MG: 20 TABLET ORAL at 08:29

## 2017-09-20 RX ADMIN — CLONIDINE HYDROCHLORIDE 0.1 MG: 0.1 TABLET ORAL at 08:29

## 2017-09-20 RX ADMIN — CLOZAPINE 200 MG: 100 TABLET ORAL at 08:29

## 2017-09-20 RX ADMIN — CLONIDINE HYDROCHLORIDE 0.1 MG: 0.1 TABLET ORAL at 20:28

## 2017-09-20 RX ADMIN — VITAMIN D, TAB 1000IU (100/BT) 2000 UNITS: 25 TAB at 08:29

## 2017-09-20 ASSESSMENT — ACTIVITIES OF DAILY LIVING (ADL)
GROOMING: PROMPTS
ORAL_HYGIENE: PROMPTS
DRESS: PROMPTS

## 2017-09-20 NOTE — PLAN OF CARE
Problem: General Plan of Care (Inpatient Behavioral)  Goal: Individualization/Patient Specific Goal (IP Behavioral)  The patient and/or their representative will achieve their patient-specific goals related to the plan of care.    The patient-specific goals include:   Patient will have an absence or decrease in hallucinations by time of discharge.  Patient will be medication compliant while hospitalized.  Patient will be able to have a reality based conversation prior to discharge.  Patient will be independent with his ADL s while hospitalized and maintain hygiene.     Patient up at breakfast and was cooperative with medications. Patient has been in bed all shift sleeping. This morning patient denied anxiety, depression, hallucinations, and pain. Patient answered question by nodding head while laying in bed. Will continue to monitor.

## 2017-09-20 NOTE — PLAN OF CARE
Face to face end of shift report received from Samina GRAY Rounding completed. Patient observed.     Tawanda Easley  9/20/2017  4:00PM

## 2017-09-20 NOTE — PLAN OF CARE
Problem: General Plan of Care (Inpatient Behavioral)  Goal: Individualization/Patient Specific Goal (IP Behavioral)  The patient and/or their representative will achieve their patient-specific goals related to the plan of care.    The patient-specific goals include:   Patient will have an absence or decrease in hallucinations by time of discharge.  Patient will be medication compliant while hospitalized.  Patient will be able to have a reality based conversation prior to discharge.  Patient will be independent with his ADL s while hospitalized and maintain hygiene.     Outcome: No Change  Pt has been asleep on rounds during all 15 minute checks this shift.

## 2017-09-20 NOTE — PLAN OF CARE
Problem: General Plan of Care (Inpatient Behavioral)  Goal: Team Discussion  Team Plan:   BEHAVIORAL TEAM DISCUSSION     Participants: Supa Espinoza NP, Yvonne Pedersen NP, Tish Boswell [facility], Carolyn Holloway Discharge Plannner, Priscilla Henry RN, Ruchi Ponce RN, Alesha Quevedo [employer], Jazzy Kruegerwas OT, Nneka Sprague OT   Progress: minimal, delusional  Continued Stay Criteria/Rationale: delusional, disorganized, medication adjustments  Medical/Physical: None known  Precautions:   Behavioral Orders   Procedures     Code 1 - Restrict to Unit     Routine Programming       As clinically indicated     Status 15       Every 15 minutes.     Plan: filing for commitment and Ramirez, continue to stabilize on medications  Rationale for change in precautions or plan: None

## 2017-09-20 NOTE — PLAN OF CARE
"Problem: General Plan of Care (Inpatient Behavioral)  Goal: Individualization/Patient Specific Goal (IP Behavioral)  The patient and/or their representative will achieve their patient-specific goals related to the plan of care.    The patient-specific goals include:   Patient will have an absence or decrease in hallucinations by time of discharge.  Patient will be medication compliant while hospitalized.  Patient will be able to have a reality based conversation prior to discharge.  Patient will be independent with his ADL s while hospitalized and maintain hygiene.     Outcome: No Change  Pt admits to difficulty concentrating  States sleeping better  States eating okay  Denies depression or SI  Does not go to group  States sleep in all kinds of different places  States  \"sometimes I even sleep [place] the [place]\"  Pt continues to be delusional and was pacing tonight and laughing to self yet says not hearing voices  When offered prn neuroleptic pt declined  Inappropriate affect noted frequently      "

## 2017-09-20 NOTE — PLAN OF CARE
Face to face end of shift report received from MARINA Gaston. Rounding completed. Patient observed. Pt in bed in room, wakes on 15 minute check.    SWEETIE GONZALEZ  9/19/2017  11:47 PM

## 2017-09-20 NOTE — PLAN OF CARE
Face to face end of shift report received from Keiko GRAY. Rounding completed. Patient observed in room.    Samina Ponce  9/20/2017  7:39 AM

## 2017-09-20 NOTE — PROGRESS NOTES
[facility]  Psychiatric Progress Note    Subjective   Miles reports he is feeling tired from the medication. He then asks when he can leave. Discussed with him that he still has delusional statements about owning multiple houses and such. He then tells me that is not delusion but fact and he just needed time to remember that. It is clear that the clozaril is not showing great effect on his delusional beliefs. He also has little to no insight to his mental health issues. Consideration for another neuroleptic to augment may need to be considered.     10 point ROS reviewed- denies any concerns today       DIagnoses:      Paranoid Schizophrenia w/ capgras delusions    Attestation:  Patient has been seen and evaluated by me, Yvonne Pedersen NP          Interim History:   The patient's care was discussed with the treatment team and chart notes were reviewed.          Medications:       cloZAPine  325 mg Oral At Bedtime     cloNIDine  0.1 mg Oral BID     cloZAPine  200 mg Oral Daily     cholecalciferol  2,000 Units Oral Daily     famotidine  20 mg Oral BID     clotrimazole, benztropine, hydrOXYzine, acetaminophen, alum & mag hydroxide-simethicone, magnesium hydroxide, traZODone, OLANZapine **OR** OLANZapine, nicotine polacrilex          Allergies:     Allergies   Allergen Reactions     Pcn [Bicillin C-R,] Rash     Provider wants to try amoxicillin(benadryl ordered as well) 9/10/16     Bupropion Other (See Comments)     delusions      Depakote [Valproic Acid] Other (See Comments)     Heart racing     Divalproex Sodium-Fd&C Red #40      Increase heart rate            Psychiatric Examination:   /73  Pulse 104  Temp 97.9  F (36.6  C) (Tympanic)  Resp 18  Ht 1.829 m (6')  Wt 105.4 kg (232 lb 5.8 oz)  SpO2 96%  BMI 31.51 kg/m2  Weight is 232 lbs 5.84 oz  Body mass index is 31.51 kg/(m^2).    Appearance: awake, alert, dressed in hospital scrubs and appears disheveled, laying in bed  Attitude:  "cooperative  Eye Contact: intermittent  Mood: reports he is \"fine\"  Affect: somewhat blunted  Speech:  Is responding to questions, regular rate and rhythm   Psychomotor Behavior:  No abnormal movements noted  Thought Process:  Still tangential and disorganized  Associations:  loosening of associations present  Thought Content:  Continues to make delusional statements about housing and being robbed  Insight: limited  Judgment:  limited  Oriented to: person, place, time  Attention Span and Concentration:  limited  Recent and Remote Memory:  limited  Fund of Knowledge: delayed  Muscle Strength and Tone: normal  Gait and Station: Normal           Labs:   Clozapine level drawn 9/14- pending  Weekly CBC reviewed and WNL      Assessment and Plan:  Consider commitment and Ramirez due to ongoing lack of insight and response to treatment    Estimated LOS:  Clozapine continues to be titrated up, level drawn 9/14 and pending. When he was last discharged, he was on a dose of 350mg at bed and 200mg in the AM. Goal is to reach this dose.     "

## 2017-09-20 NOTE — PROGRESS NOTES
"[facility]  Psychiatric Progress Note    Subjective   Miles is up and walking in the halls today when I see him. He continues to walk in the halls while he talk. He tells me that he feels \"fine\" and would like to leave today or tomorrow. He notes that his mother is the one that is sick and he only started getting sick after being around her. We discussed that I would like him to stay a few more days so that we can continue to increase his Clozaril, as he has told me that he feels the medication is working. He does seem agreeable to this at this time. He still needs prompts go get into the shower, though did eventually take one. He has been medication compliant with Clozaril and denies any notable side effects. Labs remain WNL.    10 point ROS reviewed- denies any concerns today       DIagnoses:      Paranoid Schizophrenia w/ capgras delusions    Attestation:  Patient has been seen and evaluated by me, Kim Cardozo NP          Interim History:   The patient's care was discussed with the treatment team and chart notes were reviewed.          Medications:       cloZAPine  325 mg Oral At Bedtime     cloNIDine  0.1 mg Oral BID     cloZAPine  200 mg Oral Daily     cholecalciferol  2,000 Units Oral Daily     famotidine  20 mg Oral BID     clotrimazole, benztropine, hydrOXYzine, acetaminophen, alum & mag hydroxide-simethicone, magnesium hydroxide, traZODone, OLANZapine **OR** OLANZapine, nicotine polacrilex          Allergies:     Allergies   Allergen Reactions     Pcn [Bicillin C-R,] Rash     Provider wants to try amoxicillin(benadryl ordered as well) 9/10/16     Bupropion Other (See Comments)     delusions      Depakote [Valproic Acid] Other (See Comments)     Heart racing     Divalproex Sodium-Fd&C Red #40      Increase heart rate            Psychiatric Examination:   /80  Pulse 104  Temp 99.1  F (37.3  C) (Tympanic)  Resp 16  Ht 1.829 m (6')  Wt 105.4 kg (232 lb 5.8 oz)  SpO2 96%  BMI " "31.51 kg/m2  Weight is 232 lbs 5.84 oz  Body mass index is 31.51 kg/(m^2).    Appearance: awake, alert, dressed in hospital scrubs, disheveled and unkempt  Attitude: cooperative though dismissive  Eye Contact: intermittent   Mood: reports he is \"fine\"  Affect: somewhat blunted  Speech:  Is responding to questions, regular rate and rhythm   Psychomotor Behavior:  No abnormal movements noted  Thought Process: tangential  Associations:  loosening of associations present  Thought Content:  Continues to make occasional delusional statements about mansions and building the hospital  Insight: limited  Judgment:  limited  Oriented to: person, place, time  Attention Span and Concentration:  limited  Recent and Remote Memory:  limited  Fund of Knowledge: delayed  Muscle Strength and Tone: normal  Gait and Station: Normal           Labs:   Weekly CBC reviewed and WNL      Assessment and Plan:  Continue to increase Clozaril as tolerated    Estimated LOS:  Clozapine continues to be titrated up, level drawn 9/14 and pending. When he was last discharged, he was on a dose of 350mg at bed and 200mg in the AM. Goal is to reach this dose.     "

## 2017-09-21 PROCEDURE — S0136 CLOZAPINE, 25 MG: HCPCS | Performed by: NURSE PRACTITIONER

## 2017-09-21 PROCEDURE — 25000132 ZZH RX MED GY IP 250 OP 250 PS 637: Performed by: NURSE PRACTITIONER

## 2017-09-21 PROCEDURE — 12400000 ZZH R&B MH

## 2017-09-21 PROCEDURE — 99231 SBSQ HOSP IP/OBS SF/LOW 25: CPT | Performed by: NURSE PRACTITIONER

## 2017-09-21 RX ADMIN — VITAMIN D, TAB 1000IU (100/BT) 2000 UNITS: 25 TAB at 08:26

## 2017-09-21 RX ADMIN — CLOZAPINE 200 MG: 100 TABLET ORAL at 08:26

## 2017-09-21 RX ADMIN — FAMOTIDINE 20 MG: 20 TABLET ORAL at 20:52

## 2017-09-21 RX ADMIN — CLONIDINE HYDROCHLORIDE 0.1 MG: 0.1 TABLET ORAL at 20:52

## 2017-09-21 RX ADMIN — CLONIDINE HYDROCHLORIDE 0.1 MG: 0.1 TABLET ORAL at 08:27

## 2017-09-21 RX ADMIN — NICOTINE POLACRILEX 4 MG: 2 GUM, CHEWING ORAL at 16:16

## 2017-09-21 RX ADMIN — FAMOTIDINE 20 MG: 20 TABLET ORAL at 08:26

## 2017-09-21 RX ADMIN — CLOZAPINE 350 MG: 100 TABLET ORAL at 20:52

## 2017-09-21 NOTE — PROGRESS NOTES
"[facility]  Psychiatric Progress Note    Subjective   Miles will not stop to talk with me today. When greeted, he replies, \"'sup.\" He then tells me that he needs to keep walking as he has places to be. He has been denying psychosis but frequently talks about events and people that are not real or have been grossly misconstrued. Conversations are not reality based except in very brief sections.     Placed on 72 hour hold and petition for commitment filed so that Savage can hopefully obtained. Clozapine no longer appears to be beneficial. Will increase HS dose tonight and he will then be at previously beneficial dosing of 200mg daily and 300mg at bed. If he does not improve, savage will be requested post confinement hearing and medication will be changed. Dr. Stratton recommending Vraylar, which should certainly be attempted.     10 point ROS attempted - no complaints       DIagnoses:      Paranoid Schizophrenia w/ capgras delusions    Attestation:  Patient has been seen and evaluated by me, Supa Espinoza NP          Interim History:   The patient's care was discussed with the treatment team and chart notes were reviewed.          Medications:       cloZAPine  325 mg Oral At Bedtime     cloNIDine  0.1 mg Oral BID     cloZAPine  200 mg Oral Daily     cholecalciferol  2,000 Units Oral Daily     famotidine  20 mg Oral BID     clotrimazole, benztropine, hydrOXYzine, acetaminophen, alum & mag hydroxide-simethicone, magnesium hydroxide, traZODone, OLANZapine **OR** OLANZapine, nicotine polacrilex          Allergies:     Allergies   Allergen Reactions     Pcn [Bicillin C-R,] Rash     Provider wants to try amoxicillin(benadryl ordered as well) 9/10/16     Bupropion Other (See Comments)     delusions      Depakote [Valproic Acid] Other (See Comments)     Heart racing     Divalproex Sodium-Fd&C Red #40      Increase heart rate            Psychiatric Examination:   /57  Pulse 78  Temp 96.9  F (36.1  C) " (Tympanic)  Resp 16  Ht 1.829 m (6')  Wt 105.4 kg (232 lb 5.8 oz)  SpO2 97%  BMI 31.51 kg/m2  Weight is 232 lbs 5.84 oz  Body mass index is 31.51 kg/(m^2).    Appearance:  awake, alert and poorly groomed  Attitude:  evasive  Eye Contact:  fair  Mood:  Guarded but still pleasant  Affect:  intensity is blunted  Speech:  clear, coherent but not reality based  Psychomotor Behavior:  Pacing  Thought Process:  disorganized, illogical and tangental  Associations:  loosening of associations present  Thought Content:  no evidence of suicidal ideation or homicidal ideation and patient appears to be responding to internal stimuli  Insight:  limited  Judgment:  limited  Oriented to:  time, person, and place  Attention Span and Concentration:  limited  Recent and Remote Memory:  limited  Fund of Knowledge: delayed  Muscle Strength and Tone: normal  Gait and Station: Normal             Labs:   Increase HS clozapine to 350mg.  72 hour hold implemented and pre-petition filed.    Assessment and Plan:  Petition for commitment and Ramirez due to ongoing lack of insight and response to treatment    Estimated LOS:  Clozapine continues to be titrated up. When he was last discharged, he was on a dose of 350mg at bed and 200mg in the AM. We have no reached this dose with no evidence of improvement. Remains disorganized and lacking to capacity to even care for immediate needs or ensure physical safety.

## 2017-09-21 NOTE — PLAN OF CARE
Problem: Discharge Planning  Goal: Discharge Planning (Adult, OB, Behavioral, Peds)  Pt was placed on a 72 hour hold and writer faxed request for a pre-petition screening to [facility] and left a voicemail for Cliff Cali notifying her.

## 2017-09-21 NOTE — PLAN OF CARE
Face to face end of shift report received from Samina FROST RN. Rounding completed. Patient observed pacing in the [place].     Maldonado Hines  9/21/2017  4:40 PM

## 2017-09-21 NOTE — PLAN OF CARE
Face to face end of shift report received from Kana GRAY. Rounding completed. Patient observed in room.    Samina Ponce  9/21/2017  7:33 AM

## 2017-09-21 NOTE — PLAN OF CARE
Problem: General Plan of Care (Inpatient Behavioral)  Goal: Team Discussion  Team Plan:   BEHAVIORAL TEAM DISCUSSION     Participants:  Nneka Dolan OT, Priscilla Henry Supervisor, Yvonne Pedersen NP, Supa Espinoza NP, Jazzy Sood OT, Benita Ayala, Tish Boswell SW, Carolyn Holloway DCP   Progress: minimal, inappropriate affect   Continued Stay Criteria/Rationale: increasing clozaril, still psychotic, delusional, plan to file petition for commitment   Medical/Physical: none known   Precautions:   Behavioral Orders   Procedures     Code 1 - Restrict to Unit     Routine Programming       As clinically indicated     Status 15       Every 15 minutes.     Plan: plan to file for petition for commitment   Rationale for change in precautions or plan: none

## 2017-09-21 NOTE — PLAN OF CARE
Face to face end of shift report received from Tawanda CAGLE RN. Rounding completed. Patient observed resting in bed.     Kana Mar  9/21/2017  12:18 AM

## 2017-09-21 NOTE — PLAN OF CARE
Problem: General Plan of Care (Inpatient Behavioral)  Goal: Individualization/Patient Specific Goal (IP Behavioral)  The patient and/or their representative will achieve their patient-specific goals related to the plan of care.    The patient-specific goals include:   Patient will have an absence or decrease in hallucinations by time of discharge.  Patient will be medication compliant while hospitalized.  Patient will be able to have a reality based conversation prior to discharge.  Patient will be independent with his ADL s while hospitalized and maintain hygiene.     Patient in halls most of the shift pacing. Patient denies anxiety, depression, hallucinations, and pain. Patient does appear to be responding to some internal stimuli. Patient was cooperative with all medications. Patient continue to make delusional statements during conversation.

## 2017-09-22 PROCEDURE — S0136 CLOZAPINE, 25 MG: HCPCS | Performed by: NURSE PRACTITIONER

## 2017-09-22 PROCEDURE — 25000132 ZZH RX MED GY IP 250 OP 250 PS 637: Performed by: NURSE PRACTITIONER

## 2017-09-22 PROCEDURE — 99232 SBSQ HOSP IP/OBS MODERATE 35: CPT | Performed by: NURSE PRACTITIONER

## 2017-09-22 PROCEDURE — 12400000 ZZH R&B MH

## 2017-09-22 RX ADMIN — CLOZAPINE 350 MG: 100 TABLET ORAL at 20:56

## 2017-09-22 RX ADMIN — FAMOTIDINE 20 MG: 20 TABLET ORAL at 08:34

## 2017-09-22 RX ADMIN — CLOZAPINE 200 MG: 100 TABLET ORAL at 08:34

## 2017-09-22 RX ADMIN — VITAMIN D, TAB 1000IU (100/BT) 2000 UNITS: 25 TAB at 08:34

## 2017-09-22 RX ADMIN — FAMOTIDINE 20 MG: 20 TABLET ORAL at 20:57

## 2017-09-22 RX ADMIN — NICOTINE POLACRILEX 4 MG: 2 GUM, CHEWING ORAL at 18:26

## 2017-09-22 RX ADMIN — CLONIDINE HYDROCHLORIDE 0.1 MG: 0.1 TABLET ORAL at 08:34

## 2017-09-22 RX ADMIN — CLONIDINE HYDROCHLORIDE 0.1 MG: 0.1 TABLET ORAL at 20:57

## 2017-09-22 ASSESSMENT — ACTIVITIES OF DAILY LIVING (ADL)
DRESS: PROMPTS
GROOMING: PROMPTS
ORAL_HYGIENE: PROMPTS
LAUNDRY: UNABLE TO COMPLETE

## 2017-09-22 NOTE — PLAN OF CARE
Face to face end of shift report received from Fadumo GOMEZ RN. Rounding completed. Patient observed pacing in the tenorio.     Maldonado Hines  9/22/2017  5:22 PM

## 2017-09-22 NOTE — PLAN OF CARE
Problem: General Plan of Care (Inpatient Behavioral)  Goal: Individualization/Patient Specific Goal (IP Behavioral)  The patient and/or their representative will achieve their patient-specific goals related to the plan of care.    The patient-specific goals include:   Patient will have an absence or decrease in hallucinations by time of discharge.  Patient will be medication compliant while hospitalized.  Patient will be able to have a reality based conversation prior to discharge.  Patient will be independent with his ADL s while hospitalized and maintain hygiene.     Outcome: No Change  Slept t/o shift

## 2017-09-22 NOTE — PLAN OF CARE
Face to face end of shift report received from Maldonado RN Rounding completed. Patient observed.     Samina Bailey  9/21/2017  11:46 PM

## 2017-09-22 NOTE — PROGRESS NOTES
"[facility]  Psychiatric Progress Note    Subjective   Miles is more receptive to conversation today. He tells me that he believes he showered last night. Discussed his hair and beard, which he indicates he needs to \"keep on\" as that is how he looked prior to going to [place]. He then tells me a story about coming back with his \"skin head\" friends and his \"old buddies\" didn't recognize him. \"One of my buddies kicked me out. He was like, you're not...(waves with hands)...you're not...so and so.\" Appears unable to recall his own name. Repeatedly tells staff that he has multiple names. Later he does ask me about the 72 hour hold. Discussed his medication and that it doesn't seem to be fully beneficial so we would like to change it. He is aware that the Pending sale to [facility] will be coming to screen him today. He does not believe that he is on Clozapine and tells me that he is not \"mentally ill,\" and that he has a physical illness caused by \"taking sertraline forever and then one day I woke up and couldn't find my pills and just became more and more depressed.\" Reminded that this may have felt like a physical illness, but it clearly impacted his mental health. He then just walked away.     10 point ROS attempted - no complaints       DIagnoses:      Paranoid Schizophrenia w/ capgras delusions    Attestation:  Patient has been seen and evaluated by me, Supa Espinoza NP          Interim History:   The patient's care was discussed with the treatment team and chart notes were reviewed.          Medications:       cloZAPine  350 mg Oral At Bedtime     cloNIDine  0.1 mg Oral BID     cloZAPine  200 mg Oral Daily     cholecalciferol  2,000 Units Oral Daily     famotidine  20 mg Oral BID     clotrimazole, benztropine, hydrOXYzine, acetaminophen, alum & mag hydroxide-simethicone, magnesium hydroxide, traZODone, OLANZapine **OR** OLANZapine, nicotine polacrilex          Allergies:     Allergies   Allergen Reactions     Pcn " [Bicillin C-R,] Rash     Provider wants to try amoxicillin(benadryl ordered as well) 9/10/16     Bupropion Other (See Comments)     delusions      Depakote [Valproic Acid] Other (See Comments)     Heart racing     Divalproex Sodium-Fd&C Red #40      Increase heart rate            Psychiatric Examination:   /74  Pulse 91  Temp 96.7  F (35.9  C) (Tympanic)  Resp 16  Ht 1.829 m (6')  Wt 105.4 kg (232 lb 5.8 oz)  SpO2 95%  BMI 31.51 kg/m2  Weight is 232 lbs 5.84 oz  Body mass index is 31.51 kg/(m^2).    Appearance:  awake, alert and poorly groomed  Attitude:  Primarily cooperative today  Eye Contact:  fair  Mood: pleasant  Affect:  Labile but congruent to shifting mood  Speech:  clear, coherent, varies between normal content and illogical  Psychomotor Behavior:  no evidence of tardive dyskinesia, dystonia, or tics  Thought Process:  At times logical - very brief periods. Often illogical and tangential  Associations:  loosening of associations present  Thought Content:  no evidence of suicidal ideation or homicidal ideation, patient appears to be responding to internal stimuli and preoccupied  Insight:  limited  Judgment:  limited  Oriented to:  time, person, and place  Attention Span and Concentration:  limited  Recent and Remote Memory:  limited  Fund of Knowledge: delayed  Muscle Strength and Tone: normal  Gait and Station: Normal         Labs:   Continue current Clozapine dosing at this time.   72 hour hold implemented and pre-petition filed. County to screen.     Assessment and Plan:  Petition for commitment and Ramirez due to ongoing lack of insight and response to treatment    Estimated LOS:  Clozapine continues to be titrated up. When he was last discharged, he was on a dose of 350mg at bed and 200mg in the AM. We have no reached this dose with no evidence of improvement. Remains disorganized and lacking to capacity to even care for immediate needs or ensure physical safety.

## 2017-09-22 NOTE — PLAN OF CARE
Problem: General Plan of Care (Inpatient Behavioral)  Goal: Individualization/Patient Specific Goal (IP Behavioral)  The patient and/or their representative will achieve their patient-specific goals related to the plan of care.    The patient-specific goals include:   Patient will have an absence or decrease in hallucinations by time of discharge.  Patient will be medication compliant while hospitalized.  Patient will be able to have a reality based conversation prior to discharge.  Patient will be independent with his ADL s while hospitalized and maintain hygiene.     Outcome: No Change  Patient did not participate in nursing assessment. He was tense and withdrawn. Patient declined to talk to this recorder but did ask for things and make his needs known. He showered. He paced in the halls and was observed talking to himself. He denied pain. He seemed disconnected.

## 2017-09-22 NOTE — PLAN OF CARE
Face to face end of shift report received from MARINA Haas. Rounding completed. Patient observed resting in bed.     MIKEY FITZPATRICK  9/22/2017  8:22 AM

## 2017-09-22 NOTE — PLAN OF CARE
"Problem: General Plan of Care (Inpatient Behavioral)  Goal: Individualization/Patient Specific Goal (IP Behavioral)  The patient and/or their representative will achieve their patient-specific goals related to the plan of care.    The patient-specific goals include:   Patient will have an absence or decrease in hallucinations by time of discharge.  Patient will be medication compliant while hospitalized.  Patient will be able to have a reality based conversation prior to discharge.  Patient will be independent with his ADL s while hospitalized and maintain hygiene.     Pleasant compliant and cooperative during nurse assessment and scheduled medications. Patient appears disheveled and internaly preoccupied. Patient able to answer short questions appropriately but with some delusions. When given medications pt asked when he started taking clonidine and didn't believe he has been taking it or being in hospital since last month, pt stated \"I haven't been here that long or taking that before, but I have different names\". Pt continues to be withdrawn and pacing in unit. Pt unable to sit in groups for long periods of times. County screener was here for short time. After screener left, pt asks who that person was despite being introduced. Will continue to monitor.   1400- Pt resting in bed.       "

## 2017-09-22 NOTE — PLAN OF CARE
Problem: General Plan of Care (Inpatient Behavioral)  Goal: Team Discussion  Team Plan:   BEHAVIORAL TEAM DISCUSSION     Participants: Supa Espinoza NP, Yvonne Pedersen NP, Tish PHILLIPSSW, Carolyn Holloway Discharge Plannner, Michelle Beverly RN, Jazzy Sood OT, Nneka Sprague OT   Progress: None, remains delusional  Continued Stay Criteria/Rationale: delusional, medication adjustments  Medical/Physical: None known  Precautions:   Behavioral Orders   Procedures     Code 1 - Restrict to Unit     Routine Programming       As clinically indicated     Status 15       Every 15 minutes.     Plan: continue to stabilize on medications, petitioning for committment  Rationale for change in precautions or plan: None

## 2017-09-22 NOTE — PLAN OF CARE
Problem: Discharge Planning  Goal: Discharge Planning (Adult, OB, Behavioral, Peds)  Cliff Cali from [facility] came to complete the pre-petition screening for commitment on pt.     1:40 pm; Writer called Cliff Cali and she stated she will be filing for commitment on Monday.

## 2017-09-23 PROCEDURE — 12400000 ZZH R&B MH

## 2017-09-23 PROCEDURE — 25000132 ZZH RX MED GY IP 250 OP 250 PS 637: Performed by: NURSE PRACTITIONER

## 2017-09-23 PROCEDURE — S0136 CLOZAPINE, 25 MG: HCPCS | Performed by: NURSE PRACTITIONER

## 2017-09-23 PROCEDURE — 99232 SBSQ HOSP IP/OBS MODERATE 35: CPT | Performed by: NURSE PRACTITIONER

## 2017-09-23 RX ADMIN — NICOTINE POLACRILEX 4 MG: 2 GUM, CHEWING ORAL at 21:50

## 2017-09-23 RX ADMIN — CLOZAPINE 200 MG: 100 TABLET ORAL at 08:29

## 2017-09-23 RX ADMIN — VITAMIN D, TAB 1000IU (100/BT) 2000 UNITS: 25 TAB at 08:28

## 2017-09-23 RX ADMIN — CLOZAPINE 350 MG: 100 TABLET ORAL at 20:37

## 2017-09-23 RX ADMIN — CLONIDINE HYDROCHLORIDE 0.1 MG: 0.1 TABLET ORAL at 08:28

## 2017-09-23 RX ADMIN — FAMOTIDINE 20 MG: 20 TABLET ORAL at 08:28

## 2017-09-23 RX ADMIN — CLONIDINE HYDROCHLORIDE 0.1 MG: 0.1 TABLET ORAL at 20:38

## 2017-09-23 RX ADMIN — FAMOTIDINE 20 MG: 20 TABLET ORAL at 20:37

## 2017-09-23 ASSESSMENT — ACTIVITIES OF DAILY LIVING (ADL)
DRESS: SCRUBS (BEHAVIORAL HEALTH)
ORAL_HYGIENE: PROMPTS
ORAL_HYGIENE: PROMPTS
GROOMING: PROMPTS
GROOMING: PROMPTS

## 2017-09-23 NOTE — PLAN OF CARE
"Problem: General Plan of Care (Inpatient Behavioral)  Goal: Individualization/Patient Specific Goal (IP Behavioral)  The patient and/or their representative will achieve their patient-specific goals related to the plan of care.    The patient-specific goals include:   Patient will have an absence or decrease in hallucinations by time of discharge.  Patient will be medication compliant while hospitalized.  Patient will be able to have a reality based conversation prior to discharge.  Patient will be independent with his ADL s while hospitalized and maintain hygiene.     Outcome: No Change  Pt has tried to attend group but only stays few minutes and is in and out of group  Pacing in the tenorio and restless  Stays on fringe of group  Admits to feeling \"a little\" depressed  Denies SI  Continues to have delusional content to speech not always reality based      "

## 2017-09-23 NOTE — PROGRESS NOTES
"[facility]  Psychiatric Progress Note    Subjective   Miles reports he slept well last night and he feels fairly good this morning. He does continue to have grossly delusional thoughts. He told me he is himself sometimes but he is often other people and their moods change. Miles then laughed and then said \"it's all good though\". He does not clarify what he means. He is clearly not benefiting from the last known effective dose of clozaril as of yet. Josafta is able to say he is frustrated from being here for so long. That was the most logical sentence he formed during our conversation.      10 point ROS attempted - no complaints       DIagnoses:      Paranoid Schizophrenia w/ capgras delusions    Attestation:  Patient has been seen and evaluated by me, Yvonne Pedersen NP          Interim History:   The patient's care was discussed with the treatment team and chart notes were reviewed.          Medications:       cloZAPine  350 mg Oral At Bedtime     cloNIDine  0.1 mg Oral BID     cloZAPine  200 mg Oral Daily     cholecalciferol  2,000 Units Oral Daily     famotidine  20 mg Oral BID     clotrimazole, benztropine, hydrOXYzine, acetaminophen, alum & mag hydroxide-simethicone, magnesium hydroxide, traZODone, OLANZapine **OR** OLANZapine, nicotine polacrilex          Allergies:     Allergies   Allergen Reactions     Pcn [Bicillin C-R,] Rash     Provider wants to try amoxicillin(benadryl ordered as well) 9/10/16     Bupropion Other (See Comments)     delusions      Depakote [Valproic Acid] Other (See Comments)     Heart racing     Divalproex Sodium-Fd&C Red #40      Increase heart rate            Psychiatric Examination:   /89 (BP Location: Other (Comment))  Pulse 82  Temp 96.4  F (35.8  C) (Tympanic)  Resp 16  Ht 1.829 m (6')  Wt 105.4 kg (232 lb 5.8 oz)  SpO2 96%  BMI 31.51 kg/m2  Weight is 232 lbs 5.84 oz  Body mass index is 31.51 kg/(m^2).    Appearance:  awake, alert and poorly " groomed  Attitude: cooperative  Eye Contact:  fair  Mood: pleasant but endorses anxiety  Affect:  Shifts with mood  Speech:  clear, coherent, largely illogical  Psychomotor Behavior:  no evidence of tardive dyskinesia, dystonia, or tics  Thought Process:  At times logical - very brief periods. Often illogical and tangential  Associations:  loosening of associations present  Thought Content:  no evidence of suicidal ideation or homicidal ideation, patient appears to be responding to internal stimuli and preoccupied  Insight:  limited  Judgment:  limited  Oriented to:  time, person, and place  Attention Span and Concentration:  limited  Recent and Remote Memory:  limited  Fund of Knowledge: delayed  Muscle Strength and Tone: normal  Gait and Station: Normal         Labs:   Continue current Clozapine dosing at this time.   72 hour hold implemented and pre-petition filed. [facility].     Assessment and Plan:  Petition for commitment and Ramirez due to ongoing lack of insight and response to treatment    Estimated LOS:  Clozapine continues to be titrated up. When he was last discharged, he was on a dose of 350mg at bed and 200mg in the AM. We have no reached this dose with no evidence of improvement. Remains disorganized and lacking to capacity to even care for immediate needs or ensure physical safety.

## 2017-09-23 NOTE — PLAN OF CARE
Problem: General Plan of Care (Inpatient Behavioral)  Goal: Individualization/Patient Specific Goal (IP Behavioral)  The patient and/or their representative will achieve their patient-specific goals related to the plan of care.    The patient-specific goals include:   Patient will have an absence or decrease in hallucinations by time of discharge.  Patient will be medication compliant while hospitalized.  Patient will be able to have a reality based conversation prior to discharge.  Patient will be independent with his ADL s while hospitalized and maintain hygiene.     Outcome: No Change   Patient had been awake and up for breakfast meal this morning. Took meds as ordered this am. Patient has had no incidence of falls. He denies any problems physically. Patient has stayed out of groups this morning and instead in bed asleep. Patient verbally responds to writer when questions are asked but he also mumbles somewhat under his breath.     1400 Patient is up and pacing in the hallway. Patient was given items to shower today and he did that this afternoon. He is pleasant and makes fairly good eye contact when I talk with him.

## 2017-09-23 NOTE — PLAN OF CARE
Face to face end of shift report received from Maldonado WEINSTEIN RN. Rounding completed. Patient observed.     Maurice Mcintyre  9/23/2017  12:42 AM

## 2017-09-23 NOTE — PLAN OF CARE
Face to face end of shift report received from Maurice LEOS RN. Rounding completed. Patient observed.     Sara Altman  9/23/2017  7:53 AM

## 2017-09-23 NOTE — PLAN OF CARE
Problem: General Plan of Care (Inpatient Behavioral)  Goal: Individualization/Patient Specific Goal (IP Behavioral)  The patient and/or their representative will achieve their patient-specific goals related to the plan of care.    The patient-specific goals include:   Patient will have an absence or decrease in hallucinations by time of discharge.  Patient will be medication compliant while hospitalized.  Patient will be able to have a reality based conversation prior to discharge.  Patient will be independent with his ADL s while hospitalized and maintain hygiene.     2330--in bed with eyes closed and breathing regular. 0545--still in bed with eyes closed and breathing regular. 0606--polite with vitals. Machine results for BP were 87/45, 89/53, 88//52. Manual blood pressure was 120/89 and pulse is 82.

## 2017-09-23 NOTE — PLAN OF CARE
Face to face end of shift report received from Sara GRAY Rounding completed. Patient observed.     Tawanda Easley  9/23/2017  4:00 PM

## 2017-09-24 LAB
BASOPHILS # BLD AUTO: 0.1 10E9/L (ref 0–0.2)
BASOPHILS NFR BLD AUTO: 0.7 %
DIFFERENTIAL METHOD BLD: NORMAL
EOSINOPHIL # BLD AUTO: 0.2 10E9/L (ref 0–0.7)
EOSINOPHIL NFR BLD AUTO: 2.2 %
ERYTHROCYTE [DISTWIDTH] IN BLOOD BY AUTOMATED COUNT: 12.6 % (ref 10–15)
HCT VFR BLD AUTO: 42.1 % (ref 40–53)
HGB BLD-MCNC: 14.6 G/DL (ref 13.3–17.7)
IMM GRANULOCYTES # BLD: 0 10E9/L (ref 0–0.4)
IMM GRANULOCYTES NFR BLD: 0.3 %
LYMPHOCYTES # BLD AUTO: 2.4 10E9/L (ref 0.8–5.3)
LYMPHOCYTES NFR BLD AUTO: 32.1 %
MCH RBC QN AUTO: 29.7 PG (ref 26.5–33)
MCHC RBC AUTO-ENTMCNC: 34.7 G/DL (ref 31.5–36.5)
MCV RBC AUTO: 86 FL (ref 78–100)
MONOCYTES # BLD AUTO: 0.9 10E9/L (ref 0–1.3)
MONOCYTES NFR BLD AUTO: 12.7 %
NEUTROPHILS # BLD AUTO: 3.8 10E9/L (ref 1.6–8.3)
NEUTROPHILS NFR BLD AUTO: 52 %
NRBC # BLD AUTO: 0 10*3/UL
NRBC BLD AUTO-RTO: 0 /100
PLATELET # BLD AUTO: 295 10E9/L (ref 150–450)
RBC # BLD AUTO: 4.92 10E12/L (ref 4.4–5.9)
WBC # BLD AUTO: 7.4 10E9/L (ref 4–11)

## 2017-09-24 PROCEDURE — 25000132 ZZH RX MED GY IP 250 OP 250 PS 637: Performed by: NURSE PRACTITIONER

## 2017-09-24 PROCEDURE — 36415 COLL VENOUS BLD VENIPUNCTURE: CPT | Performed by: NURSE PRACTITIONER

## 2017-09-24 PROCEDURE — S0136 CLOZAPINE, 25 MG: HCPCS | Performed by: NURSE PRACTITIONER

## 2017-09-24 PROCEDURE — 85025 COMPLETE CBC W/AUTO DIFF WBC: CPT | Performed by: NURSE PRACTITIONER

## 2017-09-24 PROCEDURE — 99232 SBSQ HOSP IP/OBS MODERATE 35: CPT | Performed by: NURSE PRACTITIONER

## 2017-09-24 PROCEDURE — 12400000 ZZH R&B MH

## 2017-09-24 RX ADMIN — NICOTINE POLACRILEX 4 MG: 2 GUM, CHEWING ORAL at 12:41

## 2017-09-24 RX ADMIN — CLONIDINE HYDROCHLORIDE 0.1 MG: 0.1 TABLET ORAL at 20:45

## 2017-09-24 RX ADMIN — CLOZAPINE 350 MG: 100 TABLET ORAL at 20:45

## 2017-09-24 RX ADMIN — FAMOTIDINE 20 MG: 20 TABLET ORAL at 20:46

## 2017-09-24 RX ADMIN — CLONIDINE HYDROCHLORIDE 0.1 MG: 0.1 TABLET ORAL at 08:25

## 2017-09-24 RX ADMIN — FAMOTIDINE 20 MG: 20 TABLET ORAL at 08:25

## 2017-09-24 RX ADMIN — VITAMIN D, TAB 1000IU (100/BT) 2000 UNITS: 25 TAB at 08:25

## 2017-09-24 RX ADMIN — NICOTINE POLACRILEX 4 MG: 2 GUM, CHEWING ORAL at 18:53

## 2017-09-24 RX ADMIN — CLOZAPINE 200 MG: 100 TABLET ORAL at 08:25

## 2017-09-24 ASSESSMENT — ACTIVITIES OF DAILY LIVING (ADL)
DRESS: SCRUBS (BEHAVIORAL HEALTH)
GROOMING: WITH ASSISTANCE;INDEPENDENT
ORAL_HYGIENE: INDEPENDENT
DRESS: SCRUBS (BEHAVIORAL HEALTH)
ORAL_HYGIENE: INDEPENDENT
GROOMING: PROMPTS

## 2017-09-24 NOTE — PLAN OF CARE
Face to face end of shift report received from Sara GRAY Rounding completed. Patient observed.     Tawanda Easley  9/24/2017  3:37 PM

## 2017-09-24 NOTE — PLAN OF CARE
Face to face end of shift report received from Maurice LEOS RN. Rounding completed. Patient observed.     Sara Altman  9/24/2017  11:55 AM

## 2017-09-24 NOTE — PLAN OF CARE
"Problem: General Plan of Care (Inpatient Behavioral)  Goal: Individualization/Patient Specific Goal (IP Behavioral)  The patient and/or their representative will achieve their patient-specific goals related to the plan of care.    The patient-specific goals include:   Patient will have an absence or decrease in hallucinations by time of discharge.  Patient will be medication compliant while hospitalized.  Patient will be able to have a reality based conversation prior to discharge.  Patient will be independent with his ADL s while hospitalized and maintain hygiene.     Outcome: No Change  Pt admits to \"a little stress' could not elaborate Denies SI or voices but appears to be responding to internal stimuli  Pt will wander in and out of group but doesn't stay for long Pacing halls much of time  Stays on fringes      "

## 2017-09-24 NOTE — PROGRESS NOTES
[facility]  Psychiatric Progress Note    Subjective   Miles is up and about this morning when I meet with him. He tells me he thinks about the past and the future a lot. He then asks why I want to know these things. I tell him it is find out if he is feeling okay. He tells me he is sleeping better and asks if there is anything else I need. He then walks away and paces the hallway mumbling to himself much of the time.       10 point ROS attempted - no complaints       DIagnoses:      Paranoid Schizophrenia w/ capgras delusions    Attestation:  Patient has been seen and evaluated by me, Yvonne Pedersen NP          Interim History:   The patient's care was discussed with the treatment team and chart notes were reviewed.          Medications:       cloZAPine  350 mg Oral At Bedtime     cloNIDine  0.1 mg Oral BID     cloZAPine  200 mg Oral Daily     cholecalciferol  2,000 Units Oral Daily     famotidine  20 mg Oral BID     clotrimazole, benztropine, hydrOXYzine, acetaminophen, alum & mag hydroxide-simethicone, magnesium hydroxide, traZODone, OLANZapine **OR** OLANZapine, nicotine polacrilex          Allergies:     Allergies   Allergen Reactions     Pcn [Bicillin C-R,] Rash     Provider wants to try amoxicillin(benadryl ordered as well) 9/10/16     Bupropion Other (See Comments)     delusions      Depakote [Valproic Acid] Other (See Comments)     Heart racing     Divalproex Sodium-Fd&C Red #40      Increase heart rate            Psychiatric Examination:   /76  Pulse 81  Temp 97.8  F (36.6  C) (Tympanic)  Resp 18  Ht 1.829 m (6')  Wt 105.1 kg (231 lb 11.2 oz)  SpO2 97%  BMI 31.42 kg/m2  Weight is 231 lbs 11.2 oz  Body mass index is 31.42 kg/(m^2).    Appearance:  awake, alert and poorly groomed  Attitude: cooperative  Eye Contact:  fair  Mood: slightly irritable  Affect:  Shifts with mood  Speech:  clear, coherent, largely illogical  Psychomotor Behavior:  no evidence of tardive dyskinesia,  dystonia, or tics  Thought Process:  At times logical - very brief periods. Often illogical and tangential  Associations:  loosening of associations present  Thought Content:  no evidence of suicidal ideation or homicidal ideation, patient appears to be responding to internal stimuli and preoccupied  Insight:  limited  Judgment:  limited  Oriented to:  time, person, and place  Attention Span and Concentration:  limited  Recent and Remote Memory:  limited  Fund of Knowledge: delayed  Muscle Strength and Tone: normal  Gait and Station: Normal         Labs:   Continue current Clozapine dosing at this time.   72 hour hold implemented and pre-petition filed. County to screen.     Assessment and Plan:  Petition for commitment and Ramirez due to ongoing lack of insight and response to treatment    Estimated LOS:  Clozapine continues to be titrated up. When he was last discharged, he was on a dose of 350mg at bed and 200mg in the AM. We have no reached this dose with no evidence of improvement. Remains disorganized and lacking to capacity to even care for immediate needs or ensure physical safety.

## 2017-09-24 NOTE — PLAN OF CARE
Problem: General Plan of Care (Inpatient Behavioral)  Goal: Individualization/Patient Specific Goal (IP Behavioral)  The patient and/or their representative will achieve their patient-specific goals related to the plan of care.    The patient-specific goals include:   Patient will have an absence or decrease in hallucinations by time of discharge.  Patient will be medication compliant while hospitalized.  Patient will be able to have a reality based conversation prior to discharge.  Patient will be independent with his ADL s while hospitalized and maintain hygiene.     2330--in bed with eyes closed and breathing regular. 0510--still in bed with eyes closed and breathing regular.

## 2017-09-24 NOTE — PLAN OF CARE
Face to face end of shift report received from Tawanda ALBARADO RN. Rounding completed. Patient observed.     Maurice Mcintyre  9/24/2017  12:42 AM

## 2017-09-24 NOTE — PLAN OF CARE
Problem: General Plan of Care (Inpatient Behavioral)  Goal: Individualization/Patient Specific Goal (IP Behavioral)  The patient and/or their representative will achieve their patient-specific goals related to the plan of care.    The patient-specific goals include:   Patient will have an absence or decrease in hallucinations by time of discharge.  Patient will be medication compliant while hospitalized.  Patient will be able to have a reality based conversation prior to discharge.  Patient will be independent with his ADL s while hospitalized and maintain hygiene.     Outcome: No Change  Patient is up and down throughout the day. He converses with writer but denies depression, suicidal thoughts and hallucinations. Paces hallway. Given shower supplies, states that he might shower later. Patient is eating well. Denies pain. Has had no falls.

## 2017-09-25 PROCEDURE — S0136 CLOZAPINE, 25 MG: HCPCS | Performed by: NURSE PRACTITIONER

## 2017-09-25 PROCEDURE — 25000132 ZZH RX MED GY IP 250 OP 250 PS 637: Performed by: NURSE PRACTITIONER

## 2017-09-25 PROCEDURE — 99232 SBSQ HOSP IP/OBS MODERATE 35: CPT | Performed by: NURSE PRACTITIONER

## 2017-09-25 PROCEDURE — 12400000 ZZH R&B MH

## 2017-09-25 RX ADMIN — CLOZAPINE 350 MG: 100 TABLET ORAL at 20:11

## 2017-09-25 RX ADMIN — FAMOTIDINE 20 MG: 20 TABLET ORAL at 20:11

## 2017-09-25 RX ADMIN — CLOZAPINE 200 MG: 100 TABLET ORAL at 08:41

## 2017-09-25 RX ADMIN — CLONIDINE HYDROCHLORIDE 0.1 MG: 0.1 TABLET ORAL at 08:41

## 2017-09-25 RX ADMIN — FAMOTIDINE 20 MG: 20 TABLET ORAL at 08:41

## 2017-09-25 RX ADMIN — CLONIDINE HYDROCHLORIDE 0.1 MG: 0.1 TABLET ORAL at 20:11

## 2017-09-25 RX ADMIN — VITAMIN D, TAB 1000IU (100/BT) 2000 UNITS: 25 TAB at 08:41

## 2017-09-25 RX ADMIN — NICOTINE POLACRILEX 4 MG: 2 GUM, CHEWING ORAL at 18:53

## 2017-09-25 RX ADMIN — NICOTINE POLACRILEX 4 MG: 2 GUM, CHEWING ORAL at 08:47

## 2017-09-25 ASSESSMENT — ACTIVITIES OF DAILY LIVING (ADL)
LAUNDRY: UNABLE TO COMPLETE
DRESS: INDEPENDENT;SCRUBS (BEHAVIORAL HEALTH)
GROOMING: PROMPTS
ORAL_HYGIENE: PROMPTS

## 2017-09-25 NOTE — PLAN OF CARE
Face to face end of shift report received from Bailey GRAY. Rounding completed. Patient observed in [facility].    Samina Ponce  9/25/2017  3:46 PM

## 2017-09-25 NOTE — PLAN OF CARE
Problem: General Plan of Care (Inpatient Behavioral)  Goal: Individualization/Patient Specific Goal (IP Behavioral)  The patient and/or their representative will achieve their patient-specific goals related to the plan of care.    The patient-specific goals include:   Patient will have an absence or decrease in hallucinations by time of discharge.  Patient will be medication compliant while hospitalized.  Patient will sleep 6 to 9 hours every night,.  Patient will be able to have a reality based conversation prior to discharge.  Patient will be independent with his ADL s while hospitalized and maintain hygiene.     Outcome: Improving  Slept all noc without issue.

## 2017-09-25 NOTE — PLAN OF CARE
"Problem: General Plan of Care (Inpatient Behavioral)  Goal: Individualization/Patient Specific Goal (IP Behavioral)  The patient and/or their representative will achieve their patient-specific goals related to the plan of care.    The patient-specific goals include:   Patient will have an absence or decrease in hallucinations by time of discharge.  Patient will be medication compliant while hospitalized.  Patient will sleep 6 to 9 hours every night,.  Patient will be able to have a reality based conversation prior to discharge.  Patient will be independent with his ADL s while hospitalized and maintain hygiene.     Outcome: No Change  Pt denied SI, self-harm ideation, hallucinations, delusions, and anxiety. Pt stated that he does have some depression and rated this as \"like a 4/10.\" Pt stated (regarding his situation), \"I know that it could be better or could be worse.\" Regarding HI, pt stated - \" No, I don't have any, but there are some people that I would like to do that to, but I would never act on it. I know that I just need to let some things go, so no, I wouldn't do that. It wouldn't be worth it.\" Pts affect was flat and blunted. Pt denied pain at this time. Pt was pleasant and cooperative with this nursing assessment and was med compliant. Pt was encouraged to shower this shift, but did not - pt does have some body odor and is unkempt, ungroomed. Pt paces up and down the hallway, throughout the milieu area and paces in and out of the group room during this shift. Pt is now observed pacing in the hallway.    2:28 PM  Pts mother called regarding questions about his medications. Pts mother also reported a concern regarding pts dad visiting - pts father uses \"meth.\" Pts mother is wondering if pt could be tested for meth in fear of his dad giving him meth and pt using it here. Pts mother was told that visitor's pockets are checked prior to entering the unit and pt will be monitored during visits.  "

## 2017-09-25 NOTE — PROGRESS NOTES
[facility]  Psychiatric Progress Note    Subjective   Miles is in bed this morning when I meet with him. He tells me he did not sleep well last night. Miles tells me his thoughts are organized sometimes and sometimes they are not. He said it could be better or it could be worse. Continues to demonstrate limited insight to his mental health issues.       10 point ROS attempted - no complaints       DIagnoses:      Paranoid Schizophrenia w/ capgras delusions    Attestation:  Patient has been seen and evaluated by me, Yvonne Pedersen NP          Interim History:   The patient's care was discussed with the treatment team and chart notes were reviewed.          Medications:       cloZAPine  350 mg Oral At Bedtime     cloNIDine  0.1 mg Oral BID     cloZAPine  200 mg Oral Daily     cholecalciferol  2,000 Units Oral Daily     famotidine  20 mg Oral BID     clotrimazole, benztropine, hydrOXYzine, acetaminophen, alum & mag hydroxide-simethicone, magnesium hydroxide, traZODone, OLANZapine **OR** OLANZapine, nicotine polacrilex          Allergies:     Allergies   Allergen Reactions     Pcn [Bicillin C-R,] Rash     Provider wants to try amoxicillin(benadryl ordered as well) 9/10/16     Bupropion Other (See Comments)     delusions      Depakote [Valproic Acid] Other (See Comments)     Heart racing     Divalproex Sodium-Fd&C Red #40      Increase heart rate            Psychiatric Examination:   /74  Pulse 85  Temp 96.9  F (36.1  C) (Tympanic)  Resp 18  Ht 1.829 m (6')  Wt 105.1 kg (231 lb 11.2 oz)  SpO2 97%  BMI 31.42 kg/m2  Weight is 231 lbs 11.2 oz  Body mass index is 31.42 kg/(m^2).    Appearance:  awake, alert and poorly groomed  Attitude: cooperative  Eye Contact:  fair  Mood: labile mood  Affect:  Shifts with mood  Speech:  clear, coherent, largely illogical  Psychomotor Behavior:  no evidence of tardive dyskinesia, dystonia, or tics  Thought Process:  At times logical - very brief periods.  Often illogical and tangential  Associations:  loosening of associations present  Thought Content:  no evidence of suicidal ideation or homicidal ideation, patient appears to be responding to internal stimuli and preoccupied  Insight:  limited  Judgment:  limited  Oriented to:  time, person, and place  Attention Span and Concentration:  limited  Recent and Remote Memory:  limited  Fund of Knowledge: delayed  Muscle Strength and Tone: normal  Gait and Station: Normal         Labs:   Continue current Clozapine dosing at this time.   72 hour hold implemented and pre-petition filed. [place] to screen[place]     Assessment and Plan:  Petition for commitment and Ramirez due to ongoing lack of insight and response to treatment    Estimated LOS:  Clozapine continues to be titrated up. When he was last discharged, he was on a dose of 350mg at bed and 200mg in the AM. We have no reached this dose with no evidence of improvement. Remains disorganized and lacking to capacity to even care for immediate needs or ensure physical safety.

## 2017-09-25 NOTE — PLAN OF CARE
Face to face end of shift report received from Dottie ESPINOZA RN. Rounding completed. Patient observed sleeping in bed.    Bailey Sanchez  9/25/2017  7:37 AM

## 2017-09-25 NOTE — PLAN OF CARE
Problem: Discharge Planning  Goal: Discharge Planning (Adult, OB, Behavioral, Peds)  Writer spoke with Cale Martin who stated that they filed the paperwork with the courts today and we should be receiving a copy of the court papers later today or tomorrow for court dates.

## 2017-09-25 NOTE — PLAN OF CARE
Problem: General Plan of Care (Inpatient Behavioral)  Goal: Team Discussion  Team Plan:   BEHAVIORAL TEAM DISCUSSION     Participants: Kim Cardozo NP, Yvonne Pedersen NP, Tish Boswell [facility], Teresa Stevens RN. Alesha Loera [facility], Jazzy Sood OT, Nneka Sprague OT   Progress: None, remains delusional  Continued Stay Criteria/Rationale: delusional, medication adjustments, limited insight  Medical/Physical: None known  Precautions:   Behavioral Orders   Procedures     Code 1 - Restrict to Unit     Routine Programming       As clinically indicated     Status 15       Every 15 minutes.     Plan: continue to stabilize on medications, petitioning for committment  Rationale for change in precautions or plan: None

## 2017-09-26 PROCEDURE — S0136 CLOZAPINE, 25 MG: HCPCS | Performed by: NURSE PRACTITIONER

## 2017-09-26 PROCEDURE — 99232 SBSQ HOSP IP/OBS MODERATE 35: CPT | Performed by: NURSE PRACTITIONER

## 2017-09-26 PROCEDURE — 25000132 ZZH RX MED GY IP 250 OP 250 PS 637: Performed by: NURSE PRACTITIONER

## 2017-09-26 PROCEDURE — 12400000 ZZH R&B MH

## 2017-09-26 RX ADMIN — NICOTINE POLACRILEX 4 MG: 2 GUM, CHEWING ORAL at 21:08

## 2017-09-26 RX ADMIN — FAMOTIDINE 20 MG: 20 TABLET ORAL at 20:19

## 2017-09-26 RX ADMIN — VITAMIN D, TAB 1000IU (100/BT) 2000 UNITS: 25 TAB at 08:21

## 2017-09-26 RX ADMIN — CLOZAPINE 200 MG: 100 TABLET ORAL at 08:21

## 2017-09-26 RX ADMIN — CLONIDINE HYDROCHLORIDE 0.1 MG: 0.1 TABLET ORAL at 20:19

## 2017-09-26 RX ADMIN — CLOZAPINE 350 MG: 100 TABLET ORAL at 20:18

## 2017-09-26 RX ADMIN — FAMOTIDINE 20 MG: 20 TABLET ORAL at 08:22

## 2017-09-26 RX ADMIN — NICOTINE POLACRILEX 4 MG: 2 GUM, CHEWING ORAL at 09:45

## 2017-09-26 RX ADMIN — CLONIDINE HYDROCHLORIDE 0.1 MG: 0.1 TABLET ORAL at 08:21

## 2017-09-26 ASSESSMENT — ACTIVITIES OF DAILY LIVING (ADL)
DRESS: INDEPENDENT;SCRUBS (BEHAVIORAL HEALTH)
GROOMING: PROMPTS
LAUNDRY: UNABLE TO COMPLETE
ORAL_HYGIENE: PROMPTS

## 2017-09-26 NOTE — PLAN OF CARE
"Problem: General Plan of Care (Inpatient Behavioral)  Goal: Individualization/Patient Specific Goal (IP Behavioral)  The patient and/or their representative will achieve their patient-specific goals related to the plan of care.    The patient-specific goals include:   Patient will have an absence or decrease in hallucinations by time of discharge.  Patient will be medication compliant while hospitalized.  Patient will sleep 6 to 9 hours every night,.  Patient will be able to have a reality based conversation prior to discharge.  Patient will be independent with his ADL s while hospitalized and maintain hygiene.     Outcome: Therapy, progress toward functional goals is gradual  Pt denied SI, self-harm ideation, hallucinations, and delusions. Pt stated regarding his anxiety and depression, \"Yeah I have some and then I don't. It is what it is. It's okay.\"  Pt also stated, \"I remember being born in a different year. It was weird.\" Pt did not rate either the anxiety and depression. Pt was pleasant and cooperative with this nursing assessment and was med compliant. Pt was encouraged to shower this shift, but refused and said he was fine. Pt does have some body odor and is unkempt. Pts affect was flat and blunted. Pt denied pain at this time. Pt continues to paces up and down the hallway, throughout the milieu area and paces in and out of the group room. Pt is now sitting in the milieu area.      "

## 2017-09-26 NOTE — PLAN OF CARE
Problem: General Plan of Care (Inpatient Behavioral)  Goal: Team Discussion  Team Plan:   BEHAVIORAL TEAM DISCUSSION     Participants: Teresa Stevens RN, Alesha Loera TRS, Nneka Sprague OT, Priscilla Henry Supervisor, Yvonne Pedersen NP, Supa Espinoza NP, Carolyn Holloway DCP, Tish NGUYEN    Progress: fair, still delusional   Continued Stay Criteria/Rationale: continue with current clozaril medication   Medical/Physical: none   Precautions:   Behavioral Orders   Procedures     Code 1 - Restrict to Unit     Routine Programming       As clinically indicated     Status 15       Every 15 minutes.     Plan: commit and savage filed, court Wednesday 9/27 @ 3:00 and October 9th   Rationale for change in precautions or plan: none

## 2017-09-26 NOTE — PLAN OF CARE
Face to face end of shift report received from Bailey GRAY. Rounding completed. Patient observed in [facility].    Samina Ponce  9/26/2017  4:02 PM

## 2017-09-26 NOTE — PLAN OF CARE
"Problem: General Plan of Care (Inpatient Behavioral)  Goal: Individualization/Patient Specific Goal (IP Behavioral)  The patient and/or their representative will achieve their patient-specific goals related to the plan of care.    The patient-specific goals include:   Patient will have an absence or decrease in hallucinations by time of discharge.  Patient will be medication compliant while hospitalized.  Patient will sleep 6 to 9 hours every night,.  Patient will be able to have a reality based conversation prior to discharge.  Patient will be independent with his ADL s while hospitalized and maintain hygiene.     Patient pacing the halls all shift. Patient denies hallucinations but does appear to be responding to some internal stimuli. Patient has a flat affect and is not very talkative with assessment. Patient states \"Its all good\" and walks away. Patient did not attend groups this shift and is not social with peers on the unit. Patient denies pain. Patient was cooperative wtih medications.       "

## 2017-09-26 NOTE — PLAN OF CARE
Problem: Discharge Planning  Goal: Discharge Planning (Adult, OB, Behavioral, Peds)  Writer received a call from the care coordinator with [facility] asking for an update on expected discharge- writer explained that pt was not improving and that we had filed for commitment and will pursue a Ramirez in order to try other neuroleptic medications.

## 2017-09-26 NOTE — PLAN OF CARE
"Problem: General Plan of Care (Inpatient Behavioral)  Goal: Individualization/Patient Specific Goal (IP Behavioral)  The patient and/or their representative will achieve their patient-specific goals related to the plan of care.    The patient-specific goals include:   Patient will have an absence or decrease in hallucinations by time of discharge.  Patient will be medication compliant while hospitalized.  Patient will sleep 6 to 9 hours every night,.  Patient will be able to have a reality based conversation prior to discharge.  Patient will be independent with his ADL s while hospitalized and maintain hygiene.     Patient pacing the halls most of the shift. Patient denies hallucinations, anxiety, depression, and SI. Patient laughs when asked and states \"Im fine\". Patient had a flat affect. Patient does not make any HI statements this shift. Patient was served court papers by the  this shift. Papers filed in the chart. Patient was cooperative with all medications this shift. Patient did clip finger nails this shift. Patient was encouraged to take a shower multiple times this shift. Patient kept states \" I will when I get around to it.\" Patients father came to visit this shift. Visit went well.      "

## 2017-09-26 NOTE — PLAN OF CARE
Face to face end of shift report received from Ciro LEOS RN. Rounding completed. Patient observed sleeping in bed.    Bailey Sanchez  9/26/2017  7:36 AM
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
## 2017-09-27 PROCEDURE — 12400000 ZZH R&B MH

## 2017-09-27 PROCEDURE — 99232 SBSQ HOSP IP/OBS MODERATE 35: CPT | Performed by: NURSE PRACTITIONER

## 2017-09-27 PROCEDURE — S0136 CLOZAPINE, 25 MG: HCPCS | Performed by: NURSE PRACTITIONER

## 2017-09-27 PROCEDURE — 25000132 ZZH RX MED GY IP 250 OP 250 PS 637: Performed by: NURSE PRACTITIONER

## 2017-09-27 RX ORDER — CLOZAPINE 100 MG/1
400 TABLET ORAL AT BEDTIME
Status: DISCONTINUED | OUTPATIENT
Start: 2017-09-27 | End: 2017-10-01

## 2017-09-27 RX ADMIN — NICOTINE POLACRILEX 4 MG: 2 GUM, CHEWING ORAL at 16:16

## 2017-09-27 RX ADMIN — VITAMIN D, TAB 1000IU (100/BT) 2000 UNITS: 25 TAB at 08:32

## 2017-09-27 RX ADMIN — CLOZAPINE 200 MG: 100 TABLET ORAL at 08:32

## 2017-09-27 RX ADMIN — CLONIDINE HYDROCHLORIDE 0.1 MG: 0.1 TABLET ORAL at 08:32

## 2017-09-27 RX ADMIN — FAMOTIDINE 20 MG: 20 TABLET ORAL at 20:25

## 2017-09-27 RX ADMIN — CLONIDINE HYDROCHLORIDE 0.1 MG: 0.1 TABLET ORAL at 20:25

## 2017-09-27 RX ADMIN — FAMOTIDINE 20 MG: 20 TABLET ORAL at 08:32

## 2017-09-27 RX ADMIN — NICOTINE POLACRILEX 4 MG: 2 GUM, CHEWING ORAL at 19:37

## 2017-09-27 RX ADMIN — CLOZAPINE 400 MG: 100 TABLET ORAL at 20:25

## 2017-09-27 ASSESSMENT — ACTIVITIES OF DAILY LIVING (ADL)
GROOMING: INDEPENDENT;PROMPTS
DRESS: SCRUBS (BEHAVIORAL HEALTH);INDEPENDENT
LAUNDRY: UNABLE TO COMPLETE
ORAL_HYGIENE: PROMPTS;INDEPENDENT

## 2017-09-27 NOTE — PROGRESS NOTES
"[facility]  Psychiatric Progress Note    Subjective   Miles is pacing today. He will not stop to talk to me but when I follow him he does answer my questions. Tells me that he is \"just chillin.\" Also tells me that he is feeling anxious and stressed, but when asked about what is causing anxiety and stress, he responds, \"Oh, I'm not anxious or stressed. I'm good.\" as if he had never said it in the first place. Denies concerns, s/e from medications, or other symptoms. Increasing Clozapine today. Also completed petition for savage.     10 point ROS attempted - no complaints       DIagnoses:      Paranoid Schizophrenia w/ capgras delusions    Attestation:  Patient has been seen and evaluated by me, Supa Espinoza NP          Interim History:   The patient's care was discussed with the treatment team and chart notes were reviewed.          Medications:       cloZAPine  350 mg Oral At Bedtime     cloNIDine  0.1 mg Oral BID     cloZAPine  200 mg Oral Daily     cholecalciferol  2,000 Units Oral Daily     famotidine  20 mg Oral BID     clotrimazole, benztropine, hydrOXYzine, acetaminophen, alum & mag hydroxide-simethicone, magnesium hydroxide, traZODone, OLANZapine **OR** OLANZapine, nicotine polacrilex          Allergies:     Allergies   Allergen Reactions     Pcn [Bicillin C-R,] Rash     Provider wants to try amoxicillin(benadryl ordered as well) 9/10/16     Bupropion Other (See Comments)     delusions      Depakote [Valproic Acid] Other (See Comments)     Heart racing     Divalproex Sodium-Fd&C Red #40      Increase heart rate            Psychiatric Examination:   /75  Pulse 86  Temp 97.1  F (36.2  C) (Tympanic)  Resp 16  Ht 1.829 m (6')  Wt 105.1 kg (231 lb 11.2 oz)  SpO2 97%  BMI 31.42 kg/m2  Weight is 231 lbs 11.2 oz  Body mass index is 31.42 kg/(m^2).    Appearance:  Awake, alert, disheveled  Attitude: attempts to be cooperative  Eye Contact:  fair  Mood: brighter  Affect:  Mood " congruent  Speech:  Clear, not spontaneous  Psychomotor Behavior:  no evidence of tardive dyskinesia, dystonia, or tics, pacing  Thought Process:  Generally illogical  Associations:  loosening of associations present  Thought Content:  no evidence of suicidal ideation or homicidal ideation, patient appears to be responding to internal stimuli and preoccupied  Insight:  limited  Judgment:  limited  Oriented to:  time, person, and place  Attention Span and Concentration:  limited  Recent and Remote Memory:  limited  Fund of Knowledge: delayed  Muscle Strength and Tone: normal  Gait and Station: Normal         Labs:   Increase HS clozapine to 400mg. Will continue to increase until beneficial, or max daily dosing is achieved. Ramirez filed with additional options included as back up.   Confinement hearing today.    Assessment and Plan:  Petition for commitment and Ramirez due to ongoing lack of insight and response to treatment    Estimated LOS:  Clozapine continues to be titrated up. When he was last discharged, he was on a dose of 350mg at bed and 200mg in the AM. We have reached this dose with no evidence of improvement. Will continue to increase. Remains disorganized and lacking to capacity to even care for immediate needs or ensure physical safety.

## 2017-09-27 NOTE — PLAN OF CARE
Face to face end of shift report received from Wes GOMEZ RN. Rounding completed. Patient observed in the [place].     Maldonado Hines  9/27/2017  4:33 PM

## 2017-09-27 NOTE — PLAN OF CARE
Problem: General Plan of Care (Inpatient Behavioral)  Goal: Individualization/Patient Specific Goal (IP Behavioral)  The patient and/or their representative will achieve their patient-specific goals related to the plan of care.    The patient-specific goals include:   Patient will have an absence or decrease in hallucinations by time of discharge.  Patient will be medication compliant while hospitalized.  Patient will sleep 6 to 9 hours every night,.  Patient will be able to have a reality based conversation prior to discharge.  Patient will be independent with his ADL S while hospitalized and maintain hygiene.     Outcome: No Change  Pt has been up most of the shift pacing around the unit.  He did attend some of the groups briefly but does not sit still very long.  He denied having hallucinations though appears pre-occupied and his thoughts do include some delusional content.  Pt's affect is a bit brighter than a few days ago. He did joke with me some about getting a hair cut.  Pt was agreeable to the increase in Clozaril starting tonight.

## 2017-09-27 NOTE — PLAN OF CARE
Face to face end of shift report received from Jeison GRAY. Rounding completed. Patient observed in dayroom and introduced to nursing for the shift.    Wes Barboza  9/27/2017  9:08 AM

## 2017-09-27 NOTE — PROVIDER NOTIFICATION
09/26/17 1908   Patient Belongings   Second Staff Aretha RN   General Info Comment 1-black pr of DG shoes, 1-pr of karissa blue jeans, 1-pr of striped blue boxers, 1-pr of long white socks, 1-tan carhart t-shirt, 1 black Carhart Hooded Sweatshirt all brought in for Pt by family member.                                               SENT HOME AS OF TODAY (with Pt's Mother):  1-black pr of shoes, 1-belt, 1-pr of jeans, 1-black T-shirt, 1-white pr of old socks, 1-black hooded sweatshirt.                                                                                        List items sent to safe: NONE  All other belongings put in assigned cubby in belongings room.  & noted prior belongings sent home with mother, per Pt, as signature below shows new & old things being sent home.    I have reviewed my belongings list on admission and verify that it is correct.     Patient signature_______________________________    Second staff witness (if patient unable to sign) ______________________________       I have received all my belongings at discharge.    Patient signature________________________________    Gloria Araujo  9/26/2017  7:15 PM

## 2017-09-27 NOTE — PLAN OF CARE
Problem: Discharge Planning  Goal: Discharge Planning (Adult, OB, Behavioral, Peds)  Writer sent Ramirez petition with [facility] court administration.     3:24pm: Pt had confinment hearing today and pt is confined to [facility] until his commitment hearing.  Pt's mother was present at the court hearing and requested to speak with a provider.  She stated she would like to be included in the discussions about the pt's treatment.

## 2017-09-27 NOTE — PLAN OF CARE
Problem: General Plan of Care (Inpatient Behavioral)  Goal: Individualization/Patient Specific Goal (IP Behavioral)  The patient and/or their representative will achieve their patient-specific goals related to the plan of care.    The patient-specific goals include:   Patient will have an absence or decrease in hallucinations by time of discharge.  Patient will be medication compliant while hospitalized.  Patient will sleep 6 to 9 hours every night,.  Patient will be able to have a reality based conversation prior to discharge.  Patient will be independent with his ADL s while hospitalized and maintain hygiene.     Outcome: Improving     Pt in bed all noc thus far appears to be asleep. Respiration rate non-labored. Will continue to monitor pt on 15 minute checks.  Jeison Easley RN

## 2017-09-28 PROCEDURE — S0136 CLOZAPINE, 25 MG: HCPCS | Performed by: NURSE PRACTITIONER

## 2017-09-28 PROCEDURE — 12400000 ZZH R&B MH

## 2017-09-28 PROCEDURE — 99232 SBSQ HOSP IP/OBS MODERATE 35: CPT | Performed by: NURSE PRACTITIONER

## 2017-09-28 PROCEDURE — 25000132 ZZH RX MED GY IP 250 OP 250 PS 637: Performed by: NURSE PRACTITIONER

## 2017-09-28 RX ADMIN — CLONIDINE HYDROCHLORIDE 0.1 MG: 0.1 TABLET ORAL at 20:03

## 2017-09-28 RX ADMIN — NICOTINE POLACRILEX 4 MG: 2 GUM, CHEWING ORAL at 15:44

## 2017-09-28 RX ADMIN — NICOTINE POLACRILEX 4 MG: 2 GUM, CHEWING ORAL at 09:25

## 2017-09-28 RX ADMIN — CLOZAPINE 400 MG: 100 TABLET ORAL at 20:03

## 2017-09-28 RX ADMIN — FAMOTIDINE 20 MG: 20 TABLET ORAL at 20:03

## 2017-09-28 RX ADMIN — VITAMIN D, TAB 1000IU (100/BT) 2000 UNITS: 25 TAB at 08:05

## 2017-09-28 RX ADMIN — FAMOTIDINE 20 MG: 20 TABLET ORAL at 08:05

## 2017-09-28 RX ADMIN — CLOZAPINE 200 MG: 100 TABLET ORAL at 08:05

## 2017-09-28 RX ADMIN — CLONIDINE HYDROCHLORIDE 0.1 MG: 0.1 TABLET ORAL at 08:05

## 2017-09-28 ASSESSMENT — ACTIVITIES OF DAILY LIVING (ADL)
GROOMING: PROMPTS
ORAL_HYGIENE: PROMPTS
DRESS: SCRUBS (BEHAVIORAL HEALTH);PROMPTS

## 2017-09-28 NOTE — PLAN OF CARE
"Problem: General Plan of Care (Inpatient Behavioral)  Goal: Individualization/Patient Specific Goal (IP Behavioral)  The patient and/or their representative will achieve their patient-specific goals related to the plan of care.    The patient-specific goals include:   Patient will have an absence or decrease in hallucinations by time of discharge.  Patient will be medication compliant while hospitalized.  Patient will sleep 6 to 9 hours every night,.  Patient will be able to have a reality based conversation prior to discharge.  Patient will be independent with his ADL s while hospitalized and maintain hygiene.     Outcome: Improving  Patient denied anxiety and depression. He denied hallucinations but at times seemed preoccupied. At one time he appeared to be talking to himself and did not respond to this writers prompts. He was pleasant and smiled. He made jokes and was polite. Patient was restless and entered group many times but then walked back out again. He denied pain. Patient asked \"why have things changed so much? Did they put in new riley on the other unit?\"       "

## 2017-09-28 NOTE — PLAN OF CARE
Problem: General Plan of Care (Inpatient Behavioral)  Goal: Team Discussion  Team Plan:   BEHAVIORAL TEAM DISCUSSION  Late entry from -27-17  Participants: Rowan Alford NP, Kim Cardozo NP, Supa Espinoza NP, [facility] , [facility], Carolyn Holloway Discharge Planner, Wes GRAY, Alesha Loera  Progress: Fair- still responding to internal stimuli  Continued Stay Criteria/Rationale: Medications adjustments- monitor for effectiveness and side effects  Medical/Physical: none  Precautions:   Behavioral Orders   Procedures     Code 1 - Restrict to Unit     Routine Programming       As clinically indicated     Status 15       Every 15 minutes.     Plan: Confinement court today- filed a savage.  Rationale for change in precautions or plan: none        Problem: Patient Care Overview  Goal: Team Discussion  Team Plan:   BEHAVIORAL TEAM DISCUSSION  Late entry from -27-17  Participants: Rowan Alford NP, Kim Cardozo NP, Supa Espinoza NP, [place] , [facility], Carolyn Holloway Discharge Planner, Wes RN, Alesha Loera  Progress: Fair- still responding to internal stimuli  Continued Stay Criteria/Rationale: Medications adjustments- monitor for effectiveness and side effects  Medical/Physical: none  Precautions:   Behavioral Orders   Procedures     Code 1 - Restrict to Unit     Routine Programming       As clinically indicated     Status 15       Every 15 minutes.     Plan: Confinement court today- filed a savage.  Rationale for change in precautions or plan: none

## 2017-09-28 NOTE — PLAN OF CARE
Face to face end of shift report received from MARINA Harvey. Rounding completed. Patient observed pacing the unit.     Lisa Dia  9/28/2017  3:27 PM

## 2017-09-28 NOTE — PLAN OF CARE
Problem: General Plan of Care (Inpatient Behavioral)  Goal: Team Discussion  Team Plan:   BEHAVIORAL TEAM DISCUSSION     Participants: Wes Barboza RN, Alesha Loera TRS, Jazzy Sood OT, Rowan Alford NP, Kim Cardozo NP, Supa Espinoza NP, Carolyn Holloway DCP, [facility]   Progress: good, attempts to attend groups but does not stay long and paces   Continued Stay Criteria/Rationale: bedtime clozaril was increased to 400mg   Medical/Physical: none known   Precautions:   Behavioral Orders   Procedures     Code 1 - Restrict to Unit     Routine Programming       As clinically indicated     Status 15       Every 15 minutes.     Plan: Commit and savage 10/9 at 4:00   Rationale for change in precautions or plan: none         Problem: Patient Care Overview  Goal: Team Discussion  Team Plan:   BEHAVIORAL TEAM DISCUSSION     Participants: Wes Barboza RN, Alesha Loera TRS, Jazzy Sood OT, April Rocío NP, Kim Cardozo NP, Supa Espinoza NP, Carolyn Holloway DCP, [facility]   Progress: good, attempts to attend groups but does not stay long and paces   Continued Stay Criteria/Rationale: bedtime clozaril was increased to 400mg   Medical/Physical: none known   Precautions:   Behavioral Orders   Procedures     Code 1 - Restrict to Unit     Routine Programming       As clinically indicated     Status 15       Every 15 minutes.     Plan: Commit and savage 10/9 at 4:00   Rationale for change in precautions or plan: none

## 2017-09-28 NOTE — PLAN OF CARE
Problem: General Plan of Care (Inpatient Behavioral)  Goal: Individualization/Patient Specific Goal (IP Behavioral)  The patient and/or their representative will achieve their patient-specific goals related to the plan of care.    The patient-specific goals include:   Patient will have an absence or decrease in hallucinations by time of discharge.  Patient will be medication compliant while hospitalized.  Patient will sleep 6 to 9 hours every night,.  Patient will be able to have a reality based conversation prior to discharge.  Patient will be independent with his ADL s while hospitalized and maintain hygiene.     Outcome: No Change  Patient observed resting in his room. Respirations equal and non-labored.

## 2017-09-28 NOTE — PLAN OF CARE
"Problem: General Plan of Care (Inpatient Behavioral)  Goal: Individualization/Patient Specific Goal (IP Behavioral)  The patient and/or their representative will achieve their patient-specific goals related to the plan of care.    The patient-specific goals include:   Patient will have an absence or decrease in hallucinations by time of discharge.  Patient will be medication compliant while hospitalized.  Patient will sleep 6 to 9 hours every night,.  Patient will be able to have a reality based conversation prior to discharge.  Patient will be independent with his ADL s while hospitalized and maintain hygiene.     Outcome: Therapy, progress toward functional goals is gradual  Pt denies SI, HI, hallucinations. Continues to appear responding to internal stimuli. Pt does endorse \"a little\" anxiety. Denies depression. States that his legs are feeling stiff \"probably from staying still all night when I was sleeping.\" Declines need for PRN. Pt continues to pace the unit. Smiles at times but affect is mostly blunted and flat. Appears disheveled and untidy. States that he wants to \"get out of here and go to my house.\" Pt does enter the group room but does not sit down or participate in groups. Interacts minimally with peers. Pt does seem able to track conversation better and is able to answer assessment questions.    1830- Pt's mother here to visit.    2003- Pt took scheduled HS meds with no issue. Pt was educated on increased dose of clozapine. Pt states that he doesn't know why he is on clozapine because \"It's not like I was messed up mentally, I was just physically in and out.\" Pt laughs inappropriately and continues to pace.  "

## 2017-09-28 NOTE — PROGRESS NOTES
"[facility]  Psychiatric Progress Note    Subjective   Still pacing. Confined now. Commitment hearing on Monday 10/9 at 4pm. Mother was present and did request to speak to a provider as she would like to be including in discussions about his treatment. Attempted to call her this afternoon. No answer. The last time we spoke she insisted that I place him on Potassium for his mental illness.     Clozapine increased last night. Does not appear to be having any adverse effects from this. He does smile and is polite but answers my questions illogically. Nursing reported that he was complaining of having some calf pain. There was no signs of dystonia upon assessment. Suspect it has to do with chronic pacing on hard floors in socks. He denies pain when I ask. He did shower recently - nursing notes indicate after lunch and then he attended a group. Tells me he is sleeping and \"all is a-ok.\"     10 point ROS attempted - no complaints       DIagnoses:      Paranoid Schizophrenia w/ capgras delusions    Attestation:  Patient has been seen and evaluated by me, Supa Espinoza NP          Interim History:   The patient's care was discussed with the treatment team and chart notes were reviewed.          Medications:       cloZAPine  400 mg Oral At Bedtime     cloNIDine  0.1 mg Oral BID     cloZAPine  200 mg Oral Daily     cholecalciferol  2,000 Units Oral Daily     famotidine  20 mg Oral BID     clotrimazole, benztropine, hydrOXYzine, acetaminophen, alum & mag hydroxide-simethicone, magnesium hydroxide, traZODone, OLANZapine **OR** OLANZapine, nicotine polacrilex          Allergies:     Allergies   Allergen Reactions     Pcn [Bicillin C-R,] Rash     Provider wants to try amoxicillin(benadryl ordered as well) 9/10/16     Bupropion Other (See Comments)     delusions      Depakote [Valproic Acid] Other (See Comments)     Heart racing     Divalproex Sodium-Fd&C Red #40      Increase heart rate            Psychiatric " Examination:   /62  Pulse 88  Temp 97.3  F (36.3  C) (Tympanic)  Resp 17  Ht 1.829 m (6')  Wt 105.1 kg (231 lb 11.2 oz)  SpO2 96%  BMI 31.42 kg/m2  Weight is 231 lbs 11.2 oz  Body mass index is 31.42 kg/(m^2).    Appearance:  Awake, alert, better grooming - appears to just have showered.  Attitude: pleasant  Eye Contact:  fair  Mood: good  Affect:  Mood congruent  Speech:  Clear, often illogical  Psychomotor Behavior:  no evidence of tardive dyskinesia, dystonia, or tics, pacing  Thought Process:  Generally illogical  Associations:  loosening of associations present  Thought Content:  no evidence of suicidal ideation or homicidal ideation, patient appears to be responding to internal stimuli and preoccupied  Insight:  limited  Judgment:  limited  Oriented to:  time, person, and place  Attention Span and Concentration:  limited  Recent and Remote Memory:  limited  Fund of Knowledge: delayed  Muscle Strength and Tone: normal  Gait and Station: Normal         Labs:   Cotninue HS clozapine 400mg. Consider increasing tomorrow night. Will continue to increase until beneficial, or max daily dosing is achieved. Ramirez filed with additional options included as back up.   Confinement hearing today.    Assessment and Plan:  Petition for commitment and Ramirez due to ongoing lack of insight and response to treatment    Estimated LOS:  Clozapine continues to be titrated up. When he was last discharged, he was on a dose of 350mg at bed and 200mg in the AM. We have reached this dose with no evidence of improvement. Will continue to increase. Remains disorganized and lacking to capacity to even care for immediate needs or ensure physical safety.

## 2017-09-28 NOTE — PLAN OF CARE
Face to face end of shift report received from Jeison GRAY. Rounding completed. Patient observed in bed appearing to sleep.    Wes Barboza  9/28/2017  7:45 AM

## 2017-09-29 PROCEDURE — 25000132 ZZH RX MED GY IP 250 OP 250 PS 637: Performed by: NURSE PRACTITIONER

## 2017-09-29 PROCEDURE — S0136 CLOZAPINE, 25 MG: HCPCS | Performed by: NURSE PRACTITIONER

## 2017-09-29 PROCEDURE — 12400000 ZZH R&B MH

## 2017-09-29 PROCEDURE — 99232 SBSQ HOSP IP/OBS MODERATE 35: CPT | Performed by: NURSE PRACTITIONER

## 2017-09-29 RX ADMIN — NICOTINE POLACRILEX 4 MG: 2 GUM, CHEWING ORAL at 12:16

## 2017-09-29 RX ADMIN — CLOZAPINE 400 MG: 100 TABLET ORAL at 20:18

## 2017-09-29 RX ADMIN — CLONIDINE HYDROCHLORIDE 0.1 MG: 0.1 TABLET ORAL at 20:18

## 2017-09-29 RX ADMIN — NICOTINE POLACRILEX 4 MG: 2 GUM, CHEWING ORAL at 18:25

## 2017-09-29 RX ADMIN — VITAMIN D, TAB 1000IU (100/BT) 2000 UNITS: 25 TAB at 08:19

## 2017-09-29 RX ADMIN — CLOZAPINE 200 MG: 100 TABLET ORAL at 08:20

## 2017-09-29 RX ADMIN — FAMOTIDINE 20 MG: 20 TABLET ORAL at 08:20

## 2017-09-29 RX ADMIN — FAMOTIDINE 20 MG: 20 TABLET ORAL at 20:18

## 2017-09-29 RX ADMIN — CLONIDINE HYDROCHLORIDE 0.1 MG: 0.1 TABLET ORAL at 08:20

## 2017-09-29 ASSESSMENT — ACTIVITIES OF DAILY LIVING (ADL)
ORAL_HYGIENE: PROMPTS;INDEPENDENT
DRESS: INDEPENDENT;SCRUBS (BEHAVIORAL HEALTH)
GROOMING: PROMPTS;INDEPENDENT
LAUNDRY: UNABLE TO COMPLETE

## 2017-09-29 NOTE — PLAN OF CARE
Problem: Discharge Planning  Goal: Discharge Planning (Adult, OB, Behavioral, Peds)  Writer sent referral for ACT team in- [facility] called to get more info about the pt and will send over some ROIs for any providers pt has seen.

## 2017-09-29 NOTE — PLAN OF CARE
Face to face end of shift report received from Nicolette GRAY. Rounding completed. Patient observed in group room and introduced to nursing for the shift.  Wes Barboza  9/29/2017  3:43 PM

## 2017-09-29 NOTE — PLAN OF CARE
Face to face end of shift report received from Fadumo GOMEZ RN. Rounding completed. Patient observed in bed resting.     Nicolette Elam  9/29/2017  7:30 AM

## 2017-09-29 NOTE — PLAN OF CARE
Problem: General Plan of Care (Inpatient Behavioral)  Goal: Individualization/Patient Specific Goal (IP Behavioral)  The patient and/or their representative will achieve their patient-specific goals related to the plan of care.    The patient-specific goals include:   Patient will have an absence or decrease in hallucinations by time of discharge.  Patient will be medication compliant while hospitalized.  Patient will sleep 6 to 9 hours every night,.  Patient will be able to have a reality based conversation prior to discharge.  Patient will be independent with his ADL s while hospitalized and maintain hygiene.     Pt appears to be sleeping in bed with eyes closed and having regular respirations. 15 minutes and PRN safety checks completed with no noted complains. Will continue to monitor.      MIKEY FITZPATRICK  9/29/2017  5:38 AM

## 2017-09-29 NOTE — PROGRESS NOTES
"[facility]  Psychiatric Progress Note    Subjective     Miles is still in bed this morning when I see him. He tells me that he is \"feeling good\", though is also feeling tired and just wants to sleep. His Clozapine was increased again 2 days ago which may be contributing. He reports no notable side effects and denies any calf pain, which was noted yesterday. I did observe him later in the day sitting at the table in group. He is now confined.      10 point ROS reviewed - reports no current concerns       DIagnoses:      Paranoid Schizophrenia w/ capgras delusions    Attestation:  Patient has been seen and evaluated by me, Kim Cardozo NP          Interim History:   The patient's care was discussed with the treatment team and chart notes were reviewed.          Medications:       cloZAPine  400 mg Oral At Bedtime     cloNIDine  0.1 mg Oral BID     cloZAPine  200 mg Oral Daily     cholecalciferol  2,000 Units Oral Daily     famotidine  20 mg Oral BID     clotrimazole, benztropine, hydrOXYzine, acetaminophen, alum & mag hydroxide-simethicone, magnesium hydroxide, traZODone, OLANZapine **OR** OLANZapine, nicotine polacrilex          Allergies:     Allergies   Allergen Reactions     Pcn [Bicillin C-R,] Rash     Provider wants to try amoxicillin(benadryl ordered as well) 9/10/16     Bupropion Other (See Comments)     delusions      Depakote [Valproic Acid] Other (See Comments)     Heart racing     Divalproex Sodium-Fd&C Red #40      Increase heart rate            Psychiatric Examination:   /83  Pulse 110  Temp 98.8  F (37.1  C) (Tympanic)  Resp 16  Ht 1.829 m (6')  Wt 105.1 kg (231 lb 11.2 oz)  SpO2 97%  BMI 31.42 kg/m2  Weight is 231 lbs 11.2 oz  Body mass index is 31.42 kg/(m^2).    Appearance: awake, alert, dressed in hospital scrubs, somewhat disheveled  Attitude: pleasant though dismissive  Eye Contact:  fair  Mood: reports that he is \"feeling good\"  Affect:  euthymic  Speech:  Regular " rate and rhythm, offers little  Psychomotor Behavior:  no evidence of tardive dyskinesia, dystonia, or tics, pacing  Thought Process: illogical at times  Associations:  loosening of associations present  Thought Content:  no evidence of suicidal ideation or homicidal ideation, patient appears to be responding to internal stimuli and preoccupied  Insight:  limited  Judgment:  limited  Oriented to:  time, person, and place  Attention Span and Concentration:  limited  Recent and Remote Memory:  limited  Fund of Knowledge: delayed  Muscle Strength and Tone: normal  Gait and Station: Normal         Labs:   No results found for this or any previous visit (from the past 24 hour(s)).       Assessment and Plan:  Continue Clozapine. Consider increasing this weekend. Will continue to increase until beneficial, or max daily dosing is achieved.    He is now confined, commitment/savage hearing on Oct. 9th        Estimated LOS: Unknown. Clozapine continues to be titrated up. Petition for commitment and savage filed due to lack of improvement.

## 2017-09-29 NOTE — PLAN OF CARE
Problem: General Plan of Care (Inpatient Behavioral)  Goal: Individualization/Patient Specific Goal (IP Behavioral)  The patient and/or their representative will achieve their patient-specific goals related to the plan of care.    The patient-specific goals include:   Patient will have an absence or decrease in hallucinations by time of discharge.  Patient will be medication compliant while hospitalized.  Patient will sleep 6 to 9 hours every night,.  Patient will be able to have a reality based conversation prior to discharge.  Patient will be independent with his ADL s while hospitalized and maintain hygiene.     Outcome: No Change  Pt. Denies HI, SI, anxiety, depression, hallucinations, and pain. Pt. Reports he will update staff to thoughts feelings of wanting to harm self or others. PT. Coopertive with nursing assessment and medications. Pt. Withdrawn and isolating to room this am. Pt. Disheveled, encouraged to shower. Pt. Eating 50% of breakfast and 75% of lunch. Denies any issues with bowel and bladder. Pt. Up later in the shift out to [facility] pacing in tenorio. Pt. Mood calm and cooperative at this time.

## 2017-09-29 NOTE — PLAN OF CARE
Face to face end of shift report received from MARINA Gonzalez. Rounding completed. Patient observed resting in bed.     MIKEY FITZPATRICK  9/28/2017  11:57 PM

## 2017-09-29 NOTE — PLAN OF CARE
Problem: General Plan of Care (Inpatient Behavioral)  Goal: Team Discussion  Team Plan:   BEHAVIORAL TEAM DISCUSSION     Participants: Kim Cardozo NP, Benita Ayala NP, Tish LEMON, Carolyn Holloway Discharge Plannnolayinka, Samina Ponce RN.  [facility] OT.  Progress: Fair- still responding to internal stimuli  Continued Stay Criteria/Rationale: Medications adjustments, increase in clozaril- monitor for effectiveness and side effects  Medical/Physical: none  Precautions:   Behavioral Orders   Procedures     Code 1 - Restrict to Unit     Routine Programming       As clinically indicated     Status 15       Every 15 minutes.     Plan: Confinement court yesterday. Commitment on the 9th. Referral sent to the ACT team.   Rationale for change in precautions or plan: none        Problem: Patient Care Overview  Goal: Team Discussion  Team Plan:   BEHAVIORAL TEAM DISCUSSION     Participants: Kim Cardozo NP, Benita Ayala NP, Tish LEMON, Carolyn Clement Planglen, Samina Ponce RN.  Jazzy Sood OT.  Progress: Fair- still responding to internal stimuli  Continued Stay Criteria/Rationale: Medications adjustments, increase in clozaril- monitor for effectiveness and side effects  Medical/Physical: none  Precautions:   Behavioral Orders   Procedures     Code 1 - Restrict to Unit     Routine Programming       As clinically indicated     Status 15       Every 15 minutes.     Plan: Confinement court yesterday. Commitment on the 9th. Referral sent to the ACT team.   Rationale for change in precautions or plan: none

## 2017-09-30 PROCEDURE — 25000132 ZZH RX MED GY IP 250 OP 250 PS 637: Performed by: NURSE PRACTITIONER

## 2017-09-30 PROCEDURE — S0136 CLOZAPINE, 25 MG: HCPCS | Performed by: NURSE PRACTITIONER

## 2017-09-30 PROCEDURE — 99232 SBSQ HOSP IP/OBS MODERATE 35: CPT | Performed by: NURSE PRACTITIONER

## 2017-09-30 PROCEDURE — 12400000 ZZH R&B MH

## 2017-09-30 RX ADMIN — FAMOTIDINE 20 MG: 20 TABLET ORAL at 20:20

## 2017-09-30 RX ADMIN — CLOZAPINE 200 MG: 100 TABLET ORAL at 09:06

## 2017-09-30 RX ADMIN — CLONIDINE HYDROCHLORIDE 0.1 MG: 0.1 TABLET ORAL at 09:06

## 2017-09-30 RX ADMIN — NICOTINE POLACRILEX 4 MG: 2 GUM, CHEWING ORAL at 12:58

## 2017-09-30 RX ADMIN — CLOZAPINE 400 MG: 100 TABLET ORAL at 20:20

## 2017-09-30 RX ADMIN — FAMOTIDINE 20 MG: 20 TABLET ORAL at 09:06

## 2017-09-30 RX ADMIN — CLONIDINE HYDROCHLORIDE 0.1 MG: 0.1 TABLET ORAL at 20:20

## 2017-09-30 RX ADMIN — NICOTINE POLACRILEX 4 MG: 2 GUM, CHEWING ORAL at 19:36

## 2017-09-30 RX ADMIN — VITAMIN D, TAB 1000IU (100/BT) 2000 UNITS: 25 TAB at 09:06

## 2017-09-30 ASSESSMENT — ACTIVITIES OF DAILY LIVING (ADL)
GROOMING: PROMPTS;INDEPENDENT
GROOMING: INDEPENDENT
LAUNDRY: UNABLE TO COMPLETE
LAUNDRY: UNABLE TO COMPLETE
ORAL_HYGIENE: INDEPENDENT
ORAL_HYGIENE: PROMPTS;INDEPENDENT
DRESS: SCRUBS (BEHAVIORAL HEALTH);INDEPENDENT
DRESS: INDEPENDENT;SCRUBS (BEHAVIORAL HEALTH)

## 2017-09-30 NOTE — PLAN OF CARE
Problem: General Plan of Care (Inpatient Behavioral)  Goal: Individualization/Patient Specific Goal (IP Behavioral)  The patient and/or their representative will achieve their patient-specific goals related to the plan of care.    The patient-specific goals include:   Patient will have an absence or decrease in hallucinations by time of discharge.  Patient will be medication compliant while hospitalized.  Patient will sleep 6 to 9 hours every night,.  Patient will be able to have a reality based conversation prior to discharge.  Patient will be independent with his ADL s while hospitalized and maintain hygiene.     Pt has been in bed with eyes closed and regular respirations observed all night. Will continue to monitor.

## 2017-09-30 NOTE — PLAN OF CARE
Problem: General Plan of Care (Inpatient Behavioral)  Goal: Individualization/Patient Specific Goal (IP Behavioral)  The patient and/or their representative will achieve their patient-specific goals related to the plan of care.    The patient-specific goals include:   Patient will have an absence or decrease in hallucinations by time of discharge.  Patient will be medication compliant while hospitalized.  Patient will sleep 6 to 9 hours every night,.  Patient will be able to have a reality based conversation prior to discharge.  Patient will be independent with his ADL s while hospitalized and maintain hygiene.     Outcome: Improving  Pt has been up and active this shift. He has done a great deal of pacing today. He told me that his muscles are feeling pretty good because he has been stretching off and on all day.  He denies hearing voices or feeling paranoid. He says that he feels pretty good today.  Pt was not able to sit still in the afternoon groups today.  He goes into groups and interacts some but struggles to sit still.

## 2017-09-30 NOTE — PLAN OF CARE
Problem: General Plan of Care (Inpatient Behavioral)  Goal: Individualization/Patient Specific Goal (IP Behavioral)  The patient and/or their representative will achieve their patient-specific goals related to the plan of care.    The patient-specific goals include:   Patient will have an absence or decrease in hallucinations by time of discharge.  Patient will be medication compliant while hospitalized.  Patient will sleep 6 to 9 hours every night,.  Patient will be able to have a reality based conversation prior to discharge.  Patient will be independent with his ADL s while hospitalized and maintain hygiene.     Outcome: Improving  Pt has been up and active this shift pacing often.  Pt attended all of the available groups though had a hard time sitting still. He talked with me about having some periods of dizziness when he moves from sitting or laying to standing. He was given education about orthostatic hypertension and encouraged to get up slowly.  Pt continues to have some delusional thought content but denies hearing voices. His affect is blunted some but he does laugh and joke at times. He talked with me about liking to take walks in the woods at home.

## 2017-09-30 NOTE — PLAN OF CARE
Face to face end of shift report received from Ciro GRAY. Rounding completed. Patient observed pacing hallway and introduced to nursing for the shift.    Wes Barboza  9/30/2017  4:12 PM

## 2017-09-30 NOTE — PROGRESS NOTES
"[facility]  Psychiatric Progress Note    Subjective     Miles is up walking in the halls when I see him today. He tells me that he is \"hanging out, just chillin\". He does tell me that he is feeling \"better\". He notes that he \"used to feel stressed all the time but now I am thankful for feeling better\". He is aware of his commitment and questions where his second hearing will take place. He does know that his next hearing is on the 9th. He seems to be able to track better in conversation today, though does have times where he will talk about \"all of my houses\" and his \"other family\". He has been medication compliant and denies any notable side effects. He had been notably more tired yesterday, though states that this is better today.     10 point ROS reviewed - reports no current concerns       DIagnoses:      Paranoid Schizophrenia w/ capgras delusions    Attestation:  Patient has been seen and evaluated by me, Kim Cardozo NP          Interim History:   The patient's care was discussed with the treatment team and chart notes were reviewed.          Medications:       cloZAPine  400 mg Oral At Bedtime     cloNIDine  0.1 mg Oral BID     cloZAPine  200 mg Oral Daily     cholecalciferol  2,000 Units Oral Daily     famotidine  20 mg Oral BID     clotrimazole, benztropine, hydrOXYzine, acetaminophen, alum & mag hydroxide-simethicone, magnesium hydroxide, traZODone, OLANZapine **OR** OLANZapine, nicotine polacrilex          Allergies:     Allergies   Allergen Reactions     Pcn [Bicillin C-R,] Rash     Provider wants to try amoxicillin(benadryl ordered as well) 9/10/16     Bupropion Other (See Comments)     delusions      Depakote [Valproic Acid] Other (See Comments)     Heart racing     Divalproex Sodium-Fd&C Red #40      Increase heart rate            Psychiatric Examination:   /68  Pulse 87  Temp 97.6  F (36.4  C) (Tympanic)  Resp 16  Ht 1.829 m (6')  Wt 105.1 kg (231 lb 11.2 oz)  SpO2 98% " " BMI 31.42 kg/m2  Weight is 231 lbs 11.2 oz  Body mass index is 31.42 kg/(m^2).    Appearance: awake, alert, dressed in hospital scrubs and somewhat disheveled  Attitude: pleasant, mostly cooperative  Eye Contact: fair  Mood: reports that he is feeling \"better\"  Affect:  variable  Speech:  Regular rate and rhythm, more talkative today  Psychomotor Behavior:  no evidence of tardive dyskinesia, dystonia, or tics, pacing  Thought Process: illogical at times, though does have periods of clearing  Associations:  loosening of associations present  Thought Content:  no evidence of suicidal ideation or homicidal ideation, patient appears to be responding to internal stimuli and preoccupied  Insight:  limited  Judgment:  limited  Oriented to:  time, person, and place  Attention Span and Concentration:  limited  Recent and Remote Memory:  limited  Fund of Knowledge: delayed  Muscle Strength and Tone: normal  Gait and Station: Normal         Labs:   No results found for this or any previous visit (from the past 24 hour(s)).       Assessment and Plan:  CBC and Clozapine level to be drawn in AM  Continue Clozapine. Consider increasing this weekend. Will continue to increase until beneficial, or max daily dosing is achieved.    He is now confined, commitment/savage hearing on Oct. 9th        Estimated LOS: Unknown. Clozapine continues to be titrated up. Petition for commitment and savage filed due to lack of improvement.     "

## 2017-09-30 NOTE — PLAN OF CARE
Problem: General Plan of Care (Inpatient Behavioral)  Goal: Individualization/Patient Specific Goal (IP Behavioral)  The patient and/or their representative will achieve their patient-specific goals related to the plan of care.    The patient-specific goals include:   Patient will have an absence or decrease in hallucinations by time of discharge.  Patient will be medication compliant while hospitalized.  Patient will sleep 6 to 9 hours every night,.  Patient will be able to have a reality based conversation prior to discharge.  Patient will be independent with his ADL s while hospitalized and maintain hygiene.     Outcome: Improving  Pt denies currently hallucinations this shift. Pt has been med compliant, Pt states sleep has been good. Pt has had reality based conversation this shift. Pt has been encouraged to shower but has not taken a shower thus far this shift. Will continue to monitor hygiene and encourage. Pt up and pacing much of the shift. Pt is cooperative and calm.

## 2017-09-30 NOTE — PLAN OF CARE
Face to face end of shift report received from Wes GOMEZ RN. Rounding completed. Patient observed in bed, appears asleep, respirations observed.     Carly Davalos  9/29/2017  11:31 PM

## 2017-10-01 LAB
BASOPHILS # BLD AUTO: 0.1 10E9/L (ref 0–0.2)
BASOPHILS NFR BLD AUTO: 1 %
DIFFERENTIAL METHOD BLD: NORMAL
EOSINOPHIL # BLD AUTO: 0.2 10E9/L (ref 0–0.7)
EOSINOPHIL NFR BLD AUTO: 3 %
ERYTHROCYTE [DISTWIDTH] IN BLOOD BY AUTOMATED COUNT: 12.7 % (ref 10–15)
HCT VFR BLD AUTO: 45.4 % (ref 40–53)
HGB BLD-MCNC: 15.4 G/DL (ref 13.3–17.7)
IMM GRANULOCYTES # BLD: 0 10E9/L (ref 0–0.4)
IMM GRANULOCYTES NFR BLD: 0.2 %
LYMPHOCYTES # BLD AUTO: 2 10E9/L (ref 0.8–5.3)
LYMPHOCYTES NFR BLD AUTO: 31.8 %
MCH RBC QN AUTO: 29.3 PG (ref 26.5–33)
MCHC RBC AUTO-ENTMCNC: 33.9 G/DL (ref 31.5–36.5)
MCV RBC AUTO: 86 FL (ref 78–100)
MONOCYTES # BLD AUTO: 0.8 10E9/L (ref 0–1.3)
MONOCYTES NFR BLD AUTO: 12.6 %
NEUTROPHILS # BLD AUTO: 3.2 10E9/L (ref 1.6–8.3)
NEUTROPHILS NFR BLD AUTO: 51.4 %
NRBC # BLD AUTO: 0 10*3/UL
NRBC BLD AUTO-RTO: 0 /100
PLATELET # BLD AUTO: 303 10E9/L (ref 150–450)
RBC # BLD AUTO: 5.26 10E12/L (ref 4.4–5.9)
WBC # BLD AUTO: 6.3 10E9/L (ref 4–11)

## 2017-10-01 PROCEDURE — S0136 CLOZAPINE, 25 MG: HCPCS | Performed by: NURSE PRACTITIONER

## 2017-10-01 PROCEDURE — 36415 COLL VENOUS BLD VENIPUNCTURE: CPT | Performed by: NURSE PRACTITIONER

## 2017-10-01 PROCEDURE — 25000132 ZZH RX MED GY IP 250 OP 250 PS 637: Performed by: NURSE PRACTITIONER

## 2017-10-01 PROCEDURE — 80159 DRUG ASSAY CLOZAPINE: CPT | Performed by: NURSE PRACTITIONER

## 2017-10-01 PROCEDURE — 99232 SBSQ HOSP IP/OBS MODERATE 35: CPT | Performed by: NURSE PRACTITIONER

## 2017-10-01 PROCEDURE — 12400000 ZZH R&B MH

## 2017-10-01 PROCEDURE — 85025 COMPLETE CBC W/AUTO DIFF WBC: CPT | Performed by: NURSE PRACTITIONER

## 2017-10-01 RX ADMIN — CLONIDINE HYDROCHLORIDE 0.1 MG: 0.1 TABLET ORAL at 20:25

## 2017-10-01 RX ADMIN — CLONIDINE HYDROCHLORIDE 0.1 MG: 0.1 TABLET ORAL at 08:24

## 2017-10-01 RX ADMIN — VITAMIN D, TAB 1000IU (100/BT) 2000 UNITS: 25 TAB at 08:24

## 2017-10-01 RX ADMIN — FAMOTIDINE 20 MG: 20 TABLET ORAL at 08:22

## 2017-10-01 RX ADMIN — CLOZAPINE 425 MG: 100 TABLET ORAL at 20:25

## 2017-10-01 RX ADMIN — FAMOTIDINE 20 MG: 20 TABLET ORAL at 20:25

## 2017-10-01 RX ADMIN — NICOTINE POLACRILEX 4 MG: 2 GUM, CHEWING ORAL at 09:15

## 2017-10-01 RX ADMIN — CLOZAPINE 200 MG: 100 TABLET ORAL at 08:23

## 2017-10-01 RX ADMIN — NICOTINE POLACRILEX 4 MG: 2 GUM, CHEWING ORAL at 19:07

## 2017-10-01 ASSESSMENT — ACTIVITIES OF DAILY LIVING (ADL)
DRESS: SCRUBS (BEHAVIORAL HEALTH);INDEPENDENT
ORAL_HYGIENE: INDEPENDENT;PROMPTS
GROOMING: INDEPENDENT;PROMPTS
GROOMING: INDEPENDENT
DRESS: SCRUBS (BEHAVIORAL HEALTH)
ORAL_HYGIENE: INDEPENDENT
LAUNDRY: UNABLE TO COMPLETE

## 2017-10-01 NOTE — PLAN OF CARE
Problem: General Plan of Care (Inpatient Behavioral)  Goal: Individualization/Patient Specific Goal (IP Behavioral)  The patient and/or their representative will achieve their patient-specific goals related to the plan of care.    The patient-specific goals include:   Patient will have an absence or decrease in hallucinations by time of discharge.  Patient will be medication compliant while hospitalized.  Patient will sleep 6 to 9 hours every night,.  Patient will be able to have a reality based conversation prior to discharge.  Patient will be independent with his ADL s while hospitalized and maintain hygiene.     Outcome: Improving  Pt has been in bed with eyes closed and regular respirations observed all night. Lab here this morning, patient allowed draw. Will continue to monitor.

## 2017-10-01 NOTE — PLAN OF CARE
"Problem: General Plan of Care (Inpatient Behavioral)  Goal: Individualization/Patient Specific Goal (IP Behavioral)  The patient and/or their representative will achieve their patient-specific goals related to the plan of care.    The patient-specific goals include:   Patient will have an absence or decrease in hallucinations by time of discharge.  Patient will be medication compliant while hospitalized.  Patient will sleep 6 to 9 hours every night,.  Patient will be able to have a reality based conversation prior to discharge.  Patient will be independent with his ADL s while hospitalized and maintain hygiene.     Pt has been up on the unit, pacing in the tenorio and about the lobby, much of the shift. Wanders in and out of groups. Less hostile, than previous, on assessment. Still offers little information. Denied depression and anxiety--and tells this writer \"I've been in worse predicaments before.\" Eye contact remains poor and pt continuously mumbles to himself under his breath. Continues to appear preoccupied and as if he is responding to internal stimuli. Tells this writer that he slept \"okay.\" Took scheduled morning medications without issue.      "

## 2017-10-01 NOTE — PLAN OF CARE
Face to face end of shift report received from VINOD Davalos RN. Rounding completed. Patient observed, up on unit for breakfast meal.     Gabino Stevens  10/1/2017  8:03 AM

## 2017-10-01 NOTE — PLAN OF CARE
Face to face end of shift report received from Wes GOMEZ RN. Rounding completed. Patient observed in bed, appears asleep, respirations observed.     Carly Davalos  9/30/2017  11:39 PM

## 2017-10-02 PROCEDURE — 25000132 ZZH RX MED GY IP 250 OP 250 PS 637: Performed by: NURSE PRACTITIONER

## 2017-10-02 PROCEDURE — 99232 SBSQ HOSP IP/OBS MODERATE 35: CPT | Performed by: NURSE PRACTITIONER

## 2017-10-02 PROCEDURE — S0136 CLOZAPINE, 25 MG: HCPCS | Performed by: NURSE PRACTITIONER

## 2017-10-02 PROCEDURE — 12400000 ZZH R&B MH

## 2017-10-02 RX ADMIN — FAMOTIDINE 20 MG: 20 TABLET ORAL at 20:42

## 2017-10-02 RX ADMIN — CLONIDINE HYDROCHLORIDE 0.1 MG: 0.1 TABLET ORAL at 08:10

## 2017-10-02 RX ADMIN — NICOTINE POLACRILEX 4 MG: 2 GUM, CHEWING ORAL at 13:31

## 2017-10-02 RX ADMIN — CLOZAPINE 425 MG: 100 TABLET ORAL at 20:42

## 2017-10-02 RX ADMIN — CLONIDINE HYDROCHLORIDE 0.1 MG: 0.1 TABLET ORAL at 20:42

## 2017-10-02 RX ADMIN — CLOZAPINE 200 MG: 100 TABLET ORAL at 08:11

## 2017-10-02 RX ADMIN — NICOTINE POLACRILEX 4 MG: 2 GUM, CHEWING ORAL at 08:31

## 2017-10-02 RX ADMIN — VITAMIN D, TAB 1000IU (100/BT) 2000 UNITS: 25 TAB at 08:11

## 2017-10-02 RX ADMIN — NICOTINE POLACRILEX 4 MG: 2 GUM, CHEWING ORAL at 17:19

## 2017-10-02 RX ADMIN — FAMOTIDINE 20 MG: 20 TABLET ORAL at 08:10

## 2017-10-02 RX ADMIN — NICOTINE POLACRILEX 4 MG: 2 GUM, CHEWING ORAL at 20:45

## 2017-10-02 ASSESSMENT — ACTIVITIES OF DAILY LIVING (ADL)
GROOMING: PROMPTS
ORAL_HYGIENE: INDEPENDENT
GROOMING: PROMPTS
DRESS: SCRUBS (BEHAVIORAL HEALTH);INDEPENDENT
LAUNDRY: UNABLE TO COMPLETE
DRESS: SCRUBS (BEHAVIORAL HEALTH);INDEPENDENT

## 2017-10-02 NOTE — PROGRESS NOTES
Face to face end of shift report received from ayleen nava at 2300 report.. Rounding completed. Patient observed.     Hanny Mcintyre  10/2/2017  1:03 AM

## 2017-10-02 NOTE — PLAN OF CARE
Face to face end of shift report received from Chrissy MAX RN. Rounding completed. Patient observed pacing [facility].    Yoselyn Kelly  10/2/2017  5:00 PM

## 2017-10-02 NOTE — PLAN OF CARE
Face to face end of shift report received from Teresa LOMELI RN. Rounding completed. Patient observed in hallway pacing.     Karina Bailey  10/1/2017  7:02 PM

## 2017-10-02 NOTE — PLAN OF CARE
"Problem: Discharge Planning  Goal: Discharge Planning (Adult, OB, Behavioral, Peds)  Writer checked in with pt and asked how it was going?  Pt replied, \"Its going as good as can be expected\" and continued to walk away.  Pt has been pacing the hallway all shift.       "

## 2017-10-02 NOTE — PROGRESS NOTES
"[facility]  Psychiatric Progress Note    Subjective     Miles is up in the lounge today when I see him. He engages with me briefly, stating that he is \"just hanging out\" and feels \"fine\". He then walks away to go get coffee and then walks down the tenorio. It appears he is tolerating the Clozaril and is no longer feeling sedated in the AM. Will increase this tonight by 25 mg. He still appears untidy and unkempt and has needed a lot of prompting to take a shower. He has been sleeping well at night. He will briefly attend group at times.    10 point ROS reviewed - reports no current concerns       DIagnoses:      Paranoid Schizophrenia w/ capgras delusions    Attestation:  Patient has been seen and evaluated by me, Kim Cardozo NP          Interim History:   The patient's care was discussed with the treatment team and chart notes were reviewed.          Medications:       cloZAPine  425 mg Oral At Bedtime     cloNIDine  0.1 mg Oral BID     cloZAPine  200 mg Oral Daily     cholecalciferol  2,000 Units Oral Daily     famotidine  20 mg Oral BID     clotrimazole, benztropine, hydrOXYzine, acetaminophen, alum & mag hydroxide-simethicone, magnesium hydroxide, traZODone, OLANZapine **OR** OLANZapine, nicotine polacrilex          Allergies:     Allergies   Allergen Reactions     Pcn [Bicillin C-R,] Rash     Provider wants to try amoxicillin(benadryl ordered as well) 9/10/16     Bupropion Other (See Comments)     delusions      Depakote [Valproic Acid] Other (See Comments)     Heart racing     Divalproex Sodium-Fd&C Red #40      Increase heart rate            Psychiatric Examination:   /79  Pulse 103  Temp 97.8  F (36.6  C) (Tympanic)  Resp 14  Ht 1.829 m (6')  Wt 104.7 kg (230 lb 12.8 oz)  SpO2 98%  BMI 31.3 kg/m2  Weight is 230 lbs 12.8 oz  Body mass index is 31.3 kg/(m^2).    Appearance: awake, alert, dressed in hospital scrubs, appears untidy  Attitude: pleasant, mostly cooperative  Eye Contact: " "brief  Mood: reports that he is feeling \"better\", denies depression or anxiety  Affect:  variable  Speech:  Regular rate and rhythm, offers little today  Psychomotor Behavior:  no evidence of tardive dyskinesia, dystonia, or tics, pacing  Thought Process: illogical at times, though does have periods of clearing  Associations:  loosening of associations present  Thought Content:  no evidence of suicidal ideation or homicidal ideation, patient appears to be responding to internal stimuli and preoccupied  Insight:  limited  Judgment:  limited  Oriented to:  time, person, and place  Attention Span and Concentration: limited  Recent and Remote Memory: limited  Fund of Knowledge: delayed  Muscle Strength and Tone: normal  Gait and Station: Normal         Labs:     Results for orders placed or performed during the hospital encounter of 08/20/17 (from the past 24 hour(s))   CBC with platelets differential   Result Value Ref Range    WBC 6.3 4.0 - 11.0 10e9/L    RBC Count 5.26 4.4 - 5.9 10e12/L    Hemoglobin 15.4 13.3 - 17.7 g/dL    Hematocrit 45.4 40.0 - 53.0 %    MCV 86 78 - 100 fl    MCH 29.3 26.5 - 33.0 pg    MCHC 33.9 31.5 - 36.5 g/dL    RDW 12.7 10.0 - 15.0 %    Platelet Count 303 150 - 450 10e9/L    Diff Method Automated Method     % Neutrophils 51.4 %    % Lymphocytes 31.8 %    % Monocytes 12.6 %    % Eosinophils 3.0 %    % Basophils 1.0 %    % Immature Granulocytes 0.2 %    Nucleated RBCs 0 0 /100    Absolute Neutrophil 3.2 1.6 - 8.3 10e9/L    Absolute Lymphocytes 2.0 0.8 - 5.3 10e9/L    Absolute Monocytes 0.8 0.0 - 1.3 10e9/L    Absolute Eosinophils 0.2 0.0 - 0.7 10e9/L    Absolute Basophils 0.1 0.0 - 0.2 10e9/L    Abs Immature Granulocytes 0.0 0 - 0.4 10e9/L    Absolute Nucleated RBC 0.0           Assessment and Plan:  CBC and Clozapine level drawn today, CBC is WNL  Increase HS Clozaril to 425 mg. Will continue to increase until beneficial, or max daily dosing is achieved.    He is now confined, commitment/savage " hearing on Oct. 9th        Estimated LOS: Unknown. Clozapine continues to be titrated up. Petition for commitment and savage filed due to lack of improvement.

## 2017-10-02 NOTE — PLAN OF CARE
Problem: General Plan of Care (Inpatient Behavioral)  Goal: Individualization/Patient Specific Goal (IP Behavioral)  The patient and/or their representative will achieve their patient-specific goals related to the plan of care.    The patient-specific goals include:   Patient will have an absence or decrease in hallucinations by time of discharge.  Patient will be medication compliant while hospitalized.  Patient will sleep 6 to 9 hours every night,.  Patient will be able to have a reality based conversation prior to discharge.  Patient will be independent with his ADL s while hospitalized and maintain hygiene.     0045 in bed with eyes closed and respirations easy. Presenting sleeping.[id number] patient continues to sleep at this time.

## 2017-10-02 NOTE — PLAN OF CARE
Problem: General Plan of Care (Inpatient Behavioral)  Goal: Team Discussion  Team Plan:   BEHAVIORAL TEAM DISCUSSION     Participants: Rowan Alford NP, Supa Espinoza NP, Kim Cardozo NP, [facility], Carolyn Holloway Discharge Plannner, Chrissy Stack RN, [facility] Lo[facility], [facility] OT, Destiny Kumar OT   Progress: Fair- still responding to internal stimuli  Continued Stay Criteria/Rationale: Medications adjustments, increase in clozapinel- monitor for effectiveness and side effects  Medical/Physical: none  Precautions:   Behavioral Orders   Procedures     Code 1 - Restrict to Unit     Routine Programming       As clinically indicated     Status 15       Every 15 minutes.     Plan: Commitment court on the 9th. Referral to ACT.   Rationale for change in precautions or plan: none        Problem: Patient Care Overview  Goal: Team Discussion  Team Plan:   BEHAVIORAL TEAM DISCUSSION     Participants: Rowan Alford NP, Supa Espinoza NP, Kim Cardozo NP, [facility], Carolyn Holloway Discharge Plannner, Chrissy Stack RN, Alesha Loera [facility], [facility] OT, Destiny Kumar OT   Progress: Fair- still responding to internal stimuli  Continued Stay Criteria/Rationale: Medications adjustments, increase in clozapinel- monitor for effectiveness and side effects  Medical/Physical: none  Precautions:   Behavioral Orders   Procedures     Code 1 - Restrict to Unit     Routine Programming       As clinically indicated     Status 15       Every 15 minutes.     Plan: Commitment court on the 9th. Referral to ACT.   Rationale for change in precautions or plan: none

## 2017-10-02 NOTE — PLAN OF CARE
Problem: General Plan of Care (Inpatient Behavioral)  Goal: Individualization/Patient Specific Goal (IP Behavioral)  The patient and/or their representative will achieve their patient-specific goals related to the plan of care.    The patient-specific goals include:   Patient will have an absence or decrease in hallucinations by time of discharge.  Patient will be medication compliant while hospitalized.  Patient will sleep 6 to 9 hours every night,.  Patient will be able to have a reality based conversation prior to discharge.  Patient will be independent with his ADL s while hospitalized and maintain hygiene.     Outcome: Improving  Patient has been calm, cooperative, and med compliant this shift.  He has been up pacing much of the shift but did attend some group programing.  He was friendly and polite when talking to staff.  Denies all mental health issues.  No complaints of pain.  VS WNL.  Will continue to monitor.

## 2017-10-02 NOTE — PROGRESS NOTES
"[facility]  Psychiatric Progress Note    Subjective     Miles is lying in bed when we speak. He tells me that he is feeling tired today. Denies any issues and tells me that he has \"nothing I want to talk about.\" He has been polite on the unit with peers and staff. Continues to deny mental health symptoms. Sleeping and eating well. Continues to have poor hygiene. Notably malodorous today.     10 point ROS negative.       DIagnoses:      Paranoid Schizophrenia w/ capgras delusions    Attestation:  Patient has been seen and evaluated by me, Supa Espinoza NP          Interim History:   The patient's care was discussed with the treatment team and chart notes were reviewed.          Medications:       cloZAPine  425 mg Oral At Bedtime     cloNIDine  0.1 mg Oral BID     cloZAPine  200 mg Oral Daily     cholecalciferol  2,000 Units Oral Daily     famotidine  20 mg Oral BID     clotrimazole, benztropine, hydrOXYzine, acetaminophen, alum & mag hydroxide-simethicone, magnesium hydroxide, traZODone, OLANZapine **OR** OLANZapine, nicotine polacrilex          Allergies:     Allergies   Allergen Reactions     Pcn [Bicillin C-R,] Rash     Provider wants to try amoxicillin(benadryl ordered as well) 9/10/16     Bupropion Other (See Comments)     delusions      Depakote [Valproic Acid] Other (See Comments)     Heart racing     Divalproex Sodium-Fd&C Red #40      Increase heart rate            Psychiatric Examination:   /89  Pulse 87  Temp 97.7  F (36.5  C) (Tympanic)  Resp 16  Ht 1.829 m (6')  Wt 104.7 kg (230 lb 12.8 oz)  SpO2 97%  BMI 31.3 kg/m2  Weight is 230 lbs 12.8 oz  Body mass index is 31.3 kg/(m^2).    Appearance: awake but fatigued and in bed  Attitude: primarily cooperative, somewhat dismissive today  Eye Contact: brief  Mood: unremarkable  Affect:  appropriate  Speech:  Regular rate and volume. Brief responses. Not spontaneous.   Psychomotor Behavior:  no evidence of tardive dyskinesia, " dystonia, or tics  Thought Process: illogical at times, though does have periods of clearing  Associations:  loosening of associations present  Thought Content:  no evidence of suicidal ideation or homicidal ideation, patient appears to be responding to internal stimuli and preoccupied  Insight:  limited  Judgment:  limited  Oriented to:  time, person, and place  Attention Span and Concentration: limited  Recent and Remote Memory: limited  Fund of Knowledge: delayed  Muscle Strength and Tone: normal  Gait and Station: Normal         Labs:     No labs today. Clozapine level pending.     Assessment and Plan:  Continue HS Clozaril to 425 mg. Will continue to increase until beneficial, or max daily dosing is achieved.    He is now confined, commitment/savage hearing on Oct. 9th        Estimated LOS: Unknown. Clozapine continues to be titrated up. Petition for commitment and savage filed due to lack of improvement.

## 2017-10-02 NOTE — PLAN OF CARE
Problem: Patient Care Overview  Goal: Individualization & Mutuality  Patient will have an absence or decrease in hallucinations by time of discharge.  Patient will be medication compliant while hospitalized.  Patient will sleep 6 to 9 hours every night,.  Patient will be able to have a reality based conversation prior to discharge.  Patient will be independent with his ADL s while hospitalized and maintain hygiene.  Outcome: Therapy, progress toward functional goals is gradual  Pt accepted his morning medications today. He has gotten up for both meals but has otherwise been in bed most of the day. He has a strong body odor to him and has not showered today. He is heard talking to himself but this is less than previously noted in the past. Pt keeps conversation superficial but is able to track in this type of conversation. He offers minimal eye contact during conversation.

## 2017-10-02 NOTE — PLAN OF CARE
Face to face end of shift report received from Hanny LEOS RN. Rounding completed. Patient observed lying in bed awake.     Chrissy Stack  10/2/2017  7:52 AM

## 2017-10-03 PROCEDURE — 99232 SBSQ HOSP IP/OBS MODERATE 35: CPT | Performed by: NURSE PRACTITIONER

## 2017-10-03 PROCEDURE — 25000132 ZZH RX MED GY IP 250 OP 250 PS 637: Performed by: NURSE PRACTITIONER

## 2017-10-03 PROCEDURE — S0136 CLOZAPINE, 25 MG: HCPCS | Performed by: NURSE PRACTITIONER

## 2017-10-03 PROCEDURE — 12400000 ZZH R&B MH

## 2017-10-03 RX ADMIN — NICOTINE POLACRILEX 4 MG: 2 GUM, CHEWING ORAL at 17:38

## 2017-10-03 RX ADMIN — FAMOTIDINE 20 MG: 20 TABLET ORAL at 20:21

## 2017-10-03 RX ADMIN — NICOTINE POLACRILEX 4 MG: 2 GUM, CHEWING ORAL at 13:09

## 2017-10-03 RX ADMIN — CLONIDINE HYDROCHLORIDE 0.1 MG: 0.1 TABLET ORAL at 20:20

## 2017-10-03 RX ADMIN — CLONIDINE HYDROCHLORIDE 0.1 MG: 0.1 TABLET ORAL at 08:14

## 2017-10-03 RX ADMIN — CLOZAPINE 200 MG: 100 TABLET ORAL at 08:13

## 2017-10-03 RX ADMIN — CLOZAPINE 425 MG: 100 TABLET ORAL at 20:19

## 2017-10-03 RX ADMIN — VITAMIN D, TAB 1000IU (100/BT) 2000 UNITS: 25 TAB at 08:13

## 2017-10-03 RX ADMIN — FAMOTIDINE 20 MG: 20 TABLET ORAL at 08:13

## 2017-10-03 ASSESSMENT — ACTIVITIES OF DAILY LIVING (ADL)
ORAL_HYGIENE: INDEPENDENT
DRESS: SCRUBS (BEHAVIORAL HEALTH);INDEPENDENT
LAUNDRY: UNABLE TO COMPLETE
ORAL_HYGIENE: PROMPTS
GROOMING: PROMPTS
DRESS: INDEPENDENT
GROOMING: PROMPTS

## 2017-10-03 NOTE — PLAN OF CARE
Face to face end of shift report received from Kana LOMELI RN. Rounding completed. Patient observed in the milieu area.    Bailey Sanchez  10/3/2017  3:56 PM

## 2017-10-03 NOTE — PLAN OF CARE
Face to face end of shift report received from Hanny LEOS RN. Rounding completed. Patient observed resting in bed.     Kana Mar  10/3/2017  7:36 AM

## 2017-10-03 NOTE — PLAN OF CARE
"Problem: Patient Care Overview  Goal: Individualization & Mutuality  Patient will have an absence or decrease in hallucinations by time of discharge.  Patient will be medication compliant while hospitalized.  Patient will sleep 6 to 9 hours every night,.  Patient will be able to have a reality based conversation prior to discharge.  Patient will be independent with his ADL s while hospitalized and maintain hygiene.   Outcome: No Change  Patient denies criteria and laughs when asked about hallucinations.  He appears to be responding to internal stimuli.  He is disheveled and has neglected grooming.  Encouraged him to shower and provided him with supplies.  He is compliant with medications but states \"These medications were a different name when I took them before.  They were a different formulary and had different codes.\"  He states \"my last name is Tc. At least right now it is.  I have different last names and it changes.\"  He also states that prior to admission his hand had \"Black stuff seeping out of my hands that I couldn't wash off\".  Patient's mother here at visiting hours.  She states when she was talking to patient one of his hands was red while the other was white.  This was not observed by staff.  Patient continues to pace the hallways throughout the shift and talks to himself.        "

## 2017-10-03 NOTE — PLAN OF CARE
Problem: General Plan of Care (Inpatient Behavioral)  Goal: Team Discussion  Team Plan:   BEHAVIORAL TEAM DISCUSSION     Participants: Kim Cardozo NP, Yvonne Pedersen NP, [facility], Carolyn Holloway Discharge Plannner, Priscilla Henry RN,  Alesha Quevedo [facility], Destiny Oh OT, [facility], Chrissy Stack RN.  Progress: better ability to stay in one place for short amounts of time, but not on topic.  Active delusions.  Continued Stay Criteria/Rationale: Med stabilization and changes post savage on Monday.   Medical/Physical:   Precautions:   Behavioral Orders   Procedures     Code 1 - Restrict to Unit     Routine Programming       As clinically indicated     Status 15       Every 15 minutes.     Plan: Await savage and work on medication changes as appropriate  Rationale for change in precautions or plan:        Problem: Patient Care Overview  Goal: Team Discussion  Team Plan:   BEHAVIORAL TEAM DISCUSSION     Participants: Kim Cardozo NP, Yvonne Pedersen NP, [facility] Briggs [facility], Carolyn Holloway Discharge Plannner, Priscilla Henry RN,  Alesha Quevedo [facility], Destiny Oh OT, [facility], Chrissy Stack RN.  Progress: better ability to stay in one place for short amounts of time, but not on topic.  Active delusions.  Continued Stay Criteria/Rationale: Med stabilization and changes post savage on Monday.   Medical/Physical:   Precautions:   Behavioral Orders   Procedures     Code 1 - Restrict to Unit     Routine Programming       As clinically indicated     Status 15       Every 15 minutes.     Plan: Await savage and work on medication changes as appropriate  Rationale for change in precautions or plan:

## 2017-10-03 NOTE — PLAN OF CARE
Problem: Patient Care Overview  Goal: Individualization & Mutuality  Patient will have an absence or decrease in hallucinations by time of discharge.  Patient will be medication compliant while hospitalized.  Patient will sleep 6 to 9 hours every night,.  Patient will be able to have a reality based conversation prior to discharge.  Patient will be independent with his ADL s while hospitalized and maintain hygiene.   0100 in bed with eyes closed and respirations easy presenting sleeping.[id number] patient continues to sleep at this time.

## 2017-10-03 NOTE — PROGRESS NOTES
Face to face end of shift report received from joslyn nava at 2300 report.. Rounding completed. Patient observed.     Hanny Mcintyre  10/3/2017  1:35 AM

## 2017-10-03 NOTE — PROGRESS NOTES
[facility]  Psychiatric Progress Note    Subjective     Miles is lying in bed when we speak today. He denies any hallucinations today. He does acknowledge having some flashbacks but he does not think they are particularly bothersome at this time. Josafat is somewhat dismissive as he rolls over and covers his head after telling me about his flashbacks.    10 point ROS negative.       DIagnoses:      Paranoid Schizophrenia w/ capgras delusions    Attestation:  Patient has been seen and evaluated by me, Yvonne Pedersen NP          Interim History:   The patient's care was discussed with the treatment team and chart notes were reviewed.          Medications:       cloZAPine  425 mg Oral At Bedtime     cloNIDine  0.1 mg Oral BID     cloZAPine  200 mg Oral Daily     cholecalciferol  2,000 Units Oral Daily     famotidine  20 mg Oral BID     clotrimazole, benztropine, hydrOXYzine, acetaminophen, alum & mag hydroxide-simethicone, magnesium hydroxide, traZODone, OLANZapine **OR** OLANZapine, nicotine polacrilex          Allergies:     Allergies   Allergen Reactions     Pcn [Bicillin C-R,] Rash     Provider wants to try amoxicillin(benadryl ordered as well) 9/10/16     Bupropion Other (See Comments)     delusions      Depakote [Valproic Acid] Other (See Comments)     Heart racing     Divalproex Sodium-Fd&C Red #40      Increase heart rate            Psychiatric Examination:   /77  Pulse 76  Temp 98.4  F (36.9  C) (Tympanic)  Resp 18  Ht 1.829 m (6')  Wt 104.7 kg (230 lb 12.8 oz)  SpO2 95%  BMI 31.3 kg/m2  Weight is 230 lbs 12.8 oz  Body mass index is 31.3 kg/(m^2).    Appearance: awake but fatigued and in bed  Attitude: cooperative but slightly dismissive  Eye Contact: brief  Mood: unremarkable  Affect:  appropriate  Speech:  Regular rate and volume. Brief responses. Not spontaneous.   Psychomotor Behavior:  no evidence of tardive dyskinesia, dystonia, or tics  Thought Process: illogical at times,  though does have periods of clearing  Associations:  loosening of associations present  Thought Content:  no evidence of suicidal ideation or homicidal ideation, patient appears to be responding to internal stimuli and preoccupied  Insight:  limited  Judgment:  limited  Oriented to:  time, person, and place  Attention Span and Concentration: limited  Recent and Remote Memory: limited  Fund of Knowledge: delayed  Muscle Strength and Tone: normal  Gait and Station: Normal         Labs:     No results found for this or any previous visit (from the past 48 hour(s)).     Assessment and Plan:  Continue HS Clozaril to 425 mg. Will continue to increase until beneficial, or max daily dosing is achieved.    He is now confined, commitment/savage hearing on Oct. 9th        Estimated LOS: Unknown. Clozapine continues to be titrated up. Petition for commitment and savage filed due to lack of improvement.

## 2017-10-03 NOTE — PLAN OF CARE
"Problem: Patient Care Overview  Goal: Individualization & Mutuality  Patient will have an absence or decrease in hallucinations by time of discharge.  Patient will be medication compliant while hospitalized.  Patient will sleep 6 to 9 hours every night,.  Patient will be able to have a reality based conversation prior to discharge.  Patient will be independent with his ADL s while hospitalized and maintain hygiene.   Outcome: Therapy, progress toward functional goals is gradual  Pt denied SI, HI, self-harm ideation, anxiety, depression, hallucinations, and delusions. Pt stated, \"I am fine.\" . Pt spent the majority of the shift milieu area, walking up and down the halls, and in and out of the group room. Pt appears to responding to internal stimuli as pt was seen taking to himself and laughing at times. Pts affect is flat and blunted. Pt denied stated he was having pain in his right knee rating this pain as a 2/10, but denied any intervention. Pt was cooperative and pleasant with this nursing assessment, but walked away from this writer during the middle of the conversation. Pt has prominent body odor and was encouraged to shower. Pt stated, \"I will be showering after dinner\" but never did shower. Pt is now seen in the lounge area walking back and forth.      "

## 2017-10-04 PROCEDURE — S0136 CLOZAPINE, 25 MG: HCPCS | Performed by: NURSE PRACTITIONER

## 2017-10-04 PROCEDURE — 25000132 ZZH RX MED GY IP 250 OP 250 PS 637: Performed by: NURSE PRACTITIONER

## 2017-10-04 PROCEDURE — 12400000 ZZH R&B MH

## 2017-10-04 RX ADMIN — CLOZAPINE 200 MG: 100 TABLET ORAL at 08:21

## 2017-10-04 RX ADMIN — VITAMIN D, TAB 1000IU (100/BT) 2000 UNITS: 25 TAB at 08:21

## 2017-10-04 RX ADMIN — CLONIDINE HYDROCHLORIDE 0.1 MG: 0.1 TABLET ORAL at 21:05

## 2017-10-04 RX ADMIN — CLONIDINE HYDROCHLORIDE 0.1 MG: 0.1 TABLET ORAL at 08:21

## 2017-10-04 RX ADMIN — NICOTINE POLACRILEX 4 MG: 2 GUM, CHEWING ORAL at 20:33

## 2017-10-04 RX ADMIN — FAMOTIDINE 20 MG: 20 TABLET ORAL at 21:05

## 2017-10-04 RX ADMIN — NICOTINE POLACRILEX 4 MG: 2 GUM, CHEWING ORAL at 17:24

## 2017-10-04 RX ADMIN — CLOZAPINE 425 MG: 100 TABLET ORAL at 21:05

## 2017-10-04 RX ADMIN — FAMOTIDINE 20 MG: 20 TABLET ORAL at 08:21

## 2017-10-04 RX ADMIN — NICOTINE POLACRILEX 4 MG: 2 GUM, CHEWING ORAL at 12:37

## 2017-10-04 ASSESSMENT — ACTIVITIES OF DAILY LIVING (ADL)
LAUNDRY: UNABLE TO COMPLETE
GROOMING: INDEPENDENT
DRESS: INDEPENDENT;SCRUBS (BEHAVIORAL HEALTH)
GROOMING: PROMPTS
ORAL_HYGIENE: INDEPENDENT
ORAL_HYGIENE: INDEPENDENT
DRESS: INDEPENDENT;SCRUBS (BEHAVIORAL HEALTH)

## 2017-10-04 NOTE — PLAN OF CARE
Problem: Patient Care Overview  Goal: Individualization & Mutuality  Patient will have an absence or decrease in hallucinations by time of discharge.  Patient will be medication compliant while hospitalized.  Patient will sleep 6 to 9 hours every night,.  Patient will be able to have a reality based conversation prior to discharge.  Patient will be independent with his ADL s while hospitalized and maintain hygiene.   Pt denies hallucinations but appears to be responding. Pt has not showered this shift. Pt compliant with medications this shift. Pt continues with blunted, flat affect and continues to be isolative and withdrawn from staff and peers.

## 2017-10-04 NOTE — PLAN OF CARE
Face to face end of shift report received from Zohra GRAY. Rounding completed. Patient observed in bed.     Beth Rand  10/4/2017  7:26 AM

## 2017-10-04 NOTE — PLAN OF CARE
"Problem: Patient Care Overview  Goal: Individualization & Mutuality  Patient will have an absence or decrease in hallucinations by time of discharge.  Patient will be medication compliant while hospitalized.  Patient will sleep 6 to 9 hours every night,.  Patient will be able to have a reality based conversation prior to discharge.  Patient will be independent with his ADL s while hospitalized and maintain hygiene.   Outcome: No Change  Pt denied SI, HI, self-harm ideation, anxiety, depression, hallucinations, and delusions. Pt stated, \"I am doing good. I'm just thinking about all the past bullshit and I'm trying to just let it go. You know, I was a good kid growing up. Maybe I should just go back to all the bullshit. Nah, I ain't going to do that\" while patient was laughing. Pt continued to walk and pace throughout this nursing assessment. Pt appears to responding to internal stimuli as pt was seen taking to himself and laughing at times while pacing up and down the halls and in and out of the group room. Pts affect is flat and blunted. Pt denied pain at this time. Pt was cooperative and pleasant with this nursing assessment. Pt has very obvious body odor and was encouraged to shower - pt said that he will \"later this evening.\"  Pts mother called about getting his Clozaril prior auth-ed because pts mother stated that she assumed that because pt had commitment court on Monday that pt would be discharged. Pts mother was informed of the process. Pt now observed in the group room.    10:48 PM  Pts pulse was running high and pts pulse (116) was taken apical. Pts HR was 106 bpm.  "

## 2017-10-04 NOTE — PLAN OF CARE
Problem: Discharge Planning  Goal: Discharge Planning (Adult, OB, Behavioral, Peds)  Dr. Tovar the independent examiner is coming to see the pt at 9:45 this morning.  Writer called medical records for the disk and faxed the court order to them.

## 2017-10-04 NOTE — PLAN OF CARE
Problem: Patient Care Overview  Goal: Individualization & Mutuality  Patient will have an absence or decrease in hallucinations by time of discharge.  Patient will be medication compliant while hospitalized.  Patient will sleep 6 to 9 hours every night,.  Patient will be able to have a reality based conversation prior to discharge.  Patient will be independent with his ADL s while hospitalized and maintain hygiene.   0100 in bed with eyes closed with easy respirations, presenting sleeping. 0523 patient continues to sleep this shift.

## 2017-10-04 NOTE — PROGRESS NOTES
Face to face end of shift report received from ana luisa nava at 2300 report.. Rounding completed. Patient observed.     Hanny Mcintyre  10/4/2017  1:35 AM      .

## 2017-10-04 NOTE — PROGRESS NOTES
Weight continues as stable. Average po intake % of meals.      RD will continue to monitor.      Negra Adams, RD, LD

## 2017-10-04 NOTE — PLAN OF CARE
Face to face end of shift report received from Beth BROCK RN. Rounding completed. Patient observed in the milieu area.    Bailey Sanchez  10/4/2017  3:42 PM

## 2017-10-04 NOTE — PLAN OF CARE
Problem: General Plan of Care (Inpatient Behavioral)  Goal: Team Discussion  Team Plan:   BEHAVIORAL TEAM DISCUSSION     Participants: Supa Espinoza NP, Kim Cardozo NP, Yvonne Pedersen NP, Amy Briggs [facility], [facility], Priscilla Henry RN,  Chrissy Stack RN, Alesha Loera [facility], [facility] OT, Destiny Stacy OT   Progress: better ability to stay in one place for short amounts of time, but not on topic.  Active delusions.  Continued Stay Criteria/Rationale: Med stabilization and changes post savage on Monday.   Medical/Physical: none known  Precautions:   Behavioral Orders   Procedures     Code 1 - Restrict to Unit     Routine Programming       As clinically indicated     Status 15       Every 15 minutes.     Plan: Await savage and work on medication changes as appropriate. Independent examiner to assess today. Court on Monday.   Rationale for change in precautions or plan: none        Problem: Patient Care Overview  Goal: Team Discussion  Team Plan:   BEHAVIORAL TEAM DISCUSSION     Participants: Supa Espinoza NP, Kim Cardozo NP, Yvonne Pedersen NP, Amy Briggs [facility], [facility], Priscilla Henry RN,  Chrissy Stack RN, Ales[facility] Loera [facility], [facility] OT, Destiny Stacy OT   Progress: better ability to stay in one place for short amounts of time, but not on topic.  Active delusions.  Continued Stay Criteria/Rationale: Med stabilization and changes post savage on Monday.   Medical/Physical: none known  Precautions:   Behavioral Orders   Procedures     Code 1 - Restrict to Unit     Routine Programming       As clinically indicated     Status 15       Every 15 minutes.     Plan: Await savage and work on medication changes as appropriate. Independent examiner to assess today. Court on Monday.   Rationale for change in precautions or plan: none

## 2017-10-05 LAB
CLOZAPINE AND METABOLITES TOTAL: 1118 NG/ML
CLOZAPINE SERPL-MCNC: 690 NG/ML
CLOZAPINE-N-OXIDE QUANT: 141 NG/ML
NORCLOZAPINE SERPL-MCNC: 287 NG/ML

## 2017-10-05 PROCEDURE — 25000132 ZZH RX MED GY IP 250 OP 250 PS 637: Performed by: NURSE PRACTITIONER

## 2017-10-05 PROCEDURE — S0136 CLOZAPINE, 25 MG: HCPCS | Performed by: NURSE PRACTITIONER

## 2017-10-05 PROCEDURE — 99232 SBSQ HOSP IP/OBS MODERATE 35: CPT | Performed by: NURSE PRACTITIONER

## 2017-10-05 PROCEDURE — 12400000 ZZH R&B MH

## 2017-10-05 RX ADMIN — FAMOTIDINE 20 MG: 20 TABLET ORAL at 20:23

## 2017-10-05 RX ADMIN — CLOZAPINE 200 MG: 100 TABLET ORAL at 08:26

## 2017-10-05 RX ADMIN — CLONIDINE HYDROCHLORIDE 0.1 MG: 0.1 TABLET ORAL at 08:26

## 2017-10-05 RX ADMIN — NICOTINE POLACRILEX 4 MG: 2 GUM, CHEWING ORAL at 17:58

## 2017-10-05 RX ADMIN — CLONIDINE HYDROCHLORIDE 0.1 MG: 0.1 TABLET ORAL at 20:23

## 2017-10-05 RX ADMIN — CLOZAPINE 450 MG: 100 TABLET ORAL at 20:23

## 2017-10-05 RX ADMIN — NICOTINE POLACRILEX 4 MG: 2 GUM, CHEWING ORAL at 13:34

## 2017-10-05 RX ADMIN — VITAMIN D, TAB 1000IU (100/BT) 2000 UNITS: 25 TAB at 08:25

## 2017-10-05 RX ADMIN — NICOTINE POLACRILEX 4 MG: 2 GUM, CHEWING ORAL at 10:52

## 2017-10-05 RX ADMIN — FAMOTIDINE 20 MG: 20 TABLET ORAL at 08:26

## 2017-10-05 ASSESSMENT — ACTIVITIES OF DAILY LIVING (ADL)
DRESS: SCRUBS (BEHAVIORAL HEALTH)
ORAL_HYGIENE: PROMPTS;INDEPENDENT
GROOMING: INDEPENDENT;PROMPTS
DRESS: INDEPENDENT;SCRUBS (BEHAVIORAL HEALTH)
GROOMING: INDEPENDENT
LAUNDRY: UNABLE TO COMPLETE
ORAL_HYGIENE: INDEPENDENT

## 2017-10-05 NOTE — PLAN OF CARE
Face to face end of shift report received from Carly GRAY. Rounding completed. Patient observed in bed.     Beth Rand  10/5/2017  7:27 AM

## 2017-10-05 NOTE — PLAN OF CARE
"Problem: Patient Care Overview  Goal: Individualization & Mutuality  Patient will have an absence or decrease in hallucinations by time of discharge.  Patient will be medication compliant while hospitalized.  Patient will sleep 6 to 9 hours every night,.  Patient will be able to have a reality based conversation prior to discharge.  Patient will be independent with his ADL s while hospitalized and maintain hygiene.   Outcome: Therapy, progress toward functional goals is gradual  Pt denied SI, HI, self-harm ideation, anxiety, depression, hallucinations, and delusions. Pt stated, \"I am good, I am up and down. I'm just thinking about things. My brothers were always there and they helped me out.\" Pt reported that he has 2 older brothers and 1 younger brother and the 2 older brothers are  with kids.  Pt appears to responding to internal stimuli as pt was continues to be seen taking to himself and laughing at times while pacing up and down the halls and in and out of the group room. Pts affect is flat and blunted. Pt denied pain at this time. Pt was cooperative and pleasant with this nursing assessment. Pt did shower on day shift and has little to no body odor, but does still look disheveled. Pt is now observed in the group room. Will continue to monitor.    Pts mother called (at 1545) and is upset because she is claiming that she has left several messages for the providers and the  and no one is calling her back. This writer informed the pts mother that she will leave another note for the providers to call her and will leave a note for the  to call. When reviewing the notes, it appears that the  did try to contact pts mother this afternoon and was unable to reach her. If pts mother comes to visit, this writer will inform her of this. Otherwise, a sticky note was sent and a post-it note was left.    8:33 PM  Pts pulse was running high again this evening at 114 bpm. This writer " took his pulse on his left radial and it was 108 bpm.

## 2017-10-05 NOTE — PLAN OF CARE
Face to face end of shift report received from Deanna GREENBERG RN. Rounding completed. Patient observed walking in the hallway.    Bailey Sanchez  10/5/2017  3:51 PM

## 2017-10-05 NOTE — PLAN OF CARE
"Problem: Patient Care Overview  Goal: Individualization & Mutuality  Patient will have an absence or decrease in hallucinations by time of discharge.  Patient will be medication compliant while hospitalized.  Patient will sleep 6 to 9 hours every night,.  Patient will be able to have a reality based conversation prior to discharge.  Patient will be independent with his ADL s while hospitalized and maintain hygiene.   Pt denies having hallucinations, but appears to be responding. Pt is distractible during nursing assessment. Pt medication compliant this shift. Pt does not have much of a conversation with this writer this shift. Writer asked pt about taking a shower this shift, pt said he just took one, reminded pt that he has not taken one for a few days. Pt said he might take on today.Staff was able to get pt to take a shower this morning.  Pt came to nurses station to get Nicotine gum, while writer was getting it pt was talking, asked pt what he had said and pt said \"Oh, I was talking to myself, I'm spun like a rabbit in a basket,\" and then pt walked away.     "

## 2017-10-05 NOTE — PLAN OF CARE
Problem: Discharge Planning  Goal: Discharge Planning (Adult, OB, Behavioral, Peds)  Writer checked in with pt who was sitting in the lounge- pt reported he was resting because he was tired.  Pt denied having any concerns or needs.  Pt's  Fang Garcia came to visit pt. She discussed the commitment process with him.  Writer informed Fang that writer had referred the pt to the ACT team and Fang stated that she felt that would be very good for the pt and she would call the ACT team and recommend that they take pt.  She stated that pt's mother had called her and was not happy that pt was being committed.  Writer told Fang that the independent examiner recommended that he not live with his mother and that when writer spoke with the ACT team that stated that if they took on the pt that they could assist with finding him his own apartment in [place] where they could work closely with him.  Fang thought that was a good idea and mentioned some subsidized housing that had just opened that would be a good place for him to live. Fang will try and make it to pt's commitment hearing on Oct. 9th.

## 2017-10-05 NOTE — PLAN OF CARE
Problem: General Plan of Care (Inpatient Behavioral)  Goal: Team Discussion  Team Plan:   BEHAVIORAL TEAM DISCUSSION     Participants: Supa Espinoza NP, Yvonne Pedersen NP, Benita Ellison OT, [facility], [facility] [place] [facility], Priscilla Henry RN, Dolores Daniel RN, Chrissy Stack RN, [facility], [facility] OT  Progress: Some. Going to groups.   Continued Stay Criteria/Rationale: Continued stabilization. Increase in clozaril  Medical/Physical: None  Precautions:   Behavioral Orders   Procedures     Code 1 - Restrict to Unit     Routine Programming       As clinically indicated     Status 15       Every 15 minutes.     Plan: D/C to IRTS  Rationale for change in precautions or plan: None    Problem: Patient Care Overview  Goal: Team Discussion  Team Plan:   BEHAVIORAL TEAM DISCUSSION     Participants: Supa Espinoza NP, Yvonne Pedersen NP, Benita Ellison OT, Amy Briggs LICSW, [facility], Priscilla Henry RN, Dolores Daniel RN, Chrissy Stack RN, [facility] Therapy, [facility] OT  Progress: Some. Going to groups.   Continued Stay Criteria/Rationale: Continued stabilization. Increase in clozaril  Medical/Physical: None  Precautions:   Behavioral Orders   Procedures     Code 1 - Restrict to Unit     Routine Programming       As clinically indicated     Status 15       Every 15 minutes.     Plan: D/C to IRTS  Rationale for change in precautions or plan: None

## 2017-10-05 NOTE — PLAN OF CARE
Face to face end of shift report received from Bailey GRAY. Rounding completed. Patient observed in bed, appears asleep, respirations observed.     Carly Davalos  10/4/2017  11:55 PM

## 2017-10-05 NOTE — PLAN OF CARE
Problem: Discharge Planning  Goal: Discharge Planning (Adult, OB, Behavioral, Peds)  Pt appears to be getting slightly better- he has been more talkative and sitting in some groups.  Writer had a message from his mother Malika Goode [phone]- wanting to ask writer what the plan was for her son?  Writer attempted to call her back twice but no answer and her voicemail box was not set up.

## 2017-10-05 NOTE — PLAN OF CARE
Problem: Patient Care Overview  Goal: Individualization & Mutuality  Patient will have an absence or decrease in hallucinations by time of discharge.  Patient will be medication compliant while hospitalized.  Patient will sleep 6 to 9 hours every night,.  Patient will be able to have a reality based conversation prior to discharge.  Patient will be independent with his ADL s while hospitalized and maintain hygiene.   Outcome: Improving  Pt has been in bed with eyes closed and regular respirations observed all night. Will continue to monitor.

## 2017-10-05 NOTE — PROGRESS NOTES
[facility]  Psychiatric Progress Note    Subjective     Miles is in bed when I meet with him this morning. He tells me he sleeps well at night but his medications make him tired. Miles tells me he feels as though the clozaril is working better for him here than it was prior to admit. He believes that the stuff he was getting outside of here was different formulary and was not as effective. He admits he still has some negative thoughts but does not have them often anymore  And he does feel his thinking is more clear than when he was admitted.     10 point ROS negative.       DIagnoses:      Paranoid Schizophrenia w/ capgras delusions    Attestation:  Patient has been seen and evaluated by me, Yvonne Pedersen NP          Interim History:   The patient's care was discussed with the treatment team and chart notes were reviewed.          Medications:       cloZAPine  425 mg Oral At Bedtime     cloNIDine  0.1 mg Oral BID     cloZAPine  200 mg Oral Daily     cholecalciferol  2,000 Units Oral Daily     famotidine  20 mg Oral BID     clotrimazole, benztropine, hydrOXYzine, acetaminophen, alum & mag hydroxide-simethicone, magnesium hydroxide, traZODone, OLANZapine **OR** OLANZapine, nicotine polacrilex          Allergies:     Allergies   Allergen Reactions     Pcn [Bicillin C-R,] Rash     Provider wants to try amoxicillin(benadryl ordered as well) 9/10/16     Bupropion Other (See Comments)     delusions      Depakote [Valproic Acid] Other (See Comments)     Heart racing     Divalproex Sodium-Fd&C Red #40      Increase heart rate            Psychiatric Examination:   /73  Pulse 94  Temp 97.3  F (36.3  C) (Tympanic)  Resp 20  Ht 1.829 m (6')  Wt 104.7 kg (230 lb 12.8 oz)  SpO2 94%  BMI 31.3 kg/m2  Weight is 230 lbs 12.8 oz  Body mass index is 31.3 kg/(m^2).    Appearance: awake but fatigued and in bed  Attitude: cooperative   Eye Contact: good  Mood: improved  Affect:  appropriate  Speech:  Regular  rate and volume. Brief responses somewhat spontaneous  Psychomotor Behavior:  no evidence of tardive dyskinesia, dystonia, or tics  Thought Process: more clear  Associations:  loosening of associations present  Thought Content:  no evidence of suicidal ideation or homicidal ideation, patient appears to be responding to internal stimuli and preoccupied  Insight:  limited  Judgment:  limited  Oriented to:  time, person, and place  Attention Span and Concentration: limited  Recent and Remote Memory: limited  Fund of Knowledge: delayed  Muscle Strength and Tone: normal  Gait and Station: Normal         Labs:     No results found for this or any previous visit (from the past 48 hour(s)).    Assessment and Plan:  Increase HS Clozaril to 450 mg. Will continue to increase until beneficial, or max daily dosing is achieved.    He is now confined, commitment/savage hearing on Oct. 9th        Estimated LOS: Unknown. Clozapine continues to be titrated up. Petition for commitment and savage filed due to lack of improvement.

## 2017-10-06 PROCEDURE — 25000132 ZZH RX MED GY IP 250 OP 250 PS 637: Performed by: NURSE PRACTITIONER

## 2017-10-06 PROCEDURE — S0136 CLOZAPINE, 25 MG: HCPCS | Performed by: NURSE PRACTITIONER

## 2017-10-06 PROCEDURE — 99232 SBSQ HOSP IP/OBS MODERATE 35: CPT | Performed by: NURSE PRACTITIONER

## 2017-10-06 PROCEDURE — 12400000 ZZH R&B MH

## 2017-10-06 RX ADMIN — CLOZAPINE 450 MG: 100 TABLET ORAL at 20:12

## 2017-10-06 RX ADMIN — FAMOTIDINE 20 MG: 20 TABLET ORAL at 20:12

## 2017-10-06 RX ADMIN — CLOZAPINE 200 MG: 100 TABLET ORAL at 08:51

## 2017-10-06 RX ADMIN — CLONIDINE HYDROCHLORIDE 0.1 MG: 0.1 TABLET ORAL at 08:51

## 2017-10-06 RX ADMIN — VITAMIN D, TAB 1000IU (100/BT) 2000 UNITS: 25 TAB at 08:51

## 2017-10-06 RX ADMIN — FAMOTIDINE 20 MG: 20 TABLET ORAL at 08:52

## 2017-10-06 RX ADMIN — CLONIDINE HYDROCHLORIDE 0.1 MG: 0.1 TABLET ORAL at 20:12

## 2017-10-06 RX ADMIN — NICOTINE POLACRILEX 4 MG: 2 GUM, CHEWING ORAL at 19:44

## 2017-10-06 RX ADMIN — NICOTINE POLACRILEX 4 MG: 2 GUM, CHEWING ORAL at 16:46

## 2017-10-06 ASSESSMENT — ACTIVITIES OF DAILY LIVING (ADL)
DRESS: INDEPENDENT
GROOMING: INDEPENDENT
DRESS: INDEPENDENT;SCRUBS (BEHAVIORAL HEALTH)
ORAL_HYGIENE: PROMPTS
LAUNDRY: UNABLE TO COMPLETE
GROOMING: PROMPTS
ORAL_HYGIENE: PROMPTS;INDEPENDENT

## 2017-10-06 NOTE — PLAN OF CARE
Face to face end of shift report received from Carly GRAY. Rounding completed. Patient observed.     Tawanda Easley  10/6/2017  0800 AM

## 2017-10-06 NOTE — PLAN OF CARE
Problem: General Plan of Care (Inpatient Behavioral)  Goal: Individualization/Patient Specific Goal (IP Behavioral)  The patient and/or their representative will achieve their patient-specific goals related to the plan of care.    The patient-specific goals include:        Outcome: Improving  Pt has been in bed with eyes closed and regular respirations observed all night. Will continue to monitor.

## 2017-10-06 NOTE — PLAN OF CARE
Face to face end of shift report received from Carly GREENBERG RN. Rounding completed. Patient observed in bed.    Bailey Sanchez  10/6/2017  3:45 PM

## 2017-10-06 NOTE — PROGRESS NOTES
[facility]  Psychiatric Progress Note    Subjective     Miles is again isolative to his room today. He continues to report feeling tired. He does say he thinks his medications are making him tired. Miles also reports feeling a bit better on his medications. He thinks they are helping organize his thoughts and he said they make things quieter. Miles does then say he does not have a mental illness however but they still help.    Spoke with Josafat's mother Malika. She has concerns that Josafat is in fact not Schizophrenic but his symptoms could be caused by pernicious anemia. Explained to her that his lab work does not indicate anemia of any sort. She feels that the medications we are treating him with are harmful to him and he should be taken off of them and placed on vitamins and supplements to treat anemia as she has researched it. Malika is also concerned that she is not in charge of ot has his treatment verified through her as she is his mother. I again explained to her that Josafat is an adult and she is not his guardian but did assure her that with a signed ELIDA she can call anytime and ask for updates on his condition.      10 point ROS negative.       DIagnoses:      Paranoid Schizophrenia w/ capgras delusions    Attestation:  Patient has been seen and evaluated by me, Yvonne Pedersen NP          Interim History:   The patient's care was discussed with the treatment team and chart notes were reviewed.          Medications:       cloZAPine  450 mg Oral At Bedtime     cloNIDine  0.1 mg Oral BID     cloZAPine  200 mg Oral Daily     cholecalciferol  2,000 Units Oral Daily     famotidine  20 mg Oral BID     clotrimazole, benztropine, hydrOXYzine, acetaminophen, alum & mag hydroxide-simethicone, magnesium hydroxide, traZODone, OLANZapine **OR** OLANZapine, nicotine polacrilex          Allergies:     Allergies   Allergen Reactions     Pcn [Bicillin C-R,] Rash     Provider wants to try amoxicillin(benadryl ordered as  well) 9/10/16     Bupropion Other (See Comments)     delusions      Depakote [Valproic Acid] Other (See Comments)     Heart racing     Divalproex Sodium-Fd&C Red #40      Increase heart rate            Psychiatric Examination:   /80  Pulse 90  Temp 97.9  F (36.6  C) (Tympanic)  Resp 16  Ht 1.829 m (6')  Wt 104.7 kg (230 lb 12.8 oz)  SpO2 98%  BMI 31.3 kg/m2  Weight is 230 lbs 12.8 oz  Body mass index is 31.3 kg/(m^2).    Appearance: awake but fatigued and in bed  Attitude: cooperative   Eye Contact: good  Mood: improved  Affect:  appropriate  Speech:  Regular rate and volume. Brief responses somewhat spontaneous  Psychomotor Behavior:  no evidence of tardive dyskinesia, dystonia, or tics  Thought Process: more clear  Associations:  loosening of associations present  Thought Content:  no evidence of suicidal ideation or homicidal ideation, patient appears to be responding to internal stimuli and preoccupied  Insight:  limited  Judgment:  limited  Oriented to:  time, person, and place  Attention Span and Concentration: limited  Recent and Remote Memory: limited  Fund of Knowledge: delayed  Muscle Strength and Tone: normal  Gait and Station: Normal         Labs:     No results found for this or any previous visit (from the past 48 hour(s)).    Assessment and Plan:  Increase HS Clozaril to 450 mg. Will continue to increase until beneficial, or max daily dosing is achieved.    He is now confined, commitment/savage hearing on Oct. 9th        Estimated LOS: Unknown. Clozapine continues to be titrated up. Petition for commitment and savage filed due to lack of improvement.

## 2017-10-06 NOTE — PLAN OF CARE
Face to face end of shift report received from Bailey GRAY. Rounding completed. Patient observed in bed, appears asleep, respirations observed.     Carly Davalos  10/6/2017  12:06 AM

## 2017-10-06 NOTE — PLAN OF CARE
"Problem: Patient Care Overview  Goal: Individualization & Mutuality  Patient will have an absence or decrease in hallucinations by time of discharge.  Patient will be medication compliant while hospitalized.  Patient will sleep 6 to 9 hours every night,.  Patient will be able to have a reality based conversation prior to discharge.  Patient will be independent with his ADL s while hospitalized and maintain hygiene.   Outcome: No Change  Pt denied SI, HI, self-harm ideation, anxiety, depression, hallucinations, and delusions. Pt stated, \"I'm alright. I'm good. I'm just tired that's all.\" Pt was resting in his bed during this nursing assessment. Pt appears to responding to internal stimuli as pt was continues to be seen talking to himself while laughing. Pt continues to pace up and down the halls and in and out of the group room. Pts affect is flat and blunted. Pt denied pain at this time saying that he has \"aches and pains, but they come and go.\" Pt was cooperative and pleasant with this nursing assessment. Pt appears dishelved and unkempt, and has some prominent body odor. Pts father came and visited tonight, the visit went well per the pt. Will continue to monitor.      "

## 2017-10-06 NOTE — PLAN OF CARE
Problem: Patient Care Overview  Goal: Plan of Care/Patient Progress Review  Outcome: No Change  Pt continues to be very disheveled and unmotivated  Does not like to attend group  Pt talks to self and laughs inappropriately  Denies depression or SI   Pacing in tenorio  Stays on fringes and not very social

## 2017-10-07 PROCEDURE — 25000132 ZZH RX MED GY IP 250 OP 250 PS 637: Performed by: NURSE PRACTITIONER

## 2017-10-07 PROCEDURE — 82746 ASSAY OF FOLIC ACID SERUM: CPT | Performed by: NURSE PRACTITIONER

## 2017-10-07 PROCEDURE — 36415 COLL VENOUS BLD VENIPUNCTURE: CPT | Performed by: NURSE PRACTITIONER

## 2017-10-07 PROCEDURE — S0136 CLOZAPINE, 25 MG: HCPCS | Performed by: NURSE PRACTITIONER

## 2017-10-07 PROCEDURE — 12400000 ZZH R&B MH

## 2017-10-07 PROCEDURE — 99232 SBSQ HOSP IP/OBS MODERATE 35: CPT | Performed by: NURSE PRACTITIONER

## 2017-10-07 PROCEDURE — 82607 VITAMIN B-12: CPT | Performed by: NURSE PRACTITIONER

## 2017-10-07 RX ADMIN — FAMOTIDINE 20 MG: 20 TABLET ORAL at 09:00

## 2017-10-07 RX ADMIN — CLONIDINE HYDROCHLORIDE 0.1 MG: 0.1 TABLET ORAL at 20:45

## 2017-10-07 RX ADMIN — NICOTINE POLACRILEX 4 MG: 2 GUM, CHEWING ORAL at 20:01

## 2017-10-07 RX ADMIN — CLOZAPINE 200 MG: 100 TABLET ORAL at 09:00

## 2017-10-07 RX ADMIN — CLOZAPINE 450 MG: 100 TABLET ORAL at 20:45

## 2017-10-07 RX ADMIN — NICOTINE POLACRILEX 4 MG: 2 GUM, CHEWING ORAL at 17:22

## 2017-10-07 RX ADMIN — FAMOTIDINE 20 MG: 20 TABLET ORAL at 20:45

## 2017-10-07 RX ADMIN — CLONIDINE HYDROCHLORIDE 0.1 MG: 0.1 TABLET ORAL at 09:00

## 2017-10-07 RX ADMIN — VITAMIN D, TAB 1000IU (100/BT) 2000 UNITS: 25 TAB at 09:00

## 2017-10-07 ASSESSMENT — ACTIVITIES OF DAILY LIVING (ADL)
ORAL_HYGIENE: PROMPTS
DRESS: SCRUBS (BEHAVIORAL HEALTH);INDEPENDENT
GROOMING: PROMPTS
LAUNDRY: UNABLE TO COMPLETE
ORAL_HYGIENE: PROMPTS
GROOMING: PROMPTS
LAUNDRY: UNABLE TO COMPLETE
DRESS: INDEPENDENT

## 2017-10-07 NOTE — PLAN OF CARE
Face to face end of shift report received from Tawanda CAGLE RN. Rounding completed. Patient observed in [facility].    Yoselyn Kelly  10/7/2017  4:01 PM

## 2017-10-07 NOTE — PLAN OF CARE
Problem: Patient Care Overview  Goal: Individualization & Mutuality  Patient will have an absence or decrease in hallucinations by time of discharge.  Patient will be medication compliant while hospitalized.  Patient will sleep 6 to 9 hours every night,.  Patient will be able to have a reality based conversation prior to discharge.  Patient will be independent with his ADL s while hospitalized and maintain hygiene.   0000 in bed with easy respirations, presenting sleeping. 0513 patient continues to sleep at this time.0620 patient refused vitals but allowed lab draw.

## 2017-10-07 NOTE — PLAN OF CARE
"Problem: Patient Care Overview  Goal: Individualization & Mutuality  Patient will have an absence or decrease in hallucinations by time of discharge.  Patient will be medication compliant while hospitalized.  Patient will sleep 6 to 9 hours every night,.  Patient will be able to have a reality based conversation prior to discharge.  Patient will be independent with his ADL s while hospitalized and maintain hygiene.   Outcome: No Change  Patient denies SI, HI, hallucinations, pain, depression, anxiety. He appears to be responding to internal stimuli at times  He states that he was tired for a few hours today but is now awake and feeling more awake.  He is able to states his full name and  during medication administration, \"It's Miles Montez for the moment.  I have many names though.  I was thinking about what would be the best name to have.\"  Patient is disheveled and has neglected grooming.  His affect is flat.  He continues to pace the hallways and pace in and out of group.        "

## 2017-10-07 NOTE — PROGRESS NOTES
Face to face end of shift report received from ana luisa nava at 2300 report.. Rounding completed. Patient observed.     Hanny Mcintyre  10/7/2017  12:52 AM

## 2017-10-07 NOTE — PLAN OF CARE
Face to face end of shift report received from Zohra Lala. Rounding completed. Patient observed.     Tawanda Easley  10/7/2017  0800AM

## 2017-10-07 NOTE — PLAN OF CARE
"Problem: Patient Care Overview  Goal: Individualization & Mutuality  Patient will have an absence or decrease in hallucinations by time of discharge.  Patient will be medication compliant while hospitalized.  Patient will sleep 6 to 9 hours every night,.  Patient will be able to have a reality based conversation prior to discharge.  Patient will be independent with his ADL s while hospitalized and maintain hygiene.   Outcome: Improving  Pt isolative in room and low energy  Very disheveled  Encouraged to take a shower but pt states \"took one last night\"  \"Have trouble concentrating in the group and so I only stay a short time\"  \"will maybe go later today\"  Taking meds without hesitation  Denies depression or SI      "

## 2017-10-07 NOTE — PLAN OF CARE
Dans Dad was given back a case of water, closed bag of assorted chips, 4-powerades and 3 gatorades were given back to patients father due to having way to much in belongings.

## 2017-10-07 NOTE — PROGRESS NOTES
[facility]  Psychiatric Progress Note    Subjective     Miles remains isolative to his room in the mornings. He tells me his medications make him tired but he still feels they are effective in controlling his mood symptoms and organizing his thoughts better. He does not feel as though he has a mental illness however.       10 point ROS negative.       DIagnoses:      Paranoid Schizophrenia w/ capgras delusions    Attestation:  Patient has been seen and evaluated by me, Yvonne Pedersen NP          Interim History:   The patient's care was discussed with the treatment team and chart notes were reviewed.          Medications:       cloZAPine  450 mg Oral At Bedtime     cloNIDine  0.1 mg Oral BID     cloZAPine  200 mg Oral Daily     cholecalciferol  2,000 Units Oral Daily     famotidine  20 mg Oral BID     clotrimazole, benztropine, hydrOXYzine, acetaminophen, alum & mag hydroxide-simethicone, magnesium hydroxide, traZODone, OLANZapine **OR** OLANZapine, nicotine polacrilex          Allergies:     Allergies   Allergen Reactions     Pcn [Bicillin C-R,] Rash     Provider wants to try amoxicillin(benadryl ordered as well) 9/10/16     Bupropion Other (See Comments)     delusions      Depakote [Valproic Acid] Other (See Comments)     Heart racing     Divalproex Sodium-Fd&C Red #40      Increase heart rate            Psychiatric Examination:   /81  Pulse 107  Temp 97.7  F (36.5  C) (Tympanic)  Resp 18  Ht 1.829 m (6')  Wt 104.7 kg (230 lb 12.8 oz)  SpO2 97%  BMI 31.3 kg/m2  Weight is 230 lbs 12.8 oz  Body mass index is 31.3 kg/(m^2).    Appearance: awake but fatigued and in bed  Attitude: cooperative   Eye Contact: good  Mood: improved  Affect:  appropriate  Speech:  Regular rate and volume. Brief responses somewhat spontaneous  Psychomotor Behavior:  no evidence of tardive dyskinesia, dystonia, or tics  Thought Process: more clear  Associations:  loosening of associations present  Thought  Content:  no evidence of suicidal ideation or homicidal ideation, patient appears to be responding to internal stimuli and preoccupied  Insight:  limited  Judgment:  limited  Oriented to:  time, person, and place  Attention Span and Concentration: limited  Recent and Remote Memory: limited  Fund of Knowledge: delayed  Muscle Strength and Tone: normal  Gait and Station: Normal         Labs:     No results found for this or any previous visit (from the past 48 hour(s)).    Assessment and Plan:  Increase HS Clozaril to 450 mg. Will continue to increase until beneficial, or max daily dosing is achieved.    He is now confined, commitment/savage hearing on Oct. 9th        Estimated LOS: Unknown. Clozapine continues to be titrated up. Petition for commitment and savage filed due to lack of improvement.

## 2017-10-08 LAB
BASOPHILS # BLD AUTO: 0.1 10E9/L (ref 0–0.2)
BASOPHILS NFR BLD AUTO: 1.1 %
DIFFERENTIAL METHOD BLD: NORMAL
EOSINOPHIL # BLD AUTO: 0.2 10E9/L (ref 0–0.7)
EOSINOPHIL NFR BLD AUTO: 2.4 %
ERYTHROCYTE [DISTWIDTH] IN BLOOD BY AUTOMATED COUNT: 13 % (ref 10–15)
HCT VFR BLD AUTO: 40.3 % (ref 40–53)
HGB BLD-MCNC: 14.3 G/DL (ref 13.3–17.7)
IMM GRANULOCYTES # BLD: 0 10E9/L (ref 0–0.4)
IMM GRANULOCYTES NFR BLD: 0.3 %
LYMPHOCYTES # BLD AUTO: 2.1 10E9/L (ref 0.8–5.3)
LYMPHOCYTES NFR BLD AUTO: 29.4 %
MCH RBC QN AUTO: 30.3 PG (ref 26.5–33)
MCHC RBC AUTO-ENTMCNC: 35.5 G/DL (ref 31.5–36.5)
MCV RBC AUTO: 85 FL (ref 78–100)
MONOCYTES # BLD AUTO: 0.8 10E9/L (ref 0–1.3)
MONOCYTES NFR BLD AUTO: 11.3 %
NEUTROPHILS # BLD AUTO: 3.9 10E9/L (ref 1.6–8.3)
NEUTROPHILS NFR BLD AUTO: 55.5 %
NRBC # BLD AUTO: 0 10*3/UL
NRBC BLD AUTO-RTO: 0 /100
PLATELET # BLD AUTO: 319 10E9/L (ref 150–450)
RBC # BLD AUTO: 4.72 10E12/L (ref 4.4–5.9)
WBC # BLD AUTO: 7 10E9/L (ref 4–11)

## 2017-10-08 PROCEDURE — 99232 SBSQ HOSP IP/OBS MODERATE 35: CPT | Performed by: NURSE PRACTITIONER

## 2017-10-08 PROCEDURE — S0136 CLOZAPINE, 25 MG: HCPCS | Performed by: NURSE PRACTITIONER

## 2017-10-08 PROCEDURE — 12400000 ZZH R&B MH

## 2017-10-08 PROCEDURE — 25000132 ZZH RX MED GY IP 250 OP 250 PS 637: Performed by: NURSE PRACTITIONER

## 2017-10-08 PROCEDURE — 85025 COMPLETE CBC W/AUTO DIFF WBC: CPT | Performed by: NURSE PRACTITIONER

## 2017-10-08 PROCEDURE — 36415 COLL VENOUS BLD VENIPUNCTURE: CPT | Performed by: NURSE PRACTITIONER

## 2017-10-08 RX ADMIN — CLOZAPINE 200 MG: 100 TABLET ORAL at 08:24

## 2017-10-08 RX ADMIN — FAMOTIDINE 20 MG: 20 TABLET ORAL at 08:24

## 2017-10-08 RX ADMIN — NICOTINE POLACRILEX 4 MG: 2 GUM, CHEWING ORAL at 17:24

## 2017-10-08 RX ADMIN — CLONIDINE HYDROCHLORIDE 0.1 MG: 0.1 TABLET ORAL at 08:24

## 2017-10-08 RX ADMIN — VITAMIN D, TAB 1000IU (100/BT) 2000 UNITS: 25 TAB at 08:24

## 2017-10-08 RX ADMIN — CLOZAPINE 450 MG: 100 TABLET ORAL at 20:18

## 2017-10-08 RX ADMIN — CLONIDINE HYDROCHLORIDE 0.1 MG: 0.1 TABLET ORAL at 20:18

## 2017-10-08 RX ADMIN — NICOTINE POLACRILEX 4 MG: 2 GUM, CHEWING ORAL at 08:34

## 2017-10-08 RX ADMIN — FAMOTIDINE 20 MG: 20 TABLET ORAL at 20:18

## 2017-10-08 ASSESSMENT — ACTIVITIES OF DAILY LIVING (ADL)
ORAL_HYGIENE: PROMPTS
DRESS: SCRUBS (BEHAVIORAL HEALTH)
ORAL_HYGIENE: PROMPTS
DRESS: SCRUBS (BEHAVIORAL HEALTH)
GROOMING: PROMPTS
LAUNDRY: UNABLE TO COMPLETE
GROOMING: PROMPTS

## 2017-10-08 NOTE — PHARMACY
Pharmacy Clozapine Note    Date of Service: 10/8/2017  Patient's : 1985  31 year old, male    Current clozapine regimen: 200 mg daily and 450 mg at bedtime  Has there been a known interruption in therapy for greater than/equal to 48 hours? No    Recent ANC Value(s):  Recent Labs   Lab Test  10/08/17   0712  10/01/17   0637  17   0657  17   1204  09/10/17   0603  17   0714  17   0704   ANEU  3.9  3.2  3.8  7.0  4.3  8.6*  2.6       Is the patient enrolled in the clozapine REMS program? Yes  Ordering prescriber: Juliane  Is this provider certified in the clozapine REMS program? Yes  Is the ANC within recommended limits? Yes  Does the patient have any signs or symptoms of infection, including fever or sore throat? No    Plan:  1. Continue clozapine therapy at  200 mg daily and 450 mg at bedtime PO.  2. A WBC with differential will be ordered at least weekly.  ANC values will be entered into the REMS program.  3. Signs/symptoms of infection will be monitored daily.    Ethan Araujo [facility]  Phone / Pager: [phone]

## 2017-10-08 NOTE — PLAN OF CARE
Problem: Patient Care Overview  Goal: Individualization & Mutuality  Patient will have an absence or decrease in hallucinations by time of discharge.  Patient will be medication compliant while hospitalized.  Patient will sleep 6 to 9 hours every night,.  Patient will be able to have a reality based conversation prior to discharge.  Patient will be independent with his ADL s while hospitalized and maintain hygiene.   Pt is cooperative with writer.  Pt denies all criteria this shift.  Dose appear to be responding to internal stimuli this shift.  Pt has been pacing hallways and talking to himself.  Pt will laugh at inappropriate times.  Pt asked for shower things, which was provided.  Pt cooperative with all medications this shift.

## 2017-10-08 NOTE — PLAN OF CARE
Problem: Patient Care Overview  Goal: Individualization & Mutuality  Patient will have an absence or decrease in hallucinations by time of discharge.  Patient will be medication compliant while hospitalized.  Patient will sleep 6 to 9 hours every night,.  Patient will be able to have a reality based conversation prior to discharge.  Patient will be independent with his ADL s while hospitalized and maintain hygiene.   Outcome: No Change  Med compliant  Eating well  Pt appears to be responding to internal stimuli  Laughs inappropriately  Pacing in tenorio occasionally and attention span not long enough to attend group

## 2017-10-08 NOTE — PLAN OF CARE
Face to face end of shift report received from Tawanda CAGLE RN. Rounding completed. Patient observed awake in room.     Marilin Vaz  10/8/2017  3:59 PM

## 2017-10-08 NOTE — PROGRESS NOTES
[facility]  Psychiatric Progress Note    Subjective     Miles has been attending some partial groups. He does report he has a bit of a hard time concentrating on them. Miles tells me he hopes to discharge home after he leaves here or to one of his properties. Miles is telling me that his mother did not take him off of his medications, he thinks someone took them along with all of his clothes.     10 point ROS negative.       DIagnoses:      Paranoid Schizophrenia w/ capgras delusions    Attestation:  Patient has been seen and evaluated by me, Yvonne Pedersen NP          Interim History:   The patient's care was discussed with the treatment team and chart notes were reviewed.          Medications:       cloZAPine  450 mg Oral At Bedtime     cloNIDine  0.1 mg Oral BID     cloZAPine  200 mg Oral Daily     cholecalciferol  2,000 Units Oral Daily     famotidine  20 mg Oral BID     clotrimazole, benztropine, hydrOXYzine, acetaminophen, alum & mag hydroxide-simethicone, magnesium hydroxide, traZODone, OLANZapine **OR** OLANZapine, nicotine polacrilex          Allergies:     Allergies   Allergen Reactions     Pcn [Bicillin C-R,] Rash     Provider wants to try amoxicillin(benadryl ordered as well) 9/10/16     Bupropion Other (See Comments)     delusions      Depakote [Valproic Acid] Other (See Comments)     Heart racing     Divalproex Sodium-Fd&C Red #40      Increase heart rate            Psychiatric Examination:   /54  Pulse 107  Temp 97.8  F (36.6  C) (Tympanic)  Resp 15  Ht 1.829 m (6')  Wt 105.4 kg (232 lb 4.8 oz)  SpO2 95%  BMI 31.51 kg/m2  Weight is 232 lbs 4.8 oz  Body mass index is 31.51 kg/(m^2).    Appearance: awake but fatigued and in bed  Attitude: cooperative   Eye Contact: good  Mood: improved  Affect:  appropriate  Speech:  Regular rate and volume. Brief responses somewhat spontaneous  Psychomotor Behavior:  no evidence of tardive dyskinesia, dystonia, or tics  Thought Process:  more clear  Associations:  loosening of associations present  Thought Content:  no evidence of suicidal ideation or homicidal ideation, patient appears to be responding to internal stimuli and preoccupied  Insight:  limited  Judgment:  limited  Oriented to:  time, person, and place  Attention Span and Concentration: limited  Recent and Remote Memory: limited  Fund of Knowledge: delayed  Muscle Strength and Tone: normal  Gait and Station: Normal         Labs:     Results for orders placed or performed during the hospital encounter of 08/20/17 (from the past 48 hour(s))   CBC with platelets differential   Result Value Ref Range    WBC 7.0 4.0 - 11.0 10e9/L    RBC Count 4.72 4.4 - 5.9 10e12/L    Hemoglobin 14.3 13.3 - 17.7 g/dL    Hematocrit 40.3 40.0 - 53.0 %    MCV 85 78 - 100 fl    MCH 30.3 26.5 - 33.0 pg    MCHC 35.5 31.5 - 36.5 g/dL    RDW 13.0 10.0 - 15.0 %    Platelet Count 319 150 - 450 10e9/L    Diff Method Automated Method     % Neutrophils 55.5 %    % Lymphocytes 29.4 %    % Monocytes 11.3 %    % Eosinophils 2.4 %    % Basophils 1.1 %    % Immature Granulocytes 0.3 %    Nucleated RBCs 0 0 /100    Absolute Neutrophil 3.9 1.6 - 8.3 10e9/L    Absolute Lymphocytes 2.1 0.8 - 5.3 10e9/L    Absolute Monocytes 0.8 0.0 - 1.3 10e9/L    Absolute Eosinophils 0.2 0.0 - 0.7 10e9/L    Absolute Basophils 0.1 0.0 - 0.2 10e9/L    Abs Immature Granulocytes 0.0 0 - 0.4 10e9/L    Absolute Nucleated RBC 0.0        Assessment and Plan:  Increase HS Clozaril to 450 mg. Will continue to increase until beneficial, or max daily dosing is achieved.    He is now confined, commitment/savage hearing on Oct. 9th        Estimated LOS: Unknown. Clozapine continues to be titrated up. Petition for commitment and savage filed due to lack of improvement.

## 2017-10-08 NOTE — PROGRESS NOTES
Face to face end of shift report received from joslyn nava at 2300 report.. Rounding completed. Patient observed.     Hanny Mcintyre  10/8/2017  12:04 AM

## 2017-10-08 NOTE — PLAN OF CARE
Problem: Patient Care Overview  Goal: Individualization & Mutuality  Patient will have an absence or decrease in hallucinations by time of discharge.  Patient will be medication compliant while hospitalized.  Patient will sleep 6 to 9 hours every night,.  Patient will be able to have a reality based conversation prior to discharge.  Patient will be independent with his ADL s while hospitalized and maintain hygiene.   2345 in bed with respirations easy, eyes closed, presenting sleeping.0549 patient continues to sleep at this time.

## 2017-10-09 LAB
FOLATE SERPL-MCNC: 7.7 NG/ML
VIT B12 SERPL-MCNC: 551 PG/ML (ref 193–986)

## 2017-10-09 PROCEDURE — 99232 SBSQ HOSP IP/OBS MODERATE 35: CPT | Performed by: NURSE PRACTITIONER

## 2017-10-09 PROCEDURE — 25000132 ZZH RX MED GY IP 250 OP 250 PS 637: Performed by: NURSE PRACTITIONER

## 2017-10-09 PROCEDURE — 12400000 ZZH R&B MH

## 2017-10-09 PROCEDURE — S0136 CLOZAPINE, 25 MG: HCPCS | Performed by: NURSE PRACTITIONER

## 2017-10-09 PROCEDURE — 12400011

## 2017-10-09 RX ADMIN — CLOZAPINE 450 MG: 100 TABLET ORAL at 21:03

## 2017-10-09 RX ADMIN — FAMOTIDINE 20 MG: 20 TABLET ORAL at 21:04

## 2017-10-09 RX ADMIN — CLONIDINE HYDROCHLORIDE 0.1 MG: 0.1 TABLET ORAL at 21:04

## 2017-10-09 RX ADMIN — NICOTINE POLACRILEX 4 MG: 2 GUM, CHEWING ORAL at 10:38

## 2017-10-09 RX ADMIN — NICOTINE POLACRILEX 4 MG: 2 GUM, CHEWING ORAL at 13:09

## 2017-10-09 RX ADMIN — CLONIDINE HYDROCHLORIDE 0.1 MG: 0.1 TABLET ORAL at 08:31

## 2017-10-09 RX ADMIN — FAMOTIDINE 20 MG: 20 TABLET ORAL at 08:31

## 2017-10-09 RX ADMIN — VITAMIN D, TAB 1000IU (100/BT) 2000 UNITS: 25 TAB at 08:31

## 2017-10-09 RX ADMIN — CLOZAPINE 200 MG: 100 TABLET ORAL at 08:31

## 2017-10-09 RX ADMIN — NICOTINE POLACRILEX 4 MG: 2 GUM, CHEWING ORAL at 18:18

## 2017-10-09 ASSESSMENT — ACTIVITIES OF DAILY LIVING (ADL)
GROOMING: PROMPTS
DRESS: SCRUBS (BEHAVIORAL HEALTH)
LAUNDRY: UNABLE TO COMPLETE
ORAL_HYGIENE: PROMPTS

## 2017-10-09 NOTE — PLAN OF CARE
"Problem: Discharge Planning  Goal: Discharge Planning (Adult, OB, Behavioral, Peds)  Writer called and spoke with pt's mother Malika Goode- [phone]- informed her that writer had tried to call Thursday but her voicemail was not set up and writer was in a training Friday.  Discussed the upcoming court hearing today and informed her that writer had referred pt to the ACT team and explained what they do- mother thought that was a good plan.  Expressed frustration that she has \"been left out of the loop\" and wants to be informed about his treatment.      "

## 2017-10-09 NOTE — PLAN OF CARE
Face to face end of shift report received from MARINA العلي. Rounding completed. Patient observed in room.     Toño Antunez  10/9/2017  7:38 AM

## 2017-10-09 NOTE — PLAN OF CARE
Face to face end of shift report received from Toño FROST RN. Rounding completed. Patient observed pacing hallways.     Karina Bailey  10/9/2017  4:55 PM

## 2017-10-09 NOTE — PLAN OF CARE
"Problem: Patient Care Overview  Goal: Individualization & Mutuality  Patient will have an absence or decrease in hallucinations by time of discharge.  Patient will be medication compliant while hospitalized.  Patient will sleep 6 to 9 hours every night,.  Patient will be able to have a reality based conversation prior to discharge.  Patient will be independent with his ADL s while hospitalized and maintain hygiene.   Patient ate 50% of breakfast then returned to bed. He is compliant with his medications this morning. He denies feeling depressed or suicidal. He says that he feels that \"the medication is making a difference\". \"I was ok at home most of the time, but the medications seem to be helping\". He denies any racing thoughts or hallucinations. He is still preoccupied at times.       "

## 2017-10-09 NOTE — PROGRESS NOTES
Face to face end of shift report received from meaghan nava at 2300 report.. Rounding completed. Patient observed.     Hanny Mcintyre  10/9/2017  1:11 AM

## 2017-10-09 NOTE — PROGRESS NOTES
[facility]  Psychiatric Progress Note    Subjective     Miles continues to isolate to his room in the morning as he reports his medications make him tired. He does admit to talking to himself at times. He tells me that he built a house [place] miles from here but he does not like to talk about it. Miles is encouraged to shower and comb his hair before court. Miles said he woul do so even though he just took a shower last night.      10 point ROS negative.       DIagnoses:      Paranoid Schizophrenia w/ capgras delusions    Attestation:  Patient has been seen and evaluated by me, Yvonne Pedersen NP          Interim History:   The patient's care was discussed with the treatment team and chart notes were reviewed.          Medications:       cloZAPine  450 mg Oral At Bedtime     cloNIDine  0.1 mg Oral BID     cloZAPine  200 mg Oral Daily     cholecalciferol  2,000 Units Oral Daily     famotidine  20 mg Oral BID     clotrimazole, benztropine, hydrOXYzine, acetaminophen, alum & mag hydroxide-simethicone, magnesium hydroxide, traZODone, OLANZapine **OR** OLANZapine, nicotine polacrilex          Allergies:     Allergies   Allergen Reactions     Pcn [Bicillin C-R,] Rash     Provider wants to try amoxicillin(benadryl ordered as well) 9/10/16     Bupropion Other (See Comments)     delusions      Depakote [Valproic Acid] Other (See Comments)     Heart racing     Divalproex Sodium-Fd&C Red #40      Increase heart rate            Psychiatric Examination:   /54  Pulse 100  Temp 97.8  F (36.6  C) (Tympanic)  Resp 16  Ht 1.829 m (6')  Wt 105.4 kg (232 lb 4.8 oz)  SpO2 98%  BMI 31.51 kg/m2  Weight is 232 lbs 4.8 oz  Body mass index is 31.51 kg/(m^2).    Appearance: awake but fatigued and in bed  Attitude: cooperative   Eye Contact: good  Mood: slightly irritable  Affect:  appropriate  Speech:  Regular rate and volume. Brief responses somewhat spontaneous  Psychomotor Behavior:  no evidence of tardive  dyskinesia, dystonia, or tics  Thought Process: more clear  Associations:  loosening of associations present  Thought Content:  no evidence of suicidal ideation or homicidal ideation, patient appears to be responding to internal stimuli and preoccupied  Insight:  limited  Judgment:  limited  Oriented to:  time, person, and place  Attention Span and Concentration: limited  Recent and Remote Memory: limited  Fund of Knowledge: delayed  Muscle Strength and Tone: normal  Gait and Station: Normal         Labs:     Results for orders placed or performed during the hospital encounter of 08/20/17 (from the past 48 hour(s))   CBC with platelets differential   Result Value Ref Range    WBC 7.0 4.0 - 11.0 10e9/L    RBC Count 4.72 4.4 - 5.9 10e12/L    Hemoglobin 14.3 13.3 - 17.7 g/dL    Hematocrit 40.3 40.0 - 53.0 %    MCV 85 78 - 100 fl    MCH 30.3 26.5 - 33.0 pg    MCHC 35.5 31.5 - 36.5 g/dL    RDW 13.0 10.0 - 15.0 %    Platelet Count 319 150 - 450 10e9/L    Diff Method Automated Method     % Neutrophils 55.5 %    % Lymphocytes 29.4 %    % Monocytes 11.3 %    % Eosinophils 2.4 %    % Basophils 1.1 %    % Immature Granulocytes 0.3 %    Nucleated RBCs 0 0 /100    Absolute Neutrophil 3.9 1.6 - 8.3 10e9/L    Absolute Lymphocytes 2.1 0.8 - 5.3 10e9/L    Absolute Monocytes 0.8 0.0 - 1.3 10e9/L    Absolute Eosinophils 0.2 0.0 - 0.7 10e9/L    Absolute Basophils 0.1 0.0 - 0.2 10e9/L    Abs Immature Granulocytes 0.0 0 - 0.4 10e9/L    Absolute Nucleated RBC 0.0        Assessment and Plan:  Increase HS Clozaril to 450 mg. Will continue to increase until beneficial, or max daily dosing is achieved.    He is now confined, commitment/savage hearing on Oct. 9th        Estimated LOS: Unknown. Clozapine continues to be titrated up. Petition for commitment and savage filed due to lack of improvement.

## 2017-10-09 NOTE — PLAN OF CARE
Problem: Patient Care Overview  Goal: Individualization & Mutuality  Patient will have an absence or decrease in hallucinations by time of discharge.  Patient will be medication compliant while hospitalized.  Patient will sleep 6 to 9 hours every night,.  Patient will be able to have a reality based conversation prior to discharge.  Patient will be independent with his ADL s while hospitalized and maintain hygiene.   0100 in bed with easy respirations, eyes closed, presenting sleeping. 0530 continues to sleep at this time.

## 2017-10-09 NOTE — PLAN OF CARE
Problem: General Plan of Care (Inpatient Behavioral)  Goal: Team Discussion  Team Plan:   BEHAVIORAL TEAM DISCUSSION     Participants: Supa Espinoza NP, Yvonne Pedersen NP, Lilly Overlee LICSW, Tish Rouns[place] LICSW, Millie Ryder Discharge Planner, Carolyn Holloway Discharge Plannnolayinka, Priscilla Henry RN, Toño Antunez RN. Alesha Loera Recreation Therapy, Alberta Warwas OT, Nneka Samsa OT   Progress: Going to groups able to sit through some of them..   Continued Stay Criteria/Rationale: Continued stabilization. Recent increase in clozaril  Medical/Physical: None  Precautions:   Behavioral Orders   Procedures     Code 1 - Restrict to Unit     Routine Programming       As clinically indicated     Status 15       Every 15 minutes.     Plan: Court today. DC to [facility]  Rationale for change in precautions or plan: none        Problem: Patient Care Overview  Goal: Team Discussion  Team Plan:   BEHAVIORAL TEAM DISCUSSION     Participants: Supa Espinoza NP, Yvonne Pedersen NP, Lilly Overlee LICSW, Tish Roun[place] LICSW, Millie Ryder Discharge Planner, Carolyn Holloway Discharge Plannner, Priscilla Henry RN, Toño Antunez RN. Alesha Loera Recreation Therapy, [facility] OT, Nneka Samsa OT   Progress: Going to groups able to sit through some of them..   Continued Stay Criteria/Rationale: Continued stabilization. Recent increase in clozaril  Medical/Physical: None  Precautions:   Behavioral Orders   Procedures     Code 1 - Restrict to Unit     Routine Programming       As clinically indicated     Status 15       Every 15 minutes.     Plan: Court today. DC to [facility]  Rationale for change in precautions or plan: none

## 2017-10-10 PROCEDURE — 25000132 ZZH RX MED GY IP 250 OP 250 PS 637: Performed by: NURSE PRACTITIONER

## 2017-10-10 PROCEDURE — 12400011

## 2017-10-10 PROCEDURE — 99232 SBSQ HOSP IP/OBS MODERATE 35: CPT | Performed by: NURSE PRACTITIONER

## 2017-10-10 PROCEDURE — S0136 CLOZAPINE, 25 MG: HCPCS | Performed by: NURSE PRACTITIONER

## 2017-10-10 RX ORDER — CLOZAPINE 100 MG/1
500 TABLET ORAL AT BEDTIME
Status: DISCONTINUED | OUTPATIENT
Start: 2017-10-10 | End: 2017-11-06 | Stop reason: HOSPADM

## 2017-10-10 RX ADMIN — CLOZAPINE 200 MG: 100 TABLET ORAL at 08:23

## 2017-10-10 RX ADMIN — VITAMIN D, TAB 1000IU (100/BT) 2000 UNITS: 25 TAB at 08:21

## 2017-10-10 RX ADMIN — NICOTINE POLACRILEX 4 MG: 2 GUM, CHEWING ORAL at 13:07

## 2017-10-10 RX ADMIN — NICOTINE POLACRILEX 4 MG: 2 GUM, CHEWING ORAL at 18:25

## 2017-10-10 RX ADMIN — CLONIDINE HYDROCHLORIDE 0.1 MG: 0.1 TABLET ORAL at 20:13

## 2017-10-10 RX ADMIN — NICOTINE POLACRILEX 4 MG: 2 GUM, CHEWING ORAL at 10:57

## 2017-10-10 RX ADMIN — FAMOTIDINE 20 MG: 20 TABLET ORAL at 20:13

## 2017-10-10 RX ADMIN — CLOZAPINE 500 MG: 100 TABLET ORAL at 20:13

## 2017-10-10 RX ADMIN — CLONIDINE HYDROCHLORIDE 0.1 MG: 0.1 TABLET ORAL at 08:23

## 2017-10-10 RX ADMIN — FAMOTIDINE 20 MG: 20 TABLET ORAL at 08:22

## 2017-10-10 NOTE — PROGRESS NOTES
Face to face end of shift report received from ayleen nava at 2300 report.. Rounding completed. Patient observed.     Hanny Mcintyre  10/10/2017  12:54 AM

## 2017-10-10 NOTE — PLAN OF CARE
Face to face end of shift report received from Toño GRAY. Rounding completed. Patient observed in [facility].    Samina Ponce  10/10/2017  3:52 PM

## 2017-10-10 NOTE — PLAN OF CARE
"Problem: Patient Care Overview  Goal: Individualization & Mutuality  Patient will have an absence or decrease in hallucinations by time of discharge.  Patient will be medication compliant while hospitalized.  Patient will sleep 6 to 9 hours every night,.  Patient will be able to have a reality based conversation prior to discharge.  Patient will be independent with his ADL s while hospitalized and maintain hygiene.   He is up on the unit. He is preoccupied and responding to internal stimuli. He does say that he is feeling \"pretty good\". He denies feeling depressed or suicidal. He is maintaining appropriate boundaries with staff and peers. He is encouraged to attend groups, he does tend to walk in and out of groups. He denies any trouble sleeping and is able to have a reality based conversation for short periods.       "

## 2017-10-10 NOTE — PROGRESS NOTES
[facility]  Psychiatric Progress Note    Subjective     Miles isolates to his room much of the time bust he does get up and walk the halls sometimes. HE also tried to attend some groups but is unable to participate for extended periods. Josafat does tell me the medications help him feel better and decrease his anxiety. He can carry on a reality based conversation for short periods before it becomes a bit disconntected and rambling.      10 point ROS negative.       DIagnoses:      Paranoid Schizophrenia w/ capgras delusions    Attestation:  Patient has been seen and evaluated by me, Yvonne Pedersen NP          Interim History:   The patient's care was discussed with the treatment team and chart notes were reviewed.          Medications:       cloZAPine  500 mg Oral At Bedtime     cloNIDine  0.1 mg Oral BID     cloZAPine  200 mg Oral Daily     cholecalciferol  2,000 Units Oral Daily     famotidine  20 mg Oral BID     clotrimazole, benztropine, hydrOXYzine, acetaminophen, alum & mag hydroxide-simethicone, magnesium hydroxide, traZODone, OLANZapine **OR** OLANZapine, nicotine polacrilex          Allergies:     Allergies   Allergen Reactions     Pcn [Bicillin C-R,] Rash     Provider wants to try amoxicillin(benadryl ordered as well) 9/10/16     Bupropion Other (See Comments)     delusions      Depakote [Valproic Acid] Other (See Comments)     Heart racing     Divalproex Sodium-Fd&C Red #40      Increase heart rate            Psychiatric Examination:   /75  Pulse 110  Temp 97.1  F (36.2  C) (Tympanic)  Resp 16  Ht 1.829 m (6')  Wt 105.4 kg (232 lb 4.8 oz)  SpO2 95%  BMI 31.51 kg/m2  Weight is 232 lbs 4.8 oz  Body mass index is 31.51 kg/(m^2).    Appearance: awake but fatigued and in bed  Attitude: cooperative   Eye Contact: good  Mood: slightly irritable  Affect:  appropriate  Speech:  Regular rate and volume. Brief responses somewhat spontaneous  Psychomotor Behavior:  no evidence of tardive  dyskinesia, dystonia, or tics  Thought Process: more clear  Associations:  loosening of associations present  Thought Content:  no evidence of suicidal ideation or homicidal ideation, patient appears to be responding to internal stimuli and preoccupied  Insight:  limited  Judgment:  limited  Oriented to:  time, person, and place  Attention Span and Concentration: limited  Recent and Remote Memory: limited  Fund of Knowledge: delayed  Muscle Strength and Tone: normal  Gait and Station: Normal         Labs:     No results found for this or any previous visit (from the past 48 hour(s)).    Assessment and Plan:  Increase HS Clozaril to 500 mg. Will continue to increase until beneficial, or max daily dosing is achieved.    Committed and has a Savage petition        Estimated LOS: Unknown. Clozapine continues to be titrated up. Petition for commitment and savage filed due to lack of improvement.

## 2017-10-10 NOTE — PLAN OF CARE
Problem: Patient Care Overview  Goal: Individualization & Mutuality  Patient will have an absence or decrease in hallucinations by time of discharge.  Patient will be medication compliant while hospitalized.  Patient will sleep 6 to 9 hours every night,.  Patient will be able to have a reality based conversation prior to discharge.  Patient will be independent with his ADL s while hospitalized and maintain hygiene.   Outcome: Improving  Patient calm, cooperative, and med compliant this shift.  He paced the hallways most of the shift but did attend some groups.  Remained calm after court hearing today but did not talk about it with this writer. Laughing inappropriately during conversations but was in a pleasant mood.  VS WNL. No complaints of pain.  Will continue to monitor.

## 2017-10-10 NOTE — PLAN OF CARE
Problem: Discharge Planning  Goal: Discharge Planning (Adult, OB, Behavioral, Peds)  Pt had his commitment/ savage hearing yesterday afternoon.  The  ruled in favor of full commitment and signed the savage request.

## 2017-10-10 NOTE — PLAN OF CARE
Problem: General Plan of Care (Inpatient Behavioral)  Goal: Team Discussion  Team Plan:   BEHAVIORAL TEAM DISCUSSION     Participants: Toño Antunez RN, Alesha Loera TRS, Nneka Sam OT, Yvonne Pedersen NP, Kim Cardozo NP, Supa Espinoza NP, Luanne Ryder DCP, Carolyn Holloway DCP, [facility], Psychiatric   Progress: fair attends some groups   Continued Stay Criteria/Rationale: increase HS clozaril to 450mg, still responds to internal stimuli    Medical/Physical: none known   Precautions:   Behavioral Orders   Procedures     Code 1 - Restrict to Unit     Routine Programming       As clinically indicated     Status 15       Every 15 minutes.      Plan: committed and jarvised, referral to ACT team   Rationale for change in precautions or plan: none         Problem: Patient Care Overview  Goal: Team Discussion  Team Plan:   BEHAVIORAL TEAM DISCUSSION     Participants: Toño Antunez RN, Alesha Loera TRS, Nneka Sam OT, Yvonne Pedersen NP, Kim Cardozo NP, Supa Espinoza NP, Luanne Ryder DCP, Carolyn Holloway DCP, [facility], Psychiatric   Progress: fair attends some groups   Continued Stay Criteria/Rationale: increase HS clozaril to 450mg, still responds to internal stimuli    Medical/Physical: none known   Precautions:   Behavioral Orders   Procedures     Code 1 - Restrict to Unit     Routine Programming       As clinically indicated     Status 15       Every 15 minutes.      Plan: committed and jarvised, referral to ACT team   Rationale for change in precautions or plan: none

## 2017-10-10 NOTE — PLAN OF CARE
Problem: Patient Care Overview  Goal: Individualization & Mutuality  Patient will have an absence or decrease in hallucinations by time of discharge.  Patient will be medication compliant while hospitalized.  Patient will sleep 6 to 9 hours every night,.  Patient will be able to have a reality based conversation prior to discharge.  Patient will be independent with his ADL s while hospitalized and maintain hygiene.   0045 in bed with eyes closed, respirations easy, presenting sleeping.0500 patient continues to sleep through the night.

## 2017-10-11 PROCEDURE — 25000132 ZZH RX MED GY IP 250 OP 250 PS 637: Performed by: NURSE PRACTITIONER

## 2017-10-11 PROCEDURE — S0136 CLOZAPINE, 25 MG: HCPCS | Performed by: NURSE PRACTITIONER

## 2017-10-11 PROCEDURE — 12400011

## 2017-10-11 RX ADMIN — CLONIDINE HYDROCHLORIDE 0.1 MG: 0.1 TABLET ORAL at 08:11

## 2017-10-11 RX ADMIN — NICOTINE POLACRILEX 4 MG: 2 GUM, CHEWING ORAL at 18:40

## 2017-10-11 RX ADMIN — VITAMIN D, TAB 1000IU (100/BT) 2000 UNITS: 25 TAB at 08:11

## 2017-10-11 RX ADMIN — FAMOTIDINE 20 MG: 20 TABLET ORAL at 08:11

## 2017-10-11 RX ADMIN — CLOZAPINE 500 MG: 100 TABLET ORAL at 20:10

## 2017-10-11 RX ADMIN — FAMOTIDINE 20 MG: 20 TABLET ORAL at 20:10

## 2017-10-11 RX ADMIN — CLOZAPINE 200 MG: 100 TABLET ORAL at 08:11

## 2017-10-11 RX ADMIN — NICOTINE POLACRILEX 4 MG: 2 GUM, CHEWING ORAL at 12:15

## 2017-10-11 RX ADMIN — CLONIDINE HYDROCHLORIDE 0.1 MG: 0.1 TABLET ORAL at 20:10

## 2017-10-11 ASSESSMENT — ACTIVITIES OF DAILY LIVING (ADL): DRESS: SCRUBS (BEHAVIORAL HEALTH)

## 2017-10-11 NOTE — PLAN OF CARE
Face to face end of shift report received from Toño FROST RN. Rounding completed. Patient observed in the [facility] [place].     Chrissy Stack  10/11/2017  3:34 PM

## 2017-10-11 NOTE — PLAN OF CARE
Problem: Patient Care Overview  Goal: Individualization & Mutuality  Patient will have an absence or decrease in hallucinations by time of discharge.  Patient will be medication compliant while hospitalized.  Patient will sleep 6 to 9 hours every night,.  Patient will be able to have a reality based conversation prior to discharge.  Patient will be independent with his ADL s while hospitalized and maintain hygiene.   2345 in bed with eyes closed and respirations easy, presenting sleeping. 0532 patient continues to sleep at this time.

## 2017-10-11 NOTE — PLAN OF CARE
Problem: Patient Care Overview  Goal: Individualization & Mutuality  Patient will have an absence or decrease in hallucinations by time of discharge.  Patient will be medication compliant while hospitalized.  Patient will sleep 6 to 9 hours every night,.  Patient will be able to have a reality based conversation prior to discharge.  Patient will be independent with his ADL s while hospitalized and maintain hygiene.   He is up on the unit this morning. He continues to be preoccupied and responding to internal stimuli. He is able to manage a reality based conversation for short periods. He talks of the medication making him tired. He denies feeling depressed or suicidal. He is encouraged to shower today. He is disheveled and his room is untidy. His socks hang off the ends of his feet and he doesn't pull them up. He is maintaining appropriate boundaries with staff and peers.     1340: patient showered and put on clean clothes today.

## 2017-10-11 NOTE — PROGRESS NOTES
Face to face end of shift report received from josse nava at 2300 report.. Rounding completed. Patient observed.     Hanny Mcintyre  10/11/2017  12:04 AM

## 2017-10-11 NOTE — PLAN OF CARE
Face to face end of shift report received from MARINA العلي. Rounding completed. Patient observed in room.     Toño Antunez  10/11/2017  7:38 AM

## 2017-10-11 NOTE — PLAN OF CARE
"Problem: Patient Care Overview  Goal: Individualization & Mutuality  Patient will have an absence or decrease in hallucinations by time of discharge.  Patient will be medication compliant while hospitalized.  Patient will sleep 6 to 9 hours every night,.  Patient will be able to have a reality based conversation prior to discharge.  Patient will be independent with his ADL s while hospitalized and maintain hygiene.   Patient up on the unit pacing the halls most of the shift. Patient in and out of group but does not participate.  Patient states \" Im doing fine\", Patient states i'm just over it and want to be home watching dish and riding dirt bikes. Patient does appear to be talking to self at time when pacing. Patient denies anxiety, depression, SI, and pain.       "

## 2017-10-11 NOTE — PLAN OF CARE
Problem: General Plan of Care (Inpatient Behavioral)  Goal: Team Discussion  Team Plan:   BEHAVIORAL TEAM DISCUSSION     Participants: Rowan Alford NP, Kim Cardozo NP, Lilly LEMON, Tish LEMON, Toño Antunez RN. [facility], [facility] OT, Nneka [facility] OT   Progress: fair attends some groups   Continued Stay Criteria/Rationale: recent increase HS clozaril, still responds to internal stimuli    Medical/Physical: none known   Precautions:   Behavioral Orders   Procedures     Code 1 - Restrict to Unit     Routine Programming       As clinically indicated     Status 15       Every 15 minutes.     Plan: committed and jarvised, referral to ACT team, looking at a board and lodge.   Rationale for change in precautions or plan: none        Problem: Patient Care Overview  Goal: Team Discussion  Team Plan:   BEHAVIORAL TEAM DISCUSSION     Participants: Rowan Alford NP, Kim Cardozo NP, Lilly LEMON, Tish LEMON, Toño Antunez RN. Alesha Loera [facility], [facility] OT, Nneka Samsa OT   Progress: fair attends some groups   Continued Stay Criteria/Rationale: recent increase HS clozaril, still responds to internal stimuli    Medical/Physical: none known   Precautions:   Behavioral Orders   Procedures     Code 1 - Restrict to Unit     Routine Programming       As clinically indicated     Status 15       Every 15 minutes.     Plan: committed and jarvised, referral to ACT team, looking at a board and lodge.   Rationale for change in precautions or plan: none

## 2017-10-12 PROCEDURE — 25000132 ZZH RX MED GY IP 250 OP 250 PS 637: Performed by: NURSE PRACTITIONER

## 2017-10-12 PROCEDURE — S0136 CLOZAPINE, 25 MG: HCPCS | Performed by: NURSE PRACTITIONER

## 2017-10-12 PROCEDURE — 12400011

## 2017-10-12 PROCEDURE — 99232 SBSQ HOSP IP/OBS MODERATE 35: CPT | Performed by: NURSE PRACTITIONER

## 2017-10-12 RX ADMIN — CLOZAPINE 500 MG: 100 TABLET ORAL at 20:07

## 2017-10-12 RX ADMIN — CLONIDINE HYDROCHLORIDE 0.1 MG: 0.1 TABLET ORAL at 20:07

## 2017-10-12 RX ADMIN — NICOTINE POLACRILEX 4 MG: 2 GUM, CHEWING ORAL at 17:37

## 2017-10-12 RX ADMIN — CLOZAPINE 200 MG: 100 TABLET ORAL at 08:26

## 2017-10-12 RX ADMIN — FAMOTIDINE 20 MG: 20 TABLET ORAL at 08:26

## 2017-10-12 RX ADMIN — NICOTINE POLACRILEX 4 MG: 2 GUM, CHEWING ORAL at 08:30

## 2017-10-12 RX ADMIN — NICOTINE POLACRILEX 4 MG: 2 GUM, CHEWING ORAL at 15:56

## 2017-10-12 RX ADMIN — NICOTINE POLACRILEX 4 MG: 2 GUM, CHEWING ORAL at 12:21

## 2017-10-12 RX ADMIN — VITAMIN D, TAB 1000IU (100/BT) 2000 UNITS: 25 TAB at 08:26

## 2017-10-12 RX ADMIN — CLONIDINE HYDROCHLORIDE 0.1 MG: 0.1 TABLET ORAL at 08:26

## 2017-10-12 RX ADMIN — FAMOTIDINE 20 MG: 20 TABLET ORAL at 20:07

## 2017-10-12 ASSESSMENT — ACTIVITIES OF DAILY LIVING (ADL)
ORAL_HYGIENE: INDEPENDENT;PROMPTS
ORAL_HYGIENE: INDEPENDENT
DRESS: INDEPENDENT
DRESS: INDEPENDENT
GROOMING: INDEPENDENT
LAUNDRY: UNABLE TO COMPLETE
GROOMING: INDEPENDENT;PROMPTS

## 2017-10-12 NOTE — PLAN OF CARE
"Problem: Discharge Planning  Goal: Discharge Planning (Adult, OB, Behavioral, Peds)  Left a voicemail with Temitope Armijo with the [facility] ACT team- [phone].    Left a voicemail for Fang Fink: [phone]- [facility] .     1:30  Pm: received a call back from Fang Fink who stated that she had sent an email to the ACT team which she thought would be a good plan for pt but they are a month or two out from accepting new clients.  She stated that when she spoke with pt he indicated he would be open to not going back to live with his mom and asked writer to talk to him about going to a board and lodge.     Writer asked pt if he would be willing to move into a board and lodge until his case manger or ACT team found him his own apartment?  Pt stated he would like to get his own place.  He did not wish to work with an ACT team because he stated he had worked with an [facility] worker before and that gave him anxiety and made his hands shake.  He only wishes to work with Fang his .  Pt stated that his medications are starting to work ; pt stated, \"It is like I was in a forest full of trees and now a path has opened up and I can see a way out and I said to myself, 'Wow these meds really help!'\" Pt was able to carry on a mostly reality based conversation with pt for 10 minutes.     Writer spoke with Temitope Armijo for the ACT team who stated that pt sounded perfect for the ACT team and she will be out on Monday at 11:00 to meet him and tell him about their program.   "

## 2017-10-12 NOTE — PLAN OF CARE
"Problem: Patient Care Overview  Goal: Individualization & Mutuality  Patient will have an absence or decrease in hallucinations by time of discharge.  Patient will be medication compliant while hospitalized.  Patient will sleep 6 to 9 hours every night,.  Patient will be able to have a reality based conversation prior to discharge.  Patient will be independent with his ADL s while hospitalized and maintain hygiene.   Outcome: Therapy, progress toward functional goals is gradual  Pt in halls much of the shift and pacing slowly back and forth. He does intermittently attend groups though stays only for a short time. 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  He was quiet clear during this short interaction. Pt affect remains flat. He is calm on the unit and boundaries remain appropriate.       "

## 2017-10-12 NOTE — PROGRESS NOTES
Face to face end of shift report received from gray barbosa rn at 2300 report.. Rounding completed. Patient observed.     Hanny Mcintyre  10/12/2017  12:09 AM

## 2017-10-12 NOTE — PLAN OF CARE
Face to face end of shift report received from Carly SARABIA RN. Rounding completed. Patient observed.     Key Vaughan  10/12/2017  4:07 PM

## 2017-10-12 NOTE — PLAN OF CARE
Problem: General Plan of Care (Inpatient Behavioral)  Goal: Team Discussion  Team Plan:   BEHAVIORAL TEAM DISCUSSION     Participants: Rowan Alford NP, Yvonne Pedersen NP, Kim Cardozo NP, Beth Hinton NP student intern, [facility] OT, Nneka Samsa OT, Lilly [facility], [facility], Luanne Ryder Discharge Planner, Jackie Billy RN  Progress: Moderate: pt has been going to some groups but still delussional  Continued Stay Criteria/Rationale: Will increase clozaril and if that does not work will consider trying other neuroleptic medication.  Medical/Physical: none  Precautions:   Behavioral Orders   Procedures     Code 1 - Restrict to Unit     Routine Programming       As clinically indicated     Status 15       Every 15 minutes.     Plan: Stabilize and discharge with ACT team  Rationale for change in precautions or plan:none        Problem: Patient Care Overview  Goal: Team Discussion  Team Plan:   BEHAVIORAL TEAM DISCUSSION     Participants: Rowan Alford NP, Yvonne Pedersen NP, Kim Cardozo NP, Beth Hinton NP student intern, [facility] OT, Nneka Samsa OT, [facility], [facility], Luannedeena Guardado Discharge Planner, Jackie Billy RN  Progress: Moderate: pt has been going to some groups but still delussional  Continued Stay Criteria/Rationale: Will increase clozaril and if that does not work will consider trying other neuroleptic medication.  Medical/Physical: none  Precautions:   Behavioral Orders   Procedures     Code 1 - Restrict to Unit     Routine Programming       As clinically indicated     Status 15       Every 15 minutes.     Plan: Stabilize and discharge with ACT team  Rationale for change in precautions or plan:none

## 2017-10-12 NOTE — PLAN OF CARE
Problem: Patient Care Overview  Goal: Individualization & Mutuality  Patient will have an absence or decrease in hallucinations by time of discharge.  Patient will be medication compliant while hospitalized.  Patient will sleep 6 to 9 hours every night,.  Patient will be able to have a reality based conversation prior to discharge.  Patient will be independent with his ADL s while hospitalized and maintain hygiene.   0000 in bed with eyes closed and respirations easy, presenting sleeping.0527 patient continues to sleep at this time.

## 2017-10-12 NOTE — PROGRESS NOTES
[facility]  Psychiatric Progress Note    Subjective     Miles isolates to his room much of the time bust he does get up and walk the halls sometimes. HE also tried to attend some groups but is unable to participate for extended periods. Josafat does tell me the medications help him feel better and decrease his anxiety. He can carry on a reality based conversation for short periods before it becomes a bit disconntected and rambling.      10 point ROS negative.       DIagnoses:      Paranoid Schizophrenia w/ capgras delusions    Attestation:  Patient has been seen and evaluated by me, Yvonne Pedersen NP          Interim History:   The patient's care was discussed with the treatment team and chart notes were reviewed.          Medications:       cloZAPine  500 mg Oral At Bedtime     cloNIDine  0.1 mg Oral BID     cloZAPine  200 mg Oral Daily     cholecalciferol  2,000 Units Oral Daily     famotidine  20 mg Oral BID     clotrimazole, benztropine, hydrOXYzine, acetaminophen, alum & mag hydroxide-simethicone, magnesium hydroxide, traZODone, OLANZapine **OR** OLANZapine, nicotine polacrilex          Allergies:     Allergies   Allergen Reactions     Pcn [Bicillin C-R,] Rash     Provider wants to try amoxicillin(benadryl ordered as well) 9/10/16     Bupropion Other (See Comments)     delusions      Depakote [Valproic Acid] Other (See Comments)     Heart racing     Divalproex Sodium-Fd&C Red #40      Increase heart rate            Psychiatric Examination:   /67  Pulse 95  Temp 98.3  F (36.8  C) (Tympanic)  Resp 12  Ht 1.829 m (6')  Wt 105.4 kg (232 lb 4.8 oz)  SpO2 94%  BMI 31.51 kg/m2  Weight is 232 lbs 4.8 oz  Body mass index is 31.51 kg/(m^2).    Appearance: awake, alert and cooperative  Attitude: cooperative   Eye Contact: good  Mood: bright  Affect:  appropriate  Speech:  Regular rate and volume. Brief responses somewhat spontaneous  Psychomotor Behavior:  no evidence of tardive dyskinesia,  dystonia, or tics  Thought Process: more clear but not very logical  Associations:  loosening of associations present  Thought Content:  no evidence of suicidal ideation or homicidal ideation, patient appears to be responding to internal stimuli and preoccupied  Insight:  improving  Judgment:  improving  Oriented to:  time, person, and place  Attention Span and Concentration: limited  Recent and Remote Memory: limited  Fund of Knowledge: delayed  Muscle Strength and Tone: normal  Gait and Station: Normal         Labs:     No results found for this or any previous visit (from the past 48 hour(s)).    Assessment and Plan:  Increase HS Clozaril to 500 mg. Will continue to increase until beneficial, or max daily dosing is achieved.    Committed and has a Savage petition        Estimated LOS: Unknown. Clozapine continues to be titrated up. Petition for commitment and savage filed due to lack of improvement.

## 2017-10-12 NOTE — PLAN OF CARE
Face to face end of shift report received from Hanny LEOS RN. Rounding completed. Patient observed in bed, appears asleep, respirations observed.     Carly Davalos  10/12/2017  7:32 AM

## 2017-10-12 NOTE — PLAN OF CARE
"Problem: Patient Care Overview  Goal: Individualization & Mutuality  Patient will have an absence or decrease in hallucinations by time of discharge.  Patient will be medication compliant while hospitalized.  Patient will sleep 6 to 9 hours every night,.  Patient will be able to have a reality based conversation prior to discharge.  Patient will be independent with his ADL s while hospitalized and maintain hygiene.   Outcome: Improving     Patient denies having any hallucinations, but appears to be responding at times.   Patient has been medication compliant this shift.   Patient reports that he slept well last night.  Patient had a reality based conversation with this writer this shift.     Patient calm, cooperative this shift. He denies any pain, SI, HI, and hallucinations. However, appears to be responding at times. Patient up for breakfast and remained out of bed for the morning. Patient stated he feels great and doesn't feel tired at all. Patient usually spends most of the mornings in bed sleeping. He has paced the hallways, has attended groups, but doesn't stay for long. He has been med compliant and appeared to be happy that the meds are working. He stated \"I hope they don't switch my meds like before when I got out of [facility]. I was on Zoloft and it just wasn't working for me\". Will continue to monitor.   "

## 2017-10-12 NOTE — PLAN OF CARE
Problem: Patient Care Overview  Goal: Individualization & Mutuality  Patient will have an absence or decrease in hallucinations by time of discharge.  Patient will be medication compliant while hospitalized.  Patient will sleep 6 to 9 hours every night,.  Patient will be able to have a reality based conversation prior to discharge.  Patient will be independent with his ADL s while hospitalized and maintain hygiene.   Outcome: Therapy, progress toward functional goals is gradual  Pt. Reports doing better, states hallucinations have decreased, is medication compliant, denies difficulty with sleeping, has some reality based conversation, independent with ADL's, but is refusing to shower and shave at this time, pacing in the halls frequently, will continue to monitor.

## 2017-10-13 PROCEDURE — S0136 CLOZAPINE, 25 MG: HCPCS | Performed by: NURSE PRACTITIONER

## 2017-10-13 PROCEDURE — 25000132 ZZH RX MED GY IP 250 OP 250 PS 637: Performed by: NURSE PRACTITIONER

## 2017-10-13 PROCEDURE — 12400011

## 2017-10-13 RX ADMIN — VITAMIN D, TAB 1000IU (100/BT) 2000 UNITS: 25 TAB at 08:26

## 2017-10-13 RX ADMIN — CLOZAPINE 200 MG: 100 TABLET ORAL at 08:26

## 2017-10-13 RX ADMIN — FAMOTIDINE 20 MG: 20 TABLET ORAL at 20:13

## 2017-10-13 RX ADMIN — CLONIDINE HYDROCHLORIDE 0.1 MG: 0.1 TABLET ORAL at 08:26

## 2017-10-13 RX ADMIN — FAMOTIDINE 20 MG: 20 TABLET ORAL at 08:26

## 2017-10-13 RX ADMIN — CLOZAPINE 500 MG: 100 TABLET ORAL at 20:13

## 2017-10-13 RX ADMIN — NICOTINE POLACRILEX 4 MG: 2 GUM, CHEWING ORAL at 17:14

## 2017-10-13 RX ADMIN — NICOTINE POLACRILEX 4 MG: 2 GUM, CHEWING ORAL at 20:18

## 2017-10-13 RX ADMIN — NICOTINE POLACRILEX 4 MG: 2 GUM, CHEWING ORAL at 12:09

## 2017-10-13 RX ADMIN — CLONIDINE HYDROCHLORIDE 0.1 MG: 0.1 TABLET ORAL at 20:13

## 2017-10-13 ASSESSMENT — ACTIVITIES OF DAILY LIVING (ADL)
GROOMING: INDEPENDENT
ORAL_HYGIENE: INDEPENDENT
LAUNDRY: UNABLE TO COMPLETE
GROOMING: INDEPENDENT
DRESS: INDEPENDENT
DRESS: INDEPENDENT;SCRUBS (BEHAVIORAL HEALTH)
ORAL_HYGIENE: INDEPENDENT

## 2017-10-13 NOTE — PLAN OF CARE
Problem: Patient Care Overview  Goal: Individualization & Mutuality  Patient will have an absence or decrease in hallucinations by time of discharge.  Patient will be medication compliant while hospitalized.  Patient will sleep 6 to 9 hours every night,.  Patient will be able to have a reality based conversation prior to discharge.  Patient will be independent with his ADL s while hospitalized and maintain hygiene.   Outcome: No Change  Patient denies SI, HI, hallucinations, pain, depression, anxiety.  Patient in bed most of the morning.  His is disheveled and requires prompting for hygiene.  He is calm and cooperative with scheduled medications. When he is awake he continues to pace hallways.

## 2017-10-13 NOTE — PLAN OF CARE
Face to face end of shift report received from Yoselyn RAINES RN. Rounding completed. Patient observed.     Key Vaughan  10/13/2017  4:18 PM

## 2017-10-13 NOTE — PROGRESS NOTES
Face to face end of shift report received from hanna nava at 2300 report.. Rounding completed. Patient observed.     Hanny Mcintyre  10/13/2017  12:10 AM

## 2017-10-13 NOTE — PLAN OF CARE
Problem: General Plan of Care (Inpatient Behavioral)  Goal: Team Discussion  Team Plan:   BEHAVIORAL TEAM DISCUSSION     Participants: Kim Cardozo NP, Yvonne Pedersen NP, Lilly Villarreal LICSW,  Tish Boswell LICSW, Jazzy Sood OT, Nneka Sprague OT   Progress: Moderate: pt has been going to some groups but still delussional, initiates conversation.  Continued Stay Criteria/Rationale: Will increase clozaril and if that does not work will consider trying other neuroleptic medication.  Medical/Physical: none  Precautions:   Behavioral Orders   Procedures     Code 1 - Restrict to Unit     Routine Programming       As clinically indicated     Status 15       Every 15 minutes.     Plan: ACT team to interview Monday at 11 am. Referrals to [facility].  Rationale for change in precautions or plan: none        Problem: Patient Care Overview  Goal: Team Discussion  Team Plan:   BEHAVIORAL TEAM DISCUSSION     Participants: Kim Cardozo NP, Yvonne Pedersen NP, Lilly Villarreal LICSW,  Tish Boswell LICSW, Jazzy Sood OT, Nneka Samsa OT   Progress: Moderate: pt has been going to some groups but still delussional, initiates conversation.  Continued Stay Criteria/Rationale: Will increase clozaril and if that does not work will consider trying other neuroleptic medication.  Medical/Physical: none  Precautions:   Behavioral Orders   Procedures     Code 1 - Restrict to Unit     Routine Programming       As clinically indicated     Status 15       Every 15 minutes.     Plan: ACT team to interview Monday at 11 am. Referrals to [facility].  Rationale for change in precautions or plan: none

## 2017-10-13 NOTE — PLAN OF CARE
Problem: Patient Care Overview  Goal: Individualization & Mutuality  Patient will have an absence or decrease in hallucinations by time of discharge.  Patient will be medication compliant while hospitalized.  Patient will sleep 6 to 9 hours every night,.  Patient will be able to have a reality based conversation prior to discharge.  Patient will be independent with his ADL s while hospitalized and maintain hygiene.   0000 in bed with eyes closed and respirations easy, presenting sleeping. 0507 patient continues to sleep at this time.

## 2017-10-13 NOTE — PLAN OF CARE
Problem: Patient Care Overview  Goal: Individualization & Mutuality  Patient will have an absence or decrease in hallucinations by time of discharge.  Patient will be medication compliant while hospitalized.  Patient will sleep 6 to 9 hours every night,.  Patient will be able to have a reality based conversation prior to discharge.  Patient will be independent with his ADL s while hospitalized and maintain hygiene.   Outcome: Therapy, progress toward functional goals is gradual  Pt. Verbalizes a decrease in hallucinations, but states they come back, is medication compliant, states he slept well last night, has some reality based conversation at times, then has delusional statements, independent with ADL's, encouraged to shower, will continue to monitor.

## 2017-10-14 PROCEDURE — 25000132 ZZH RX MED GY IP 250 OP 250 PS 637: Performed by: NURSE PRACTITIONER

## 2017-10-14 PROCEDURE — 12400011

## 2017-10-14 PROCEDURE — S0136 CLOZAPINE, 25 MG: HCPCS | Performed by: NURSE PRACTITIONER

## 2017-10-14 PROCEDURE — 99232 SBSQ HOSP IP/OBS MODERATE 35: CPT | Performed by: NURSE PRACTITIONER

## 2017-10-14 RX ADMIN — NICOTINE POLACRILEX 4 MG: 2 GUM, CHEWING ORAL at 15:53

## 2017-10-14 RX ADMIN — CLONIDINE HYDROCHLORIDE 0.1 MG: 0.1 TABLET ORAL at 20:14

## 2017-10-14 RX ADMIN — FAMOTIDINE 20 MG: 20 TABLET ORAL at 08:28

## 2017-10-14 RX ADMIN — FAMOTIDINE 20 MG: 20 TABLET ORAL at 20:14

## 2017-10-14 RX ADMIN — CLOZAPINE 500 MG: 100 TABLET ORAL at 20:14

## 2017-10-14 RX ADMIN — CLONIDINE HYDROCHLORIDE 0.1 MG: 0.1 TABLET ORAL at 08:29

## 2017-10-14 RX ADMIN — NICOTINE POLACRILEX 4 MG: 2 GUM, CHEWING ORAL at 17:43

## 2017-10-14 RX ADMIN — VITAMIN D, TAB 1000IU (100/BT) 2000 UNITS: 25 TAB at 08:28

## 2017-10-14 RX ADMIN — CLOZAPINE 200 MG: 100 TABLET ORAL at 08:28

## 2017-10-14 RX ADMIN — NICOTINE POLACRILEX 4 MG: 2 GUM, CHEWING ORAL at 08:29

## 2017-10-14 RX ADMIN — NICOTINE POLACRILEX 4 MG: 2 GUM, CHEWING ORAL at 20:30

## 2017-10-14 RX ADMIN — NICOTINE POLACRILEX 4 MG: 2 GUM, CHEWING ORAL at 13:08

## 2017-10-14 ASSESSMENT — ACTIVITIES OF DAILY LIVING (ADL)
GROOMING: PROMPTS
GROOMING: PROMPTS
DRESS: SCRUBS (BEHAVIORAL HEALTH);INDEPENDENT
ORAL_HYGIENE: INDEPENDENT

## 2017-10-14 NOTE — PLAN OF CARE
Face to face end of shift report received from Key GREENBERG RN. Rounding completed. Patient observed resting in bed.     Kana Mar  10/13/2017  11:43 PM

## 2017-10-14 NOTE — PLAN OF CARE
"Problem: Patient Care Overview  Goal: Individualization & Mutuality  Patient will have an absence or decrease in hallucinations by time of discharge.  Patient will be medication compliant while hospitalized.  Patient will sleep 6 to 9 hours every night,.  Patient will be able to have a reality based conversation prior to discharge.  Patient will be independent with his ADL s while hospitalized and maintain hygiene.   Outcome: Therapy, progress toward functional goals is gradual  Pt up pacing the halls this morning. When this writer speaks with him in his room he is restless/paces and he states he is \"feeling really good.\" His affect was brighter during this conversation. He states he does feel \"foggy\" for a \"split second\" intermittently but he returns to feeling well afterwards. Pt stated that his CM and SW are working on getting him an apartment. He states he would like an apartment to \"get away from my mom and dad\" and that he thinks he would do well because, \"I like taking my meds.\" He held a reality oriented conversation for a few minutes with this writer. 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 He was very pleasant with this writer.       "

## 2017-10-14 NOTE — PROGRESS NOTES
[facility]  Psychiatric Progress Note    Subjective     Miles up pacing in tenorio and stated he feels unreal as this is the best he has felt in 10 years, and feels the meds are working well, and only S/E is a bit drowsiness. Is not isolating today and stated he goes to groups here and there but at times hard to concentrate at times. He can carry on a reality based conversation and today very logical in his thought process.       Review of Systems  10 point ROS negative.         DIagnoses:      Paranoid Schizophrenia w/ capgras delusions    Attestation:  Patient has been seen and evaluated by me, Lucy Chapa NP          Interim History:   The patient's care was discussed with the treatment team and chart notes were reviewed.          Medications:       cloZAPine  500 mg Oral At Bedtime     cloNIDine  0.1 mg Oral BID     cloZAPine  200 mg Oral Daily     cholecalciferol  2,000 Units Oral Daily     famotidine  20 mg Oral BID     clotrimazole, benztropine, hydrOXYzine, acetaminophen, alum & mag hydroxide-simethicone, magnesium hydroxide, traZODone, OLANZapine **OR** OLANZapine, nicotine polacrilex          Allergies:     Allergies   Allergen Reactions     Pcn [Bicillin C-R,] Rash     Provider wants to try amoxicillin(benadryl ordered as well) 9/10/16     Bupropion Other (See Comments)     delusions      Depakote [Valproic Acid] Other (See Comments)     Heart racing     Divalproex Sodium-Fd&C Red #40      Increase heart rate            Psychiatric Examination:   /67  Pulse 87  Temp 97.8  F (36.6  C) (Tympanic)  Resp 16  Ht 1.829 m (6')  Wt 105.4 kg (232 lb 4.8 oz)  SpO2 94%  BMI 31.51 kg/m2  Weight is 232 lbs 4.8 oz  Body mass index is 31.51 kg/(m^2).    Appearance: awake, alert and cooperative  Attitude: cooperative   Eye Contact: good  Mood: bright and anxiety and depression minimal  Affect:  appropriate  Speech:  Regular rate and volume. And tone  Psychomotor Behavior:  no evidence of  tardive dyskinesia, dystonia, or tics  Thought Process: more clearand appears more logical  Associations:  loosening of associations present  Not as bad today  Thought Content:  no evidence of suicidal ideation or homicidal ideation, patient appears to be responding to internal stimuli and preoccupied  But not bad today  Insight:  improving  Judgment:  improving  Oriented to:  time, person, and place  Attention Span and Concentration: limited  Recent and Remote Memory: limited  Fund of Knowledge: delayed  Muscle Strength and Tone: normal  Gait and Station: Normal         Labs:     No results found for this or any previous visit (from the past 48 hour(s)).    Assessment and Plan:  Increase HS Clozaril to 500 mg. Will continue to increase until beneficial, or max daily dosing is achieved.    Committed and has a Savage petition    Estimated LOS: Unknown. Clozapine continues to be titrated up. Petition for commitment and savage filed due to lack of improvement.

## 2017-10-14 NOTE — PLAN OF CARE
Face to face end of shift report received from Kana LOMELI RN. Rounding completed. Patient observed lying in bed with his eyes closed.     Chrissy Stack  10/14/2017  7:44 AM

## 2017-10-14 NOTE — PLAN OF CARE
Problem: Patient Care Overview  Goal: Individualization & Mutuality  Patient will have an absence or decrease in hallucinations by time of discharge.  Patient will be medication compliant while hospitalized.  Patient will sleep 6 to 9 hours every night,.  Patient will be able to have a reality based conversation prior to discharge.  Patient will be independent with his ADL s while hospitalized and maintain hygiene.   Outcome: No Change  Pt has been in bed with eyes closed and regular respirations observed all night. Will continue to monitor.

## 2017-10-14 NOTE — PLAN OF CARE
Face to face end of shift report received from Chrissy RHODES RN. Rounding completed. Patient observed.     Key Vaughan  10/14/2017  4:16 PM

## 2017-10-15 LAB
BASOPHILS # BLD AUTO: 0.1 10E9/L (ref 0–0.2)
BASOPHILS NFR BLD AUTO: 1 %
DIFFERENTIAL METHOD BLD: NORMAL
EOSINOPHIL # BLD AUTO: 0.2 10E9/L (ref 0–0.7)
EOSINOPHIL NFR BLD AUTO: 2.1 %
ERYTHROCYTE [DISTWIDTH] IN BLOOD BY AUTOMATED COUNT: 12.9 % (ref 10–15)
HCT VFR BLD AUTO: 41.8 % (ref 40–53)
HGB BLD-MCNC: 14.5 G/DL (ref 13.3–17.7)
IMM GRANULOCYTES # BLD: 0 10E9/L (ref 0–0.4)
IMM GRANULOCYTES NFR BLD: 0.3 %
LYMPHOCYTES # BLD AUTO: 2.6 10E9/L (ref 0.8–5.3)
LYMPHOCYTES NFR BLD AUTO: 35.6 %
MCH RBC QN AUTO: 29.8 PG (ref 26.5–33)
MCHC RBC AUTO-ENTMCNC: 34.7 G/DL (ref 31.5–36.5)
MCV RBC AUTO: 86 FL (ref 78–100)
MONOCYTES # BLD AUTO: 0.9 10E9/L (ref 0–1.3)
MONOCYTES NFR BLD AUTO: 11.9 %
NEUTROPHILS # BLD AUTO: 3.6 10E9/L (ref 1.6–8.3)
NEUTROPHILS NFR BLD AUTO: 49.1 %
NRBC # BLD AUTO: 0 10*3/UL
NRBC BLD AUTO-RTO: 0 /100
PLATELET # BLD AUTO: 321 10E9/L (ref 150–450)
RBC # BLD AUTO: 4.86 10E12/L (ref 4.4–5.9)
WBC # BLD AUTO: 7.3 10E9/L (ref 4–11)

## 2017-10-15 PROCEDURE — 25000132 ZZH RX MED GY IP 250 OP 250 PS 637: Performed by: NURSE PRACTITIONER

## 2017-10-15 PROCEDURE — 12400011

## 2017-10-15 PROCEDURE — 85025 COMPLETE CBC W/AUTO DIFF WBC: CPT | Performed by: NURSE PRACTITIONER

## 2017-10-15 PROCEDURE — S0136 CLOZAPINE, 25 MG: HCPCS | Performed by: NURSE PRACTITIONER

## 2017-10-15 PROCEDURE — 36415 COLL VENOUS BLD VENIPUNCTURE: CPT | Performed by: NURSE PRACTITIONER

## 2017-10-15 PROCEDURE — 99232 SBSQ HOSP IP/OBS MODERATE 35: CPT | Performed by: NURSE PRACTITIONER

## 2017-10-15 RX ADMIN — CLONIDINE HYDROCHLORIDE 0.1 MG: 0.1 TABLET ORAL at 20:10

## 2017-10-15 RX ADMIN — FAMOTIDINE 20 MG: 20 TABLET ORAL at 08:35

## 2017-10-15 RX ADMIN — NICOTINE POLACRILEX 4 MG: 2 GUM, CHEWING ORAL at 19:25

## 2017-10-15 RX ADMIN — VITAMIN D, TAB 1000IU (100/BT) 2000 UNITS: 25 TAB at 08:35

## 2017-10-15 RX ADMIN — NICOTINE POLACRILEX 4 MG: 2 GUM, CHEWING ORAL at 08:35

## 2017-10-15 RX ADMIN — NICOTINE POLACRILEX 4 MG: 2 GUM, CHEWING ORAL at 17:29

## 2017-10-15 RX ADMIN — CLONIDINE HYDROCHLORIDE 0.1 MG: 0.1 TABLET ORAL at 08:35

## 2017-10-15 RX ADMIN — NICOTINE POLACRILEX 4 MG: 2 GUM, CHEWING ORAL at 13:12

## 2017-10-15 RX ADMIN — CLOZAPINE 500 MG: 100 TABLET ORAL at 20:10

## 2017-10-15 RX ADMIN — FAMOTIDINE 20 MG: 20 TABLET ORAL at 20:10

## 2017-10-15 RX ADMIN — CLOZAPINE 200 MG: 100 TABLET ORAL at 08:35

## 2017-10-15 ASSESSMENT — ACTIVITIES OF DAILY LIVING (ADL)
GROOMING: PROMPTS
DRESS: SCRUBS (BEHAVIORAL HEALTH)
ORAL_HYGIENE: INDEPENDENT
DRESS: SCRUBS (BEHAVIORAL HEALTH);INDEPENDENT

## 2017-10-15 NOTE — PLAN OF CARE
Problem: Patient Care Overview  Goal: Individualization & Mutuality  Patient will have an absence or decrease in hallucinations by time of discharge.  Patient will be medication compliant while hospitalized.  Patient will sleep 6 to 9 hours every night,.  Patient will be able to have a reality based conversation prior to discharge.  Patient will be independent with his ADL s while hospitalized and maintain hygiene.   Outcome: Therapy, progress toward functional goals is gradual  Pt. Verbalizes a decrease in hallucinations, but they are still there at times, is medication compliant, has some reality based conversation at times, but continues to have some delusional statements, independent with ADL's, will continue to monitor.

## 2017-10-15 NOTE — PLAN OF CARE
Face to face end of shift report received from Key GREENBERG RN. Rounding completed. Patient observed resting in bed.     aKna Mar  10/14/2017  11:29 PM

## 2017-10-15 NOTE — PLAN OF CARE
"Problem: Patient Care Overview  Goal: Individualization & Mutuality  Patient will have an absence or decrease in hallucinations by time of discharge.  Patient will be medication compliant while hospitalized.  Patient will sleep 6 to 9 hours every night,.  Patient will be able to have a reality based conversation prior to discharge.  Patient will be independent with his ADL s while hospitalized and maintain hygiene.   Outcome: Therapy, progress toward functional goals is gradual  Pt has been isolating to his room and lying in bed most of the day today. He did get up for breakfast and ate in the lounge though. He states he is \"tired\" today but continues to \"feel good\" otherwise. Pt accepted his morning medications and is in agreement with treatment team recommendations. This writer notified him that his labs came back WNL. Pt then went on to state that he believes the phlebotomist took his blood from his \"main vein\" and questioned this writer if this was correct. Pt also mumbled many other things at the time that this writer was unable to discern. Will attempt to talk to pt more when up.       "

## 2017-10-15 NOTE — PROGRESS NOTES
[facility]  Psychiatric Progress Note    Subjective     Miles is laying in bed but sat immediately when I came in and said he feel really tired this am but is good. Minimal anxiety and depression and no S/I. Sleeping and eating great. Yesterday he said he has not felt good in 10 years, and feels the meds are working well, and only S/E is a bit drowsiness. Is not isolating today and stated he goes to groups here and there but at times hard to concentrate at times. He can carry on a reality based conversation and today very logical in his thought process.       Review of Systems  10 point ROS negative.         DIagnoses:      Paranoid Schizophrenia w/ capgras delusions    Attestation:  Patient has been seen and evaluated by me, Lucy Chapa NP          Interim History:   The patient's care was discussed with the treatment team and chart notes were reviewed.          Medications:       cloZAPine  500 mg Oral At Bedtime     cloNIDine  0.1 mg Oral BID     cloZAPine  200 mg Oral Daily     cholecalciferol  2,000 Units Oral Daily     famotidine  20 mg Oral BID     clotrimazole, benztropine, hydrOXYzine, acetaminophen, alum & mag hydroxide-simethicone, magnesium hydroxide, traZODone, OLANZapine **OR** OLANZapine, nicotine polacrilex          Allergies:     Allergies   Allergen Reactions     Pcn [Bicillin C-R,] Rash     Provider wants to try amoxicillin(benadryl ordered as well) 9/10/16     Bupropion Other (See Comments)     delusions      Depakote [Valproic Acid] Other (See Comments)     Heart racing     Divalproex Sodium-Fd&C Red #40      Increase heart rate            Psychiatric Examination:   /68  Pulse 89  Temp 96.8  F (36  C) (Tympanic)  Resp 16  Ht 1.829 m (6')  Wt 105.7 kg (233 lb 0.4 oz)  SpO2 96%  BMI 31.6 kg/m2  Weight is 233 lbs .42 oz  Body mass index is 31.6 kg/(m^2).    Appearance: awake, alert and cooperative  Attitude: cooperative   Eye Contact: good  Mood: bright and  anxiety and depression minimal  Affect:  appropriate  Speech:  Regular rate and volume. And tone  Psychomotor Behavior:  no evidence of tardive dyskinesia, dystonia, or tics  Thought Process: more clearand appears more logical  Associations:  loosening of associations present  Not as bad today  Thought Content:  no evidence of suicidal ideation or homicidal ideation, patient appears to be responding to internal stimuli and preoccupied  But not bad today  Insight:  improving  Judgment:  improving  Oriented to:  time, person, and place  Attention Span and Concentration: limited  Recent and Remote Memory: limited  Fund of Knowledge: delayed  Muscle Strength and Tone: normal  Gait and Station: Normal         Labs:     Results for orders placed or performed during the hospital encounter of 08/20/17 (from the past 48 hour(s))   CBC with platelets differential   Result Value Ref Range    WBC 7.3 4.0 - 11.0 10e9/L    RBC Count 4.86 4.4 - 5.9 10e12/L    Hemoglobin 14.5 13.3 - 17.7 g/dL    Hematocrit 41.8 40.0 - 53.0 %    MCV 86 78 - 100 fl    MCH 29.8 26.5 - 33.0 pg    MCHC 34.7 31.5 - 36.5 g/dL    RDW 12.9 10.0 - 15.0 %    Platelet Count 321 150 - 450 10e9/L    Diff Method Automated Method     % Neutrophils 49.1 %    % Lymphocytes 35.6 %    % Monocytes 11.9 %    % Eosinophils 2.1 %    % Basophils 1.0 %    % Immature Granulocytes 0.3 %    Nucleated RBCs 0 0 /100    Absolute Neutrophil 3.6 1.6 - 8.3 10e9/L    Absolute Lymphocytes 2.6 0.8 - 5.3 10e9/L    Absolute Monocytes 0.9 0.0 - 1.3 10e9/L    Absolute Eosinophils 0.2 0.0 - 0.7 10e9/L    Absolute Basophils 0.1 0.0 - 0.2 10e9/L    Abs Immature Granulocytes 0.0 0 - 0.4 10e9/L    Absolute Nucleated RBC 0.0        Assessment and Plan:  Increase HS Clozaril to 500 mg. Will continue to increase until beneficial, or max daily dosing is achieved.    Committed and has a Ramirez petition  CBC WNL today    Estimated LOS: Unknown. Clozapine continues to be titrated up. Petition for  commitment and savage filed due to lack of improvement.

## 2017-10-15 NOTE — PLAN OF CARE
Face to face end of shift report received from Chrissy RHODES RN. Rounding completed. Patient observed.     Key Vaughan  10/15/2017  3:38 PM

## 2017-10-15 NOTE — PLAN OF CARE
Face to face end of shift report received from Kana LOMELI RN. Rounding completed. Patient observed lying in bed with his eyes closed.     Chrissy Stack  10/15/2017  7:37 AM

## 2017-10-16 PROCEDURE — S0136 CLOZAPINE, 25 MG: HCPCS | Performed by: NURSE PRACTITIONER

## 2017-10-16 PROCEDURE — 12400011

## 2017-10-16 PROCEDURE — 25000132 ZZH RX MED GY IP 250 OP 250 PS 637: Performed by: NURSE PRACTITIONER

## 2017-10-16 RX ADMIN — VITAMIN D, TAB 1000IU (100/BT) 2000 UNITS: 25 TAB at 08:07

## 2017-10-16 RX ADMIN — NICOTINE POLACRILEX 4 MG: 2 GUM, CHEWING ORAL at 18:34

## 2017-10-16 RX ADMIN — CLOZAPINE 200 MG: 100 TABLET ORAL at 08:07

## 2017-10-16 RX ADMIN — NICOTINE POLACRILEX 4 MG: 2 GUM, CHEWING ORAL at 17:36

## 2017-10-16 RX ADMIN — OLANZAPINE 10 MG: 10 TABLET, FILM COATED ORAL at 18:34

## 2017-10-16 RX ADMIN — NICOTINE POLACRILEX 4 MG: 2 GUM, CHEWING ORAL at 08:26

## 2017-10-16 RX ADMIN — CLONIDINE HYDROCHLORIDE 0.1 MG: 0.1 TABLET ORAL at 20:25

## 2017-10-16 RX ADMIN — NICOTINE POLACRILEX 4 MG: 2 GUM, CHEWING ORAL at 12:21

## 2017-10-16 RX ADMIN — CLONIDINE HYDROCHLORIDE 0.1 MG: 0.1 TABLET ORAL at 08:07

## 2017-10-16 RX ADMIN — FAMOTIDINE 20 MG: 20 TABLET ORAL at 08:07

## 2017-10-16 RX ADMIN — NICOTINE POLACRILEX 4 MG: 2 GUM, CHEWING ORAL at 20:26

## 2017-10-16 RX ADMIN — CLOZAPINE 500 MG: 100 TABLET ORAL at 20:25

## 2017-10-16 RX ADMIN — NICOTINE POLACRILEX 4 MG: 2 GUM, CHEWING ORAL at 14:56

## 2017-10-16 RX ADMIN — FAMOTIDINE 20 MG: 20 TABLET ORAL at 20:25

## 2017-10-16 ASSESSMENT — ACTIVITIES OF DAILY LIVING (ADL)
DRESS: SCRUBS (BEHAVIORAL HEALTH)
LAUNDRY: UNABLE TO COMPLETE
DRESS: SCRUBS (BEHAVIORAL HEALTH);INDEPENDENT
GROOMING: PROMPTS;INDEPENDENT
GROOMING: PROMPTS
ORAL_HYGIENE: INDEPENDENT

## 2017-10-16 NOTE — PLAN OF CARE
Face to face end of shift report received from Chrissy RHODES RN. Rounding completed. Patient observed.     Abigail Frost  10/16/2017  4:05 PM

## 2017-10-16 NOTE — PLAN OF CARE
"Problem: Patient Care Overview  Goal: Individualization & Mutuality  Patient will have an absence or decrease in hallucinations by time of discharge.  Patient will be medication compliant while hospitalized.  Patient will sleep 6 to 9 hours every night,.  Patient will be able to have a reality based conversation prior to discharge.  Patient will be independent with his ADL s while hospitalized and maintain hygiene.   Outcome: No Change  Patient pleasant and cooperative. Denies all criteria. States he does have some aches and pains all over his body, but \"they come and go\", declines interventions at this time. States he is here \"waiting for it to all unfold\". Has been pacing the unit and attending groups. States it is hard to sit still at times, so groups get difficult. Is disheveled, and posture is slouched, but affect is bright. Mom visited during visiting hours.       "

## 2017-10-16 NOTE — PLAN OF CARE
Face to face end of shift report received from Kana LOMELI RN. Rounding completed. Patient observed lying in bed with his eyes closed.     Chrissy Stack  10/16/2017  7:46 AM

## 2017-10-16 NOTE — PLAN OF CARE
Problem: General Plan of Care (Inpatient Behavioral)  Goal: Team Discussion  Team Plan:   Outcome: No Change  BEHAVIORAL TEAM DISCUSSION     Participants: Toño Antunez RN, Alesha Loera RT, Mali Sprague OT, Rowan Alford NP, Kim Cardozo NP, Nathaly Villarreal , Tish [facility]  Progress: Walks in and out of group, will talk if staff initiates but does not sit down and then walks back out.  Is cooperative with meds  Continued Stay Criteria/Rationale: Patient still stabilizing from psychosis - still adjusting Clozaril  Medical/Physical: None  Precautions:   Behavioral Orders   Procedures     Code 1 - Restrict to Unit     Routine Programming       As clinically indicated     Status 15       Every 15 minutes.     Plan: Continue to stabilize on med changes - adjust Clozaril, IRTS referrals or ACT team - seems more appropriate for ACT  Rationale for change in precautions or plan: None        Problem: Patient Care Overview  Goal: Team Discussion  Team Plan:   Outcome: No Change  BEHAVIORAL TEAM DISCUSSION     Participants: Toño Antunez RN, Alesha Loera RT, Mali Sprague OT, April Rocío NP, Kim Cardozo NP, Nathaly NGUYEN, [facility]  Progress: Walks in and out of group, will talk if staff initiates but does not sit down and then walks back out.  Is cooperative with meds  Continued Stay Criteria/Rationale: Patient still stabilizing from psychosis - still adjusting Clozaril  Medical/Physical: None  Precautions:   Behavioral Orders   Procedures     Code 1 - Restrict to Unit     Routine Programming       As clinically indicated     Status 15       Every 15 minutes.     Plan: Continue to stabilize on med changes - adjust Clozaril, IRTS referrals or ACT team - seems more appropriate for ACT  Rationale for change in precautions or plan: None

## 2017-10-16 NOTE — PLAN OF CARE
Problem: Discharge Planning  Goal: Discharge Planning (Adult, OB, Behavioral, Peds)  Writer met with pt and had him complete paperwork for the ACT team.  Pt met with the ACT team and afterwards writer spoke with Temitope Armijo who stated they will take pt and will probably come out next week to do the intake paperwork with him.     Writer spoke with pt's mother Malika and let her know that the ACT team will be taking on pt.     Writer spoke with Fang Perales and updated her also about the ACT team.

## 2017-10-16 NOTE — PLAN OF CARE
"Problem: Patient Care Overview  Goal: Individualization & Mutuality  Patient will have an absence or decrease in hallucinations by time of discharge.  Patient will be medication compliant while hospitalized.  Patient will sleep 6 to 9 hours every night,.  Patient will be able to have a reality based conversation prior to discharge.  Patient will be independent with his ADL s while hospitalized and maintain hygiene.   Outcome: Therapy, progress toward functional goals is gradual  Pt isolative today and more withdrawn. He has been lying in bed much of the day. He has been encouraged to shower as he has a strong body odor. He has declined to shower so far but states he will after he rests. Pt met with ACT team this morning. He has not held a reality based conversation with this writer today though answers assessment questions with \"yes\" or \"no.\" He denied any hallucinations. He has been accepting of his scheduled medications and is in agreement with the treatment team recommendations.     1:42 PM  Pt did end up showering.   "

## 2017-10-16 NOTE — PLAN OF CARE
Problem: Patient Care Overview  Goal: Individualization & Mutuality  Patient will have an absence or decrease in hallucinations by time of discharge.  Patient will be medication compliant while hospitalized.  Patient will sleep 6 to 9 hours every night,.  Patient will be able to have a reality based conversation prior to discharge.  Patient will be independent with his ADL s while hospitalized and maintain hygiene.   Outcome: Therapy, progress toward functional goals is gradual  Pt. Has been medication compliant, does not have total reality based conversation, has delusional thinking, independent with ADL's, but not wanting to shave or shower at this time, pacing in the halls frequently, does not initiate conversation, will continue to monitor.

## 2017-10-16 NOTE — PLAN OF CARE
Problem: Discharge Planning  Goal: Discharge Planning (Adult, OB, Behavioral, Peds)  Writer met with pt and had him complete paperwork for the ACT team.  Pt met with the ACT team and afterwards writer spoke with Temitope Armijo who stated they will take pt and will probably come out next week to do the intake paperwork with him.

## 2017-10-16 NOTE — PLAN OF CARE
Face to face end of shift report received from Key GREENBERG RN. Rounding completed. Patient observed resting in bed.     Kana Mar  10/15/2017  11:57 PM

## 2017-10-17 PROCEDURE — 25000132 ZZH RX MED GY IP 250 OP 250 PS 637: Performed by: NURSE PRACTITIONER

## 2017-10-17 PROCEDURE — S0136 CLOZAPINE, 25 MG: HCPCS | Performed by: NURSE PRACTITIONER

## 2017-10-17 PROCEDURE — 12400011

## 2017-10-17 RX ADMIN — NICOTINE POLACRILEX 4 MG: 2 GUM, CHEWING ORAL at 21:39

## 2017-10-17 RX ADMIN — NICOTINE POLACRILEX 4 MG: 2 GUM, CHEWING ORAL at 19:40

## 2017-10-17 RX ADMIN — FAMOTIDINE 20 MG: 20 TABLET ORAL at 20:11

## 2017-10-17 RX ADMIN — NICOTINE POLACRILEX 4 MG: 2 GUM, CHEWING ORAL at 12:13

## 2017-10-17 RX ADMIN — CLOZAPINE 200 MG: 100 TABLET ORAL at 08:14

## 2017-10-17 RX ADMIN — FAMOTIDINE 20 MG: 20 TABLET ORAL at 08:14

## 2017-10-17 RX ADMIN — NICOTINE POLACRILEX 4 MG: 2 GUM, CHEWING ORAL at 17:15

## 2017-10-17 RX ADMIN — CLONIDINE HYDROCHLORIDE 0.1 MG: 0.1 TABLET ORAL at 08:14

## 2017-10-17 RX ADMIN — VITAMIN D, TAB 1000IU (100/BT) 2000 UNITS: 25 TAB at 08:14

## 2017-10-17 RX ADMIN — CLONIDINE HYDROCHLORIDE 0.1 MG: 0.1 TABLET ORAL at 20:11

## 2017-10-17 RX ADMIN — CLOZAPINE 500 MG: 100 TABLET ORAL at 20:11

## 2017-10-17 ASSESSMENT — ACTIVITIES OF DAILY LIVING (ADL)
ORAL_HYGIENE: INDEPENDENT
LAUNDRY: UNABLE TO COMPLETE
DRESS: SCRUBS (BEHAVIORAL HEALTH)
DRESS: SCRUBS (BEHAVIORAL HEALTH);INDEPENDENT
GROOMING: SHOWER;INDEPENDENT
ORAL_HYGIENE: INDEPENDENT
GROOMING: INDEPENDENT;PROMPTS

## 2017-10-17 NOTE — PLAN OF CARE
"Problem: Patient Care Overview  Goal: Individualization & Mutuality  Patient will have an absence or decrease in hallucinations by time of discharge.  Patient will be medication compliant while hospitalized.  Patient will sleep 6 to 9 hours every night,.  Patient will be able to have a reality based conversation prior to discharge.  Patient will be independent with his ADL s while hospitalized and maintain hygiene.   Outcome: Therapy, progress toward functional goals is gradual  Pt isolating to his room and lying in bed until lunch time. He requested to shower and did so. He has been brighter and more talkative today. Pt came to the nurse's station and asked everyone how they were doing. Pt tracked well in conversation with this writer and asked appropriate questions. He stated he likes working with Fang MCGILL as his CM and he may not want to work with the ACT team. He stated, \"I have too many things going on and I'm trying to take in all of the variables.\" He did tell this writer that if he has the option of going to a board and lodge or going to his mothers prior to possibly getting an apartment, that he'd rather go to his parents. This writer encouraged him to call Fang MCGILL and speak with his SW regarding the specifics of working with the ACT team and his discharge.       "

## 2017-10-17 NOTE — PLAN OF CARE
"Problem: Patient Care Overview  Goal: Individualization & Mutuality  Patient will have an absence or decrease in hallucinations by time of discharge.  Patient will be medication compliant while hospitalized.  Patient will sleep 6 to 9 hours every night,.  Patient will be able to have a reality based conversation prior to discharge.  Patient will be independent with his ADL s while hospitalized and maintain hygiene.   Outcome: Improving  Patient is restless and pacing the floors, denies all criteria. Does go into the group room, stays for a bit, then leaves before group time is complete. States he is here \"waiting to see what's going to happen\", that he was given \"10 different names of places and can't keep track\", but states he has them all written down to try to keep track though. Affect is bright, does joke some with staff, but does not engage in conversation with his peers. Is untidy and disheveled, posture is slouched. Aunt visited, reports to staff that she believes patient is improving because she was able to get a hug from patient before ending her visit.       "

## 2017-10-17 NOTE — PLAN OF CARE
Problem: Patient Care Overview  Goal: Individualization & Mutuality  Patient will have an absence or decrease in hallucinations by time of discharge.  Patient will be medication compliant while hospitalized.  Patient will sleep 6 to 9 hours every night,.  Patient will be able to have a reality based conversation prior to discharge.  Patient will be independent with his ADL s while hospitalized and maintain hygiene.   2330--in bed with eyes closed and breathing regular. 0559--still in bed with eyes closed and breathing regular.

## 2017-10-17 NOTE — PLAN OF CARE
Problem: General Plan of Care (Inpatient Behavioral)  Goal: Team Discussion  Team Plan:   BEHAVIORAL TEAM DISCUSSION     Participants: Rowan Alford NP, Kim Cardozo NP, Beth Holman NP, Toño Antunez RN, Priscilla Henry RN, Nneka Sprague OT, Luanne Hinton, Carolyn LAUREN, Nathaly Villarreal [facility], [facility]  Progress: Moderate  Continued Stay Criteria/Rationale: Monitoring medications for effectiveness and side effects  Medical/Physical: none  Precautions:   Behavioral Orders   Procedures     Code 1 - Restrict to Unit     Routine Programming       As clinically indicated     Status 15       Every 15 minutes.     Plan: Discharge to home or board and lodge with ACT services.   Rationale for change in precautions or plan: none        Problem: Patient Care Overview  Goal: Team Discussion  Team Plan:   BEHAVIORAL TEAM DISCUSSION     Participants: Rowan Alford NP, Kim Cardozo NP, Beth Holman NP, Toño Antunez RN, Priscilla Henry RN, Nneka Sprague OT, Luanne Hinton, Carolyn LAUREN, Nathaly [facility], [facility]  Progress: Moderate  Continued Stay Criteria/Rationale: Monitoring medications for effectiveness and side effects  Medical/Physical: none  Precautions:   Behavioral Orders   Procedures     Code 1 - Restrict to Unit     Routine Programming       As clinically indicated     Status 15       Every 15 minutes.     Plan: Discharge to home or board and lodge with ACT services.   Rationale for change in precautions or plan: none

## 2017-10-17 NOTE — PLAN OF CARE
Face to face end of shift report received from Chrissy RHODES RN. Rounding completed. Patient observed in lounge area.     Abigail Frost  10/17/2017  3:39 PM

## 2017-10-17 NOTE — PLAN OF CARE
Face to face end of shift report received from Jaime LEOS RN. Rounding completed. Patient observed lying in bed with his eyes closed.     Chrissy Stack  10/17/2017  7:34 AM

## 2017-10-17 NOTE — PLAN OF CARE
Problem: Discharge Planning  Goal: Discharge Planning (Adult, OB, Behavioral, Peds)  Writer spoke with pt and answered his questions about the ACT team.  Pt expressed fear that he would have to go to groups - writer assured him that he did not have to do any groups unless he wanted to but the ACT team would be making sure he was taking his medications and seeing his providers to keep him out of the hospital.  Pt stated that he wants to take his medications because they help him.  Pt stated he would like his own place but he also likes staying with his mother to save up his money.  Writer told him that writer asked his  Fang Pitts to give him a call to discuss his options and the ACT team would be back out next week to talk to him about it.

## 2017-10-17 NOTE — PLAN OF CARE
Face to face end of shift report received from Abigail BROWN RN. Rounding completed. Patient observed.     Maurice Mcintyre  10/17/2017  2:01 AM

## 2017-10-18 PROCEDURE — 25000132 ZZH RX MED GY IP 250 OP 250 PS 637: Performed by: NURSE PRACTITIONER

## 2017-10-18 PROCEDURE — S0136 CLOZAPINE, 25 MG: HCPCS | Performed by: NURSE PRACTITIONER

## 2017-10-18 PROCEDURE — 99232 SBSQ HOSP IP/OBS MODERATE 35: CPT | Performed by: NURSE PRACTITIONER

## 2017-10-18 PROCEDURE — 12400011

## 2017-10-18 RX ADMIN — NICOTINE POLACRILEX 4 MG: 2 GUM, CHEWING ORAL at 14:57

## 2017-10-18 RX ADMIN — CLOZAPINE 200 MG: 100 TABLET ORAL at 09:02

## 2017-10-18 RX ADMIN — NICOTINE POLACRILEX 4 MG: 2 GUM, CHEWING ORAL at 12:40

## 2017-10-18 RX ADMIN — NICOTINE POLACRILEX 4 MG: 2 GUM, CHEWING ORAL at 17:38

## 2017-10-18 RX ADMIN — FAMOTIDINE 20 MG: 20 TABLET ORAL at 09:02

## 2017-10-18 RX ADMIN — CLONIDINE HYDROCHLORIDE 0.1 MG: 0.1 TABLET ORAL at 09:01

## 2017-10-18 RX ADMIN — CLOZAPINE 500 MG: 100 TABLET ORAL at 20:33

## 2017-10-18 RX ADMIN — NICOTINE POLACRILEX 4 MG: 2 GUM, CHEWING ORAL at 11:03

## 2017-10-18 RX ADMIN — CLONIDINE HYDROCHLORIDE 0.1 MG: 0.1 TABLET ORAL at 20:33

## 2017-10-18 RX ADMIN — NICOTINE POLACRILEX 4 MG: 2 GUM, CHEWING ORAL at 20:38

## 2017-10-18 RX ADMIN — FAMOTIDINE 20 MG: 20 TABLET ORAL at 20:33

## 2017-10-18 RX ADMIN — VITAMIN D, TAB 1000IU (100/BT) 2000 UNITS: 25 TAB at 09:02

## 2017-10-18 ASSESSMENT — ACTIVITIES OF DAILY LIVING (ADL)
LAUNDRY: UNABLE TO COMPLETE
LAUNDRY: UNABLE TO COMPLETE
ORAL_HYGIENE: INDEPENDENT
DRESS: SCRUBS (BEHAVIORAL HEALTH)
GROOMING: INDEPENDENT
DRESS: SCRUBS (BEHAVIORAL HEALTH);INDEPENDENT
GROOMING: INDEPENDENT
ORAL_HYGIENE: INDEPENDENT

## 2017-10-18 NOTE — PLAN OF CARE
Problem: Patient Care Overview  Goal: Individualization & Mutuality  Patient will have an absence or decrease in hallucinations by time of discharge.  Patient will be medication compliant while hospitalized.  Patient will sleep 6 to 9 hours every night,.  Patient will be able to have a reality based conversation prior to discharge.  Patient will be independent with his ADL s while hospitalized and maintain hygiene.   2330--in bed with eyes closed and breathing regular. 0556--still in bed with eyes closed and breathing regular.

## 2017-10-18 NOTE — PLAN OF CARE
Problem: General Plan of Care (Inpatient Behavioral)  Goal: Team Discussion  Team Plan:   BEHAVIORAL TEAM DISCUSSION     Participants: Supa Espinoza NP, Kim Cardozo NP, Yvonne Pedersen NP, Lilly [facility] LIC, Tish [facility] LIC, Carolyn Holloway Discharge Plannner, Luanne Joshua Discharge planner, Key Vaughan RN, Alesha Quevedo [employer], Nneka Sprague OT   Progress: fair  Continued Stay Criteria/Rationale: unable to DC to lesser level of care due to high risk of hospital readmission  Medical/Physical: none known  Precautions:   Behavioral Orders   Procedures     Code 1 - Restrict to Unit     Routine Programming       As clinically indicated     Status 15       Every 15 minutes.     Plan: continue to stabilize on medications, possible DC back home with ACT team or DC to [facility]  Rationale for change in precautions or plan: None        Problem: Patient Care Overview  Goal: Team Discussion  Team Plan:   BEHAVIORAL TEAM DISCUSSION     Participants: Supa Espinoza NP, Kim Cardozo NP, Yvonne Pedersen NP, Lilly [facility], [facility] LIC, Carolyn Holloway Discharge Plannner, Luanne Joshua Discharge planner, Key Vaughan RN, Alesha Quevedo [employer], Nneka Ribeirosa OT   Progress: fair  Continued Stay Criteria/Rationale: unable to DC to lesser level of care due to high risk of hospital readmission  Medical/Physical: none known  Precautions:   Behavioral Orders   Procedures     Code 1 - Restrict to Unit     Routine Programming       As clinically indicated     Status 15       Every 15 minutes.     Plan: continue to stabilize on medications, possible DC back home with ACT team or DC to [facility]  Rationale for change in precautions or plan: None

## 2017-10-18 NOTE — PLAN OF CARE
Problem: General Plan of Care (Inpatient Behavioral)  Goal: Team Discussion  Team Plan:   BEHAVIORAL TEAM DISCUSSION     Participants: Yvonne Pedersen NP, Kim Cardozo NP, Beth Holman NP, Supa Espinoza NP, Key Vaughan RN, Nneka Sprague OT, Luanne Hinton, Carolyn LAUREN, Nathaly [facility], [facility]  Progress: Moderate  Continued Stay Criteria/Rationale: Monitoring medications for effectiveness and side effects, cannot discharge without safe discharge plan in place   Medical/Physical: none  Precautions:           Behavioral Orders   Procedures     Code 1 - Restrict to Unit     Routine Programming         As clinically indicated     Status 15         Every 15 minutes.      Plan: Discharge to home or board and lodge with ACT services   Rationale for change in precautions or plan: none        Problem: Patient Care Overview  Goal: Team Discussion  Team Plan:   BEHAVIORAL TEAM DISCUSSION     Participants: Yvonne Pedersen NP, Kim Cardozo NP, Beth Holman NP, Supa Espinoza NP, Key Vaughan RN, Nneka Sprague OT, Luanne Hinton, Carolyn LAUREN, Nathaly [facility], [facility]  Progress: Moderate  Continued Stay Criteria/Rationale: Monitoring medications for effectiveness and side effects, cannot discharge without safe discharge plan in place   Medical/Physical: none  Precautions:           Behavioral Orders   Procedures     Code 1 - Restrict to Unit     Routine Programming         As clinically indicated     Status 15         Every 15 minutes.      Plan: Discharge to home or board and lodge with ACT services   Rationale for change in precautions or plan: none

## 2017-10-18 NOTE — PLAN OF CARE
Face to face end of shift report received from Megha BROWN RN. Rounding completed. Patient observed in [facility] [place].     Abigail Frost  10/18/2017  4:20 PM

## 2017-10-18 NOTE — PROGRESS NOTES
[facility]  Psychiatric Progress Note    Subjective     Miles is in his bed when I meet with him. He tells me he is adjusting to the medication and does not feel as sleepy in the morning. Josafat also tells me he feels he can think more clearly and does like the medication for slowing his thoughts as well. He is concerned about the cost of having an apartment. He does think it would be better to live with his parents and save money and still have the ACT team involved. He also worries that he will have to attend groups. HE does not like the idea of groups and feels anxious in them. Josafat is brighter and is able to smile appropriately throughout our conversation.     Review of Systems  10 point ROS negative.         DIagnoses:      Paranoid Schizophrenia w/ capgras delusions    Attestation:  Patient has been seen and evaluated by me, Yvonne Pedersen NP          Interim History:   The patient's care was discussed with the treatment team and chart notes were reviewed.          Medications:       cloZAPine  500 mg Oral At Bedtime     cloNIDine  0.1 mg Oral BID     cloZAPine  200 mg Oral Daily     cholecalciferol  2,000 Units Oral Daily     famotidine  20 mg Oral BID     clotrimazole, benztropine, hydrOXYzine, acetaminophen, alum & mag hydroxide-simethicone, magnesium hydroxide, traZODone, OLANZapine **OR** OLANZapine, nicotine polacrilex          Allergies:     Allergies   Allergen Reactions     Pcn [Bicillin C-R,] Rash     Provider wants to try amoxicillin(benadryl ordered as well) 9/10/16     Bupropion Other (See Comments)     delusions      Depakote [Valproic Acid] Other (See Comments)     Heart racing     Divalproex Sodium-Fd&C Red #40      Increase heart rate            Psychiatric Examination:   /74  Pulse 95  Temp 98  F (36.7  C) (Tympanic)  Resp 14  Ht 1.829 m (6')  Wt 105.7 kg (233 lb 0.4 oz)  SpO2 95%  BMI 31.6 kg/m2  Weight is 233 lbs .42 oz  Body mass index is 31.6  kg/(m^2).    Appearance: awake, alert and cooperative  Attitude: cooperative   Eye Contact: good  Mood: bright with minimal anxiety  Affect:  appropriate  Speech:  Regular rate and volume. And tone  Psychomotor Behavior:  no evidence of tardive dyskinesia, dystonia, or tics  Thought Process: more linear and logical  Associations: no loose associations noted today   Thought Content: No SI/HI, may have some internal stimuli but is able to maintain a focused conversation  Insight:  improving  Judgment:  improving  Oriented to:  time, person, and place  Attention Span and Concentration: limited  Recent and Remote Memory: limited  Fund of Knowledge: delayed  Muscle Strength and Tone: normal  Gait and Station: Normal         Labs:     No results found for this or any previous visit (from the past 48 hour(s)).    Assessment and Plan:  Increase HS Clozaril to 500 mg. Will continue to increase until beneficial, or max daily dosing is achieved.    Committed and has a [employer] petition      Estimated LOS: Unknown. Clozapine continues to be titrated up. Petition for commitment and savage filed due to lack of improvement.

## 2017-10-18 NOTE — PLAN OF CARE
Face to face end of shift report received from Abigail BROWN RN. Rounding completed. Patient observed.     Maurice Mcintyre  10/18/2017  12:19 AM

## 2017-10-18 NOTE — PLAN OF CARE
"Problem: Patient Care Overview  Goal: Individualization & Mutuality  Patient will have an absence or decrease in hallucinations by time of discharge.  Patient will be medication compliant while hospitalized.  Patient will sleep 6 to 9 hours every night,.  Patient will be able to have a reality based conversation prior to discharge.  Patient will be independent with his ADL s while hospitalized and maintain hygiene.   Outcome: Improving  Patient is in bed when I first approach him this morning. He states he is tired. He denies having suicidal ideation, homicidal ideation, anxiety, hallucinatons or depression. He states, \"I am just hanging out until they find out where I am going.\" Pt appears to be disheveled. Did offer patient shower supplies and encouraged patient to shower. He is cooperative with this writer during nursing assessment and often jokes with staff. He does maintain appropriate boundaries with staff and peers. He is able to have reality based conversations at times. He does have appropriate questions. Pt does pace the hallway but denies anxiety, he states, \"I like walking and there is nothing else to do.\"       "

## 2017-10-18 NOTE — PLAN OF CARE
Face to face end of shift report received from Jaime Mcintyre RN. Rounding completed. Patient observed in bed.     Olinda Bobo  10/18/2017  7:43 AM

## 2017-10-18 NOTE — PLAN OF CARE
"Problem: Patient Care Overview  Goal: Individualization & Mutuality  Patient will have an absence or decrease in hallucinations by time of discharge.  Patient will be medication compliant while hospitalized.  Patient will sleep 6 to 9 hours every night,.  Patient will be able to have a reality based conversation prior to discharge.  Patient will be independent with his ADL s while hospitalized and maintain hygiene.   Outcome: No Change  Patient able to have reality based conversation, states he is \"waiting to see what's going to happen\", that he wants to get home so he can work on his Jeep and \"neto with things\". States he would prefer to just go home with no services and doesn't want an apartment because that would force him to spend the money he has already saved up. States he doesn't want to go to groups while here and after discharge because he finds them difficult to sit through. Affect is bright, conversation is appropriate with joking and laughing, is untidy, but posture is less slouched. Mom visited this evening.       "

## 2017-10-18 NOTE — PLAN OF CARE
Problem: Discharge Planning  Goal: Discharge Planning (Adult, OB, Behavioral, Peds)  Pt's  Fang Fink will be coming to see the pt tomorrow morning.

## 2017-10-19 PROCEDURE — 25000132 ZZH RX MED GY IP 250 OP 250 PS 637: Performed by: NURSE PRACTITIONER

## 2017-10-19 PROCEDURE — 12400011

## 2017-10-19 PROCEDURE — S0136 CLOZAPINE, 25 MG: HCPCS | Performed by: NURSE PRACTITIONER

## 2017-10-19 PROCEDURE — 99232 SBSQ HOSP IP/OBS MODERATE 35: CPT | Performed by: NURSE PRACTITIONER

## 2017-10-19 RX ADMIN — FAMOTIDINE 20 MG: 20 TABLET ORAL at 21:02

## 2017-10-19 RX ADMIN — VITAMIN D, TAB 1000IU (100/BT) 2000 UNITS: 25 TAB at 08:07

## 2017-10-19 RX ADMIN — NICOTINE POLACRILEX 4 MG: 2 GUM, CHEWING ORAL at 15:39

## 2017-10-19 RX ADMIN — NICOTINE POLACRILEX 4 MG: 2 GUM, CHEWING ORAL at 18:58

## 2017-10-19 RX ADMIN — CLONIDINE HYDROCHLORIDE 0.1 MG: 0.1 TABLET ORAL at 08:08

## 2017-10-19 RX ADMIN — FAMOTIDINE 20 MG: 20 TABLET ORAL at 08:08

## 2017-10-19 RX ADMIN — NICOTINE POLACRILEX 4 MG: 2 GUM, CHEWING ORAL at 21:08

## 2017-10-19 RX ADMIN — CLONIDINE HYDROCHLORIDE 0.1 MG: 0.1 TABLET ORAL at 21:01

## 2017-10-19 RX ADMIN — CLOZAPINE 200 MG: 100 TABLET ORAL at 08:08

## 2017-10-19 RX ADMIN — CLOZAPINE 500 MG: 100 TABLET ORAL at 21:01

## 2017-10-19 ASSESSMENT — ACTIVITIES OF DAILY LIVING (ADL)
GROOMING: INDEPENDENT
DRESS: INDEPENDENT;SCRUBS (BEHAVIORAL HEALTH)
ORAL_HYGIENE: INDEPENDENT
LAUNDRY: UNABLE TO COMPLETE
GROOMING: INDEPENDENT
DRESS: SCRUBS (BEHAVIORAL HEALTH);INDEPENDENT

## 2017-10-19 NOTE — PLAN OF CARE
Problem: Patient Care Overview  Goal: Individualization & Mutuality  Patient will have an absence or decrease in hallucinations by time of discharge.  Patient will be medication compliant while hospitalized.  Patient will sleep 6 to 9 hours every night,.  Patient will be able to have a reality based conversation prior to discharge.  Patient will be independent with his ADL s while hospitalized and maintain hygiene.   He is calm and cooperative with this writer during nursing assessment. Pt presents with full range affect and appears to be untidy. Pt denies criteria. Pt denies having hallucinations and does not appear to be responding. Pt has been in room much of shift, laying in bed. He states he is tired. He is compliant with medications. He is able to have reality based conversation. He denies having bowel or bladder issues. He also denies having pain. He has no questions regarding plan of care. Will continue to monitor.   1420: Pt has been more active and walking around the unit after lunch. He has been attending groups but does not participate.

## 2017-10-19 NOTE — PROGRESS NOTES
[facility]  Psychiatric Progress Note    Subjective     CM here. Miles is improving. They are looking at ACT Team and possibly his own apartment, but he would have to return home prior to this. Discussed the possibility of having him go to an assisted living facility until it is clear that he is ready to transition to independent living. CM will discuss with ACT Team. Josafat is able to discuss discharge planning. He remains compliant with mediations. He denies hallucinations and appears more present during conversation.     Review of Systems  10 point ROS negative.         DIagnoses:      Paranoid Schizophrenia w/ capgras delusions    Attestation:  Patient has been seen and evaluated by me, Supa Espinoza NP          Interim History:   The patient's care was discussed with the treatment team and chart notes were reviewed.          Medications:       cloZAPine  500 mg Oral At Bedtime     cloNIDine  0.1 mg Oral BID     cloZAPine  200 mg Oral Daily     cholecalciferol  2,000 Units Oral Daily     famotidine  20 mg Oral BID     clotrimazole, benztropine, hydrOXYzine, acetaminophen, alum & mag hydroxide-simethicone, magnesium hydroxide, traZODone, OLANZapine **OR** OLANZapine, nicotine polacrilex          Allergies:     Allergies   Allergen Reactions     Pcn [Bicillin C-R,] Rash     Provider wants to try amoxicillin(benadryl ordered as well) 9/10/16     Bupropion Other (See Comments)     delusions      Depakote [Valproic Acid] Other (See Comments)     Heart racing     Divalproex Sodium-Fd&C Red #40      Increase heart rate            Psychiatric Examination:   /68  Pulse 90  Temp 97.4  F (36.3  C) (Tympanic)  Resp 14  Ht 1.829 m (6')  Wt 105.7 kg (233 lb 0.4 oz)  SpO2 95%  BMI 31.6 kg/m2  Weight is 233 lbs .42 oz  Body mass index is 31.6 kg/(m^2).    Appearance: awake, alert, disheveled, malodorous  Attitude: cooperative   Eye Contact: good  Mood: bright   Affect:  appropriate  Speech:  Regular  rate and volume. Primarily logical content  Psychomotor Behavior:  no evidence of tardive dyskinesia, dystonia, or tics  Thought Process: more linear and logical, goal oriented  Associations: no loose associations noted today   Thought Content: No SI/HI, may have some internal stimuli but is able to maintain a focused conversation  Insight:  improving  Judgment:  improving  Oriented to:  time, person, and place  Attention Span and Concentration: limited  Recent and Remote Memory: limited  Fund of Knowledge: delayed  Muscle Strength and Tone: normal  Gait and Station: Normal         Labs:     No results found for this or any previous visit (from the past 48 hour(s)).    Assessment and Plan:  No medication changes.     Estimated LOS: Discharge planning has started. He has improved but still is not showering. Some evidence of preoccupation remains. Setting up OP services and placement prior to discharge.

## 2017-10-19 NOTE — PLAN OF CARE
Face to face end of shift report received from Dottie Russell RN. Rounding completed. Patient observed in bed and appears to be asleep.     Olinda Bobo  10/19/2017  7:39 AM

## 2017-10-19 NOTE — PLAN OF CARE
Problem: General Plan of Care (Inpatient Behavioral)  Goal: Team Discussion  Team Plan:   BEHAVIORAL TEAM DISCUSSION     Participants: Supa Espinoza NP, Benita Ayala NP, Yvonne Pedersen NP, Amy Briggs LICWENDY, Millie Discharge Planner,Key Cain RN. Jazzy Sood OT, Nneka Sprague OT   Progress: fair. Able to hold a logical conversation.   Continued Stay Criteria/Rationale: unable to DC to lesser level of care due to high risk of hospital readmission  Medical/Physical: none known  Precautions:   Behavioral Orders   Procedures     Code 1 - Restrict to Unit     Routine Programming       As clinically indicated     Status 15       Every 15 minutes.     Plan: continue to stabilize on medications, possible DC back home with ACT team or DC to [facility]  Rationale for change in precautions or plan: none        Problem: Patient Care Overview  Goal: Team Discussion  Team Plan:   BEHAVIORAL TEAM DISCUSSION     Participants: Supa Espinoza NP, Benita Ayala NP, Yvonne Pedersen NP, Amy Briggs LICWENDY, Millie Discharge Planner,Key Cain RN. Jazzy Sood OT, Nneka Sprague OT   Progress: fair. Able to hold a logical conversation.   Continued Stay Criteria/Rationale: unable to DC to lesser level of care due to high risk of hospital readmission  Medical/Physical: none known  Precautions:   Behavioral Orders   Procedures     Code 1 - Restrict to Unit     Routine Programming       As clinically indicated     Status 15       Every 15 minutes.     Plan: continue to stabilize on medications, possible DC back home with ACT team or DC to [facility]  Rationale for change in precautions or plan: none

## 2017-10-19 NOTE — PLAN OF CARE
Face to face end of shift report received from MARINA Hayes . Rounding completed. Patient observed. Lying on right side - eyes closed - non-labored breathing noted.                                                                                                                                                                                                                                                                                                                                                                                                                                                                                                                                                                                                                                                                                                                                                                                                                                                                                                                                                                                                                                                                                                                                                                                                                                                                                                                                                                                                                                                                                                                                                                                                                                                                                                                                                                                                         Dottie Russell  10/19/2017  1:59 AM

## 2017-10-19 NOTE — PLAN OF CARE
"Problem: Patient Care Overview  Goal: Individualization & Mutuality  Patient will have an absence or decrease in hallucinations by time of discharge.  Patient will be medication compliant while hospitalized.  Patient will sleep 6 to 9 hours every night,.  Patient will be able to have a reality based conversation prior to discharge.  Patient will be independent with his ADL s while hospitalized and maintain hygiene.   Pt is up on the unit this afternoon, pacing halls and watching TV. Pleasant with assessment, tracks well in conversation and discussed how much better he feels since coming in. Pt stated he \"feels like a kid again,\" has more energy than usual. Denied SI, HI and hallucinations, anxiety is manageable at this time and pt is agreeable to talk to staff if needing PRN. Is looking forward to discharge and in agreement with plan of care. Encouraged to attend groups this evening. No physical complaints this evening.       "

## 2017-10-19 NOTE — PLAN OF CARE
Problem: Patient Care Overview  Goal: Individualization & Mutuality  Patient will have an absence or decrease in hallucinations by time of discharge.  Patient will be medication compliant while hospitalized.  Patient will sleep 6 to 9 hours every night,.  Patient will be able to have a reality based conversation prior to discharge.  Patient will be independent with his ADL s while hospitalized and maintain hygiene.   Outcome: No Change  Slept all noc  Without issue.

## 2017-10-19 NOTE — PLAN OF CARE
Face to face end of shift report received from MARINA Prakash. Rounding completed. Patient observed.     Aretha Hodge  10/19/2017  6:07 PM

## 2017-10-20 PROCEDURE — 25000132 ZZH RX MED GY IP 250 OP 250 PS 637: Performed by: NURSE PRACTITIONER

## 2017-10-20 PROCEDURE — S0136 CLOZAPINE, 25 MG: HCPCS | Performed by: NURSE PRACTITIONER

## 2017-10-20 PROCEDURE — 12400011

## 2017-10-20 RX ADMIN — CLOZAPINE 200 MG: 100 TABLET ORAL at 08:07

## 2017-10-20 RX ADMIN — NICOTINE POLACRILEX 4 MG: 2 GUM, CHEWING ORAL at 20:09

## 2017-10-20 RX ADMIN — CLOZAPINE 500 MG: 100 TABLET ORAL at 20:08

## 2017-10-20 RX ADMIN — CLONIDINE HYDROCHLORIDE 0.1 MG: 0.1 TABLET ORAL at 20:09

## 2017-10-20 RX ADMIN — CLONIDINE HYDROCHLORIDE 0.1 MG: 0.1 TABLET ORAL at 08:08

## 2017-10-20 RX ADMIN — NICOTINE POLACRILEX 4 MG: 2 GUM, CHEWING ORAL at 17:20

## 2017-10-20 RX ADMIN — FAMOTIDINE 20 MG: 20 TABLET ORAL at 08:08

## 2017-10-20 RX ADMIN — NICOTINE POLACRILEX 4 MG: 2 GUM, CHEWING ORAL at 12:29

## 2017-10-20 RX ADMIN — FAMOTIDINE 20 MG: 20 TABLET ORAL at 20:09

## 2017-10-20 RX ADMIN — NICOTINE POLACRILEX 4 MG: 2 GUM, CHEWING ORAL at 14:34

## 2017-10-20 RX ADMIN — NICOTINE POLACRILEX 4 MG: 2 GUM, CHEWING ORAL at 18:41

## 2017-10-20 RX ADMIN — VITAMIN D, TAB 1000IU (100/BT) 2000 UNITS: 25 TAB at 08:08

## 2017-10-20 ASSESSMENT — ACTIVITIES OF DAILY LIVING (ADL)
ORAL_HYGIENE: INDEPENDENT
DRESS: INDEPENDENT
LAUNDRY: UNABLE TO COMPLETE
DRESS: INDEPENDENT;SCRUBS (BEHAVIORAL HEALTH)
GROOMING: INDEPENDENT
GROOMING: INDEPENDENT

## 2017-10-20 NOTE — PLAN OF CARE
Face to face end of shift report received from MARINA Prakash. Rounding completed. Patient observed.     Aretha Hodge  10/20/2017  4:36 PM

## 2017-10-20 NOTE — PLAN OF CARE
Face to face end of shift report received from MARINA Carias. Rounding completed. Patient observed. Lying in supine position - eyes closed - non-labored breathing noted.     Dottie Russell  10/20/2017  1:57 AM

## 2017-10-20 NOTE — PLAN OF CARE
Problem: General Plan of Care (Inpatient Behavioral)  Goal: Team Discussion  Team Plan:   BEHAVIORAL TEAM DISCUSSION     Participants:  Supa Espinoza NP, Benita Ayala NP, Yvonen Pedersen NP, Amy LEMON, Luanne Joshua Discharge Planner, Carolyn Holloway Discharge Plannnolayinka, Priscilla Henry RN, Key Vaughan RN, Jazzy Sood OT, Nneka Sprague OT   Progress: fair  Continued Stay Criteria/Rationale: unable to DC to lesser level of care due to high risk of hospital readmission  Medical/Physical: None known  Precautions:       Behavioral Orders   Procedures     Code 1 - Restrict to Unit     Routine Programming       As clinically indicated     Status 15       Every 15 minutes.     Plan: possible DC to assisted living  Rationale for change in precautions or plan: None        Problem: Patient Care Overview  Goal: Team Discussion  Team Plan:   BEHAVIORAL TEAM DISCUSSION     Participants:  Supa Espinoza NP, Benita Ayala NP, Yvonne Pedersen NP, Amy LEMON, Luanne Joshua Discharge Planner, Carolyn Holloway Discharge Plannner, Priscilla Henry RN, Key Vaughan RN, Jazzy Sood OT, Nneka [facility] OT   Progress: fair  Continued Stay Criteria/Rationale: unable to DC to lesser level of care due to high risk of hospital readmission  Medical/Physical: None known  Precautions:       Behavioral Orders   Procedures     Code 1 - Restrict to Unit     Routine Programming       As clinically indicated     Status 15       Every 15 minutes.     Plan: possible DC to assisted living  Rationale for change in precautions or plan: None

## 2017-10-20 NOTE — PLAN OF CARE
Face to face end of shift report received from Dottie Russell RN. Rounding completed. Patient observed in bed and appears to sleep. Will continue to monitor.     Olinda Bobo  10/20/2017  7:28 AM

## 2017-10-20 NOTE — PLAN OF CARE
Problem: Patient Care Overview  Goal: Individualization & Mutuality  Patient will have an absence or decrease in hallucinations by time of discharge.  Patient will be medication compliant while hospitalized.  Patient will sleep 6 to 9 hours every night,.  Patient will be able to have a reality based conversation prior to discharge.  Patient will be independent with his ADL s while hospitalized and maintain hygiene.   Patient is pleasant and cooperative with this writer during nursing assessment. He denies having suicidal ideation, homicidal ideation, anxiety, or depression. He states he slept well. He is compliant with medication administration. He denies hallucinations and does not appear to be responding. He is able to have reality based conversations and is able to track conversations well. He did come out for breakfast but went to lay down afterwards to nap. Will continue to monitor.   1352: Pt is pleasant with this writer this afternoon. He talked about hunting and building cabins with father. He jokes with staff but does maintain appropriate boundaries. He did take a shower this afternoon and brushed teeth.

## 2017-10-21 PROCEDURE — 25000132 ZZH RX MED GY IP 250 OP 250 PS 637: Performed by: NURSE PRACTITIONER

## 2017-10-21 PROCEDURE — S0136 CLOZAPINE, 25 MG: HCPCS | Performed by: NURSE PRACTITIONER

## 2017-10-21 PROCEDURE — 12400011

## 2017-10-21 RX ADMIN — CLOZAPINE 200 MG: 100 TABLET ORAL at 08:30

## 2017-10-21 RX ADMIN — NICOTINE POLACRILEX 4 MG: 2 GUM, CHEWING ORAL at 16:06

## 2017-10-21 RX ADMIN — CLOZAPINE 500 MG: 100 TABLET ORAL at 20:43

## 2017-10-21 RX ADMIN — FAMOTIDINE 20 MG: 20 TABLET ORAL at 20:43

## 2017-10-21 RX ADMIN — CLONIDINE HYDROCHLORIDE 0.1 MG: 0.1 TABLET ORAL at 08:30

## 2017-10-21 RX ADMIN — VITAMIN D, TAB 1000IU (100/BT) 2000 UNITS: 25 TAB at 08:30

## 2017-10-21 RX ADMIN — FAMOTIDINE 20 MG: 20 TABLET ORAL at 08:31

## 2017-10-21 RX ADMIN — NICOTINE POLACRILEX 4 MG: 2 GUM, CHEWING ORAL at 17:21

## 2017-10-21 RX ADMIN — NICOTINE POLACRILEX 4 MG: 2 GUM, CHEWING ORAL at 19:53

## 2017-10-21 RX ADMIN — CLONIDINE HYDROCHLORIDE 0.1 MG: 0.1 TABLET ORAL at 20:43

## 2017-10-21 RX ADMIN — NICOTINE POLACRILEX 4 MG: 2 GUM, CHEWING ORAL at 08:35

## 2017-10-21 ASSESSMENT — ACTIVITIES OF DAILY LIVING (ADL)
DRESS: INDEPENDENT;SCRUBS (BEHAVIORAL HEALTH)
GROOMING: INDEPENDENT

## 2017-10-21 NOTE — PLAN OF CARE
Problem: Patient Care Overview  Goal: Individualization & Mutuality  Patient will have an absence or decrease in hallucinations by time of discharge.  Patient will be medication compliant while hospitalized.  Patient will sleep 6 to 9 hours every night,.  Patient will be able to have a reality based conversation prior to discharge.  Patient will be independent with his ADL s while hospitalized and maintain hygiene.   Pt continues to deny SI, HI and hallucinations, does not appear to be preoccupied and has been up on the unit entire shift. Remains calm and pleasant with staff and peers, takes medications as ordered and attends group on a fleeting basis. Showered on dayshift and had no physical complaints this evening.

## 2017-10-21 NOTE — PLAN OF CARE
Face to face end of shift report received from MARINA Okeefe. Rounding completed. Patient observed.     Aretha Hodge  10/21/2017  3:53 PM

## 2017-10-21 NOTE — PLAN OF CARE
Problem: Patient Care Overview  Goal: Individualization & Mutuality  Patient will have an absence or decrease in hallucinations by time of discharge.  Patient will be medication compliant while hospitalized.  Patient will sleep 6 to 9 hours every night,.  Patient will be able to have a reality based conversation prior to discharge.  Patient will be independent with his ADL s while hospitalized and maintain hygiene.   Outcome: Improving  Patient denied symptoms. He was pleasant and cooperative. He was withdrawn and isolated to his room. He did not shower. He denied pain. He took his meds without complaint.

## 2017-10-21 NOTE — PLAN OF CARE
Face to face end of shift report received from Aretha FROST RN. Rounding completed. Patient observed resting in bed.     Kana Mar  10/20/2017  11:59 PM

## 2017-10-21 NOTE — PLAN OF CARE
Face to face end of shift report received from Kana LOMELI RN. Rounding completed. Patient observed at breakfast.     Maldonado Hines  10/21/2017  9:14 AM

## 2017-10-22 LAB
BASOPHILS # BLD AUTO: 0.1 10E9/L (ref 0–0.2)
BASOPHILS NFR BLD AUTO: 0.9 %
DIFFERENTIAL METHOD BLD: NORMAL
EOSINOPHIL # BLD AUTO: 0.2 10E9/L (ref 0–0.7)
EOSINOPHIL NFR BLD AUTO: 2.2 %
ERYTHROCYTE [DISTWIDTH] IN BLOOD BY AUTOMATED COUNT: 12.8 % (ref 10–15)
HCT VFR BLD AUTO: 40.8 % (ref 40–53)
HGB BLD-MCNC: 14.3 G/DL (ref 13.3–17.7)
IMM GRANULOCYTES # BLD: 0 10E9/L (ref 0–0.4)
IMM GRANULOCYTES NFR BLD: 0.2 %
LYMPHOCYTES # BLD AUTO: 2.9 10E9/L (ref 0.8–5.3)
LYMPHOCYTES NFR BLD AUTO: 36 %
MCH RBC QN AUTO: 30 PG (ref 26.5–33)
MCHC RBC AUTO-ENTMCNC: 35 G/DL (ref 31.5–36.5)
MCV RBC AUTO: 86 FL (ref 78–100)
MONOCYTES # BLD AUTO: 0.9 10E9/L (ref 0–1.3)
MONOCYTES NFR BLD AUTO: 10.5 %
NEUTROPHILS # BLD AUTO: 4.1 10E9/L (ref 1.6–8.3)
NEUTROPHILS NFR BLD AUTO: 50.2 %
NRBC # BLD AUTO: 0 10*3/UL
NRBC BLD AUTO-RTO: 0 /100
PLATELET # BLD AUTO: 299 10E9/L (ref 150–450)
RBC # BLD AUTO: 4.77 10E12/L (ref 4.4–5.9)
WBC # BLD AUTO: 8.1 10E9/L (ref 4–11)

## 2017-10-22 PROCEDURE — S0136 CLOZAPINE, 25 MG: HCPCS | Performed by: NURSE PRACTITIONER

## 2017-10-22 PROCEDURE — 12400011

## 2017-10-22 PROCEDURE — 99232 SBSQ HOSP IP/OBS MODERATE 35: CPT | Performed by: NURSE PRACTITIONER

## 2017-10-22 PROCEDURE — 85025 COMPLETE CBC W/AUTO DIFF WBC: CPT | Performed by: NURSE PRACTITIONER

## 2017-10-22 PROCEDURE — 25000132 ZZH RX MED GY IP 250 OP 250 PS 637: Performed by: NURSE PRACTITIONER

## 2017-10-22 PROCEDURE — 36415 COLL VENOUS BLD VENIPUNCTURE: CPT | Performed by: NURSE PRACTITIONER

## 2017-10-22 RX ADMIN — NICOTINE POLACRILEX 4 MG: 2 GUM, CHEWING ORAL at 12:21

## 2017-10-22 RX ADMIN — FAMOTIDINE 20 MG: 20 TABLET ORAL at 20:50

## 2017-10-22 RX ADMIN — CLOZAPINE 500 MG: 100 TABLET ORAL at 20:49

## 2017-10-22 RX ADMIN — CLONIDINE HYDROCHLORIDE 0.1 MG: 0.1 TABLET ORAL at 20:49

## 2017-10-22 RX ADMIN — NICOTINE POLACRILEX 4 MG: 2 GUM, CHEWING ORAL at 19:54

## 2017-10-22 RX ADMIN — CLOZAPINE 200 MG: 100 TABLET ORAL at 09:43

## 2017-10-22 RX ADMIN — NICOTINE POLACRILEX 4 MG: 2 GUM, CHEWING ORAL at 17:41

## 2017-10-22 RX ADMIN — VITAMIN D, TAB 1000IU (100/BT) 2000 UNITS: 25 TAB at 09:42

## 2017-10-22 RX ADMIN — NICOTINE POLACRILEX 4 MG: 2 GUM, CHEWING ORAL at 11:13

## 2017-10-22 RX ADMIN — CLONIDINE HYDROCHLORIDE 0.1 MG: 0.1 TABLET ORAL at 09:42

## 2017-10-22 RX ADMIN — FAMOTIDINE 20 MG: 20 TABLET ORAL at 09:42

## 2017-10-22 ASSESSMENT — ACTIVITIES OF DAILY LIVING (ADL)
GROOMING: INDEPENDENT
DRESS: SCRUBS (BEHAVIORAL HEALTH)
ORAL_HYGIENE: INDEPENDENT

## 2017-10-22 NOTE — PLAN OF CARE
"Problem: Patient Care Overview  Goal: Individualization & Mutuality  Patient will have an absence or decrease in hallucinations by time of discharge.  Patient will be medication compliant while hospitalized.  Patient will sleep 6 to 9 hours every night,.  Patient will be able to have a reality based conversation prior to discharge.  Patient will be independent with his ADL s while hospitalized and maintain hygiene.   Pt continues to make delusional statements intermixed in conversation, is pleasant and cooperative this evening. Stated he was tired earlier and napped more than usual today, but felt better this afternoon. Denied SI, HI and hallucinations, stated anxiety is \"up and down,\" but depression is low and manageable. Paces halls, lets staff know needs, takes medications as ordered and plans to shower at HS.       "

## 2017-10-22 NOTE — PLAN OF CARE
Face to face end of shift report received from MARINA Okeefe. Rounding completed. Patient observed.     Aretha Hodge  10/22/2017  4:16 PM

## 2017-10-22 NOTE — PLAN OF CARE
Problem: Patient Care Overview  Goal: Individualization & Mutuality  Patient will have an absence or decrease in hallucinations by time of discharge.  Patient will be medication compliant while hospitalized.  Patient will sleep 6 to 9 hours every night,.  Patient will be able to have a reality based conversation prior to discharge.  Patient will be independent with his ADL s while hospitalized and maintain hygiene.     Outcome: No Change  Patient denied anxiety and depression, HI/SI and hallucinations. He appears to be preoccupied and has been pacing most of shift. Patient is tense and grumpy. He is withdrawn and dismissive. Patient reported he felt like he was having a tremor in his left hand. It was observable. Patient is more disheveled than previous and has not showered. He denied pain.  14:30 Update: Patient's mood changed and he has been pleasant and somewhat playful. He was offered a PRN and declined.

## 2017-10-22 NOTE — PLAN OF CARE
Face to face end of shift report received from Kana LOMELI RN. Rounding completed. Patient observed lying in bed with eyes closed, breathing regular and unlabored.     Maldonado Hines  10/22/2017  8:10 AM

## 2017-10-22 NOTE — PROGRESS NOTES
[facility]  Psychiatric Progress Note    Subjective     Miles is a bit irritable this morning. HE tells me he feels ready to go and would like to start planning for that. He feels he has been told too many different stories about discharge plans. HE is able to follow a linear thought process. There could still be some underlying internal stimuli but he appears to be fairly well focused but his responses are a bit delayed at times. Josafat does walk in and out of groups but does not stay for long. He is quite disheveled and malodorous.      Review of Systems  10 point ROS negative.         DIagnoses:      Paranoid Schizophrenia w/ capgras delusions    Attestation:  Patient has been seen and evaluated by me, Yvonne Pedersen NP          Interim History:   The patient's care was discussed with the treatment team and chart notes were reviewed.          Medications:       cloZAPine  500 mg Oral At Bedtime     cloNIDine  0.1 mg Oral BID     cloZAPine  200 mg Oral Daily     cholecalciferol  2,000 Units Oral Daily     famotidine  20 mg Oral BID     clotrimazole, benztropine, hydrOXYzine, acetaminophen, alum & mag hydroxide-simethicone, magnesium hydroxide, traZODone, OLANZapine **OR** OLANZapine, nicotine polacrilex          Allergies:     Allergies   Allergen Reactions     Pcn [Bicillin C-R,] Rash     Provider wants to try amoxicillin(benadryl ordered as well) 9/10/16     Bupropion Other (See Comments)     delusions      Depakote [Valproic Acid] Other (See Comments)     Heart racing     Divalproex Sodium-Fd&C Red #40      Increase heart rate            Psychiatric Examination:   /63  Pulse 90  Temp 98.4  F (36.9  C) (Tympanic)  Resp 18  Ht 1.829 m (6')  Wt 105.7 kg (233 lb 0.4 oz)  SpO2 94%  BMI 31.6 kg/m2  Weight is 233 lbs .42 oz  Body mass index is 31.6 kg/(m^2).    Appearance: awake, alert, disheveled, malodorous  Attitude: cooperative   Eye Contact: good  Mood: bright   Affect:   appropriate  Speech:  Regular rate and volume. Primarily logical content  Psychomotor Behavior:  no evidence of tardive dyskinesia, dystonia, or tics  Thought Process: more linear and logical, goal oriented  Associations: no loose associations noted today   Thought Content: No SI/HI, may have some internal stimuli but is able to maintain a focused conversation  Insight:  improving  Judgment:  improving  Oriented to:  time, person, and place  Attention Span and Concentration: limited  Recent and Remote Memory: limited  Fund of Knowledge: delayed  Muscle Strength and Tone: normal  Gait and Station: Normal         Labs:     Results for orders placed or performed during the hospital encounter of 08/20/17 (from the past 48 hour(s))   CBC with platelets differential   Result Value Ref Range    WBC 8.1 4.0 - 11.0 10e9/L    RBC Count 4.77 4.4 - 5.9 10e12/L    Hemoglobin 14.3 13.3 - 17.7 g/dL    Hematocrit 40.8 40.0 - 53.0 %    MCV 86 78 - 100 fl    MCH 30.0 26.5 - 33.0 pg    MCHC 35.0 31.5 - 36.5 g/dL    RDW 12.8 10.0 - 15.0 %    Platelet Count 299 150 - 450 10e9/L    Diff Method Automated Method     % Neutrophils 50.2 %    % Lymphocytes 36.0 %    % Monocytes 10.5 %    % Eosinophils 2.2 %    % Basophils 0.9 %    % Immature Granulocytes 0.2 %    Nucleated RBCs 0 0 /100    Absolute Neutrophil 4.1 1.6 - 8.3 10e9/L    Absolute Lymphocytes 2.9 0.8 - 5.3 10e9/L    Absolute Monocytes 0.9 0.0 - 1.3 10e9/L    Absolute Eosinophils 0.2 0.0 - 0.7 10e9/L    Absolute Basophils 0.1 0.0 - 0.2 10e9/L    Abs Immature Granulocytes 0.0 0 - 0.4 10e9/L    Absolute Nucleated RBC 0.0        Assessment and Plan:  No medication changes.     Estimated LOS: Discharge planning has started. He has improved but still is not showering. Some evidence of preoccupation remains. Setting up OP services and placement prior to discharge.

## 2017-10-22 NOTE — PLAN OF CARE
Face to face end of shift report received from Aretha FROST RN. Rounding completed. Patient observed resting in bed.     Kana Mar  10/22/2017  12:30 AM

## 2017-10-23 PROCEDURE — 25000132 ZZH RX MED GY IP 250 OP 250 PS 637: Performed by: NURSE PRACTITIONER

## 2017-10-23 PROCEDURE — S0136 CLOZAPINE, 25 MG: HCPCS | Performed by: NURSE PRACTITIONER

## 2017-10-23 PROCEDURE — 12400011

## 2017-10-23 RX ADMIN — FAMOTIDINE 20 MG: 20 TABLET ORAL at 20:09

## 2017-10-23 RX ADMIN — VITAMIN D, TAB 1000IU (100/BT) 2000 UNITS: 25 TAB at 09:10

## 2017-10-23 RX ADMIN — CLOZAPINE 200 MG: 100 TABLET ORAL at 09:10

## 2017-10-23 RX ADMIN — CLOZAPINE 500 MG: 100 TABLET ORAL at 20:09

## 2017-10-23 RX ADMIN — FAMOTIDINE 20 MG: 20 TABLET ORAL at 09:11

## 2017-10-23 RX ADMIN — NICOTINE POLACRILEX 4 MG: 2 GUM, CHEWING ORAL at 12:26

## 2017-10-23 RX ADMIN — NICOTINE POLACRILEX 4 MG: 2 GUM, CHEWING ORAL at 20:13

## 2017-10-23 RX ADMIN — CLONIDINE HYDROCHLORIDE 0.1 MG: 0.1 TABLET ORAL at 20:09

## 2017-10-23 RX ADMIN — CLONIDINE HYDROCHLORIDE 0.1 MG: 0.1 TABLET ORAL at 09:11

## 2017-10-23 NOTE — PLAN OF CARE
Problem: Discharge Planning  Goal: Discharge Planning (Adult, OB, Behavioral, Peds)  Writer spoke with ACT team about pt- they will check into finding a bed for pt in an assisted living.

## 2017-10-23 NOTE — PLAN OF CARE
Problem: General Plan of Care (Inpatient Behavioral)  Goal: Team Discussion  Team Plan:   BEHAVIORAL TEAM DISCUSSION     Participants: Supa Espinoza NP, Yvonne Pedersen NP, Carolyn Aleman Discharge planner, Millie Ryder Discharge Planner, Tishlouise [facility] LICSW, Carolyn Holloway Discharge Plannner, Priscilla Henry RN, Chrissy Stack RN, Jazzy Sood OT, Nneka Sprague OT   Progress: Good  Continued Stay Criteria/Rationale: Unable to discharge to a lesser level of care  Medical/Physical: None  Precautions:   Behavioral Orders   Procedures     Code 1 - Restrict to Unit     Routine Programming       As clinically indicated     Status 15       Every 15 minutes.     Plan: ACT team to see this week. Referrals to assisted living  Rationale for change in precautions or plan: None        Problem: Patient Care Overview  Goal: Team Discussion  Team Plan:   BEHAVIORAL TEAM DISCUSSION     Participants: Supa Espinoza NP, Yvonne Pedersen NP, Carolyn Aleman Discharge planner, Millie Ryder Discharge Planner, Tish [facility] LICSW, Carolyn Holloway Discharge Plannner, Priscilla Henry RN, Chrissy Stack RN, Jazzy Sood OT, Nneka Sprague OT   Progress: Good  Continued Stay Criteria/Rationale: Unable to discharge to a lesser level of care  Medical/Physical: None  Precautions:   Behavioral Orders   Procedures     Code 1 - Restrict to Unit     Routine Programming       As clinically indicated     Status 15       Every 15 minutes.     Plan: ACT team to see this week. Referrals to assisted living  Rationale for change in precautions or plan: None

## 2017-10-23 NOTE — PLAN OF CARE
Problem: Patient Care Overview  Goal: Individualization & Mutuality  Patient will have an absence or decrease in hallucinations by time of discharge.  Patient will be medication compliant while hospitalized.  Patient will sleep 6 to 9 hours every night,.  Patient will be able to have a reality based conversation prior to discharge.  Patient will be independent with his ADL s while hospitalized and maintain hygiene.   Pt up and slowly pacing, is cooperative this evening. Denied SI, HI and hallucinations, anxiety and depression are fluctuating and tolerable. Pt again discussed his frustration with not knowing what his discharge plan will be, and that he is hoping to be able to go home. Talked about the support of mom and dad that he has to help maintain living on his own. Miles has remained calm with no irritability noted, no physical complaints today.

## 2017-10-23 NOTE — PLAN OF CARE
Face to face end of shift report received from Kana LOMELI RN. Rounding completed. Patient observed lying in bed with eyes closed, breathing regular and unlabored.     Maldonado Hines  10/23/2017  7:53 AM

## 2017-10-23 NOTE — PLAN OF CARE
"Problem: Patient Care Overview  Goal: Individualization & Mutuality  Patient will have an absence or decrease in hallucinations by time of discharge.  Patient will be medication compliant while hospitalized.  Patient will sleep 6 to 9 hours every night,.  Patient will be able to have a reality based conversation prior to discharge.  Patient will be independent with his ADL s while hospitalized and maintain hygiene.   Outcome: Improving  Patient denied anxiety, depression, HI and SI. He seemed preoccupied. He denied being grumpy this morning and he laughed about it. He said \"maybe on the inside I'm more grumpy.\" He joked with staff and peers. Patient was awake more this shift than previous. He paced in the halls. He is dismissive when staff asks questions.     15:30 Update: This writer will continue to provide nursing care for patient throughout the next shift. Patient observed.   22:00 Update: Patient has been relaxed and cooperative this shift. He was playful and laughed with staff. He denied symptoms. He ate well and interacted with peers. He denied pain. Patient took his meds but asked if something had changed when he looked in the cup. Patient was assured there had been no med changes. He made some bizarre statements at times and at other times it was hard to understand what he was saying. Staff would ask him to repeat himself and he would laugh and walk away.   "

## 2017-10-23 NOTE — PLAN OF CARE
Face to face end of shift report received from Aretha FROST RN. Rounding completed. Patient observed.     Kana Mar  10/22/2017  11:43 PM

## 2017-10-24 PROCEDURE — 25000132 ZZH RX MED GY IP 250 OP 250 PS 637: Performed by: NURSE PRACTITIONER

## 2017-10-24 PROCEDURE — S0136 CLOZAPINE, 25 MG: HCPCS | Performed by: NURSE PRACTITIONER

## 2017-10-24 PROCEDURE — 12400011

## 2017-10-24 PROCEDURE — 99232 SBSQ HOSP IP/OBS MODERATE 35: CPT | Performed by: NURSE PRACTITIONER

## 2017-10-24 RX ADMIN — NICOTINE POLACRILEX 4 MG: 2 GUM, CHEWING ORAL at 17:28

## 2017-10-24 RX ADMIN — NICOTINE POLACRILEX 4 MG: 2 GUM, CHEWING ORAL at 20:15

## 2017-10-24 RX ADMIN — FAMOTIDINE 20 MG: 20 TABLET ORAL at 20:12

## 2017-10-24 RX ADMIN — NICOTINE POLACRILEX 4 MG: 2 GUM, CHEWING ORAL at 12:27

## 2017-10-24 RX ADMIN — CLOZAPINE 200 MG: 100 TABLET ORAL at 08:33

## 2017-10-24 RX ADMIN — CLONIDINE HYDROCHLORIDE 0.1 MG: 0.1 TABLET ORAL at 08:33

## 2017-10-24 RX ADMIN — CLONIDINE HYDROCHLORIDE 0.1 MG: 0.1 TABLET ORAL at 20:12

## 2017-10-24 RX ADMIN — FAMOTIDINE 20 MG: 20 TABLET ORAL at 08:36

## 2017-10-24 RX ADMIN — CLOZAPINE 500 MG: 100 TABLET ORAL at 20:11

## 2017-10-24 RX ADMIN — VITAMIN D, TAB 1000IU (100/BT) 2000 UNITS: 25 TAB at 08:33

## 2017-10-24 ASSESSMENT — ACTIVITIES OF DAILY LIVING (ADL)
DRESS: SCRUBS (BEHAVIORAL HEALTH);INDEPENDENT
GROOMING: INDEPENDENT;PROMPTS
LAUNDRY: UNABLE TO COMPLETE
ORAL_HYGIENE: INDEPENDENT
DRESS: INDEPENDENT;SCRUBS (BEHAVIORAL HEALTH)
ORAL_HYGIENE: INDEPENDENT
GROOMING: INDEPENDENT

## 2017-10-24 NOTE — PLAN OF CARE
Problem: General Plan of Care (Inpatient Behavioral)  Goal: Team Discussion  Team Plan:   BEHAVIORAL TEAM DISCUSSION     Participants: Supa Espinoza NP, Kim Cardozo NP, Yvonne Pedersen NP, Bianca Morales LSW, [facility], Luanne Joshua Discharge Planner, Carolyn Holloway Discharge Plannner, Michelle Beverly RN, Alesha Quevedo [employer], Nneka Ribeirosa OT   Progress: poor hygiene, pleasent  Continued Stay Criteria/Rationale: unable to DC to lesser level of care due to high risk of hospital readmission and safety to self  Medical/Physical: None known  Precautions:   Behavioral Orders   Procedures     Code 1 - Restrict to Unit     Routine Programming       As clinically indicated     Status 15       Every 15 minutes.     Plan: ACT team intact, looking for placement at assisted living  Rationale for change in precautions or plan: None        Problem: Patient Care Overview  Goal: Team Discussion  Team Plan:   BEHAVIORAL TEAM DISCUSSION     Participants: Supa Espinoza NP, Kim Cardozo NP, Yvonne Pedersen NP, Bianca Morales LSW, [facility], Luanne Joshua Discharge Planner, Carolyn Holloway Discharge Plannner, Michelle Beverly RN, Alesha Quevedo [employer], Nneka Samsa OT   Progress: poor hygiene, pleasent  Continued Stay Criteria/Rationale: unable to DC to lesser level of care due to high risk of hospital readmission and safety to self  Medical/Physical: None known  Precautions:   Behavioral Orders   Procedures     Code 1 - Restrict to Unit     Routine Programming       As clinically indicated     Status 15       Every 15 minutes.     Plan: ACT team intact, looking for placement at assisted living  Rationale for change in precautions or plan: None

## 2017-10-24 NOTE — PROGRESS NOTES
"[facility]  Psychiatric Progress Note    Subjective     Showered. Denies depression, SI/HI, anxiety or hallucinations. Continues to have some preoccupation but has improved ability to participate in a sound conversation. Wondering about going to his mother's. Discussed the possibility of going to assisted living with the ACT team following him for medications. He appears to consider this and then states, \"Could it be in the huynh?\" Miles reports finding the wooded area around his mom's home therapeutic.     Per SW, Act Team Intake this week but they will be unable to pick him up for about 2 weeks. They are going to [facility] today and will be speaking to them about having Miles as a resident.     Review of Systems  10 point ROS negative.         DIagnoses:      Paranoid Schizophrenia w/ capgras delusions    Attestation:  Patient has been seen and evaluated by me, Supa Espinoza NP          Interim History:   The patient's care was discussed with the treatment team and chart notes were reviewed.          Medications:       cloZAPine  500 mg Oral At Bedtime     cloNIDine  0.1 mg Oral BID     cloZAPine  200 mg Oral Daily     cholecalciferol  2,000 Units Oral Daily     famotidine  20 mg Oral BID     clotrimazole, benztropine, hydrOXYzine, acetaminophen, alum & mag hydroxide-simethicone, magnesium hydroxide, traZODone, OLANZapine **OR** OLANZapine, nicotine polacrilex          Allergies:     Allergies   Allergen Reactions     Pcn [Bicillin C-R,] Rash     Provider wants to try amoxicillin(benadryl ordered as well) 9/10/16     Bupropion Other (See Comments)     delusions      Depakote [Valproic Acid] Other (See Comments)     Heart racing     Divalproex Sodium-Fd&C Red #40      Increase heart rate            Psychiatric Examination:   /74  Pulse 102  Temp 97.2  F (36.2  C) (Tympanic)  Resp 16  Ht 1.829 m (6')  Wt 106.1 kg (233 lb 12.8 oz)  SpO2 96%  BMI 31.71 kg/m2  Weight is " 233 lbs 12.8 oz  Body mass index is 31.71 kg/(m^2).    Appearance: Awake, alert, showered  Attitude: cooperative   Eye Contact: good  Mood: good but appears concerned  Affect:  appropriate  Speech:  Regular rate and volume. Appropriate content.  Psychomotor Behavior:  no evidence of tardive dyskinesia, dystonia, or tics  Thought Process: more linear and logical, goal oriented  Associations: no loose associations noted today   Thought Content: No SI/HI, may have some internal stimuli but is able to maintain a focused conversation  Insight:  improving  Judgment:  improving  Oriented to:  time, person, and place  Attention Span and Concentration: limited  Recent and Remote Memory: limited  Fund of Knowledge: delayed  Muscle Strength and Tone: normal  Gait and Station: Normal         Labs:     No results found for this or any previous visit (from the past 48 hour(s)).    Assessment and Plan:  No medication changes.     Estimated LOS: Discharge planning has started. He has improved but still is not showering. Some evidence of preoccupation remains. Setting up OP services and placement prior to discharge.  Looking at assisted living arrangements.

## 2017-10-24 NOTE — PLAN OF CARE
Face to face end of shift report received from Maldonado WEINSTEIN RN. Rounding completed. Patient observed.     Maurice Mcintyre  10/24/2017  12:14 AM

## 2017-10-24 NOTE — PLAN OF CARE
Face to face end of shift report received from Maurice LEOS RN. Rounding completed. Patient observed in room laying in bed.     Almita Beverly  10/24/2017  8:05 AM

## 2017-10-24 NOTE — PLAN OF CARE
Problem: Patient Care Overview  Goal: Individualization & Mutuality  Patient will have an absence or decrease in hallucinations by time of discharge.  Patient will be medication compliant while hospitalized.  Patient will sleep 6 to 9 hours every night,.  Patient will be able to have a reality based conversation prior to discharge.  Patient will be independent with his ADL s while hospitalized and maintain hygiene.   2340--in bed with eyes closed and breathing regular. 0626--awakened for vitals.

## 2017-10-24 NOTE — PLAN OF CARE
Face to face end of shift report received from Almita GRAY. Rounding completed. Patient observed in [facility].    Virginie Alexander  10/24/2017  4:17 PM

## 2017-10-24 NOTE — PLAN OF CARE
"Problem: Patient Care Overview  Goal: Individualization & Mutuality  Patient will have an absence or decrease in hallucinations by time of discharge.  Patient will be medication compliant while hospitalized.  Patient will sleep 6 to 9 hours every night,.  Patient will be able to have a reality based conversation prior to discharge.  Patient will be independent with his ADL s while hospitalized and maintain hygiene.   Outcome: Therapy, progress toward functional goals is gradual  Patient has been up and about on unit. Paces in hallway. States he is doing \"fine.\" Feels the medications are working. States \"I feel like a kid.\" Looks preoccupied.Denies hallucinations but is seen talking to self. Is medication compliant. Denies any suicidal thoughts. Denies depression or anxiety.Needs encouragement for daily hygiene. Offers no physical complaints.      "

## 2017-10-24 NOTE — PLAN OF CARE
Problem: Discharge Planning  Goal: Discharge Planning (Adult, OB, Behavioral, Peds)  Writer spoke with the ACT team about pt- they stated that they can to the intake probably this week but would be unable to start working with him for about 2 weeks because they just took on another pt.  Writer discussed trying to get pt into assisted living and they are going to talk to [facility] today about getting pt in there.

## 2017-10-24 NOTE — PLAN OF CARE
Problem: Patient Care Overview  Goal: Individualization & Mutuality  Patient will have an absence or decrease in hallucinations by time of discharge.  Patient will be medication compliant while hospitalized.  Patient will sleep 6 to 9 hours every night,.  Patient will be able to have a reality based conversation prior to discharge.  Patient will be independent with his ADL s while hospitalized and maintain hygiene.   Outcome: Improving  Pt sleeping throughout the morning. Pt is compliant with morning medications. Pt is in his room laying in bed, he did get up for breakfast this morning.

## 2017-10-25 PROCEDURE — 25000132 ZZH RX MED GY IP 250 OP 250 PS 637: Performed by: NURSE PRACTITIONER

## 2017-10-25 PROCEDURE — 12400011

## 2017-10-25 PROCEDURE — S0136 CLOZAPINE, 25 MG: HCPCS | Performed by: NURSE PRACTITIONER

## 2017-10-25 RX ADMIN — CLONIDINE HYDROCHLORIDE 0.1 MG: 0.1 TABLET ORAL at 08:17

## 2017-10-25 RX ADMIN — NICOTINE POLACRILEX 4 MG: 2 GUM, CHEWING ORAL at 17:19

## 2017-10-25 RX ADMIN — FAMOTIDINE 20 MG: 20 TABLET ORAL at 08:17

## 2017-10-25 RX ADMIN — NICOTINE POLACRILEX 4 MG: 2 GUM, CHEWING ORAL at 20:19

## 2017-10-25 RX ADMIN — FAMOTIDINE 20 MG: 20 TABLET ORAL at 20:19

## 2017-10-25 RX ADMIN — CLOZAPINE 500 MG: 100 TABLET ORAL at 20:19

## 2017-10-25 RX ADMIN — CLONIDINE HYDROCHLORIDE 0.1 MG: 0.1 TABLET ORAL at 20:18

## 2017-10-25 RX ADMIN — VITAMIN D, TAB 1000IU (100/BT) 2000 UNITS: 25 TAB at 08:17

## 2017-10-25 RX ADMIN — CLOZAPINE 200 MG: 100 TABLET ORAL at 08:17

## 2017-10-25 RX ADMIN — NICOTINE POLACRILEX 4 MG: 2 GUM, CHEWING ORAL at 11:22

## 2017-10-25 ASSESSMENT — ACTIVITIES OF DAILY LIVING (ADL)
ORAL_HYGIENE: INDEPENDENT
LAUNDRY: UNABLE TO COMPLETE
LAUNDRY: UNABLE TO COMPLETE
DRESS: INDEPENDENT;SCRUBS (BEHAVIORAL HEALTH)
DRESS: INDEPENDENT;SCRUBS (BEHAVIORAL HEALTH)
ORAL_HYGIENE: INDEPENDENT
GROOMING: INDEPENDENT
GROOMING: INDEPENDENT

## 2017-10-25 NOTE — PLAN OF CARE
Problem: General Plan of Care (Inpatient Behavioral)  Goal: Team Discussion  Team Plan:   BEHAVIORAL TEAM DISCUSSION     Participants: Rowan Alford NP, Supa Espinoza NP, Kim Cardozo NP, Beth Sloan NP student, Luanne Story Discharge Planner, Lilly Villarreal LICSW, [facility] LICSW, Carolyn Holloway Discharge Plannner, Toño Antnuez RN, Alesha Quevedo [employer], Alberta Warwas OT, Nneka Samsa OT   Progress: showered, less delusional  Continued Stay Criteria/Rationale: unable to DC to lesser level of care due to high risk of hospital readmission and inability to independently manage  Medical/Physical: None known  Precautions:   Behavioral Orders   Procedures     Code 1 - Restrict to Unit     Routine Programming       As clinically indicated     Status 15       Every 15 minutes.     Plan: DC to [facility] when bed is available  Rationale for change in precautions or plan: None        Problem: Patient Care Overview  Goal: Team Discussion  Team Plan:   BEHAVIORAL TEAM DISCUSSION     Participants: Rowan Alford NP, Supa Espinoza NP, Kim Cardozo NP, Beth Sloan NP student, Luanne Story Discharge Planner, Lilly Ng LICSW, [facility] LICSW, Carolyn Holloway Discharge Plannner, Toño Antunez RN, Alesha Quevedo [employer], Alberta Warwas OT, Nneka Samsa OT   Progress: showered, less delusional  Continued Stay Criteria/Rationale: unable to DC to lesser level of care due to high risk of hospital readmission and inability to independently manage  Medical/Physical: None known  Precautions:   Behavioral Orders   Procedures     Code 1 - Restrict to Unit     Routine Programming       As clinically indicated     Status 15       Every 15 minutes.     Plan: DC to [facility] when bed is available  Rationale for change in precautions or plan: None

## 2017-10-25 NOTE — PLAN OF CARE
Problem: Discharge Planning  Goal: Discharge Planning (Adult, OB, Behavioral, Peds)  Spoke with Temitope Armijo with the ACT team.  There is a bed coming open at [facility] in [place] and they plan to do the intake paperwork for the ACT team with him next week.   Writer also spoke with Ilda Hodge from [facility] and updated her on pt's status.

## 2017-10-25 NOTE — PLAN OF CARE
Face to face end of shift report received from MARINA Sandoval. Rounding completed. Patient observed in bed, sleeping.     Lety Evangelista  10/25/2017  7:27 AM

## 2017-10-25 NOTE — PLAN OF CARE
Face to face end of shift report received from Virginie CORRIGAN RN. Rounding completed. Patient observed.     Maurice Mcintyre  10/25/2017  1:33 AM

## 2017-10-25 NOTE — PLAN OF CARE
Face to face end of shift report received from Quintin LEOS RN. Rounding completed. Patient observed in hallway pacing.     Almita Beverly  10/25/2017  3:42 PM

## 2017-10-25 NOTE — PLAN OF CARE
Problem: Patient Care Overview  Goal: Individualization & Mutuality  Patient will have an absence or decrease in hallucinations by time of discharge.  Patient will be medication compliant while hospitalized.  Patient will sleep 6 to 9 hours every night,.  Patient will be able to have a reality based conversation prior to discharge.  Patient will be independent with his ADL s while hospitalized and maintain hygiene.   2340--in bed with eyes closed and breathing regular. 0700--still in bed with eyes closed and breathing regular.

## 2017-10-25 NOTE — PLAN OF CARE
"Problem: Patient Care Overview  Goal: Individualization & Mutuality  Patient will have an absence or decrease in hallucinations by time of discharge.  Patient will be medication compliant while hospitalized.  Patient will sleep 6 to 9 hours every night,.  Patient will be able to have a reality based conversation prior to discharge.  Patient will be independent with his ADL s while hospitalized and maintain hygiene.   Pt has been up on the unit pacing the halls. He continues to look pre occupied. Affect is blunted and flat. He denies all criteria, stating \"i'm fine\" and laughs. Says he has been sleeping good. He is compliant with prescribed medications. Denies pain. Will continue to monitor.       "

## 2017-10-26 PROCEDURE — 99232 SBSQ HOSP IP/OBS MODERATE 35: CPT | Performed by: NURSE PRACTITIONER

## 2017-10-26 PROCEDURE — 25000132 ZZH RX MED GY IP 250 OP 250 PS 637: Performed by: NURSE PRACTITIONER

## 2017-10-26 PROCEDURE — S0136 CLOZAPINE, 25 MG: HCPCS | Performed by: NURSE PRACTITIONER

## 2017-10-26 PROCEDURE — 12400011

## 2017-10-26 RX ADMIN — CLONIDINE HYDROCHLORIDE 0.1 MG: 0.1 TABLET ORAL at 08:18

## 2017-10-26 RX ADMIN — NICOTINE POLACRILEX 4 MG: 2 GUM, CHEWING ORAL at 20:21

## 2017-10-26 RX ADMIN — NICOTINE POLACRILEX 4 MG: 2 GUM, CHEWING ORAL at 15:18

## 2017-10-26 RX ADMIN — NICOTINE POLACRILEX 4 MG: 2 GUM, CHEWING ORAL at 12:20

## 2017-10-26 RX ADMIN — VITAMIN D, TAB 1000IU (100/BT) 2000 UNITS: 25 TAB at 08:18

## 2017-10-26 RX ADMIN — CLOZAPINE 500 MG: 100 TABLET ORAL at 20:21

## 2017-10-26 RX ADMIN — CLONIDINE HYDROCHLORIDE 0.1 MG: 0.1 TABLET ORAL at 20:21

## 2017-10-26 RX ADMIN — FAMOTIDINE 20 MG: 20 TABLET ORAL at 20:21

## 2017-10-26 RX ADMIN — FAMOTIDINE 20 MG: 20 TABLET ORAL at 08:18

## 2017-10-26 RX ADMIN — CLOZAPINE 200 MG: 100 TABLET ORAL at 08:18

## 2017-10-26 RX ADMIN — NICOTINE POLACRILEX 4 MG: 2 GUM, CHEWING ORAL at 17:33

## 2017-10-26 ASSESSMENT — ACTIVITIES OF DAILY LIVING (ADL)
GROOMING: INDEPENDENT
LAUNDRY: UNABLE TO COMPLETE
LAUNDRY: UNABLE TO COMPLETE
ORAL_HYGIENE: INDEPENDENT
DRESS: INDEPENDENT
DRESS: INDEPENDENT;SCRUBS (BEHAVIORAL HEALTH)
ORAL_HYGIENE: INDEPENDENT
GROOMING: INDEPENDENT

## 2017-10-26 NOTE — PLAN OF CARE
Problem: Patient Care Overview  Goal: Individualization & Mutuality  Patient will have an absence or decrease in hallucinations by time of discharge.  Patient will be medication compliant while hospitalized.  Patient will sleep 6 to 9 hours every night,.  Patient will be able to have a reality based conversation prior to discharge.  Patient will be independent with his ADL s while hospitalized and maintain hygiene.   2330--in bed and awake. 0000--in bed with eyes closed and breathing regular. 0605--still in bed with eyes closed and breathing regular.

## 2017-10-26 NOTE — PLAN OF CARE
Face to face end of shift report received from MARINA Sandoval. Rounding completed. Patient observed in bed.     Lety Evangelista  10/26/2017  8:02 AM

## 2017-10-26 NOTE — PLAN OF CARE
Face to face end of shift report received from Lety LEOS RN. Rounding completed. Patient observed in hallway pacing.     Almita Beverly  10/26/2017  4:06 PM

## 2017-10-26 NOTE — PLAN OF CARE
"Problem: Patient Care Overview  Goal: Individualization & Mutuality  Patient will have an absence or decrease in hallucinations by time of discharge.  Patient will be medication compliant while hospitalized.  Patient will sleep 6 to 9 hours every night,.  Patient will be able to have a reality based conversation prior to discharge.  Patient will be independent with his ADL s while hospitalized and maintain hygiene.   Pt denies SI, HI, and hallucinations. He slept much of the morning and got up for lunch. He denies anxiety and depression. Says \"i'm just thinking about things.\" Pt is compliant with prescribed medications. 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 Denies pain. Will continue to monitor.       "

## 2017-10-26 NOTE — PROGRESS NOTES
[facility]  Psychiatric Progress Note    Subjective     Josafat is in bed and reports he is tired today. He asks if there is any news about discharging yet and I tell him that in a week or two he should be ready to go. He is fine with this. He tells me he feels ready to leave. Josafat is rather disheveled but again he is in bed still. No other questions or concerns for me at this time.     Per SW, Act Team Intake this week but they will be unable to pick him up for about 2 weeks. [facility] does have an opening coming up soon for Josafat.  Review of Systems  10 point ROS negative.         DIagnoses:      Paranoid Schizophrenia w/ capgras delusions    Attestation:  Patient has been seen and evaluated by me, Yvonne Pedersen NP          Interim History:   The patient's care was discussed with the treatment team and chart notes were reviewed.          Medications:       cloZAPine  500 mg Oral At Bedtime     cloNIDine  0.1 mg Oral BID     cloZAPine  200 mg Oral Daily     cholecalciferol  2,000 Units Oral Daily     famotidine  20 mg Oral BID     clotrimazole, benztropine, hydrOXYzine, acetaminophen, alum & mag hydroxide-simethicone, magnesium hydroxide, traZODone, OLANZapine **OR** OLANZapine, nicotine polacrilex          Allergies:     Allergies   Allergen Reactions     Pcn [Bicillin C-R,] Rash     Provider wants to try amoxicillin(benadryl ordered as well) 9/10/16     Bupropion Other (See Comments)     delusions      Depakote [Valproic Acid] Other (See Comments)     Heart racing     Divalproex Sodium-Fd&C Red #40      Increase heart rate            Psychiatric Examination:   /69  Pulse 90  Temp 97.2  F (36.2  C) (Tympanic)  Resp 14  Ht 1.829 m (6')  Wt 106.1 kg (233 lb 12.8 oz)  SpO2 94%  BMI 31.71 kg/m2  Weight is 233 lbs 12.8 oz  Body mass index is 31.71 kg/(m^2).    Appearance: somnolent in bed but rouses easily  Attitude: cooperative   Eye Contact: fair  Mood: good but tired  Affect:   appropriate  Speech:  Regular rate and volume. Appropriate content.  Psychomotor Behavior:  no evidence of tardive dyskinesia, dystonia, or tics  Thought Process: more linear and logical, goal oriented  Associations: no loose associations noted today   Thought Content: No SI/HI, may have some internal stimuli but is able to maintain a focused conversation  Insight:  improving  Judgment:  improving  Oriented to:  time, person, and place  Attention Span and Concentration: limited  Recent and Remote Memory: limited  Fund of Knowledge: delayed  Muscle Strength and Tone: normal  Gait and Station: Normal         Labs:     No results found for this or any previous visit (from the past 48 hour(s)).    Assessment and Plan:  Continue current medication treatment plan    Estimated LOS: Discharge planning has started. He has improved but still is not showering. Some evidence of preoccupation remains. Setting up OP services and placement prior to discharge.  Looking at assisted living arrangements.

## 2017-10-26 NOTE — PLAN OF CARE
Problem: Patient Care Overview  Goal: Individualization & Mutuality  Patient will have an absence or decrease in hallucinations by time of discharge.  Patient will be medication compliant while hospitalized.  Patient will sleep 6 to 9 hours every night,.  Patient will be able to have a reality based conversation prior to discharge.  Patient will be independent with his ADL s while hospitalized and maintain hygiene.   Outcome: Improving  Pt continues to pace the hallway throughout the day. He does attend groups and participates today. 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 [place]. Pt is calm, cooperative, and med compliant. Pt does not talk with other patients.

## 2017-10-26 NOTE — PLAN OF CARE
Face to face end of shift report received from Almita KAM RN. Rounding completed. Patient observed.     Maurice Mcintyre  10/26/2017  12:35 AM

## 2017-10-26 NOTE — PLAN OF CARE
Problem: General Plan of Care (Inpatient Behavioral)  Goal: Team Discussion  Team Plan:   BEHAVIORAL TEAM DISCUSSION     Participants: Gilda Bobo RN, Jazzy Sood OT, Nneka Sprague OT, Priscilla Henry Supervisor, Yvonne Pedersen NP, Kim Cardozo NP, Beth Holman, Carolyn Holloway DCP, Nathaly Villarreal , Luanne GuardadoAust[facility] Riggs CenterP,  April Manteca N{P   Progress: fair - showered, less delusional  Continued Stay Criteria/Rationale: unable to DC to lesser level of care due to high risk of hospital readmission and inability to independently manage   Medical/Physical: None known  Precautions:           Behavioral Orders   Procedures     Code 1 - Restrict to Unit     Routine Programming         As clinically indicated     Status 15         Every 15 minutes.      Plan: DC with ACT team and to [facility] when bed is available  Rationale for change in precautions or plan: None        Problem: Patient Care Overview  Goal: Team Discussion  Team Plan:   BEHAVIORAL TEAM DISCUSSION     Participants: Gilda Bobo RN, Jazzy Sood OT, Nneka Sprague OT, Priscilla Henry Supervisor, Yvonne Pedersen NP, Kim Cardozo NP, Beth Sloan Student, Carolyn Holloway DCP, Nathaly Villarreal , Luanne GuardadoAust[facility] Rig[facility] CenterP,  April Manteca N{P   Progress: fair - showered, less delusional  Continued Stay Criteria/Rationale: unable to DC to lesser level of care due to high risk of hospital readmission and inability to independently manage   Medical/Physical: None known  Precautions:           Behavioral Orders   Procedures     Code 1 - Restrict to Unit     Routine Programming         As clinically indicated     Status 15         Every 15 minutes.      Plan: DC with ACT team and to [facility] when bed is available  Rationale for change in precautions or plan: None

## 2017-10-27 PROCEDURE — S0136 CLOZAPINE, 25 MG: HCPCS | Performed by: NURSE PRACTITIONER

## 2017-10-27 PROCEDURE — 12400011

## 2017-10-27 PROCEDURE — 25000132 ZZH RX MED GY IP 250 OP 250 PS 637: Performed by: NURSE PRACTITIONER

## 2017-10-27 RX ADMIN — NICOTINE POLACRILEX 4 MG: 2 GUM, CHEWING ORAL at 15:40

## 2017-10-27 RX ADMIN — NICOTINE POLACRILEX 4 MG: 2 GUM, CHEWING ORAL at 12:29

## 2017-10-27 RX ADMIN — CLONIDINE HYDROCHLORIDE 0.1 MG: 0.1 TABLET ORAL at 20:16

## 2017-10-27 RX ADMIN — CLOZAPINE 200 MG: 100 TABLET ORAL at 08:28

## 2017-10-27 RX ADMIN — NICOTINE POLACRILEX 4 MG: 2 GUM, CHEWING ORAL at 22:06

## 2017-10-27 RX ADMIN — FAMOTIDINE 20 MG: 20 TABLET ORAL at 08:29

## 2017-10-27 RX ADMIN — FAMOTIDINE 20 MG: 20 TABLET ORAL at 20:16

## 2017-10-27 RX ADMIN — CLONIDINE HYDROCHLORIDE 0.1 MG: 0.1 TABLET ORAL at 08:28

## 2017-10-27 RX ADMIN — CLOZAPINE 500 MG: 100 TABLET ORAL at 20:16

## 2017-10-27 RX ADMIN — VITAMIN D, TAB 1000IU (100/BT) 2000 UNITS: 25 TAB at 08:28

## 2017-10-27 RX ADMIN — NICOTINE POLACRILEX 4 MG: 2 GUM, CHEWING ORAL at 18:51

## 2017-10-27 ASSESSMENT — ACTIVITIES OF DAILY LIVING (ADL): DRESS: SCRUBS (BEHAVIORAL HEALTH);INDEPENDENT

## 2017-10-27 NOTE — PLAN OF CARE
Face to face end of shift report received from Dottie GRAY. Rounding completed. Patient observed in room.    Samina Ponce  10/27/2017  7:40 AM

## 2017-10-27 NOTE — PLAN OF CARE
"Problem: Discharge Planning  Goal: Discharge Planning (Adult, OB, Behavioral, Peds)  Spoke with pt's mother Malika who expressed concern about possible side effects from pt's neuroleptic medication and was upset that pt was being sent to an assisted living instead of back home.  Writer explained that the independent examiner's report directed that pt should not return home.  Mother was upset \"because the county and practitioners take over control of my son and I have no say on his treatment- they are taking him away from me.\"  Mother wants pt to return home and continue to see Judson Coleman and get CBT therapy.       "

## 2017-10-27 NOTE — PLAN OF CARE
Problem: General Plan of Care (Inpatient Behavioral)  Goal: Team Discussion  Team Plan:   BEHAVIORAL TEAM DISCUSSION     Participants: Rowan Alford NP, Yvonne Pedersen NP, Jazzy Sood OT, Nneka Sprague OT, Nathaly [facility], [facility], Luanne Bedoya, Priscilla Henry RN, Samina Ponce RN  Progress: Moderate  Continued Stay Criteria/Rationale: Monitoring medications for effectiveness and side effects.   Medical/Physical: none  Precautions:   Behavioral Orders   Procedures     Code 1 - Restrict to Unit     Routine Programming       As clinically indicated     Status 15       Every 15 minutes.     Plan: ACT team and assisted living facility.   Rationale for change in precautions or plan: none        Problem: Patient Care Overview  Goal: Team Discussion  Team Plan:   BEHAVIORAL TEAM DISCUSSION     Participants: Rowan Alford NP, Yvonne Pedersen NP, Jazzy Sood OT, Nneka Sprague OT, [facility]aly [facility] LICSW, [facility], Carolyn LAUREN, Luanne LAUREN, Priscilla Henry RN, Samina Ponce RN  Progress: Moderate  Continued Stay Criteria/Rationale: Monitoring medications for effectiveness and side effects.   Medical/Physical: none  Precautions:   Behavioral Orders   Procedures     Code 1 - Restrict to Unit     Routine Programming       As clinically indicated     Status 15       Every 15 minutes.     Plan: ACT team and assisted living facility.   Rationale for change in precautions or plan: none

## 2017-10-27 NOTE — PLAN OF CARE
"Problem: Patient Care Overview  Goal: Individualization & Mutuality  Patient will have an absence or decrease in hallucinations by time of discharge.  Patient will be medication compliant while hospitalized.  Patient will sleep 6 to 9 hours every night,.  Patient will be able to have a reality based conversation prior to discharge.  Patient will be independent with his ADL s while hospitalized and maintain hygiene.   Outcome: Improving  Pt is calm, cooperative, and med compliant. Pt continues to deny all criteria but [place] others. He states he will be moving back to the house soon and all the people living there now will be moving out. Pt also continues to talk about his \"alias birth date\". Pt paces the hallways he does enter the group room from time to time but does not participate. He is withdrawn from his peers.     Pt's mother called ilene asking about his discharge and when he will be coming home to her house. Nurse did inform her that patient is expected to go to [facility] with the ACT team in place. Pt's mother is upsets and believes this is going to cause patient unneeded stress that will only poorly affect the patient. She believes he is stable when he is home when he is on commitment and required to take his medications. She questions why she is not involved in the decision making process.     Pt is currently pacing the hallway.   "

## 2017-10-27 NOTE — PLAN OF CARE
Face to face end of shift report received from Samina FROST RN. Rounding completed. Patient observed walking in the hallway.     Chrissy Stack  10/27/2017  3:47 PM

## 2017-10-27 NOTE — PLAN OF CARE
Problem: Patient Care Overview  Goal: Individualization & Mutuality  Patient will have an absence or decrease in hallucinations by time of discharge.  Patient will be medication compliant while hospitalized.  Patient will sleep 6 to 9 hours every night,.  Patient will be able to have a reality based conversation prior to discharge.  Patient will be independent with his ADL s while hospitalized and maintain hygiene.   Outcome: No Change  Slept without issue.

## 2017-10-27 NOTE — PLAN OF CARE
Face to face end of shift report received from elijah Recio . Rounding completed. Patient observed. Lying on left side - eyes closed - non-labored breathing noted.      Dottie Russell  10/27/2017  3:00 AM

## 2017-10-27 NOTE — PLAN OF CARE
Problem: Patient Care Overview  Goal: Individualization & Mutuality  Patient will have an absence or decrease in hallucinations by time of discharge.  Patient will be medication compliant while hospitalized.  Patient will sleep 6 to 9 hours every night,.  Patient will be able to have a reality based conversation prior to discharge.  Patient will be independent with his ADL s while hospitalized and maintain hygiene.   Patient has been in bed all morning. Patient was cooperative with medications. Patient did come out and eat meals. Patient denies anxiety, depression , hallucinations, and SI. Patient denies pain. Patient did not attend groups this shift.  Will continue to monitor.

## 2017-10-28 PROCEDURE — 12400011

## 2017-10-28 PROCEDURE — 99232 SBSQ HOSP IP/OBS MODERATE 35: CPT | Performed by: NURSE PRACTITIONER

## 2017-10-28 PROCEDURE — S0136 CLOZAPINE, 25 MG: HCPCS | Performed by: NURSE PRACTITIONER

## 2017-10-28 PROCEDURE — 25000132 ZZH RX MED GY IP 250 OP 250 PS 637: Performed by: NURSE PRACTITIONER

## 2017-10-28 RX ADMIN — NICOTINE POLACRILEX 4 MG: 2 GUM, CHEWING ORAL at 14:29

## 2017-10-28 RX ADMIN — CLOZAPINE 500 MG: 100 TABLET ORAL at 20:48

## 2017-10-28 RX ADMIN — CLONIDINE HYDROCHLORIDE 0.1 MG: 0.1 TABLET ORAL at 20:48

## 2017-10-28 RX ADMIN — CLONIDINE HYDROCHLORIDE 0.1 MG: 0.1 TABLET ORAL at 08:22

## 2017-10-28 RX ADMIN — FAMOTIDINE 20 MG: 20 TABLET ORAL at 20:48

## 2017-10-28 RX ADMIN — NICOTINE POLACRILEX 4 MG: 2 GUM, CHEWING ORAL at 21:38

## 2017-10-28 RX ADMIN — NICOTINE POLACRILEX 4 MG: 2 GUM, CHEWING ORAL at 17:18

## 2017-10-28 RX ADMIN — VITAMIN D, TAB 1000IU (100/BT) 2000 UNITS: 25 TAB at 08:22

## 2017-10-28 RX ADMIN — FAMOTIDINE 20 MG: 20 TABLET ORAL at 08:25

## 2017-10-28 RX ADMIN — CLOZAPINE 200 MG: 100 TABLET ORAL at 08:23

## 2017-10-28 ASSESSMENT — ACTIVITIES OF DAILY LIVING (ADL)
GROOMING: INDEPENDENT
ORAL_HYGIENE: INDEPENDENT
DRESS: SCRUBS (BEHAVIORAL HEALTH);INDEPENDENT

## 2017-10-28 NOTE — PLAN OF CARE
Face to face end of shift report received from Samina MCCARTHY RN. Rounding completed. Patient observed pacing in the hallway.     Sara Ho  10/28/2017  3:53 PM

## 2017-10-28 NOTE — PLAN OF CARE
"Problem: Patient Care Overview  Goal: Individualization & Mutuality  Patient will have an absence or decrease in hallucinations by time of discharge.  Patient will be medication compliant while hospitalized.  Patient will sleep 6 to 9 hours every night,.  Patient will be able to have a reality based conversation prior to discharge.  Patient will be independent with his ADL s while hospitalized and maintain hygiene.   Outcome: Therapy, progress toward functional goals is gradual  Pt intermittently attending group. He has been saying that he built a \"[place] near his parents. He presented with very mild hand tremors this evening and stated, \"sometimes my hands have been doing this.\" Pt paces in the halls. He had a visitor and this went well. They walked in the halls together. Pt remains medication compliant. Pt appears to have showered earlier today, he is clean and without odor. Will continue to monitor.     Problem: Mental State/Mood Impairment (Psychotic Signs/Symptoms) (Adult)  Goal: Improved Mental State/Mood (Psychotic Signs/Symptoms)  Outcome: Therapy, progress toward functional goals is gradual  Pt affect is flat. He is generally withdrawn from peers and staff.       "

## 2017-10-28 NOTE — PLAN OF CARE
Face to face end of shift report received from Olinda GRAY. Rounding completed. Patient observed in room.    Samina Ponce  10/28/2017  7:45 AM

## 2017-10-28 NOTE — PLAN OF CARE
Problem: Patient Care Overview  Goal: Individualization & Mutuality  Patient will have an absence or decrease in hallucinations by time of discharge.  Patient will be medication compliant while hospitalized.  Patient will sleep 6 to 9 hours every night,.  Patient will be able to have a reality based conversation prior to discharge.  Patient will be independent with his ADL s while hospitalized and maintain hygiene.   0202: Pt observed in bed and appears to be asleep. Eyes closed non-labored respirations noted. Will continue to monitor.   0600: Pt observed in bed and appears to be asleep. Eyes closed and non-labored respirations noted. 15 minute checks done throughout shift and position changes noted throughout shift.

## 2017-10-28 NOTE — PLAN OF CARE
Face to face end of shift report received from Chrissy Stack RN. Rounding completed. Patient observed in bed and appears to be asleep. Eyes closed and non-labored respirations noted.    Olinda Bobo  10/27/2017  11:57 PM

## 2017-10-28 NOTE — PLAN OF CARE
"Problem: Patient Care Overview  Goal: Individualization & Mutuality  Patient will have an absence or decrease in hallucinations by time of discharge.  Patient will be medication compliant while hospitalized.  Patient will sleep 6 to 9 hours every night,.  Patient will be able to have a reality based conversation prior to discharge.  Patient will be independent with his ADL s while hospitalized and maintain hygiene.   Patient in bed sleeping this morning. Patient did get up and take all scheduled medications and eat breakfast. Patient denies all criteria. Patient states \" Im doing fine just tired\". Patient did not attend groups this shift. Patient pleasant during conversation.       "

## 2017-10-28 NOTE — PROGRESS NOTES
"[facility]  Psychiatric Progress Note    Subjective     Josafat is in bed when I meet with him this morning. He tells me he is feeling better, more like his \"teenage self\". He clarified that he feels better like when he was a teen, before he was \"sick\". Josafat is in good humor this morning and is pleasant.       Per SW, Act Team Intake this week but they will be unable to pick him up for about 2 weeks. [facility] does have an opening coming up soon for Josafat.  Review of Systems  10 point ROS negative.         DIagnoses:      Paranoid Schizophrenia w/ capgras delusions    Attestation:  Patient has been seen and evaluated by me, Yvonne Pedersen NP          Interim History:   The patient's care was discussed with the treatment team and chart notes were reviewed.          Medications:       cloZAPine  500 mg Oral At Bedtime     cloNIDine  0.1 mg Oral BID     cloZAPine  200 mg Oral Daily     cholecalciferol  2,000 Units Oral Daily     famotidine  20 mg Oral BID     clotrimazole, benztropine, hydrOXYzine, acetaminophen, alum & mag hydroxide-simethicone, magnesium hydroxide, traZODone, OLANZapine **OR** OLANZapine, nicotine polacrilex          Allergies:     Allergies   Allergen Reactions     Pcn [Bicillin C-R,] Rash     Provider wants to try amoxicillin(benadryl ordered as well) 9/10/16     Bupropion Other (See Comments)     delusions      Depakote [Valproic Acid] Other (See Comments)     Heart racing     Divalproex Sodium-Fd&C Red #40      Increase heart rate            Psychiatric Examination:   /67  Pulse 70  Temp 97.7  F (36.5  C) (Tympanic)  Resp 14  Ht 1.829 m (6')  Wt 106.1 kg (233 lb 12.8 oz)  SpO2 95%  BMI 31.71 kg/m2  Weight is 233 lbs 12.8 oz  Body mass index is 31.71 kg/(m^2).    Appearance: somnolent in bed but rouses easily  Attitude: cooperative   Eye Contact: fair  Mood: good but tired  Affect:  appropriate  Speech:  Regular rate and volume. Appropriate " content.  Psychomotor Behavior:  no evidence of tardive dyskinesia, dystonia, or tics  Thought Process: more linear and logical, goal oriented  Associations: no loose associations noted today   Thought Content: No SI/HI, may have some internal stimuli but is able to maintain a focused conversation  Insight:  improving  Judgment:  improving  Oriented to:  time, person, and place  Attention Span and Concentration: limited  Recent and Remote Memory: limited  Fund of Knowledge: delayed  Muscle Strength and Tone: normal  Gait and Station: Normal         Labs:     No results found for this or any previous visit (from the past 48 hour(s)).    Assessment and Plan:  Continue current medication treatment plan    Estimated LOS: Discharge planning has started. He has improved but still is not showering. Some evidence of preoccupation remains. Setting up OP services and placement prior to discharge.  Looking at assisted living arrangements.

## 2017-10-29 LAB
BASOPHILS # BLD AUTO: 0.1 10E9/L (ref 0–0.2)
BASOPHILS NFR BLD AUTO: 0.8 %
DIFFERENTIAL METHOD BLD: ABNORMAL
EOSINOPHIL # BLD AUTO: 0.2 10E9/L (ref 0–0.7)
EOSINOPHIL NFR BLD AUTO: 2.5 %
ERYTHROCYTE [DISTWIDTH] IN BLOOD BY AUTOMATED COUNT: 12.9 % (ref 10–15)
HCT VFR BLD AUTO: 39.5 % (ref 40–53)
HGB BLD-MCNC: 13.9 G/DL (ref 13.3–17.7)
IMM GRANULOCYTES # BLD: 0 10E9/L (ref 0–0.4)
IMM GRANULOCYTES NFR BLD: 0.4 %
LYMPHOCYTES # BLD AUTO: 2.6 10E9/L (ref 0.8–5.3)
LYMPHOCYTES NFR BLD AUTO: 34.3 %
MCH RBC QN AUTO: 29.6 PG (ref 26.5–33)
MCHC RBC AUTO-ENTMCNC: 35.2 G/DL (ref 31.5–36.5)
MCV RBC AUTO: 84 FL (ref 78–100)
MONOCYTES # BLD AUTO: 0.8 10E9/L (ref 0–1.3)
MONOCYTES NFR BLD AUTO: 11.1 %
NEUTROPHILS # BLD AUTO: 3.8 10E9/L (ref 1.6–8.3)
NEUTROPHILS NFR BLD AUTO: 50.9 %
NRBC # BLD AUTO: 0 10*3/UL
NRBC BLD AUTO-RTO: 0 /100
PLATELET # BLD AUTO: 300 10E9/L (ref 150–450)
RBC # BLD AUTO: 4.69 10E12/L (ref 4.4–5.9)
WBC # BLD AUTO: 7.5 10E9/L (ref 4–11)

## 2017-10-29 PROCEDURE — 25000132 ZZH RX MED GY IP 250 OP 250 PS 637: Performed by: NURSE PRACTITIONER

## 2017-10-29 PROCEDURE — S0136 CLOZAPINE, 25 MG: HCPCS | Performed by: NURSE PRACTITIONER

## 2017-10-29 PROCEDURE — 85025 COMPLETE CBC W/AUTO DIFF WBC: CPT | Performed by: NURSE PRACTITIONER

## 2017-10-29 PROCEDURE — 12400011

## 2017-10-29 PROCEDURE — 36415 COLL VENOUS BLD VENIPUNCTURE: CPT | Performed by: NURSE PRACTITIONER

## 2017-10-29 PROCEDURE — 99232 SBSQ HOSP IP/OBS MODERATE 35: CPT | Performed by: NURSE PRACTITIONER

## 2017-10-29 RX ADMIN — CLOZAPINE 500 MG: 100 TABLET ORAL at 20:23

## 2017-10-29 RX ADMIN — VITAMIN D, TAB 1000IU (100/BT) 2000 UNITS: 25 TAB at 08:34

## 2017-10-29 RX ADMIN — FAMOTIDINE 20 MG: 20 TABLET ORAL at 08:34

## 2017-10-29 RX ADMIN — CLONIDINE HYDROCHLORIDE 0.1 MG: 0.1 TABLET ORAL at 08:34

## 2017-10-29 RX ADMIN — CLOZAPINE 200 MG: 100 TABLET ORAL at 08:34

## 2017-10-29 RX ADMIN — NICOTINE POLACRILEX 4 MG: 2 GUM, CHEWING ORAL at 17:42

## 2017-10-29 RX ADMIN — NICOTINE POLACRILEX 4 MG: 2 GUM, CHEWING ORAL at 12:26

## 2017-10-29 RX ADMIN — CLONIDINE HYDROCHLORIDE 0.1 MG: 0.1 TABLET ORAL at 20:23

## 2017-10-29 RX ADMIN — NICOTINE POLACRILEX 4 MG: 2 GUM, CHEWING ORAL at 20:33

## 2017-10-29 RX ADMIN — FAMOTIDINE 20 MG: 20 TABLET ORAL at 20:23

## 2017-10-29 RX ADMIN — NICOTINE POLACRILEX 4 MG: 2 GUM, CHEWING ORAL at 15:24

## 2017-10-29 ASSESSMENT — ACTIVITIES OF DAILY LIVING (ADL)
DRESS: SCRUBS (BEHAVIORAL HEALTH);INDEPENDENT
ORAL_HYGIENE: INDEPENDENT
GROOMING: PROMPTS

## 2017-10-29 NOTE — PROGRESS NOTES
"[facility]  Psychiatric Progress Note    Subjective     Josafat is in his room when I meet with him this morning. He asks again about discharge and tells me that one of his properties is going to be empty so he could move in there. He feels that this would be a good discharge plan. I told him he will have his own place a t [place] and he said \"oh yeah\". Discussed again that the ACT team will meet with him. He is pleasant and cooperative with assessment. Josafat contintues to have difficulty with reality so it does remain beneficial to have increased supervision for safety and wellbeing outside of the hospital setting.        Per SW, Act Team Intake this week but they will be unable to pick him up for about 2 weeks. [facility] does have an opening coming up soon for Josafat.  Review of Systems  10 point ROS negative.         DIagnoses:      Paranoid Schizophrenia w/ capgras delusions    Attestation:  Patient has been seen and evaluated by me, Yvonne Pedersen, NP          Interim History:   The patient's care was discussed with the treatment team and chart notes were reviewed.          Medications:       cloZAPine  500 mg Oral At Bedtime     cloNIDine  0.1 mg Oral BID     cloZAPine  200 mg Oral Daily     cholecalciferol  2,000 Units Oral Daily     famotidine  20 mg Oral BID     clotrimazole, benztropine, hydrOXYzine, acetaminophen, alum & mag hydroxide-simethicone, magnesium hydroxide, traZODone, OLANZapine **OR** OLANZapine, nicotine polacrilex          Allergies:     Allergies   Allergen Reactions     Pcn [Bicillin C-R,] Rash     Provider wants to try amoxicillin(benadryl ordered as well) 9/10/16     Bupropion Other (See Comments)     delusions      Depakote [Valproic Acid] Other (See Comments)     Heart racing     Divalproex Sodium-Fd&C Red #40      Increase heart rate            Psychiatric Examination:   /63  Pulse 86  Temp 98.1  F (36.7  C) (Tympanic)  Resp 16  Ht 1.829 m (6')  Wt " 106.3 kg (234 lb 6.4 oz)  SpO2 95%  BMI 31.79 kg/m2  Weight is 234 lbs 6.4 oz  Body mass index is 31.79 kg/(m^2).    Appearance: awake and alert, cooperative but disheveled  Attitude: cooperative   Eye Contact: fair  Mood: good but tired  Affect:  appropriate  Speech:  Regular rate and volume. Appropriate content.  Psychomotor Behavior:  no evidence of tardive dyskinesia, dystonia, or tics  Thought Process: more linear and logical, goal oriented  Associations: no loose associations noted today   Thought Content: No SI/HI, may have some internal stimuli but is able to maintain a focused conversation  Insight:  improving  Judgment:  improving  Oriented to:  time, person, and place  Attention Span and Concentration: limited  Recent and Remote Memory: limited  Fund of Knowledge: delayed  Muscle Strength and Tone: normal  Gait and Station: Normal         Labs:     Results for orders placed or performed during the hospital encounter of 08/20/17 (from the past 48 hour(s))   CBC with platelets differential   Result Value Ref Range    WBC 7.5 4.0 - 11.0 10e9/L    RBC Count 4.69 4.4 - 5.9 10e12/L    Hemoglobin 13.9 13.3 - 17.7 g/dL    Hematocrit 39.5 (L) 40.0 - 53.0 %    MCV 84 78 - 100 fl    MCH 29.6 26.5 - 33.0 pg    MCHC 35.2 31.5 - 36.5 g/dL    RDW 12.9 10.0 - 15.0 %    Platelet Count 300 150 - 450 10e9/L    Diff Method Automated Method     % Neutrophils 50.9 %    % Lymphocytes 34.3 %    % Monocytes 11.1 %    % Eosinophils 2.5 %    % Basophils 0.8 %    % Immature Granulocytes 0.4 %    Nucleated RBCs 0 0 /100    Absolute Neutrophil 3.8 1.6 - 8.3 10e9/L    Absolute Lymphocytes 2.6 0.8 - 5.3 10e9/L    Absolute Monocytes 0.8 0.0 - 1.3 10e9/L    Absolute Eosinophils 0.2 0.0 - 0.7 10e9/L    Absolute Basophils 0.1 0.0 - 0.2 10e9/L    Abs Immature Granulocytes 0.0 0 - 0.4 10e9/L    Absolute Nucleated RBC 0.0        Assessment and Plan:  Continue current medication treatment plan    Estimated LOS: Discharge planning has started.  He has improved but still is not showering. Some evidence of preoccupation remains. Setting up OP services and placement prior to discharge.  Looking at assisted living arrangements.

## 2017-10-29 NOTE — PLAN OF CARE
Face to face end of shift report received from Sara Ho RN. Rounding completed. Patient observed in [facility].     Olinda Bobo  10/28/2017  11:51 PM

## 2017-10-29 NOTE — PLAN OF CARE
Problem: Patient Care Overview  Goal: Individualization & Mutuality  Patient will have an absence or decrease in hallucinations by time of discharge.  Patient will be medication compliant while hospitalized.  Patient will sleep 6 to 9 hours every night,.  Patient will be able to have a reality based conversation prior to discharge.  Patient will be independent with his ADL s while hospitalized and maintain hygiene.   Outcome: Improving  Patient has been up on the unit and has been pleasant and cooperative. He has been pacing in the hallway throughout the shift. He requested shower supplies at the beginning of the shift, but did not shower. He was prompted a couple of times. He did not make any delusional statements, but he appeared pre-occupied. He would stare at the floor while talking to this writer and appeared distracted. He denied all mental health criteria. Patient was medication compliant.

## 2017-10-29 NOTE — PLAN OF CARE
Problem: Patient Care Overview  Goal: Individualization & Mutuality  Patient will have an absence or decrease in hallucinations by time of discharge.  Patient will be medication compliant while hospitalized.  Patient will sleep 6 to 9 hours every night,.  Patient will be able to have a reality based conversation prior to discharge.  Patient will be independent with his ADL s while hospitalized and maintain hygiene.   0200: Pt observed in bed and appears to be asleep. Eyes closed and non-labored respirations noted. Will continue to monitor.   0600: Pt observed in bed and appears to be asleep. Pt was up at beginning of shift but fell asleep shortly after 2330. Pt appeared to be asleep the rest of the shift. 15 minute safety checks done throughout shift and position changes noted.

## 2017-10-29 NOTE — PLAN OF CARE
Problem: Patient Care Overview  Goal: Individualization & Mutuality  Patient will have an absence or decrease in hallucinations by time of discharge.  Patient will be medication compliant while hospitalized.  Patient will sleep 6 to 9 hours every night,.  Patient will be able to have a reality based conversation prior to discharge.  Patient will be independent with his ADL s while hospitalized and maintain hygiene.   Patient up and in lounge for breakfast. Patient states he was fine at that time and was just tired. Patient was cooperative with scheduled medications. Patient got up at lunch and paced the halls. Patient denies all criteria.

## 2017-10-29 NOTE — PLAN OF CARE
Problem: Patient Care Overview  Goal: Individualization & Mutuality  Patient will have an absence or decrease in hallucinations by time of discharge.  Patient will be medication compliant while hospitalized.  Patient will sleep 6 to 9 hours every night,.  Patient will be able to have a reality based conversation prior to discharge.  Patient will be independent with his ADL s while hospitalized and maintain hygiene.   Outcome: No Change  Patient spent the majority of the shift watching TV and pacing in the hallway. He denied auditory/visual hallucinations. He made a few grandiose, delusional statements this shift about owning mansions in [place] and [place] and about owning various luxury cars. He was able to have reality based conversation at other times throughout the shift.  Patient denied all mental health criteria. Patient encouraged to shower. Verbalized that he would, but he never did. He was medication compliant.

## 2017-10-29 NOTE — PLAN OF CARE
Face to face end of shift report received from Samina MCCARTHY RN. Rounding completed. Patient observed in the lounge.     Sara Ho  10/29/2017  4:11 PM

## 2017-10-29 NOTE — PLAN OF CARE
Face to face end of shift report received from Olinda GRAY. Rounding completed. Patient observed in room.    Samina Ponce  10/29/2017  7:59 AM

## 2017-10-30 PROCEDURE — S0136 CLOZAPINE, 25 MG: HCPCS | Performed by: NURSE PRACTITIONER

## 2017-10-30 PROCEDURE — 25000132 ZZH RX MED GY IP 250 OP 250 PS 637: Performed by: NURSE PRACTITIONER

## 2017-10-30 PROCEDURE — 12400011

## 2017-10-30 RX ADMIN — CLOZAPINE 200 MG: 100 TABLET ORAL at 08:33

## 2017-10-30 RX ADMIN — NICOTINE POLACRILEX 4 MG: 2 GUM, CHEWING ORAL at 12:27

## 2017-10-30 RX ADMIN — FAMOTIDINE 20 MG: 20 TABLET ORAL at 20:07

## 2017-10-30 RX ADMIN — NICOTINE POLACRILEX 4 MG: 2 GUM, CHEWING ORAL at 15:15

## 2017-10-30 RX ADMIN — NICOTINE POLACRILEX 4 MG: 2 GUM, CHEWING ORAL at 19:40

## 2017-10-30 RX ADMIN — CLONIDINE HYDROCHLORIDE 0.1 MG: 0.1 TABLET ORAL at 20:07

## 2017-10-30 RX ADMIN — CLONIDINE HYDROCHLORIDE 0.1 MG: 0.1 TABLET ORAL at 08:33

## 2017-10-30 RX ADMIN — CLOZAPINE 500 MG: 100 TABLET ORAL at 20:07

## 2017-10-30 RX ADMIN — NICOTINE POLACRILEX 4 MG: 2 GUM, CHEWING ORAL at 18:01

## 2017-10-30 RX ADMIN — FAMOTIDINE 20 MG: 20 TABLET ORAL at 08:33

## 2017-10-30 RX ADMIN — VITAMIN D, TAB 1000IU (100/BT) 2000 UNITS: 25 TAB at 08:33

## 2017-10-30 ASSESSMENT — ACTIVITIES OF DAILY LIVING (ADL)
ORAL_HYGIENE: INDEPENDENT
ORAL_HYGIENE: INDEPENDENT
DRESS: INDEPENDENT
GROOMING: INDEPENDENT
GROOMING: PROMPTS

## 2017-10-30 NOTE — PLAN OF CARE
Problem: Patient Care Overview  Goal: Individualization & Mutuality  Patient will have an absence or decrease in hallucinations by time of discharge.  Patient will be medication compliant while hospitalized.  Patient will sleep 6 to 9 hours every night,.  Patient will be able to have a reality based conversation prior to discharge.  Patient will be independent with his ADL s while hospitalized and maintain hygiene.   0601: Pt observed in bed and appears to be asleep. Eyes closed and non-labored respirations noted. 15 minute safety checks done throughout shift and position changes noted. Will continue to monitor.

## 2017-10-30 NOTE — PLAN OF CARE
Face to face end of shift report received from Sara Ho RN. Rounding completed. Patient observed in bed and appears to be asleep. Will continue to monitor.     Olinda Bobo  10/30/2017  12:03 AM

## 2017-10-30 NOTE — PLAN OF CARE
Face to face end of shift report received from Olinda BROWN RN. Rounding completed. Patient observed.     Key Vaughan  10/30/2017  7:58 AM

## 2017-10-30 NOTE — PLAN OF CARE
Problem: General Plan of Care (Inpatient Behavioral)  Goal: Team Discussion  Team Plan:   BEHAVIORAL TEAM DISCUSSION     Participants: Rowan Alford NP, Kim Cardozo NP, [facility], [facility], Luanen Ryder Discharge Planner, Carolyn Holloway Discharge Plannner, Priscilla Henry RN,  Chrissy Stack RN, Alesha Quevedo Recreation Therapy, Alberta Warwas OT, Nneka Samsa OT   Progress: Minimal.   Continued Stay Criteria/Rationale: Continued medication stabilization, ACT team to assess this week.   Medical/Physical: None  Precautions:   Behavioral Orders   Procedures     Code 1 - Restrict to Unit     Routine Programming       As clinically indicated     Status 15       Every 15 minutes.     Plan: Referrals sent. [facility] discharge next week to assisted living. ACT team to find placement  Rationale for change in precautions or plan: None        Problem: Patient Care Overview  Goal: Team Discussion  Team Plan:   BEHAVIORAL TEAM DISCUSSION     Participants: Rowan Alford NP, Kim Cardozo NP, [facility] LIC[facility], [facility] LIC, Luanne Ryder Discharge Planner, Carolyn Holloway Discharge Plannner, Priscilla Henry RN,  Chrissy Stack RN, Alesha Quevedo [facility] [facility], Alberta Warwas OT, Nneka Samsa OT   Progress: Minimal.   Continued Stay Criteria/Rationale: Continued medication stabilization, ACT team to assess this week.   Medical/Physical: None  Precautions:   Behavioral Orders   Procedures     Code 1 - Restrict to Unit     Routine Programming       As clinically indicated     Status 15       Every 15 minutes.     Plan: Referrals sent. [facility] discharge next week to assisted living. ACT team to find placement  Rationale for change in precautions or plan: None

## 2017-10-30 NOTE — PLAN OF CARE
Problem: Patient Care Overview  Goal: Individualization & Mutuality  Patient will have an absence or decrease in hallucinations by time of discharge.  Patient will be medication compliant while hospitalized.  Patient will sleep 6 to 9 hours every night,.  Patient will be able to have a reality based conversation prior to discharge.  Patient will be independent with his ADL s while hospitalized and maintain hygiene.   Outcome: Therapy, progress toward functional goals is gradual  Pt. Was up and ate breakfast in dayroom, has been in bed all morning, is medication compliant, slept about 7 hours last night, has reality based conversation at times, but does still have rambling, delusional speech, has not showered yet this shift, will continue to monitor.  1345-  Pt. Up and ambulating in the halls, demeanor calm.

## 2017-10-30 NOTE — PLAN OF CARE
Problem: Patient Care Overview  Goal: Individualization & Mutuality  Patient will have an absence or decrease in hallucinations by time of discharge.  Patient will be medication compliant while hospitalized.  Patient will sleep 6 to 9 hours every night,.  Patient will be able to have a reality based conversation prior to discharge.  Patient will be independent with his ADL s while hospitalized and maintain hygiene.   Outcome: No Change  Pt continues to pace the halls with a flat affect. Pt denied all criteria to this writer. He has been able to carry on short reality based conversations but continues to make delusional statements and appears to be responding to internal stimuli while walking the halls. Pt is disheveled and appears to be neglecting grooming. Pt was offered shower supplies and he stated that he already has some and plans to shower this evening. Pt denied having any pain. He had no further questions. Will continue to monitor.

## 2017-10-30 NOTE — PLAN OF CARE
Face to face end of shift report received from Key GREENBERG RN. Rounding completed. Patient observed in [facility].     Kana Mar  10/30/2017  3:35 PM

## 2017-10-31 PROCEDURE — 12400011

## 2017-10-31 PROCEDURE — 25000132 ZZH RX MED GY IP 250 OP 250 PS 637: Performed by: NURSE PRACTITIONER

## 2017-10-31 PROCEDURE — 99232 SBSQ HOSP IP/OBS MODERATE 35: CPT | Performed by: NURSE PRACTITIONER

## 2017-10-31 PROCEDURE — S0136 CLOZAPINE, 25 MG: HCPCS | Performed by: NURSE PRACTITIONER

## 2017-10-31 RX ADMIN — CLOZAPINE 200 MG: 100 TABLET ORAL at 08:30

## 2017-10-31 RX ADMIN — CLOZAPINE 500 MG: 100 TABLET ORAL at 20:08

## 2017-10-31 RX ADMIN — VITAMIN D, TAB 1000IU (100/BT) 2000 UNITS: 25 TAB at 08:30

## 2017-10-31 RX ADMIN — CLONIDINE HYDROCHLORIDE 0.1 MG: 0.1 TABLET ORAL at 08:30

## 2017-10-31 RX ADMIN — CLONIDINE HYDROCHLORIDE 0.1 MG: 0.1 TABLET ORAL at 20:08

## 2017-10-31 RX ADMIN — NICOTINE POLACRILEX 4 MG: 2 GUM, CHEWING ORAL at 15:28

## 2017-10-31 RX ADMIN — FAMOTIDINE 20 MG: 20 TABLET ORAL at 08:30

## 2017-10-31 RX ADMIN — FAMOTIDINE 20 MG: 20 TABLET ORAL at 20:11

## 2017-10-31 RX ADMIN — NICOTINE POLACRILEX 4 MG: 2 GUM, CHEWING ORAL at 12:24

## 2017-10-31 RX ADMIN — NICOTINE POLACRILEX 4 MG: 2 GUM, CHEWING ORAL at 21:38

## 2017-10-31 RX ADMIN — NICOTINE POLACRILEX 4 MG: 2 GUM, CHEWING ORAL at 17:36

## 2017-10-31 RX ADMIN — NICOTINE POLACRILEX 4 MG: 2 GUM, CHEWING ORAL at 20:08

## 2017-10-31 ASSESSMENT — ACTIVITIES OF DAILY LIVING (ADL)
ORAL_HYGIENE: INDEPENDENT
GROOMING: INDEPENDENT

## 2017-10-31 NOTE — PLAN OF CARE
Face to face end of shift report received from Hanny SAMAYOA RN. Rounding completed. Patient observed.     Key Vaughan  10/31/2017  7:59 AM

## 2017-10-31 NOTE — PLAN OF CARE
Problem: Patient Care Overview  Goal: Individualization & Mutuality  Patient will have an absence or decrease in hallucinations by time of discharge.  Patient will be medication compliant while hospitalized.  Patient will sleep 6 to 9 hours every night,.  Patient will be able to have a reality based conversation prior to discharge.  Patient will be independent with his ADL s while hospitalized and maintain hygiene.   He is up on the unit walking in the halls or watching tv. He is preoccupied.  He denies feeling depressed or suicidal. He attends some groups. He denies hallucinations. He denies feeling depressed or suicidal. He is compliant with his medications.     Problem: Mental State/Mood Impairment (Psychotic Signs/Symptoms) (Adult)  Intervention: Optimize Mental State/Mood  He is dressed and groomed appropriately on the unit. He denies hallucinations.

## 2017-10-31 NOTE — PROGRESS NOTES
Face to face end of shift report received from kylie nava at 2300 report.. Rounding completed. Patient observed.     Hanny Mcintyre  10/31/2017  2:32 AM

## 2017-10-31 NOTE — PLAN OF CARE
Problem: Patient Care Overview  Goal: Individualization & Mutuality  Patient will have an absence or decrease in hallucinations by time of discharge.  Patient will be medication compliant while hospitalized.  Patient will sleep 6 to 9 hours every night,.  Patient will be able to have a reality based conversation prior to discharge.  Patient will be independent with his ADL s while hospitalized and maintain hygiene.   0000 in bed with easy respirations, presenting sleeping.0510 patient continues to sleep at this time.

## 2017-10-31 NOTE — PLAN OF CARE
Problem: General Plan of Care (Inpatient Behavioral)  Goal: Team Discussion  Team Plan:   BEHAVIORAL TEAM DISCUSSION     Participants: Rowan Alford NP, Kim Cardozo NP,  Bianca LOPEZ, Key Kerr RN,  [facility], [facility] Dis[facility] Planner,  Priscilla Henry RN,  Chrissy Stack RN,  Alesha Quevedo Recreation Therapy,  Nneka Sprague OT   Progress: Some.   Continued Stay Criteria/Rationale: Continued stabilization on medication. ACT team to assess  Medical/Physical: None  Precautions:   Behavioral Orders   Procedures     Code 1 - Restrict to Unit     Routine Programming       As clinically indicated     Status 15       Every 15 minutes.     Plan: ACT to see this week.  Continued stabilization and referral to board and lodge.   Rationale for change in precautions or plan: None        Problem: Patient Care Overview  Goal: Team Discussion  Team Plan:   BEHAVIORAL TEAM DISCUSSION     Participants: Rowan Alford NP, Kim Cardozo NP,  Bianca LOPEZ, Key Kerr RN,  [facility], Luanne Guardado Discharge Planner,  Priscilla Henry RN,  Chrissy Stack RN,  Alesha Quevedo [facility],  Nneka Ribeirosa OT   Progress: Some.   Continued Stay Criteria/Rationale: Continued stabilization on medication. ACT team to assess  Medical/Physical: None  Precautions:   Behavioral Orders   Procedures     Code 1 - Restrict to Unit     Routine Programming       As clinically indicated     Status 15       Every 15 minutes.     Plan: ACT to see this week.  Continued stabilization and referral to board and lodge.   Rationale for change in precautions or plan: None

## 2017-10-31 NOTE — PLAN OF CARE
Face to face end of shift report received from MARINA Mackey. Rounding completed. Patient observed in [facility].    Toño Antunez  10/31/2017  3:40 PM

## 2017-10-31 NOTE — PLAN OF CARE
Problem: Patient Care Overview  Goal: Individualization & Mutuality  Patient will have an absence or decrease in hallucinations by time of discharge.  Patient will be medication compliant while hospitalized.  Patient will sleep 6 to 9 hours every night,.  Patient will be able to have a reality based conversation prior to discharge.  Patient will be independent with his ADL s while hospitalized and maintain hygiene.   Outcome: Therapy, progress toward functional goals is gradual  Pt. Denies hallucinations, but appears to be responding to internal stimuli as pt. Talking to self frequently, has been medication compliant so far this shift, able to have some reality based conversation at times, but continues to have some delusional speech also, independent with ADL's, encouraged to shower, will continue to monitor.

## 2017-11-01 PROCEDURE — 25000132 ZZH RX MED GY IP 250 OP 250 PS 637: Performed by: NURSE PRACTITIONER

## 2017-11-01 PROCEDURE — S0136 CLOZAPINE, 25 MG: HCPCS | Performed by: NURSE PRACTITIONER

## 2017-11-01 PROCEDURE — 12400011

## 2017-11-01 RX ADMIN — CLONIDINE HYDROCHLORIDE 0.1 MG: 0.1 TABLET ORAL at 08:36

## 2017-11-01 RX ADMIN — CLOZAPINE 200 MG: 100 TABLET ORAL at 08:36

## 2017-11-01 RX ADMIN — NICOTINE POLACRILEX 4 MG: 2 GUM, CHEWING ORAL at 20:04

## 2017-11-01 RX ADMIN — CLONIDINE HYDROCHLORIDE 0.1 MG: 0.1 TABLET ORAL at 20:04

## 2017-11-01 RX ADMIN — VITAMIN D, TAB 1000IU (100/BT) 2000 UNITS: 25 TAB at 08:36

## 2017-11-01 RX ADMIN — NICOTINE POLACRILEX 4 MG: 2 GUM, CHEWING ORAL at 13:53

## 2017-11-01 RX ADMIN — CLOZAPINE 500 MG: 100 TABLET ORAL at 20:04

## 2017-11-01 RX ADMIN — FAMOTIDINE 20 MG: 20 TABLET ORAL at 08:36

## 2017-11-01 RX ADMIN — FAMOTIDINE 20 MG: 20 TABLET ORAL at 20:05

## 2017-11-01 ASSESSMENT — ACTIVITIES OF DAILY LIVING (ADL)
DRESS: INDEPENDENT
GROOMING: INDEPENDENT
ORAL_HYGIENE: INDEPENDENT

## 2017-11-01 NOTE — PLAN OF CARE
"Problem: Patient Care Overview  Goal: Individualization & Mutuality  Patient will have an absence or decrease in hallucinations by time of discharge.  Patient will be medication compliant while hospitalized.  Patient will sleep 6 to 9 hours every night,.  Patient will be able to have a reality based conversation prior to discharge.  Patient will be independent with his ADL s while hospitalized and maintain hygiene.   He is up on the unit walking in the halls. He makes good eye contact. He is preoccupied and noted to be talking to himself as he is walking in the halls. He states \"I have lots of secrets\" and laughs. He is maintaining appropriate boundaries with staff and peers. He denies feeling depressed or suicidal.     Problem: Mental State/Mood Impairment (Psychotic Signs/Symptoms) (Adult)  Intervention: Optimize Mental State/Mood  He is maintaining appropriate boundaries with staff and peers. He is encouraged to attend groups. He is notable less preoccupied and able to maintain a short reality based conversation.         "

## 2017-11-01 NOTE — PLAN OF CARE
Problem: Patient Care Overview  Goal: Individualization & Mutuality  Patient will have an absence or decrease in hallucinations by time of discharge.  Patient will be medication compliant while hospitalized.  Patient will sleep 6 to 9 hours every night,.  Patient will be able to have a reality based conversation prior to discharge.  Patient will be independent with his ADL s while hospitalized and maintain hygiene.   Outcome: No Change  Pt has been resting in bed for the majority of this AM. He took all his morning medications without any issues but would fall asleep between cares. He denied having any pain, anxiety, depression, HI, SI, and hallucinations. He has a flat affect and mood is calm. He did not eat any of his breakfast this AM. Pt has not showered on this shift and has not made any delusional statements at this time.  Will continue to monitor.

## 2017-11-01 NOTE — PROGRESS NOTES
[facility]  Psychiatric Progress Note    Subjective     Miles is up and walking in the halls when I see him today. He asks me if I have heard anything about when he will be discharging. We did discuss that the ACT team will likely meet with him this week to do their intake and arrangements are being made for him to stay at [facility]. He tells me that one of the houses that he owns will be empty soon and he was thinking about living there, though does agree that he will follow treatment team recommendations. He has been medication compliant and denies any side effects from Clozaril.         10 point ROS reviewed- denies any current concerns         DIagnoses:      Paranoid Schizophrenia w/ capgras delusions    Attestation:  Patient has been seen and evaluated by me, Kim Cardozo NP          Interim History:   The patient's care was discussed with the treatment team and chart notes were reviewed.          Medications:       cloZAPine  500 mg Oral At Bedtime     cloNIDine  0.1 mg Oral BID     cloZAPine  200 mg Oral Daily     cholecalciferol  2,000 Units Oral Daily     famotidine  20 mg Oral BID     clotrimazole, benztropine, hydrOXYzine, acetaminophen, alum & mag hydroxide-simethicone, magnesium hydroxide, traZODone, OLANZapine **OR** OLANZapine, nicotine polacrilex          Allergies:     Allergies   Allergen Reactions     Pcn [Bicillin C-R,] Rash     Provider wants to try amoxicillin(benadryl ordered as well) 9/10/16     Bupropion Other (See Comments)     delusions      Depakote [Valproic Acid] Other (See Comments)     Heart racing     Divalproex Sodium-Fd&C Red #40      Increase heart rate            Psychiatric Examination:   /73  Pulse 110  Temp 98.9  F (37.2  C) (Tympanic)  Resp 16  Ht 1.829 m (6')  Wt 106.3 kg (234 lb 6.4 oz)  SpO2 97%  BMI 31.79 kg/m2  Weight is 234 lbs 6.4 oz  Body mass index is 31.79 kg/(m^2).    Appearance: awake, alert, dressed in hospital  "scrubs, casually groomed though somewhat disheveled  Attitude: cooperative, pleasant  Eye Contact: fair  Mood: reports that he is \"good\" today  Affect: appropriate  Speech:  Regular rate and rhythm  Psychomotor Behavior:  no evidence of tardive dyskinesia, dystonia, or tics  Thought Process: more linear and logical, goal oriented  Associations: no loose associations noted  Thought Content: denies any suicidal or homicidal ideation, denies hallucinations, appears mildly preoccupied at times  Insight: has shown some improvement  Judgment: fair  Oriented to:  time, person, and place  Attention Span and Concentration: limited  Recent and Remote Memory: limited  Fund of Knowledge: delayed  Muscle Strength and Tone: normal  Gait and Station: Normal         Labs:     No results found for this or any previous visit (from the past 48 hour(s)).    Assessment and Plan:  Continue current medication treatment plan  Will discharge with ACT team when placement is secured      Estimated LOS: Discharge planning in process. He has improved though remains preoccupied. Setting up outpatient services and placement prior to discharge.  Looking at assisted living arrangements.     "

## 2017-11-01 NOTE — PLAN OF CARE
Problem: Discharge Planning  Goal: Discharge Planning (Adult, OB, Behavioral, Peds)  Received a call from [facility] that they have an open bed coming up this week and they can take him on Monday.  Writer left message for the ACT team and Fang Fink his  with the [facility].

## 2017-11-01 NOTE — PLAN OF CARE
Problem: Mental State/Mood Impairment (Psychotic Signs/Symptoms) (Adult)  Goal: Improved Mental State/Mood (Psychotic Signs/Symptoms)  0100 patient is sleeping at this time. 0530 patient continues to sleep.

## 2017-11-01 NOTE — PLAN OF CARE
Problem: Patient Care Overview  Goal: Individualization & Mutuality  Patient will have an absence or decrease in hallucinations by time of discharge.  Patient will be medication compliant while hospitalized.  Patient will sleep 6 to 9 hours every night,.  Patient will be able to have a reality based conversation prior to discharge.  Patient will be independent with his ADL s while hospitalized and maintain hygiene.   0100 in bed with easy respirations easy,presenting sleeping.

## 2017-11-01 NOTE — PLAN OF CARE
Face to face end of shift report received from Hanny LEOS RN. Rounding completed. Patient observed resting in bed.     Kana Mar  11/1/2017  7:48 AM

## 2017-11-01 NOTE — PROGRESS NOTES
Face to face end of shift report received from lucrecia nava at 2300 report.. Rounding completed. Patient observed.     Hanny Mcintyre  11/1/2017  1:46 AM

## 2017-11-01 NOTE — PLAN OF CARE
Problem: General Plan of Care (Inpatient Behavioral)  Goal: Team Discussion  Team Plan:   BEHAVIORAL TEAM DISCUSSION     Participants: Supa Espinoza NP, Yvonne Pedersen NP, Tish Boswell [facility], Priscilla Henry RN, Chrissy Stack RN, Kana Mar RN, [facility], [facility] OT, Nneka Sprague OT   Progress: fair  Continued Stay Criteria/Rationale: unable to DC to lesser level of care due to high risk of hospital readmission  Medical/Physical: None known  Precautions:   Behavioral Orders   Procedures     Code 1 - Restrict to Unit     Routine Programming       As clinically indicated     Status 15       Every 15 minutes.     Plan: DC with ACT to assisted living, continue to stabilize on medication  Rationale for change in precautions or plan: None        Problem: Patient Care Overview  Goal: Team Discussion  Team Plan:   BEHAVIORAL TEAM DISCUSSION     Participants: Supa Espinoza NP, Yvonne Pedersen NP, [facility], Priscilla Henry RN, Chrissy Stack RN, Kana Mar RN, [facility], [facility] OT, Nneka Sprague OT   Progress: fair  Continued Stay Criteria/Rationale: unable to DC to lesser level of care due to high risk of hospital readmission  Medical/Physical: None known  Precautions:   Behavioral Orders   Procedures     Code 1 - Restrict to Unit     Routine Programming       As clinically indicated     Status 15       Every 15 minutes.     Plan: DC with ACT to assisted living, continue to stabilize on medication  Rationale for change in precautions or plan: None

## 2017-11-01 NOTE — PLAN OF CARE
Face to face end of shift report received from MARINA Roger. Rounding completed. Patient observed in [facility].    Toño Antunez  11/1/2017  3:45 PM

## 2017-11-02 PROCEDURE — 99232 SBSQ HOSP IP/OBS MODERATE 35: CPT | Performed by: NURSE PRACTITIONER

## 2017-11-02 PROCEDURE — S0136 CLOZAPINE, 25 MG: HCPCS | Performed by: NURSE PRACTITIONER

## 2017-11-02 PROCEDURE — 25000132 ZZH RX MED GY IP 250 OP 250 PS 637: Performed by: NURSE PRACTITIONER

## 2017-11-02 PROCEDURE — 12400011

## 2017-11-02 RX ADMIN — VITAMIN D, TAB 1000IU (100/BT) 2000 UNITS: 25 TAB at 08:11

## 2017-11-02 RX ADMIN — CLONIDINE HYDROCHLORIDE 0.1 MG: 0.1 TABLET ORAL at 08:11

## 2017-11-02 RX ADMIN — FAMOTIDINE 20 MG: 20 TABLET ORAL at 08:11

## 2017-11-02 RX ADMIN — CLOZAPINE 200 MG: 100 TABLET ORAL at 08:11

## 2017-11-02 RX ADMIN — FAMOTIDINE 20 MG: 20 TABLET ORAL at 20:07

## 2017-11-02 RX ADMIN — NICOTINE POLACRILEX 4 MG: 2 GUM, CHEWING ORAL at 12:25

## 2017-11-02 RX ADMIN — NICOTINE POLACRILEX 4 MG: 2 GUM, CHEWING ORAL at 15:09

## 2017-11-02 RX ADMIN — CLOZAPINE 500 MG: 100 TABLET ORAL at 20:07

## 2017-11-02 RX ADMIN — NICOTINE POLACRILEX 4 MG: 2 GUM, CHEWING ORAL at 17:26

## 2017-11-02 RX ADMIN — NICOTINE POLACRILEX 4 MG: 2 GUM, CHEWING ORAL at 10:19

## 2017-11-02 RX ADMIN — NICOTINE POLACRILEX 4 MG: 2 GUM, CHEWING ORAL at 19:43

## 2017-11-02 RX ADMIN — CLONIDINE HYDROCHLORIDE 0.1 MG: 0.1 TABLET ORAL at 20:07

## 2017-11-02 ASSESSMENT — ACTIVITIES OF DAILY LIVING (ADL)
ORAL_HYGIENE: INDEPENDENT
DRESS: INDEPENDENT
GROOMING: INDEPENDENT

## 2017-11-02 NOTE — PLAN OF CARE
"Problem: Patient Care Overview  Goal: Individualization & Mutuality  Patient will have an absence or decrease in hallucinations by time of discharge.  Patient will be medication compliant while hospitalized.  Patient will sleep 6 to 9 hours every night,.  Patient will be able to have a reality based conversation prior to discharge.  Patient will be independent with his ADL s while hospitalized and maintain hygiene.   He is up on the unit. He is preoccupied with internal stimuli. He is able to manage a short reality based conversation. He then is tangential and talks about building his own house a few years ago and how he should be able to live there. He also talks about going to live at [facility] living then getting his own apartment.  \"I guess I just gotta do what I gotta do\".  He laughs. He paces up and down the hallway. He is compliant with his medications. He is dressed and groomed appropriately on the unit.     Problem: Mental State/Mood Impairment (Psychotic Signs/Symptoms) (Adult)  Intervention: Optimize Mental State/Mood  He is actively responding to internal stimuli. Although he denies hallucinations. He is able to communicate his needs. He is encouraged to attend groups but does tend to walk in and out of group.        "

## 2017-11-02 NOTE — PLAN OF CARE
Problem: General Plan of Care (Inpatient Behavioral)  Goal: Team Discussion  Team Plan:   BEHAVIORAL TEAM DISCUSSION     Participants: Supa Espinoza NP, Yvonne Pedersen NP, [facility], Luanne Ryder Discharge Planner, Priscilla Henry RN, Chrissy Stack RN, Kana Mar RN, [facility], [facility] OT, Nneka Sprague OT   Progress: slow progress  Continued Stay Criteria/Rationale: unable to DC to lesser level of care due to high risk of relapse and hospital readmission  Medical/Physical: None known  Precautions:   Behavioral Orders   Procedures     Code 1 - Restrict to Unit     Routine Programming       As clinically indicated     Status 15       Every 15 minutes.     Plan: DC to [place] assisted living on Monday  Rationale for change in precautions or plan: none        Problem: Patient Care Overview  Goal: Team Discussion  Team Plan:   BEHAVIORAL TEAM DISCUSSION     Participants: Supa Espinoza NP, Yvonne Pedersen NP, [facility], Luanne Guardado Discharge Planner, Priscilla Henry RN, Chrissy Stack RN, Kana Mar RN, Alesha Queved[facility], [place] [facility] OT, Nneka Sprague OT   Progress: slow progress  Continued Stay Criteria/Rationale: unable to DC to lesser level of care due to high risk of relapse and hospital readmission  Medical/Physical: None known  Precautions:   Behavioral Orders   Procedures     Code 1 - Restrict to Unit     Routine Programming       As clinically indicated     Status 15       Every 15 minutes.     Plan: DC to [place] assisted living on Monday  Rationale for change in precautions or plan: none

## 2017-11-02 NOTE — PLAN OF CARE
Face to face end of shift report received from MARINA Roger. Rounding completed. Patient observed in [facility].     Toño Antunez  11/2/2017  3:39 PM

## 2017-11-02 NOTE — PLAN OF CARE
"Problem: Discharge Planning  Goal: Discharge Planning (Adult, OB, Behavioral, Peds)  Temitope Alfonso from the ACT team came out to do the intake with pt.      Writer spoke with pt and provided him with some printed information about [facility].  Pt looked at the picture of his and stated, \"I don't know if I want to go there, it looks haunted.\"  Discussed it would be just short term until he got into his own apartment.     Left a voicemail for Fang Fink notifying her that Miles completed the intake with the ACT team.   "

## 2017-11-02 NOTE — PROGRESS NOTES
"[facility]  Psychiatric Progress Note    Subjective     Planning to discharge to [facility] on Monday. ACT did intake today. He is excited about his; however, he wishes he could go live at his dad's, where he reports \"a bunch of people are moving out and I'd like to keep an eye on the place.\" Understands that this is not currently an option. Denies depression, anxiety and psychosis. Continues to pace the halls. Remains appropriate on the unit.       10 point ROS reviewed- nothing reported         DIagnoses:      Paranoid Schizophrenia w/ capgras delusions    Attestation:  Patient has been seen and evaluated by me, Supa Espinoza NP          Interim History:   The patient's care was discussed with the treatment team and chart notes were reviewed.          Medications:       cloZAPine  500 mg Oral At Bedtime     cloNIDine  0.1 mg Oral BID     cloZAPine  200 mg Oral Daily     cholecalciferol  2,000 Units Oral Daily     famotidine  20 mg Oral BID     clotrimazole, benztropine, hydrOXYzine, acetaminophen, alum & mag hydroxide-simethicone, magnesium hydroxide, traZODone, OLANZapine **OR** OLANZapine, nicotine polacrilex          Allergies:     Allergies   Allergen Reactions     Pcn [Bicillin C-R,] Rash     Provider wants to try amoxicillin(benadryl ordered as well) 9/10/16     Bupropion Other (See Comments)     delusions      Depakote [Valproic Acid] Other (See Comments)     Heart racing     Divalproex Sodium-Fd&C Red #40      Increase heart rate            Psychiatric Examination:   /64 (BP Location: Right arm)  Pulse 78  Temp 97.9  F (36.6  C) (Tympanic)  Resp 16  Ht 1.829 m (6')  Wt 106.3 kg (234 lb 6.4 oz)  SpO2 98%  BMI 31.79 kg/m2  Weight is 234 lbs 6.4 oz  Body mass index is 31.79 kg/(m^2).    Appearance: awake, alert, appears to have showered recently  Attitude: cooperative, friendly  Eye Contact: appropriate  Mood: Good  Affect: appropriate  Speech:  Regular volume, " rate and content  Psychomotor Behavior:  no evidence of tardive dyskinesia, dystonia, or tics  Thought Process: more linear and logical, goal oriented  Associations: no loose associations noted  Thought Content: denies any suicidal or homicidal ideation, denies hallucinations, appears mildly preoccupied at times  Insight: has shown some improvement  Judgment: fair  Oriented to:  time, person, and place  Attention Span and Concentration: limited  Recent and Remote Memory: limited  Fund of Knowledge: delayed  Muscle Strength and Tone: normal  Gait and Station: Normal         Labs:     No labs today.     Assessment and Plan:  Continue current medication treatment plan  Will discharge to [facility] on Monday with ACT team.      Estimated LOS: Discharging Monday.

## 2017-11-02 NOTE — PLAN OF CARE
"Problem: Patient Care Overview  Goal: Individualization & Mutuality  Patient will have an absence or decrease in hallucinations by time of discharge.  Patient will be medication compliant while hospitalized.  Patient will sleep 6 to 9 hours every night,.  Patient will be able to have a reality based conversation prior to discharge.  Patient will be independent with his ADL s while hospitalized and maintain hygiene.   Outcome: No Change  Pt was resting in bed for the majority of the AM but did get up around lunch time. He has been up pacing the hallway and occasionally socializing with peers. He denied criteria but has made multiple delusional statements to staff this shift and has been noted to be laughing and talking to self. Pt denied having any pain currently. He did state to writer that he has occasional joint pain but it usually \"goes away.\" Pt has remained calm and cooperative with peers. He took all his scheduled medications with out any issues. He has not showered this shift and appears disheveled. Will continue to encourage pt to shower. Will continue monitor.       "

## 2017-11-02 NOTE — PLAN OF CARE
Problem: Patient Care Overview  Goal: Individualization & Mutuality  Patient will have an absence or decrease in hallucinations by time of discharge.  Patient will be medication compliant while hospitalized.  Patient will sleep 6 to 9 hours every night,.  Patient will be able to have a reality based conversation prior to discharge.  Patient will be independent with his ADL s while hospitalized and maintain hygiene.   0301: Pt observed in bed and appears to be asleep. Eyes closed and non-labored respirations noted. Will continue to monitor.   0620: Pt observed in bed and appears to be asleep. 15 minute safety checks throughout shift. Will continue to monitor.

## 2017-11-02 NOTE — PLAN OF CARE
Face to face end of shift report received from Toño Antunez RN. Rounding completed. Patient observed in bed and appears to be asleep.     Olinda Bobo  11/1/2017  11:44 PM

## 2017-11-02 NOTE — PLAN OF CARE
Face to face end of shift report received from Olinda LENTZ RN. Rounding completed. Patient observed resting in bed.     Kana Mar  11/2/2017  8:00 AM

## 2017-11-03 PROCEDURE — 25000132 ZZH RX MED GY IP 250 OP 250 PS 637: Performed by: NURSE PRACTITIONER

## 2017-11-03 PROCEDURE — S0136 CLOZAPINE, 25 MG: HCPCS | Performed by: NURSE PRACTITIONER

## 2017-11-03 PROCEDURE — 12400011

## 2017-11-03 PROCEDURE — 99232 SBSQ HOSP IP/OBS MODERATE 35: CPT | Performed by: NURSE PRACTITIONER

## 2017-11-03 RX ADMIN — CLONIDINE HYDROCHLORIDE 0.1 MG: 0.1 TABLET ORAL at 08:07

## 2017-11-03 RX ADMIN — NICOTINE POLACRILEX 4 MG: 2 GUM, CHEWING ORAL at 17:20

## 2017-11-03 RX ADMIN — NICOTINE POLACRILEX 4 MG: 2 GUM, CHEWING ORAL at 18:49

## 2017-11-03 RX ADMIN — CLONIDINE HYDROCHLORIDE 0.1 MG: 0.1 TABLET ORAL at 20:20

## 2017-11-03 RX ADMIN — FAMOTIDINE 20 MG: 20 TABLET ORAL at 08:07

## 2017-11-03 RX ADMIN — CLOZAPINE 500 MG: 100 TABLET ORAL at 20:19

## 2017-11-03 RX ADMIN — NICOTINE POLACRILEX 4 MG: 2 GUM, CHEWING ORAL at 11:34

## 2017-11-03 RX ADMIN — CLOZAPINE 200 MG: 100 TABLET ORAL at 08:07

## 2017-11-03 RX ADMIN — VITAMIN D, TAB 1000IU (100/BT) 2000 UNITS: 25 TAB at 08:07

## 2017-11-03 RX ADMIN — BENZTROPINE MESYLATE 0.5 MG: 0.5 TABLET ORAL at 20:26

## 2017-11-03 RX ADMIN — NICOTINE POLACRILEX 4 MG: 2 GUM, CHEWING ORAL at 21:53

## 2017-11-03 RX ADMIN — NICOTINE POLACRILEX 4 MG: 2 GUM, CHEWING ORAL at 12:35

## 2017-11-03 RX ADMIN — FAMOTIDINE 20 MG: 20 TABLET ORAL at 20:19

## 2017-11-03 ASSESSMENT — ACTIVITIES OF DAILY LIVING (ADL)
GROOMING: INDEPENDENT
DRESS: INDEPENDENT;SCRUBS (BEHAVIORAL HEALTH)
GROOMING: INDEPENDENT
ORAL_HYGIENE: INDEPENDENT
LAUNDRY: UNABLE TO COMPLETE
ORAL_HYGIENE: INDEPENDENT
DRESS: INDEPENDENT

## 2017-11-03 NOTE — PLAN OF CARE
Problem: Patient Care Overview  Goal: Individualization & Mutuality  Patient will have an absence or decrease in hallucinations by time of discharge.  Patient will be medication compliant while hospitalized.  Patient will sleep 6 to 9 hours every night,.  Patient will be able to have a reality based conversation prior to discharge.  Patient will be independent with his ADL s while hospitalized and maintain hygiene.   Pt appears to be sleeping in bed with eyes closed and having regular respirations. 15 minutes and PRN safety checks completed with no noted complains. Will continue to monitor.      MIKEY FITZPATRICK  11/3/2017  5:31 AM

## 2017-11-03 NOTE — PLAN OF CARE
Face to face end of shift report received from MARINA Lundberg. Rounding completed. Patient observed resting in bed.     MIKEY FITZPATRICK  11/3/2017  1:16 AM

## 2017-11-03 NOTE — PLAN OF CARE
Face to face end of shift report received from Fadumo GOMEZ RN. Rounding completed. Patient observed resting in bed.     Kana Mar  11/3/2017  8:14 AM

## 2017-11-03 NOTE — DISCHARGE INSTRUCTIONS
Behavioral Discharge Planning and Instructions    Summary: Miles was admitted to  with increased symptoms of psychosis.     Main Diagnosis:  Paranoid schizophrenia, schizo affective disorder bipolar type, Amphetamine use disorder, mild    Major Treatments, Procedures and Findings: Stabilize with medications, connect with community programs.    Symptoms to Report: feeling more aggressive, increased confusion, losing more sleep, mood getting worse or thoughts of suicide    Lifestyle Adjustment: Take all medications as prescribed, meet with doctor/ medication provider, out patient therapist, , and [facility] worker as scheduled. Abstain from alcohol or any unprescribed drugs.    Psychiatry Follow-up:     [facility]  Med management - Marysol Glynn -    Phone: [phone]   Fax: [phone]    [facility] ACT Team  [place]  [address], [facility] [address]  [place] [address]  Phone: [phone]  Fax: [phone]    [facility]  [address] [facility] [place] [address]  Phone: [phone]  Fax: [phone]    [facility]:   [address] [address]  Phone:  [phone]   Fax - [phone]    [facility]   Care Coordinator- Ilda Hodge [phone]      Resources:   Crisis Intervention: 497.433.3463 or 241-432-7123 (TTY: 350.980.9513).  Call anytime for help.  [place]ness (www.[place].phu.org): 661.191.5076 or 978-080-9992.  Alcoholics Anonymous (www.alcoholics-anonymous.org): Check your phone book for your local chapter.  Suicide Awareness Voices of Education (SAVE) (www.save.org): 586-699-VPCG (6722)  National Suicide Prevention Line (www.mentalhealthmn.org): 070-896-NHPW (8680)  Mental Health Consumer/Survivor Network of [place] (www.mhcsn.net): 252.360.6156 or 662-525-4253  Mental Health Association of [place] (www.mentalhealth.org): 506.181.4330 or 254-269-2020    General Medication Instructions:   See your  medication sheet(s) for instructions.   Take all medicines as directed.  Make no changes unless your doctor suggests them.   Go to all your doctor visits.  Be sure to have all your required lab tests. This way, your medicines can be refilled on time.  Do not use any drugs not prescribed by your doctor.  Avoid alcohol.    [place] Area:  [facility], Crisis stabilization [facility]- [phone]  [facility] Crisis Line: 1-609.277.1120  [facility]ates For Family Peace: 577-0442  Sexual Assault Program [facility]: 116.167.7512 or 1-484.661.4442  [place] Battered Women's Program: 1-862.864.4904 Ext: 279       Calls answered Mon-Fri-8:00 am--4:30 pm    [place]:  [facility] Family Peace: 1-185.986.5003  [facility] first call for help: 4-404-697-6023  [facility]:  (194) 640-1447      [place] Area:  Warm Line: 1-912.390.8401       Calls answered Tuesday--Saturday 4:00 pm--10:00 pm  [place] Crisis Line - 801.742.1935  [place] Crisis Stabilization 240-609-2048    [place] Statewide:  [place] Crisis and Referral Services: 1-874.853.5517  National Suicide Prevention Lifeline: 3-826-534-TALK (8148)   - fjt3iplv- Text  Life  to 75600  First Call for Help: 2-1-1  [facility] Helpline- 9-684-HMCN-HELP

## 2017-11-03 NOTE — PLAN OF CARE
Problem: Patient Care Overview  Goal: Individualization & Mutuality  Patient will have an absence or decrease in hallucinations by time of discharge.  Patient will be medication compliant while hospitalized.  Patient will sleep 6 to 9 hours every night,.  Patient will be able to have a reality based conversation prior to discharge.  Patient will be independent with his ADL s while hospitalized and maintain hygiene.   Outcome: No Change  Pt has been resting in bed for the majority of this AM. Pt did get up for breakfast and eat in the lounge area. He continues to make delusional statements and appears to be responding to internal stimuli. Pt denied having any pain at this this time. He has not showered this shift and appears to be neglected grooming. Will continue to monitor.     1120- ACT team came to see pt today.

## 2017-11-03 NOTE — PLAN OF CARE
"Problem: Patient Care Overview  Goal: Individualization & Mutuality  Patient will have an absence or decrease in hallucinations by time of discharge.  Patient will be medication compliant while hospitalized.  Patient will sleep 6 to 9 hours every night,.  Patient will be able to have a reality based conversation prior to discharge.  Patient will be independent with his ADL s while hospitalized and maintain hygiene.   Outcome: Improving  Pt continues to be withdrawn from others he does spend most of the day pacing, he will pace into group and watch for a bit but does not appear to participate. He denies all criteria. Pt is med compliant, while administering medication I did notice he was having difficulty completing fine motor tasks such as folding paper jerking motion is noticed. Pt was positive for cog wheeling in bicept but not on the first arm movement it took 5 bends of the elbow to notice the muscle jerking movements. Pt states he has been like this \"old man\" for a while, he states \"when I take my meds it starts out that I feel like a kid again then I start acting like the grown up other person but then I get the old man person and my hands start doing this stuff. But its happened for so long I just learned to deal with it. Pt was administered Cogentin 0.5mg at 2026, pt was reassessed 45 minutes after and there was a significant improvement, pt states \"I feel way better\" pt is free of cog wheeling at this time. Pt does continue to have tremors in hands when attempting to do fine motor activities. Pt continues to pace the hallway.       "

## 2017-11-03 NOTE — PLAN OF CARE
Problem: General Plan of Care (Inpatient Behavioral)  Goal: Team Discussion  Team Plan:   BEHAVIORAL TEAM DISCUSSION     Participants: Supa Espinoza NP,  Yvonne Pedersen NP, Tish Boswell [facility], Luanne Ryder Discharge Planner,  Priscilla Henry RN,  Chrissy Stack RN,, Jazzy Sood OT, Nneka Sprague OT, Kana RN  Progress: Moderate: continues to have fixed delussions  Continued Stay Criteria/Rationale: Monitoring medications for effectiveness and side effects  Medical/Physical: none  Precautions:   Behavioral Orders   Procedures     Code 1 - Restrict to Unit     Routine Programming       As clinically indicated     Status 15       Every 15 minutes.     Plan: discharge to [facility] on Monday.  Rationale for change in precautions or plan: none        Problem: Patient Care Overview  Goal: Team Discussion  Team Plan:   BEHAVIORAL TEAM DISCUSSION     Participants: Supa Espinoza NP,  Yvonne Pedersen NP, Tish Boswell [facility], Luanne Ryder Discharge Planner,  Priscilla Henry RN,  Chrissy Stack RN,, Jazzy Sood OT, Nneka Sprague OT, Kana RN  Progress: Moderate: continues to have fixed delussions  Continued Stay Criteria/Rationale: Monitoring medications for effectiveness and side effects  Medical/Physical: none  Precautions:   Behavioral Orders   Procedures     Code 1 - Restrict to Unit     Routine Programming       As clinically indicated     Status 15       Every 15 minutes.     Plan: discharge to [facility] on Monday.  Rationale for change in precautions or plan: none

## 2017-11-03 NOTE — PLAN OF CARE
Problem: Patient Care Overview  Goal: Interdisciplinary Rounds/Family Conf  Writer called [facility] to discuss pt's transfer to their facility on Monday.  They are faxing over intake paperwork and requested MAR.  Stated they request prescriptions be sent to [facility] in [place].     The ACT team met with pt today and he will be picked up by the ACT team  on Monday at 11:00 and transported to [facility] in [place].     Writer faxed MAR and intake paperwork to [facility].

## 2017-11-04 PROCEDURE — 25000132 ZZH RX MED GY IP 250 OP 250 PS 637: Performed by: NURSE PRACTITIONER

## 2017-11-04 PROCEDURE — 12400011

## 2017-11-04 PROCEDURE — S0136 CLOZAPINE, 25 MG: HCPCS | Performed by: NURSE PRACTITIONER

## 2017-11-04 RX ORDER — BENZTROPINE MESYLATE 0.5 MG/1
0.5 TABLET ORAL 2 TIMES DAILY
Status: DISCONTINUED | OUTPATIENT
Start: 2017-11-04 | End: 2017-11-06 | Stop reason: HOSPADM

## 2017-11-04 RX ADMIN — NICOTINE POLACRILEX 4 MG: 2 GUM, CHEWING ORAL at 21:23

## 2017-11-04 RX ADMIN — CLONIDINE HYDROCHLORIDE 0.1 MG: 0.1 TABLET ORAL at 08:15

## 2017-11-04 RX ADMIN — CLOZAPINE 200 MG: 100 TABLET ORAL at 08:15

## 2017-11-04 RX ADMIN — FAMOTIDINE 20 MG: 20 TABLET ORAL at 20:13

## 2017-11-04 RX ADMIN — NICOTINE POLACRILEX 4 MG: 2 GUM, CHEWING ORAL at 12:50

## 2017-11-04 RX ADMIN — CLONIDINE HYDROCHLORIDE 0.1 MG: 0.1 TABLET ORAL at 20:09

## 2017-11-04 RX ADMIN — NICOTINE POLACRILEX 4 MG: 2 GUM, CHEWING ORAL at 17:51

## 2017-11-04 RX ADMIN — NICOTINE POLACRILEX 4 MG: 2 GUM, CHEWING ORAL at 19:35

## 2017-11-04 RX ADMIN — NICOTINE POLACRILEX 4 MG: 2 GUM, CHEWING ORAL at 15:55

## 2017-11-04 RX ADMIN — BENZTROPINE MESYLATE 0.5 MG: 0.5 TABLET ORAL at 20:09

## 2017-11-04 RX ADMIN — VITAMIN D, TAB 1000IU (100/BT) 2000 UNITS: 25 TAB at 08:15

## 2017-11-04 RX ADMIN — BENZTROPINE MESYLATE 0.5 MG: 0.5 TABLET ORAL at 12:20

## 2017-11-04 RX ADMIN — CLOZAPINE 500 MG: 100 TABLET ORAL at 20:08

## 2017-11-04 RX ADMIN — FAMOTIDINE 20 MG: 20 TABLET ORAL at 08:15

## 2017-11-04 ASSESSMENT — ACTIVITIES OF DAILY LIVING (ADL)
DRESS: SCRUBS (BEHAVIORAL HEALTH);INDEPENDENT
DRESS: SCRUBS (BEHAVIORAL HEALTH);INDEPENDENT
LAUNDRY: UNABLE TO COMPLETE
GROOMING: INDEPENDENT
GROOMING: INDEPENDENT
ORAL_HYGIENE: INDEPENDENT
ORAL_HYGIENE: INDEPENDENT

## 2017-11-04 NOTE — PLAN OF CARE
Face to face end of shift report received from MARINA Topete. Rounding completed. Patient observed in [facility] area. Requested and received zhao carpio.     Lisa Dia  11/4/2017  4:33 PM

## 2017-11-04 NOTE — PLAN OF CARE
Face to face end of shift report received from MARINA Recio. Rounding completed. Patient observed resting in bed.     MIKEY FITZPATRICK  11/3/2017  11:53 PM

## 2017-11-04 NOTE — PLAN OF CARE
Problem: Patient Care Overview  Goal: Individualization & Mutuality  Patient will have an absence or decrease in hallucinations by time of discharge.  Patient will be medication compliant while hospitalized.  Patient will sleep 6 to 9 hours every night,.  Patient will be able to have a reality based conversation prior to discharge.  Patient will be independent with his ADL s while hospitalized and maintain hygiene.   Outcome: No Change  Patient denies SI, HI, hallucinations, pain, depression, anxiety.  He appears to be responding to internal stimuli and laughs to himself at times.  He is disheveled, untidy, and has flat affect.  He is calm and cooperative with medications.  Patient states he has BU hand tremors at times that have decreased in severity since yesterday.    Problem: Mental State/Mood Impairment (Psychotic Signs/Symptoms) (Adult)  Goal: Improved Mental State/Mood (Psychotic Signs/Symptoms)  Outcome: No Change  Patient appears to be responding to internal stimuli although he denies hallucinations.

## 2017-11-04 NOTE — PLAN OF CARE
Problem: General Plan of Care (Inpatient Behavioral)  Goal: Team Discussion  Team Plan:   BEHAVIORAL TEAM DISCUSSION     Participants:  Progress:  Continued Stay Criteria/Rationale:   Medical/Physical:   Precautions:   Behavioral Orders   Procedures     Code 1 - Restrict to Unit     Routine Programming       As clinically indicated     Status 15       Every 15 minutes.     Plan:   Rationale for change in precautions or plan:         Problem: Patient Care Overview  Goal: Individualization & Mutuality  Patient will have an absence or decrease in hallucinations by time of discharge.  Patient will be medication compliant while hospitalized.  Patient will sleep 6 to 9 hours every night,.  Patient will be able to have a reality based conversation prior to discharge.  Patient will be independent with his ADL s while hospitalized and maintain hygiene.   Pt appears to be sleeping in bed with eyes closed and having regular respirations and position changes. 15 minutes and PRN safety checks completed with no noted complains. Will continue to monitor.      MIKEY FITZPATRICK  11/4/2017  1:43 AM  Goal: Team Discussion  Team Plan:   BEHAVIORAL TEAM DISCUSSION     Participants:  Progress:  Continued Stay Criteria/Rationale:   Medical/Physical:   Precautions:   Behavioral Orders   Procedures     Code 1 - Restrict to Unit     Routine Programming       As clinically indicated     Status 15       Every 15 minutes.     Plan:   Rationale for change in precautions or plan:         Problem: Mental State/Mood Impairment (Psychotic Signs/Symptoms) (Adult)  Goal: Improved Mental State/Mood (Psychotic Signs/Symptoms)  Pt appears to be sleeping in bed with eyes closed and having regular respirations and position changes. 15 minutes and PRN safety checks completed with no noted complains. Will continue to monitor.      MIKEY FITZPATRICK  11/4/2017  1:44 AM

## 2017-11-04 NOTE — PROGRESS NOTES
"[facility]  Psychiatric Progress Note    Subjective     ACT team was here today. Apparently Miles indicated that he did not wish to discharge to [facility]. His mother is also in disagreement with this plan. ACT then attempted to \"hold off\" on discharge secondary to the desire to have a \"person centered\" plan. After speaking with both Temitope and Mika from ACT, regarding Miles's history, his mother's detriment to his care secondary to her discontinuation of medications repeatedly resulting in relapse and repeated hospitalizations, and Miles's inability to participate in making a reasonable and safe decision regarding discharge, it was decided that the discharge to [facility] with the ACT team in place was in his best interest. Miles was disappointment by this, but again indicated, \"it is what it is.\" Miles does not yet appear to be at his previous baseline; however, as he has been improving slowly, there is hope that he may recover to his previous level of functioning. It should, however, be considered that secondary to multiple relapses, he may now have a new baseline.     Continues to pace on unit. Behavior remains reasonable. He is pleasant but still appears preoccupied at times. Will carry on a lucid conversation for some time, but eventually he will still derail and become illogical. ACT to  Monday at 11am to transport to [facility] in [place], [place].     10 point ROS negative         DIagnoses:      Paranoid Schizophrenia w/ capgras delusions    Attestation:  Patient has been seen and evaluated by me, Supa Espinoza NP          Interim History:   The patient's care was discussed with the treatment team and chart notes were reviewed.          Medications:       benztropine  0.5 mg Oral BID     cloZAPine  500 mg Oral At Bedtime     cloNIDine  0.1 mg Oral BID     cloZAPine  200 mg Oral Daily     cholecalciferol  2,000 Units Oral Daily     famotidine  20 mg Oral BID     clotrimazole, " hydrOXYzine, acetaminophen, alum & mag hydroxide-simethicone, magnesium hydroxide, traZODone, OLANZapine **OR** OLANZapine, nicotine polacrilex          Allergies:     Allergies   Allergen Reactions     Pcn [Bicillin C-R,] Rash     Provider wants to try amoxicillin(benadryl ordered as well) 9/10/16     Bupropion Other (See Comments)     delusions      Depakote [Valproic Acid] Other (See Comments)     Heart racing     Divalproex Sodium-Fd&C Red #40      Increase heart rate            Psychiatric Examination:   /55  Pulse 92  Temp 97.1  F (36.2  C) (Tympanic)  Resp 18  Ht 1.829 m (6')  Wt 106.3 kg (234 lb 6.4 oz)  SpO2 97%  BMI 31.79 kg/m2  Weight is 234 lbs 6.4 oz  Body mass index is 31.79 kg/(m^2).    Appearance: awake, alert, upset  Attitude: pleasant but disappointed  Eye Contact: appropriate  Mood: again, disappointed  Affect: appropriate  Speech:  Regular volume, rate and content  Psychomotor Behavior:  no evidence of tardive dyskinesia, dystonia, or tics  Thought Process: more linear and logical, goal oriented  Associations: no loose associations noted  Thought Content: denies any suicidal or homicidal ideation, denies hallucinations, appears mildly preoccupied at times  Insight: has shown some improvement  Judgment: fair  Oriented to:  time, person, and place  Attention Span and Concentration: limited  Recent and Remote Memory: limited  Fund of Knowledge: delayed  Muscle Strength and Tone: normal  Gait and Station: Normal         Labs:     No labs today.     Assessment and Plan:  Continue current medication treatment plan  Will discharge to [place] on Monday with ACT team.  at 11am.      Estimated LOS: Discharging Monday.

## 2017-11-04 NOTE — PLAN OF CARE
"Problem: Patient Care Overview  Goal: Individualization & Mutuality  Patient will have an absence or decrease in hallucinations by time of discharge.  Patient will be medication compliant while hospitalized.  Patient will sleep 6 to 9 hours every night,.  Patient will be able to have a reality based conversation prior to discharge.  Patient will be independent with his ADL s while hospitalized and maintain hygiene.   Outcome: No Change  Pt denies SI, HI, hallucinations. Continues to appear responding to internal stimuli. Does state that he has had anxiety and depression \"for a long time now.\" He reports that he was only supposed to be here for med adjustments. Reports that he was allowed to go home right away during previous hospitalizations. He states that a long time ago he was given a med and it \"screwed me all up and ever since then I've had anxiety and depression.\" States that he feels overwhelmed about going to a new place on Monday. Verbalizes that he \"might have to find someone to live in my house I built so I can pay some bills while I'm at that place ([facility])\" Also states \"They might let me just get my own apartment and go right there rather than that other place.\"  Affect is blunted and flat. Appears disheveled and untidy. Walks in and out of groups. Denies any trouble sleeping. Continues to pace the unit.     2008-  Pt took scheduled HS meds with no issue. He does ask if he is getting cogentin, reassured that he is. Pt states that the shaking \"starts in my nerves and goes into my arms. I can feel it in my nerves.\" Very minimal tremor noted to bilateral upper extremities. Pt remains pleasant and cooperative and chats with this writer for several minutes. States that he is frustrated with being on commitment until April 2018. States \"I have things to do, you know?\"     "

## 2017-11-05 LAB
BASOPHILS # BLD AUTO: 0.1 10E9/L (ref 0–0.2)
BASOPHILS NFR BLD AUTO: 0.8 %
DIFFERENTIAL METHOD BLD: NORMAL
EOSINOPHIL # BLD AUTO: 0.1 10E9/L (ref 0–0.7)
EOSINOPHIL NFR BLD AUTO: 1.3 %
ERYTHROCYTE [DISTWIDTH] IN BLOOD BY AUTOMATED COUNT: 13.2 % (ref 10–15)
HCT VFR BLD AUTO: 44.1 % (ref 40–53)
HGB BLD-MCNC: 15.1 G/DL (ref 13.3–17.7)
IMM GRANULOCYTES # BLD: 0.1 10E9/L (ref 0–0.4)
IMM GRANULOCYTES NFR BLD: 0.7 %
LYMPHOCYTES # BLD AUTO: 2 10E9/L (ref 0.8–5.3)
LYMPHOCYTES NFR BLD AUTO: 25.9 %
MCH RBC QN AUTO: 29.2 PG (ref 26.5–33)
MCHC RBC AUTO-ENTMCNC: 34.2 G/DL (ref 31.5–36.5)
MCV RBC AUTO: 85 FL (ref 78–100)
MONOCYTES # BLD AUTO: 0.9 10E9/L (ref 0–1.3)
MONOCYTES NFR BLD AUTO: 11.6 %
NEUTROPHILS # BLD AUTO: 4.6 10E9/L (ref 1.6–8.3)
NEUTROPHILS NFR BLD AUTO: 59.7 %
NRBC # BLD AUTO: 0 10*3/UL
NRBC BLD AUTO-RTO: 0 /100
PLATELET # BLD AUTO: 309 10E9/L (ref 150–450)
RBC # BLD AUTO: 5.18 10E12/L (ref 4.4–5.9)
WBC # BLD AUTO: 7.7 10E9/L (ref 4–11)

## 2017-11-05 PROCEDURE — 99239 HOSP IP/OBS DSCHRG MGMT >30: CPT | Performed by: NURSE PRACTITIONER

## 2017-11-05 PROCEDURE — S0136 CLOZAPINE, 25 MG: HCPCS | Performed by: NURSE PRACTITIONER

## 2017-11-05 PROCEDURE — 25000132 ZZH RX MED GY IP 250 OP 250 PS 637: Performed by: NURSE PRACTITIONER

## 2017-11-05 PROCEDURE — 85025 COMPLETE CBC W/AUTO DIFF WBC: CPT | Performed by: NURSE PRACTITIONER

## 2017-11-05 PROCEDURE — 12400011

## 2017-11-05 PROCEDURE — 36415 COLL VENOUS BLD VENIPUNCTURE: CPT | Performed by: NURSE PRACTITIONER

## 2017-11-05 RX ORDER — CLONIDINE HYDROCHLORIDE 0.1 MG/1
0.1 TABLET ORAL 2 TIMES DAILY
Qty: 60 TABLET | Refills: 0 | Status: SHIPPED | OUTPATIENT
Start: 2017-11-05 | End: 2017-12-05

## 2017-11-05 RX ORDER — CLOZAPINE 100 MG/1
500 TABLET ORAL AT BEDTIME
Qty: 150 TABLET | Refills: 0 | Status: SHIPPED | OUTPATIENT
Start: 2017-11-05 | End: 2017-12-08

## 2017-11-05 RX ORDER — BENZTROPINE MESYLATE 0.5 MG/1
0.5 TABLET ORAL 2 TIMES DAILY
Qty: 60 TABLET | Refills: 0 | Status: SHIPPED | OUTPATIENT
Start: 2017-11-05 | End: 2017-12-05

## 2017-11-05 RX ORDER — CLOZAPINE 200 MG/1
200 TABLET ORAL DAILY
Qty: 30 TABLET | Refills: 0 | Status: SHIPPED | OUTPATIENT
Start: 2017-11-05 | End: 2017-12-04

## 2017-11-05 RX ADMIN — FAMOTIDINE 20 MG: 20 TABLET ORAL at 08:45

## 2017-11-05 RX ADMIN — NICOTINE POLACRILEX 4 MG: 2 GUM, CHEWING ORAL at 19:03

## 2017-11-05 RX ADMIN — FAMOTIDINE 20 MG: 20 TABLET ORAL at 20:25

## 2017-11-05 RX ADMIN — NICOTINE POLACRILEX 2 MG: 2 GUM, CHEWING ORAL at 20:27

## 2017-11-05 RX ADMIN — BENZTROPINE MESYLATE 0.5 MG: 0.5 TABLET ORAL at 08:45

## 2017-11-05 RX ADMIN — CLOZAPINE 500 MG: 100 TABLET ORAL at 20:25

## 2017-11-05 RX ADMIN — VITAMIN D, TAB 1000IU (100/BT) 2000 UNITS: 25 TAB at 08:45

## 2017-11-05 RX ADMIN — CLONIDINE HYDROCHLORIDE 0.1 MG: 0.1 TABLET ORAL at 08:45

## 2017-11-05 RX ADMIN — BENZTROPINE MESYLATE 0.5 MG: 0.5 TABLET ORAL at 20:25

## 2017-11-05 RX ADMIN — NICOTINE POLACRILEX 4 MG: 2 GUM, CHEWING ORAL at 22:14

## 2017-11-05 RX ADMIN — CLOZAPINE 200 MG: 100 TABLET ORAL at 08:45

## 2017-11-05 RX ADMIN — CLONIDINE HYDROCHLORIDE 0.1 MG: 0.1 TABLET ORAL at 20:25

## 2017-11-05 RX ADMIN — NICOTINE POLACRILEX 4 MG: 2 GUM, CHEWING ORAL at 08:45

## 2017-11-05 RX ADMIN — NICOTINE POLACRILEX 4 MG: 2 GUM, CHEWING ORAL at 14:04

## 2017-11-05 RX ADMIN — NICOTINE POLACRILEX 4 MG: 2 GUM, CHEWING ORAL at 16:00

## 2017-11-05 RX ADMIN — NICOTINE POLACRILEX 4 MG: 2 GUM, CHEWING ORAL at 12:33

## 2017-11-05 ASSESSMENT — ACTIVITIES OF DAILY LIVING (ADL)
LAUNDRY: UNABLE TO COMPLETE
GROOMING: INDEPENDENT
DRESS: SCRUBS (BEHAVIORAL HEALTH);INDEPENDENT
GROOMING: INDEPENDENT
DRESS: SCRUBS (BEHAVIORAL HEALTH);INDEPENDENT
ORAL_HYGIENE: INDEPENDENT
ORAL_HYGIENE: INDEPENDENT

## 2017-11-05 NOTE — PLAN OF CARE
Problem: Patient Care Overview  Goal: Individualization & Mutuality  Patient will have an absence or decrease in hallucinations by time of discharge.  Patient will be medication compliant while hospitalized.  Patient will sleep 6 to 9 hours every night,.  Patient will be able to have a reality based conversation prior to discharge.  Patient will be independent with his ADL s while hospitalized and maintain hygiene.   Patient denies SI, HI, hallucinations, pain, depression, anxiety.  He appears anxious and restless.  He appears to be responding to internal stimuli and laughs to himself at times.  He was restless, walked away, and returned multiple times when this writer was getting his medications prepared for administration.  He did take all scheduled medications.  He states he continues to have hand tremors that are visible.  He states it feels like electricity. He is withdrawn but does engage in minimal conversation when approached by staff.    Problem: Mental State/Mood Impairment (Psychotic Signs/Symptoms) (Adult)  Goal: Improved Mental State/Mood (Psychotic Signs/Symptoms)  Outcome: No Change  Patient's mood is calm this shift.

## 2017-11-05 NOTE — DISCHARGE SUMMARY
Psychiatric Discharge Summary    Miles Montez MRN# [id number]   Age: 31 year old YOB: 1985     Date of Admission:  8/20/2017  Date of Discharge:  11/6/2017 11:09 AM  Admitting Physician:  Keanu Stratton MD  Discharge Physician:  Supa Espinoza NP          Event Leading to Hospitalization and Hospital Stay   Miles Montez is a 31 year old single  male who was brought to the [facility] ED by his mother for psychotic symptoms and decompensation after being off of medications. He has not been taking medications for about 2 weeks. He presents to the ED as paranoid and having auditory and visual hallucinations. He has not been sleeping, eating, or bathing regularly for the last 2-3 weeks. He reports that he sees himself dead and being killed and his family being murdered. Upon admission to the unit he was described as disheveled and unkempt with strong body odor. He appeared to be preoccupied and would stare off into space for periods of time. He would laugh inappropriately in conversation.        Admitted to unit voluntarily. Remained voluntarily for a lengthy period of time; however, he was very slow to respond to re-initiation of Clozapine, so commitment and savage was filed, with thoughts that the medication may have to be changed. He did eventually begin to respond and this medication was continued. He was referred to the ACT team and the decision to start with assisted living was made. The hope is that he will be able to eventually transition to independent living at some point ;however, he just isn't there at this point. Secondary to his mother's contribution to his relapse by discontinuing his medications, it was decided that it was best if he did not discharge back to her home. Discharge to [facility] in [place], [place].     At time of discharge, there is no evidence that patient is in immediate danger of self or others.        DIagnoses:      Paranoid Schizophrenia w/  capgras delusions          Labs:     Results for orders placed or performed during the hospital encounter of 08/20/17   XR Abdomen 2 Views    Narrative    ABDOMEN SUPINE AND UPRIGHT VIEWS    FINDINGS:  There is no free intraperitoneal air.  Gas and stool are  seen within the colon to the level of the rectosigmoid.  There are no  dilated gas-filled small bowel loops.    No mass or suspicious calcification is seen.  There is no significant  spine abnormality.  There is some  narrowing of hip joint spaces  bilaterally, especially superolaterally.    IMPRESSION:  BOWEL GAS PATTERN IS WITHIN NORMAL LIMITS.  Exam Date: Aug 20, 2017 05:23:08 PM  Author: JUAN STEVENS  This report is final and signed     UA reflex to Microscopic and Culture   Result Value Ref Range    Color Urine Yellow     Appearance Urine Slightly Cloudy     Glucose Urine Negative NEG^Negative mg/dL    Bilirubin Urine Negative NEG^Negative    Ketones Urine 80 (A) NEG^Negative mg/dL    Specific Gravity Urine 1.026 1.003 - 1.035    Blood Urine Negative NEG^Negative    pH Urine 7.0 4.7 - 8.0 pH    Protein Albumin Urine 30 (A) NEG^Negative mg/dL    Urobilinogen mg/dL 2.0 0.0 - 2.0 mg/dL    Nitrite Urine Negative NEG^Negative    Leukocyte Esterase Urine Negative NEG^Negative    Source Midstream Urine     RBC Urine 0 0 - 2 /HPF    WBC Urine 0 0 - 2 /HPF    Bacteria Urine None (A) NEG^Negative /HPF    Mucous Urine Present (A) NEG^Negative /LPF   Drug Screen Urine (Range)   Result Value Ref Range    Amphetamine Qual Urine Negative NEG^Negative    Barbiturates Qual Urine Negative NEG^Negative    Benzodiazepine Qual Urine Negative NEG^Negative    Cannabinoids Qual Urine Positive (A) NEG^Negative    Cocaine Qual Urine Negative NEG^Negative    Opiates Qualitative Urine Negative NEG^Negative    Methadone Qual Urine Negative NEG^Negative    PCP Qual Urine Negative NEG^Negative   Acetaminophen level   Result Value Ref Range    Acetaminophen Level <2 mg/L   Alcohol  ethyl   Result Value Ref Range    Ethanol g/dL <0.01 0.01 g/dL   CBC with platelets differential   Result Value Ref Range    WBC 13.4 (H) 4.0 - 11.0 10e9/L    RBC Count 5.22 4.4 - 5.9 10e12/L    Hemoglobin 15.8 13.3 - 17.7 g/dL    Hematocrit 44.8 40.0 - 53.0 %    MCV 86 78 - 100 fl    MCH 30.3 26.5 - 33.0 pg    MCHC 35.3 31.5 - 36.5 g/dL    RDW 13.0 10.0 - 15.0 %    Platelet Count 377 150 - 450 10e9/L    Diff Method Automated Method     % Neutrophils 74.7 %    % Lymphocytes 15.1 %    % Monocytes 8.4 %    % Eosinophils 0.7 %    % Basophils 0.7 %    % Immature Granulocytes 0.4 %    Nucleated RBCs 0 0 /100    Absolute Neutrophil 10.0 (H) 1.6 - 8.3 10e9/L    Absolute Lymphocytes 2.0 0.8 - 5.3 10e9/L    Absolute Monocytes 1.1 0.0 - 1.3 10e9/L    Absolute Eosinophils 0.1 0.0 - 0.7 10e9/L    Absolute Basophils 0.1 0.0 - 0.2 10e9/L    Abs Immature Granulocytes 0.1 0 - 0.4 10e9/L    Absolute Nucleated RBC 0.0    Comprehensive metabolic panel   Result Value Ref Range    Sodium 138 133 - 144 mmol/L    Potassium 3.7 3.4 - 5.3 mmol/L    Chloride 107 94 - 109 mmol/L    Carbon Dioxide 21 20 - 32 mmol/L    Anion Gap 10 3 - 14 mmol/L    Glucose 94 70 - 99 mg/dL    Urea Nitrogen 12 7 - 30 mg/dL    Creatinine 0.95 0.66 - 1.25 mg/dL    GFR Estimate >90 >60 mL/min/1.7m2    GFR Estimate If Black >90 >60 mL/min/1.7m2    Calcium 9.4 8.5 - 10.1 mg/dL    Bilirubin Total 0.8 0.2 - 1.3 mg/dL    Albumin 4.3 3.4 - 5.0 g/dL    Protein Total 8.1 6.8 - 8.8 g/dL    Alkaline Phosphatase 61 40 - 150 U/L    ALT 95 (H) 0 - 70 U/L    AST 41 0 - 45 U/L   CRP inflammation   Result Value Ref Range    CRP Inflammation <2.9 0.0 - 8.0 mg/L   Salicylate level   Result Value Ref Range    Salicylate Level 4 mg/dL   TSH with free T4 reflex   Result Value Ref Range    TSH 1.38 0.40 - 4.00 mU/L   CBC with platelets differential   Result Value Ref Range    WBC 6.2 4.0 - 11.0 10e9/L    RBC Count 5.08 4.4 - 5.9 10e12/L    Hemoglobin 15.4 13.3 - 17.7 g/dL    Hematocrit  44.6 40.0 - 53.0 %    MCV 88 78 - 100 fl    MCH 30.3 26.5 - 33.0 pg    MCHC 34.5 31.5 - 36.5 g/dL    RDW 13.2 10.0 - 15.0 %    Platelet Count 317 150 - 450 10e9/L    Diff Method Automated Method     % Neutrophils 41.7 %    % Lymphocytes 26.9 %    % Monocytes 24.3 %    % Eosinophils 5.3 %    % Basophils 1.5 %    % Immature Granulocytes 0.3 %    Nucleated RBCs 0 0 /100    Absolute Neutrophil 2.6 1.6 - 8.3 10e9/L    Absolute Lymphocytes 1.7 0.8 - 5.3 10e9/L    Absolute Monocytes 1.5 (H) 0.0 - 1.3 10e9/L    Absolute Eosinophils 0.3 0.0 - 0.7 10e9/L    Absolute Basophils 0.1 0.0 - 0.2 10e9/L    Abs Immature Granulocytes 0.0 0 - 0.4 10e9/L    Absolute Nucleated RBC 0.0    CBC with platelets differential   Result Value Ref Range    WBC 12.2 (H) 4.0 - 11.0 10e9/L    RBC Count 5.01 4.4 - 5.9 10e12/L    Hemoglobin 15.3 13.3 - 17.7 g/dL    Hematocrit 44.1 40.0 - 53.0 %    MCV 88 78 - 100 fl    MCH 30.5 26.5 - 33.0 pg    MCHC 34.7 31.5 - 36.5 g/dL    RDW 13.2 10.0 - 15.0 %    Platelet Count 335 150 - 450 10e9/L    Diff Method Automated Method     % Neutrophils 70.6 %    % Lymphocytes 15.9 %    % Monocytes 10.7 %    % Eosinophils 1.9 %    % Basophils 0.5 %    % Immature Granulocytes 0.4 %    Nucleated RBCs 0 0 /100    Absolute Neutrophil 8.6 (H) 1.6 - 8.3 10e9/L    Absolute Lymphocytes 1.9 0.8 - 5.3 10e9/L    Absolute Monocytes 1.3 0.0 - 1.3 10e9/L    Absolute Eosinophils 0.2 0.0 - 0.7 10e9/L    Absolute Basophils 0.1 0.0 - 0.2 10e9/L    Abs Immature Granulocytes 0.1 0 - 0.4 10e9/L    Absolute Nucleated RBC 0.0    Clozapine and Metabolites Quant   Result Value Ref Range    Clozapine Quant 338 ng/mL    Norclozapine Quant 112 ng/mL    Clozapine-N-Oxide Quant Not Detected ng/mL    Clozapine and Metabolites Total 450 <=1500 ng/mL   Clozapine and Metabolites Quant   Result Value Ref Range    Clozapine Quant 310 ng/mL    Norclozapine Quant 137 ng/mL    Clozapine-N-Oxide Quant Not Detected ng/mL    Clozapine and Metabolites Total 447  <=1500 ng/mL   Clozapine and Metabolites Quant   Result Value Ref Range    Clozapine Quant 472 ng/mL    Norclozapine Quant 153 ng/mL    Clozapine-N-Oxide Quant 131 ng/mL    Clozapine and Metabolites Total 756 <=1500 ng/mL   CBC with platelets differential   Result Value Ref Range    WBC 8.1 4.0 - 11.0 10e9/L    RBC Count 5.17 4.4 - 5.9 10e12/L    Hemoglobin 15.4 13.3 - 17.7 g/dL    Hematocrit 46.4 40.0 - 53.0 %    MCV 90 78 - 100 fl    MCH 29.8 26.5 - 33.0 pg    MCHC 33.2 31.5 - 36.5 g/dL    RDW 12.7 10.0 - 15.0 %    Platelet Count 362 150 - 450 10e9/L    Diff Method Automated Method     % Neutrophils 53.2 %    % Lymphocytes 31.7 %    % Monocytes 11.2 %    % Eosinophils 2.3 %    % Basophils 0.9 %    % Immature Granulocytes 0.7 %    Nucleated RBCs 0 0 /100    Absolute Neutrophil 4.3 1.6 - 8.3 10e9/L    Absolute Lymphocytes 2.6 0.8 - 5.3 10e9/L    Absolute Monocytes 0.9 0.0 - 1.3 10e9/L    Absolute Eosinophils 0.2 0.0 - 0.7 10e9/L    Absolute Basophils 0.1 0.0 - 0.2 10e9/L    Abs Immature Granulocytes 0.1 0 - 0.4 10e9/L    Absolute Nucleated RBC 0.0    CBC with platelets differential   Result Value Ref Range    WBC 10.8 4.0 - 11.0 10e9/L    RBC Count 5.42 4.4 - 5.9 10e12/L    Hemoglobin 15.9 13.3 - 17.7 g/dL    Hematocrit 46.9 40.0 - 53.0 %    MCV 87 78 - 100 fl    MCH 29.3 26.5 - 33.0 pg    MCHC 33.9 31.5 - 36.5 g/dL    RDW 12.6 10.0 - 15.0 %    Platelet Count 370 150 - 450 10e9/L    Diff Method Automated Method     % Neutrophils 64.8 %    % Lymphocytes 22.8 %    % Monocytes 10.3 %    % Eosinophils 1.1 %    % Basophils 0.5 %    % Immature Granulocytes 0.5 %    Nucleated RBCs 0 0 /100    Absolute Neutrophil 7.0 1.6 - 8.3 10e9/L    Absolute Lymphocytes 2.5 0.8 - 5.3 10e9/L    Absolute Monocytes 1.1 0.0 - 1.3 10e9/L    Absolute Eosinophils 0.1 0.0 - 0.7 10e9/L    Absolute Basophils 0.1 0.0 - 0.2 10e9/L    Abs Immature Granulocytes 0.1 0 - 0.4 10e9/L    Absolute Nucleated RBC 0.0      *Note: Due to a large number of  results and/or encounters for the requested time period, some results have not been displayed. A complete set of results can be found in Results Review.          Discharge Medications:     Current Discharge Medication List      START taking these medications    Details   cloNIDine (CATAPRES) 0.1 MG tablet Take 1 tablet (0.1 mg) by mouth 2 times daily  Qty: 60 tablet, Refills: 0    Associated Diagnoses: Schizophrenia, paranoid, subchronic with acute exacerbation (H)      benztropine (COGENTIN) 0.5 MG tablet Take 1 tablet (0.5 mg) by mouth 2 times daily  Qty: 60 tablet, Refills: 0    Associated Diagnoses: Schizophrenia, paranoid, subchronic with acute exacerbation (H)      !! cloZAPine (CLOZARIL) 200 MG tablet Take 1 tablet (200 mg) by mouth daily  Qty: 30 tablet, Refills: 0    Associated Diagnoses: Schizophrenia, paranoid, subchronic with acute exacerbation (H)      !! cloZAPine (CLOZARIL) 100 MG tablet Take 5 tablets (500 mg) by mouth At Bedtime  Qty: 150 tablet, Refills: 0    Associated Diagnoses: Schizophrenia, paranoid, subchronic with acute exacerbation (H)      cholecalciferol 2000 UNITS tablet Take 2,000 Units by mouth daily  Qty: 30 tablet, Refills: 0    Associated Diagnoses: Vitamin D deficiency       !! - Potential duplicate medications found. Please discuss with provider.      CONTINUE these medications which have NOT CHANGED    Details   famotidine (PEPCID) 20 MG tablet TAKE 1 TABLET BY MOUTH TWICE A DAY  Qty: 56 tablet, Refills: 11    Comments: Maximum Refills Reached  Associated Diagnoses: Gastroesophageal reflux disease without esophagitis         STOP taking these medications       HYDROXYZINE HCL PO Comments:   Reason for Stopping:         mirtazapine (REMERON) 15 MG tablet Comments:   Reason for Stopping:         Cholecalciferol (VITAMIN D3) 2000 UNITS CAPS Comments:   Reason for Stopping:                  Psychiatric Examination:   Appearance:  awake, alert  Attitude:  somewhat cooperative  Eye  Contact:  fair  Mood:  good  Affect:  mood congruent  Speech:  clear, coherent  Psychomotor Behavior:  no evidence of tardive dyskinesia, dystonia, or tics  Thought Process:  logical, linear and goal oriented  Associations:  no loose associations  Thought Content:  no evidence of suicidal ideation or homicidal ideation and patient appears to be responding to internal stimuli  Insight:  limited  Judgment:  limited  Oriented to:  time, person, and place  Attention Span and Concentration:  fair  Recent and Remote Memory:  fair  Fund of Knowledge: low-normal  Muscle Strength and Tone: normal  Gait and Station: Normal         Discharge Plan:     Psychiatry Follow-up:      [facility]  Med management - Marysol Maki -    Phone: [phone]   Fax: [phone]     [facility] ACT Team  Jenise Calix  [address], [address]  [place] [address]  Phone: [phone]  Fax: [phone]     [facility]  [address] [place] [address]  Phone: [phone]  Fax: [phone]     [facility]:   [address] [place] [address]  Phone:  [phone]   Fax - [phone]     [facility]   Care Coordinator- Ilda Hodge [phone]    Attestation:  The patient has been seen and evaluated by me,  Supa Espinoza, RIMMA         Discharge Services Provided:    40 minutes spent on discharge services, including:  Final examination of patient.  Review and discussion of Hospital stay.  Instructions for continued outpatient care/goals.  Preparation of discharge records.  Preparation of medications refills and new prescriptions.  Preparation of Applicable referral forms.

## 2017-11-05 NOTE — PLAN OF CARE
Face to face end of shift report received from Fadumo GOMEZ RN. Rounding completed. Patient observed in bed.    Yoselyn Kelly  11/5/2017  9:35 AM

## 2017-11-05 NOTE — PHARMACY
Pharmacy Clozapine Note    Date of Service: 2017  Patient's : 1985  31 year old, male    Current clozapine regimen: 200 mg qam, 500 mg qhs  Has there been a known interruption in therapy for greater than/equal to 48 hours? No    Recent ANC Value(s):  Recent Labs   Lab Test  17   0655  10/29/17   0620  10/22/17   0550  10/15/17   0600  10/08/17   0712  10/01/17   0637  17   0657   ANEU  4.6  3.8  4.1  3.6  3.9  3.2  3.8       Is the patient enrolled in the clozapine REMS program? Yes  Ordering prescriber: Kim Cardozo  Is this provider certified in the clozapine REMS program? Yes  Is the ANC within recommended limits? Yes      Plan:  1. Continue clozapine therapy at 200 mg qam & 500 mg qhs.  2. A WBC with differential will be ordered at least weekly.  ANC values will be entered into the REMS program.  3. Signs/symptoms of infection will be monitored daily.    Dianne Marinelli [facility]  Phone / Pager: [phone]

## 2017-11-05 NOTE — PLAN OF CARE
Face to face end of shift report received from MARINA Topete. Rounding completed. Patient observed in tenorioDona Dia  11/5/2017  4:23 PM

## 2017-11-05 NOTE — PLAN OF CARE
Face to face end of shift report received from MARINA Gonzalez. Rounding completed. Patient observed resting in bed.     MIKEY FITZPATRICK  11/4/2017  11:55 PM

## 2017-11-05 NOTE — PLAN OF CARE
"Problem: Patient Care Overview  Goal: Individualization & Mutuality  Patient will have an absence or decrease in hallucinations by time of discharge.  Patient will be medication compliant while hospitalized.  Patient will sleep 6 to 9 hours every night,.  Patient will be able to have a reality based conversation prior to discharge.  Patient will be independent with his ADL s while hospitalized and maintain hygiene.   Outcome: No Change  Pt denies all mental health criteria. Continues to appear responding to internal stimuli. Does c/o shaking on and off. Stating \"It's in my tendons.\" Very minimal tremor noted when pt holds his hands out. Pt is pleasant and cooperative though his affect remains blunted and flat. He appears disheveled and untidy. Paces the unit and spends time in the lounge though does not socialize with peers. Walks in and out of groups.     2025- Pt took scheduled HS meds with no issue.    2245- Pt showered, was encouraged to shave his beard but he declines to do so. States that he is nervous about DC tomorrow.   "

## 2017-11-06 VITALS
HEIGHT: 72 IN | BODY MASS INDEX: 32.43 KG/M2 | DIASTOLIC BLOOD PRESSURE: 65 MMHG | HEART RATE: 88 BPM | TEMPERATURE: 97.3 F | WEIGHT: 239.4 LBS | OXYGEN SATURATION: 96 % | SYSTOLIC BLOOD PRESSURE: 110 MMHG | RESPIRATION RATE: 16 BRPM

## 2017-11-06 PROCEDURE — 25000132 ZZH RX MED GY IP 250 OP 250 PS 637: Performed by: NURSE PRACTITIONER

## 2017-11-06 PROCEDURE — S0136 CLOZAPINE, 25 MG: HCPCS | Performed by: NURSE PRACTITIONER

## 2017-11-06 RX ADMIN — CLONIDINE HYDROCHLORIDE 0.1 MG: 0.1 TABLET ORAL at 08:06

## 2017-11-06 RX ADMIN — FAMOTIDINE 20 MG: 20 TABLET ORAL at 08:06

## 2017-11-06 RX ADMIN — CLOZAPINE 200 MG: 100 TABLET ORAL at 08:05

## 2017-11-06 RX ADMIN — VITAMIN D, TAB 1000IU (100/BT) 2000 UNITS: 25 TAB at 08:06

## 2017-11-06 RX ADMIN — BENZTROPINE MESYLATE 0.5 MG: 0.5 TABLET ORAL at 08:06

## 2017-11-06 NOTE — PLAN OF CARE
"Problem: General Plan of Care (Inpatient Behavioral)  Goal: Team Discussion  Team Plan:   BEHAVIORAL TEAM DISCUSSION     Participants:  Progress: Good  Continued Stay Criteria/Rationale:D/C today  Medical/Physical: NA  Precautions:   Behavioral Orders   Procedures     Code 1 - Restrict to Unit     Routine Programming       As clinically indicated     Status 15       Every 15 minutes.     Plan: discharge today   Rationale for change in precautions or plan: Pt discharging        Problem: Patient Care Overview  Goal: Plan of Care/Patient Progress Review  Outcome: Adequate for Discharge Date Met:  11/06/17  Patient denies having any SI or HI at this time. He is easily able to verbalized both coping skills and support system. He is pleasant and cooperative with this writers assessment. He does state that he is \"ok\" about going, he just wishes he could \"go home to take care of my stuff\". He has a flat/blunted affect at this time, does brighten when spoken to. He has no issues with appetite, bowel or bladder issues. He is encouraged to shower prior to leaving. He attends groups intermittently, as he can tolerate it.   Goal: Individualization & Mutuality  Patient will have an absence or decrease in hallucinations by time of discharge.  Patient will be medication compliant while hospitalized.  Patient will sleep 6 to 9 hours every night,.  Patient will be able to have a reality based conversation prior to discharge.  Patient will be independent with his ADL s while hospitalized and maintain hygiene.   Pt denies any hallucinations, takes medications cooperatively, and admits being able to sleep at least 6 hours. Patient encouraged to complete ADL's prior to leaving.   Goal: Team Discussion  Team Plan:   BEHAVIORAL TEAM DISCUSSION     Participants:  Progress: Good  Continued Stay Criteria/Rationale:D/C today  Medical/Physical: NA  Precautions:   Behavioral Orders   Procedures     Code 1 - Restrict to Unit     Routine " Programming       As clinically indicated     Status 15       Every 15 minutes.     Plan: discharge today   Rationale for change in precautions or plan: Pt discharging

## 2017-11-06 NOTE — PLAN OF CARE
Problem: Patient Care Overview  Goal: Individualization & Mutuality  Patient will have an absence or decrease in hallucinations by time of discharge.  Patient will be medication compliant while hospitalized.  Patient will sleep 6 to 9 hours every night,.  Patient will be able to have a reality based conversation prior to discharge.  Patient will be independent with his ADL s while hospitalized and maintain hygiene.   Pt appears to be sleeping in bed with eyes closed, having regular respirations and position changes. 15 minutes and PRN safety checks completed with no noted complains. Will continue to monitor.            Problem: Mental State/Mood Impairment (Psychotic Signs/Symptoms) (Adult)  Goal: Improved Mental State/Mood (Psychotic Signs/Symptoms)  Pt appears to be sleeping in bed with eyes closed, having regular respirations and position changes. 15 minutes and PRN safety checks completed with no noted complains. Will continue to monitor.

## 2017-11-06 NOTE — PLAN OF CARE
Problem: Discharge Planning  Goal: Discharge Planning (Adult, OB, Behavioral, Peds)  Pt is discharging at the recommendation of the treatment team. Pt is discharging to [facility] transported by the ACT team. Pt denies having any thoughts of hurting themself or anyone else. Pt denies anxiety or depression. Pt has follow up with ACT team provider. Discharge instructions, including; demographic sheet, psychiatric evaluation, discharge summary, and AVS were faxed to these next level of care providers.    Completed provisional discharge and provided pt and ACT team with a copy.

## 2017-11-06 NOTE — PLAN OF CARE
Face to face end of shift report received from MARINA Thorne. Rounding completed. [place] [facility] [place]     Toño Antunez  11/6/2017  7:43 AM

## 2017-11-06 NOTE — PLAN OF CARE
Problem: Discharge Planning  Goal: Discharge Planning (Adult, OB, Behavioral, Peds)  Discharge Note     Patient Discharged to assisted living on 11/6/2017 11:10 AM via Private Car accompanied by Mika from ACT team and Discharge Planner to the door.      Patient informed of discharge instructions in AVS. patient verbalizes understanding and denies having any questions pertaining to AVS. Patient stable at time of discharge. Patient denies SI, HI, and thoughts of self harm at time of discharge. All personal belongings returned to patient. Discharge prescriptions sent to [facility] via electronic communication. Psych evaluation, history and physical, AVS, and discharge summary faxed to next level of care- per discharge planner.      MARGA BLANCA  11/6/2017  11:10 AM

## 2017-11-06 NOTE — PLAN OF CARE
Face to face end of shift report received from MARINA Gonzalez. Rounding completed. Patient observed resting in bed.     MIKEY FITZPATRICK  11/6/2017  1:21 AM

## 2017-11-07 ENCOUNTER — TELEPHONE (OUTPATIENT)
Dept: PSYCHIATRY | Facility: OTHER | Age: 32
End: 2017-11-07

## 2017-11-07 ENCOUNTER — MEDICAL CORRESPONDENCE (OUTPATIENT)
Dept: HEALTH INFORMATION MANAGEMENT | Facility: HOSPITAL | Age: 32
End: 2017-11-07

## 2017-11-07 DIAGNOSIS — F20.0 CHRONIC PARANOID SCHIZOPHRENIA (H): Primary | ICD-10-CM

## 2017-11-07 RX ORDER — CLOZAPINE 100 MG/1
500 TABLET ORAL AT BEDTIME
Qty: 15 TABLET | Refills: 0 | Status: SHIPPED | OUTPATIENT
Start: 2017-11-07 | End: 2017-12-04

## 2017-11-07 RX ORDER — CLOZAPINE 100 MG/1
200 TABLET ORAL DAILY
Qty: 6 TABLET | Refills: 0 | Status: SHIPPED | OUTPATIENT
Start: 2017-11-07 | End: 2017-12-08

## 2017-11-07 NOTE — TELEPHONE ENCOUNTER
Received incoming msg yesterday from Malika.  She states that Miles is going to be discharged from hospital (yesterday) and will be going to [facility].  She is hoping that Miles does not loose his spot working with Dr. Maki.  Thank you.

## 2017-11-07 NOTE — TELEPHONE ENCOUNTER
Loose his spot? I don't know what she means. [place]? Where is that. I totally plan to continue working with Miles!! -- even he is away / treatment for awhile.

## 2017-11-10 NOTE — TELEPHONE ENCOUNTER
Contacted Malika.  Discussed concerns. Explained that Dr. Maki will still continue working with Miles even when he is away, in treatment for awhile.  Malika states that he was placed at [facility] in [place] and they have ACT working with Miles.  His discharge summary states that he is working with Dr. Maki.  Malika felt better about this.  Thank you, YAMIL

## 2017-11-13 ENCOUNTER — TRANSFERRED RECORDS (OUTPATIENT)
Dept: HEALTH INFORMATION MANAGEMENT | Facility: HOSPITAL | Age: 32
End: 2017-11-13

## 2017-11-21 ENCOUNTER — TRANSFERRED RECORDS (OUTPATIENT)
Dept: HEALTH INFORMATION MANAGEMENT | Facility: HOSPITAL | Age: 32
End: 2017-11-21

## 2017-11-21 ENCOUNTER — OFFICE VISIT (OUTPATIENT)
Dept: PSYCHIATRY | Facility: OTHER | Age: 32
End: 2017-11-21
Attending: PSYCHIATRY & NEUROLOGY
Payer: COMMERCIAL

## 2017-11-21 VITALS
WEIGHT: 250 LBS | SYSTOLIC BLOOD PRESSURE: 120 MMHG | TEMPERATURE: 98.2 F | BODY MASS INDEX: 33.91 KG/M2 | DIASTOLIC BLOOD PRESSURE: 82 MMHG | HEART RATE: 105 BPM

## 2017-11-21 DIAGNOSIS — F20.0 PARANOID SCHIZOPHRENIA (H): Primary | ICD-10-CM

## 2017-11-21 PROCEDURE — 99212 OFFICE O/P EST SF 10 MIN: CPT

## 2017-11-21 PROCEDURE — 99214 OFFICE O/P EST MOD 30 MIN: CPT | Performed by: PSYCHIATRY & NEUROLOGY

## 2017-11-21 ASSESSMENT — PAIN SCALES - GENERAL: PAINLEVEL: MILD PAIN (2)
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
## 2017-11-21 NOTE — NURSING NOTE
Chief Complaint   Patient presents with     [facility] F/U     Mental health.  Shakes and tremors       Initial /82 (BP Location: Left arm, Patient Position: Sitting, Cuff Size: Adult Large)  Pulse 105  Temp 98.2  F (36.8  C) (Tympanic)  Wt 250 lb (113.4 kg)  BMI 33.91 kg/m2 Estimated body mass index is 33.91 kg/(m^2) as calculated from the following:    Height as of 8/20/17: 6' (1.829 m).    Weight as of this encounter: 250 lb (113.4 kg).  Medication Reconciliation: complete     MARIKA OQUENDO

## 2017-11-21 NOTE — MR AVS SNAPSHOT
"              After Visit Summary   11/21/2017    Miles Montez    MRN: [id number]           Patient Information     Date Of Birth          1985        Visit Information        Provider Department      11/21/2017 10:20 AM [facility]        Today's Diagnoses     Paranoid schizophrenia (H)    -  1       Follow-ups after your visit        Your next 10 appointments already scheduled     Dec 06, 2017 11:00 AM CST   (Arrive by 10:45 AM)   Return Visit with Marysol Maki MD   [facility] [place] ([facility] [place] )    [address] [place] [address]  [place] [address]   [phone]              Who to contact     If you have questions or need follow up information about today's clinic visit or your schedule please contact [facility] directly at [phone].  Normal or non-critical lab and imaging results will be communicated to you by MyChart, letter or phone within 4 business days after the clinic has received the results. If you do not hear from us within 7 days, please contact the clinic through MyChart or phone. If you have a critical or abnormal lab result, we will notify you by phone as soon as possible.  Submit refill requests through [facility] or call your pharmacy and they will forward the refill request to us. Please allow 3 business days for your refill to be completed.          Additional Information About Your Visit        MyChart Information     [facility] lets you send messages to your doctor, view your test results, renew your prescriptions, schedule appointments and more. To sign up, go to [email or web address] . Click on \"Log in\" on the left side of the screen, which will take you to the Welcome page. Then click on \"Sign up Now\" on the right side of the page.     You will be asked to enter the access code listed below, as well as some personal information. Please follow the directions to create your username and password.     Your " access code is: [email or web address]  Expires: 2018  7:38 AM     Your access code will  in 90 days. If you need help or a new code, please call your [place] clinic or [phone].        Care EveryWhere ID     This is your Care EveryWhere ID. This could be used by other organizations to access your [place] medical records  [id number]        Your Vitals Were     Pulse Temperature BMI (Body Mass Index)             105 98.2  F (36.8  C) (Tympanic) 33.91 kg/m2          Blood Pressure from Last 3 Encounters:   17 120/82   07/10/17 136/82   17 130/70    Weight from Last 3 Encounters:   17 250 lb (113.4 kg)   07/10/17 240 lb (108.9 kg)   17 250 lb (113.4 kg)              Today, you had the following     No orders found for display       Primary Care Provider Office Phone # Fax #    Maria Eugenia Mcgrath [phone]       [address] [place] [address]        Equal Access to Services     [facility]: Hadii rica ku hadasho [place], waaxda luqadaha, qaybta kaalmada aderhondayajose m, vanessa lopez . So [facility] [phone].    ATENCIÓN: Si habla español, tiene a colon disposición servicios gratuitos de asistencia lingüística. Llame al [phone].    We comply with applicable federal civil rights laws and [place] laws. We do not discriminate on the basis of race, color, national origin, age, disability, sex, sexual orientation, or gender identity.            Thank you!     Thank you for choosing [facility]  for your care. Our goal is always to provide you with excellent care. Hearing back from our patients is one way we can continue to improve our services. Please take a few minutes to complete the written survey that you may receive in the mail after your visit with us. Thank you!             Your Updated Medication List - Protect others around you: Learn how to safely use, store and throw away your medicines at  www.disposemymeds.org.          This list is accurate as of: 11/21/17  1:09 PM.  Always use your most recent med list.                   Brand Name Dispense Instructions for use Diagnosis    benztropine 0.5 MG tablet    COGENTIN    60 tablet    Take 1 tablet (0.5 mg) by mouth 2 times daily    Schizophrenia, paranoid, subchronic with acute exacerbation (H)       cholecalciferol 2000 UNITS tablet     30 tablet    Take 2,000 Units by mouth daily    Vitamin D deficiency       cloNIDine 0.1 MG tablet    CATAPRES    60 tablet    Take 1 tablet (0.1 mg) by mouth 2 times daily    Schizophrenia, paranoid, subchronic with acute exacerbation (H)       * Clozapine 200 MG tablet    CLOZARIL    30 tablet    Take 1 tablet (200 mg) by mouth daily    Schizophrenia, paranoid, subchronic with acute exacerbation (H)       * cloZAPine 100 MG tablet    CLOZARIL    150 tablet    Take 5 tablets (500 mg) by mouth At Bedtime    Schizophrenia, paranoid, subchronic with acute exacerbation (H)       * cloZAPine 100 MG tablet    CLOZARIL    6 tablet    Take 2 tablets (200 mg) by mouth daily    Chronic paranoid schizophrenia (H)       * cloZAPine 100 MG tablet    CLOZARIL    15 tablet    Take 5 tablets (500 mg) by mouth At Bedtime    Chronic paranoid schizophrenia (H)       famotidine 20 MG tablet    PEPCID    56 tablet    TAKE 1 TABLET BY MOUTH TWICE A DAY    Gastroesophageal reflux disease without esophagitis       * Notice:  This list has 4 medication(s) that are the same as other medications prescribed for you. Read the directions carefully, and ask your doctor or other care provider to review them with you.

## 2017-11-27 ENCOUNTER — TRANSFERRED RECORDS (OUTPATIENT)
Dept: HEALTH INFORMATION MANAGEMENT | Facility: HOSPITAL | Age: 32
End: 2017-11-27

## 2017-12-04 ENCOUNTER — TELEPHONE (OUTPATIENT)
Dept: PSYCHIATRY | Facility: OTHER | Age: 32
End: 2017-12-04

## 2017-12-04 DIAGNOSIS — F20.0 CHRONIC PARANOID SCHIZOPHRENIA (H): ICD-10-CM

## 2017-12-04 DIAGNOSIS — F20.0 SCHIZOPHRENIA, PARANOID, SUBCHRONIC WITH ACUTE EXACERBATION (H): ICD-10-CM

## 2017-12-04 RX ORDER — CLOZAPINE 200 MG/1
200 TABLET ORAL DAILY
Qty: 7 TABLET | Refills: 0 | Status: SHIPPED | OUTPATIENT
Start: 2017-12-04 | End: 2017-12-07

## 2017-12-04 RX ORDER — CLOZAPINE 100 MG/1
500 TABLET ORAL AT BEDTIME
Qty: 35 TABLET | Refills: 0 | Status: SHIPPED | OUTPATIENT
Start: 2017-12-04 | End: 2017-12-07

## 2017-12-04 NOTE — TELEPHONE ENCOUNTER
Received call from Miles's mother, Malika.  She states that Miles has had episodes of vomiting Thursday night and again Saturday.  Malika is concerned because it is of reddish brown, snuff color.  He has been very fatigued and sleeping a lot.  She is wondering if he has something going on with the gastrointestinal tract.  Next f/u appt. Is this Wed. 12-6.  Thank you, please advise.

## 2017-12-04 NOTE — TELEPHONE ENCOUNTER
Spoke with Tello at [facility].  Rx Clozapine was received.  Tello is wondering about more refills.  He would like to get them every week by Thursday, if possible.  Thank you, YAMIL

## 2017-12-05 DIAGNOSIS — F20.0 SCHIZOPHRENIA, PARANOID, SUBCHRONIC WITH ACUTE EXACERBATION (H): ICD-10-CM

## 2017-12-06 ENCOUNTER — TRANSFERRED RECORDS (OUTPATIENT)
Dept: HEALTH INFORMATION MANAGEMENT | Facility: HOSPITAL | Age: 32
End: 2017-12-06

## 2017-12-06 ENCOUNTER — OFFICE VISIT (OUTPATIENT)
Dept: PSYCHIATRY | Facility: OTHER | Age: 32
End: 2017-12-06
Attending: PSYCHIATRY & NEUROLOGY
Payer: COMMERCIAL

## 2017-12-06 VITALS
BODY MASS INDEX: 33.77 KG/M2 | WEIGHT: 249 LBS | HEART RATE: 111 BPM | DIASTOLIC BLOOD PRESSURE: 84 MMHG | TEMPERATURE: 98.5 F | SYSTOLIC BLOOD PRESSURE: 126 MMHG

## 2017-12-06 DIAGNOSIS — K59.01 SLOW TRANSIT CONSTIPATION: Primary | ICD-10-CM

## 2017-12-06 PROCEDURE — 99214 OFFICE O/P EST MOD 30 MIN: CPT | Performed by: PSYCHIATRY & NEUROLOGY

## 2017-12-06 PROCEDURE — 99212 OFFICE O/P EST SF 10 MIN: CPT

## 2017-12-06 RX ORDER — POLYETHYLENE GLYCOL 3350 17 G/17G
1 POWDER, FOR SOLUTION ORAL DAILY
Qty: 30 PACKET | Refills: 3 | Status: SHIPPED | OUTPATIENT
Start: 2017-12-06 | End: 2019-11-08

## 2017-12-06 ASSESSMENT — PAIN SCALES - GENERAL: PAINLEVEL: NO PAIN (0)
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
## 2017-12-06 NOTE — PATIENT INSTRUCTIONS
Clozapine is currently 200 mg morning and 500 mg bedtime. Continue the 200 mg in morning but decrease bedtime to 450 mg at bedtime for one week then down to 400 mg every night.    We are going to wait to start sertraline because it can potentially exacerbate side effects of clozapine    I encourage you to go get the Holter done -- high heart rate can be from clozapine but I would feel much better / safer if we make sure no heart issues so have the Holter done!! Especially given you had a fall within past couple week -- sometimes heart issues can do this. Also clozapine can cause orthostatic hypotension (blood pressure gets low when you stand up too fast) so the fall could have been related to that.    I will prescribe miralax for constipation. You can take it daily until regular bowel movements. Clozapine commonly causes constipation.

## 2017-12-06 NOTE — MR AVS SNAPSHOT
After Visit Summary   12/6/2017    Miles Montez    MRN: [id number]           Patient Information     Date Of Birth          1985        Visit Information        Provider Department      12/6/2017 11:00 AM Marysol Maki MD [facility]        Today's Diagnoses     Slow transit constipation    -  1      Care Instructions    Clozapine is currently 200 mg morning and 500 mg bedtime. Continue the 200 mg in morning but decrease bedtime to 450 mg at bedtime for one week then down to 400 mg every night.    We are going to wait to start sertraline because it can potentially exacerbate side effects of clozapine    I encourage you to go get the Holter done -- high heart rate can be from clozapine but I would feel much better / safer if we make sure no heart issues so have the Holter done!! Especially given you had a fall within past couple week -- sometimes heart issues can do this. Also clozapine can cause orthostatic hypotension (blood pressure gets low when you stand up too fast) so the fall could have been related to that.    I will prescribe miralax for constipation. You can take it daily until regular bowel movements. Clozapine commonly causes constipation.          Follow-ups after your visit        Your next 10 appointments already scheduled     Jan 09, 2018  2:20 PM CST   (Arrive by 2:05 PM)   Return Visit with Marysol Maki MD   [facility] ([facility] )    [facility] [address] [place] [address] [phone]              Who to contact     If you have questions or need follow up information about today's clinic visit or your schedule please contact [facility] directly at [phone].  Normal or non-critical lab and imaging results will be communicated to you by MyChart, letter or phone within 4 business days after the clinic has received the results. If you do not hear from us within 7 days, please contact the clinic  "through [employer] or phone. If you have a critical or abnormal lab result, we will notify you by phone as soon as possible.  Submit refill requests through [employer] or call your pharmacy and they will forward the refill request to us. Please allow 3 business days for your refill to be completed.          Additional Information About Your Visit        [employer] Information     [employer] lets you send messages to your doctor, view your test results, renew your prescriptions, schedule appointments and more. To sign up, go to [email or web address] . Click on \"Log in\" on the left side of the screen, which will take you to the Welcome page. Then click on \"Sign up Now\" on the right side of the page.     You will be asked to enter the access code listed below, as well as some personal information. Please follow the directions to create your username and password.     Your access code is: [email or web address]  Expires: 2018  7:38 AM     Your access code will  in 90 days. If you need help or a new code, please call your [facility] clinic or [phone].        Care EveryWhere ID     This is your Care EveryWhere ID. This could be used by other organizations to access your [facility] medical records  [id number]        Your Vitals Were     Pulse Temperature BMI (Body Mass Index)             111 98.5  F (36.9  C) (Tympanic) 33.77 kg/m2          Blood Pressure from Last 3 Encounters:   17 126/84   17 120/82   07/10/17 136/82    Weight from Last 3 Encounters:   17 249 lb (112.9 kg)   17 250 lb (113.4 kg)   07/10/17 240 lb (108.9 kg)              Today, you had the following     No orders found for display         Today's Medication Changes          These changes are accurate as of: 17 11:50 AM.  If you have any questions, ask your nurse or doctor.               Start taking these medicines.        Dose/Directions    polyethylene glycol Packet   Commonly known as:  MIRALAX/GLYCOLAX   Used for:  Slow transit " constipation   Started by:  Marysol Maki MD        Dose:  1 packet   Take 17 g by mouth daily As needed for constipation   Quantity:  30 packet   Refills:  3            Where to get your medicines      These medications were sent to [facility] [address]  [address] [address]     Phone:  [phone]     polyethylene glycol Packet                Primary Care Provider Office Phone # Fax #    Maria Eugenia Mcgrath [phone]       [facility] [address] [place] [address] [address] [place] [address]        Equal Access to Services     [facility]: Hadii aad ku hadasho Soomaali, waaxda luqadaha, qaybta kaalmada adeegyada, waxay joan hayhenriquen sher lopez . So [facility] [phone].    ATENCIÓN: Si habla español, tiene a colon disposición servicios gratuitos de asistencia lingüística. [facility] [phone].    We comply with applicable federal civil rights laws and [place] laws. We do not discriminate on the basis of race, color, national origin, age, disability, sex, sexual orientation, or gender identity.            Thank you!     Thank you for choosing [facility]  for your care. Our goal is always to provide you with excellent care. Hearing back from our patients is one way we can continue to improve our services. Please take a few minutes to complete the written survey that you may receive in the mail after your visit with us. Thank you!             Your Updated Medication List - Protect others around you: Learn how to safely use, store and throw away your medicines at www.disposemymeds.org.          This list is accurate as of: 12/6/17 11:50 AM.  Always use your most recent med list.                   Brand Name Dispense Instructions for use Diagnosis    benztropine 0.5 MG tablet    COGENTIN    60 tablet    Take 1 tablet (0.5 mg) by mouth 2 times daily    Schizophrenia, paranoid, subchronic with acute exacerbation (H)       cholecalciferol 2000  UNITS tablet     30 tablet    Take 2,000 Units by mouth daily    Vitamin D deficiency       cloNIDine 0.1 MG tablet    CATAPRES    60 tablet    Take 1 tablet (0.1 mg) by mouth 2 times daily    Schizophrenia, paranoid, subchronic with acute exacerbation (H)       * cloZAPine 100 MG tablet    CLOZARIL    150 tablet    Take 5 tablets (500 mg) by mouth At Bedtime    Schizophrenia, paranoid, subchronic with acute exacerbation (H)       * cloZAPine 100 MG tablet    CLOZARIL    6 tablet    Take 2 tablets (200 mg) by mouth daily    Chronic paranoid schizophrenia (H)       * cloZAPine 100 MG tablet    CLOZARIL    35 tablet    Take 5 tablets (500 mg) by mouth At Bedtime    Chronic paranoid schizophrenia (H)       * Clozapine 200 MG tablet    CLOZARIL    7 tablet    Take 1 tablet (200 mg) by mouth daily    Schizophrenia, paranoid, subchronic with acute exacerbation (H)       famotidine 20 MG tablet    PEPCID    56 tablet    TAKE 1 TABLET BY MOUTH TWICE A DAY    Gastroesophageal reflux disease without esophagitis       polyethylene glycol Packet    MIRALAX/GLYCOLAX    30 packet    Take 17 g by mouth daily As needed for constipation    Slow transit constipation       UNKNOWN TO PATIENT      Patient was prescribed an anti-nausea medication.  Name unknown at this time.  Just received        * Notice:  This list has 4 medication(s) that are the same as other medications prescribed for you. Read the directions carefully, and ask your doctor or other care provider to review them with you.

## 2017-12-06 NOTE — NURSING NOTE
Chief Complaint   Patient presents with     RECHECK     Mental health.       Initial /84 (BP Location: Left arm, Patient Position: Sitting, Cuff Size: Adult Large)  Pulse 111  Temp 98.5  F (36.9  C) (Tympanic)  Wt 249 lb (112.9 kg)  BMI 33.77 kg/m2 Estimated body mass index is 33.77 kg/(m^2) as calculated from the following:    Height as of 8/20/17: 6' (1.829 m).    Weight as of this encounter: 249 lb (112.9 kg).  Medication Reconciliation: complete     MARIKA OQUENDO

## 2017-12-07 DIAGNOSIS — F20.0 CHRONIC PARANOID SCHIZOPHRENIA (H): ICD-10-CM

## 2017-12-07 DIAGNOSIS — F20.0 SCHIZOPHRENIA, PARANOID, SUBCHRONIC WITH ACUTE EXACERBATION (H): ICD-10-CM

## 2017-12-07 RX ORDER — CLONIDINE HYDROCHLORIDE 0.1 MG/1
TABLET ORAL
Qty: 60 TABLET | Refills: 5 | Status: SHIPPED | OUTPATIENT
Start: 2017-12-07 | End: 2018-04-02

## 2017-12-07 RX ORDER — BENZTROPINE MESYLATE 0.5 MG/1
TABLET ORAL
Qty: 60 TABLET | Refills: 5 | Status: SHIPPED | OUTPATIENT
Start: 2017-12-07 | End: 2018-04-03

## 2017-12-08 RX ORDER — CLOZAPINE 50 MG/1
50 TABLET ORAL AT BEDTIME
Qty: 7 TABLET | Refills: 0 | Status: SHIPPED | OUTPATIENT
Start: 2017-12-08 | End: 2017-12-20 | Stop reason: DRUGHIGH

## 2017-12-08 RX ORDER — CLOZAPINE 200 MG/1
TABLET ORAL
Qty: 7 TABLET | Refills: 0 | Status: SHIPPED | OUTPATIENT
Start: 2017-12-08 | End: 2017-12-20

## 2017-12-08 RX ORDER — CLOZAPINE 100 MG/1
400 TABLET ORAL AT BEDTIME
Qty: 28 TABLET | Refills: 0 | Status: SHIPPED | OUTPATIENT
Start: 2017-12-08 | End: 2017-12-20

## 2017-12-08 NOTE — TELEPHONE ENCOUNTER
Clozapine 100mg      Last Written Prescription Date: 11/5/17  Last Fill Quantity: 150,  # refills: 0   Last Office Visit with [facility], [facility] or [facility] prescribing provider: 12/6/17                                         Next 5 appointments (look out 90 days)     Jan 09, 2018  2:20 PM CST   (Arrive by 2:05 PM)   Return Visit with Marysol Maki MD   [facility] [place] ([facility] [place] )    [facility] [address] [place] [id number]   [phone]                      Clozapine 200mg      Last Written Prescription Date: 12/4/17  Last Fill Quantity: 7,  # refills: 0   Last Office Visit with [facility], [facility] or [facility] prescribing provider: 12/6/17

## 2017-12-11 ENCOUNTER — TRANSFERRED RECORDS (OUTPATIENT)
Dept: HEALTH INFORMATION MANAGEMENT | Facility: HOSPITAL | Age: 32
End: 2017-12-11

## 2017-12-20 DIAGNOSIS — F20.0 SCHIZOPHRENIA, PARANOID, SUBCHRONIC WITH ACUTE EXACERBATION (H): ICD-10-CM

## 2017-12-20 DIAGNOSIS — F20.0 CHRONIC PARANOID SCHIZOPHRENIA (H): ICD-10-CM

## 2017-12-20 RX ORDER — CLOZAPINE 100 MG/1
400 TABLET ORAL AT BEDTIME
Qty: 28 TABLET | Refills: 0 | Status: SHIPPED | OUTPATIENT
Start: 2017-12-20 | End: 2017-12-29

## 2017-12-20 RX ORDER — CLOZAPINE 200 MG/1
200 TABLET ORAL DAILY
Qty: 7 TABLET | Refills: 0 | Status: SHIPPED | OUTPATIENT
Start: 2017-12-20 | End: 2017-12-29

## 2017-12-21 DIAGNOSIS — F20.0 CHRONIC PARANOID SCHIZOPHRENIA (H): Primary | ICD-10-CM

## 2017-12-21 RX ORDER — CLOZAPINE 200 MG/1
200 TABLET ORAL DAILY
Qty: 7 TABLET | Refills: 0 | Status: SHIPPED | OUTPATIENT
Start: 2017-12-21 | End: 2017-12-28

## 2017-12-21 RX ORDER — CLOZAPINE 100 MG/1
400 TABLET ORAL AT BEDTIME
Qty: 28 TABLET | Refills: 0 | Status: SHIPPED | OUTPATIENT
Start: 2017-12-21 | End: 2017-12-28

## 2017-12-21 NOTE — TELEPHONE ENCOUNTER
ANC yest 12/20/17 was 5180. Documented in clozapine [facility] and will dispense 28 of 100 mg (1-week since takes 4 tabs HS) tabs and 7 of 200 mg tabs (takes 1 tab daily in am) for next week

## 2017-12-28 DIAGNOSIS — F20.0 CHRONIC PARANOID SCHIZOPHRENIA (H): ICD-10-CM

## 2017-12-28 NOTE — TELEPHONE ENCOUNTER
*clozapine 100  Last Written Prescription Date: 12/21/17  Last Fill Quantity: 28,  # refills: 0   Last Office Visit with [facility], [facility] or [facility] prescribing provider: 12/6/17    clozapine   Last Written Prescription Date: 12/21/17  Last Fill Quantity: 7,  # refills: 0   Last Office Visit with [facility], [facility] or [facility] prescribing provider: 12/6/17                                         Next 5 appointments (look out 90 days)     Jan 09, 2018  2:20 PM CST   (Arrive by 2:05 PM)   Return Visit with Marysol Maki MD   [facility] [place] ([facility] [place] )    [address] [place] [address]  [place] [address] [phone]                                                           Next 5 appointments (look out 90 days)     Jan 09, 2018  2:20 PM CST   (Arrive by 2:05 PM)   Return Visit with Marysol Maki MD   [facility] [place] ([facility] [place] )    [address]  [place] [address] [phone]

## 2017-12-29 DIAGNOSIS — F20.0 SCHIZOPHRENIA, PARANOID, SUBCHRONIC WITH ACUTE EXACERBATION (H): ICD-10-CM

## 2017-12-29 DIAGNOSIS — F20.0 CHRONIC PARANOID SCHIZOPHRENIA (H): ICD-10-CM

## 2017-12-29 RX ORDER — CLOZAPINE 200 MG/1
200 TABLET ORAL DAILY
Qty: 7 TABLET | Refills: 0 | Status: SHIPPED | OUTPATIENT
Start: 2017-12-29 | End: 2018-01-09

## 2017-12-29 RX ORDER — CLOZAPINE 100 MG/1
400 TABLET ORAL AT BEDTIME
Qty: 28 TABLET | Refills: 0 | Status: SHIPPED | OUTPATIENT
Start: 2017-12-29 | End: 2018-01-09

## 2018-01-02 ENCOUNTER — TRANSFERRED RECORDS (OUTPATIENT)
Dept: HEALTH INFORMATION MANAGEMENT | Facility: HOSPITAL | Age: 33
End: 2018-01-02

## 2018-01-02 RX ORDER — CLOZAPINE 100 MG/1
TABLET ORAL
Qty: 28 TABLET | Refills: 0 | Status: SHIPPED | OUTPATIENT
Start: 2018-01-02 | End: 2018-01-04

## 2018-01-02 RX ORDER — CLOZAPINE 200 MG/1
TABLET ORAL
Qty: 7 TABLET | Refills: 0 | Status: SHIPPED | OUTPATIENT
Start: 2018-01-02 | End: 2018-01-04

## 2018-01-02 NOTE — TELEPHONE ENCOUNTER
Clozapine 100mg      Last Written Prescription Date: 1/2/18  Last Fill Quantity: 28tab,  # refills: 0   Last Office Visit with [facility], [facility] or  Health prescribing provider: 12/6/17                                         Next 5 appointments (look out 90 days)     Jan 09, 2018  2:20 PM CST   (Arrive by 2:05 PM)   Return Visit with Marysol Maki MD   [facility] [place] ([facility] [place] )    [address] [place] [address]  [place] [address]   [phone]                  Clozapine 200mg      Last Written Prescription Date: 1/02/2018  Last Fill Quantity: 7tab,  # refills: 0   Last Office Visit with [facility], [facility] or [facility] prescribing provider: 12/6/17                                         Next 5 appointments (look out 90 days)     Jan 09, 2018  2:20 PM CST   (Arrive by 2:05 PM)   Return Visit with Marysol Maki MD   [facility] [place] ([facility] [place] )    [address] [place] [address]  [place] [address]   [phone]

## 2018-01-03 RX ORDER — CLOZAPINE 100 MG/1
TABLET ORAL
Qty: 28 TABLET | OUTPATIENT
Start: 2018-01-03

## 2018-01-03 RX ORDER — CLOZAPINE 200 MG/1
TABLET ORAL
Qty: 7 TABLET | OUTPATIENT
Start: 2018-01-03

## 2018-01-04 DIAGNOSIS — F20.0 CHRONIC PARANOID SCHIZOPHRENIA (H): ICD-10-CM

## 2018-01-08 RX ORDER — CLOZAPINE 100 MG/1
TABLET ORAL
Qty: 28 TABLET | Refills: 0 | Status: SHIPPED | OUTPATIENT
Start: 2018-01-08 | End: 2018-01-12

## 2018-01-08 RX ORDER — CLOZAPINE 200 MG/1
TABLET ORAL
Qty: 7 TABLET | Refills: 0 | Status: SHIPPED | OUTPATIENT
Start: 2018-01-08 | End: 2018-01-12

## 2018-01-09 ENCOUNTER — OFFICE VISIT (OUTPATIENT)
Dept: PSYCHIATRY | Facility: OTHER | Age: 33
End: 2018-01-09
Attending: PSYCHIATRY & NEUROLOGY
Payer: COMMERCIAL

## 2018-01-09 VITALS
WEIGHT: 245 LBS | TEMPERATURE: 98.1 F | BODY MASS INDEX: 33.23 KG/M2 | SYSTOLIC BLOOD PRESSURE: 116 MMHG | HEART RATE: 107 BPM | DIASTOLIC BLOOD PRESSURE: 76 MMHG

## 2018-01-09 DIAGNOSIS — F20.0 SCHIZOPHRENIA, PARANOID, SUBCHRONIC WITH ACUTE EXACERBATION (H): Primary | ICD-10-CM

## 2018-01-09 PROCEDURE — 99213 OFFICE O/P EST LOW 20 MIN: CPT | Performed by: PSYCHIATRY & NEUROLOGY

## 2018-01-09 PROCEDURE — G0463 HOSPITAL OUTPT CLINIC VISIT: HCPCS

## 2018-01-09 ASSESSMENT — PAIN SCALES - GENERAL: PAINLEVEL: MILD PAIN (2)

## 2018-01-09 NOTE — NURSING NOTE
Chief Complaint   Patient presents with     RECHECK     Mental health.       Initial /76 (BP Location: Left arm, Patient Position: Sitting, Cuff Size: Adult Regular)  Pulse 107  Temp 98.1  F (36.7  C) (Tympanic)  Wt 245 lb (111.1 kg)  BMI 33.23 kg/m2 Estimated body mass index is 33.23 kg/(m^2) as calculated from the following:    Height as of 8/20/17: 6' (1.829 m).    Weight as of this encounter: 245 lb (111.1 kg).  Medication Reconciliation: complete     MARIKA OQUENDO

## 2018-01-09 NOTE — MR AVS SNAPSHOT
"              After Visit Summary   1/9/2018    Miles Montez    MRN: [id number]           Patient Information     Date Of Birth          1985        Visit Information        Provider Department      1/9/2018 2:20 PM Marysol Maki MD [facility] [place]         Follow-ups after your visit        Your next 10 appointments already scheduled     Feb 06, 2018  3:20 PM CST   (Arrive by 3:05 PM)   Return Visit with Marysol Maki MD   [facility] [place] ([facility] [place] )    [address] [place] [address]  [place] [address] [phone]              Who to contact     If you have questions or need follow up information about today's clinic visit or your schedule please contact [facility] directly at [phone].  Normal or non-critical lab and imaging results will be communicated to you by MyChart, letter or phone within 4 business days after the clinic has received the results. If you do not hear from us within 7 days, please contact the clinic through MyChart or phone. If you have a critical or abnormal lab result, we will notify you by phone as soon as possible.  Submit refill requests through [employer] or call your pharmacy and they will forward the refill request to us. Please allow 3 business days for your refill to be completed.          Additional Information About Your Visit        MyChart Information     [employer] lets you send messages to your doctor, view your test results, renew your prescriptions, schedule appointments and more. To sign up, go to [email or web address] . Click on \"Log in\" on the left side of the screen, which will take you to the Welcome page. Then click on \"Sign up Now\" on the right side of the page.     You will be asked to enter the access code listed below, as well as some personal information. Please follow the directions to create your username and password.     Your access code is: [email or web address]  Expires: 2/4/2018  7:38 AM     Your " access code will  in 90 days. If you need help or a new code, please call your [facility] clinic or [phone].        Care EveryWhere ID     This is your Care EveryWhere ID. This could be used by other organizations to access your [facility] medical records  [id number]        Your Vitals Were     Pulse Temperature BMI (Body Mass Index)             107 98.1  F (36.7  C) (Tympanic) 33.23 kg/m2          Blood Pressure from Last 3 Encounters:   18 116/76   17 126/84   17 120/82    Weight from Last 3 Encounters:   18 245 lb (111.1 kg)   17 249 lb (112.9 kg)   17 250 lb (113.4 kg)              Today, you had the following     No orders found for display         Today's Medication Changes          These changes are accurate as of: 18  3:11 PM.  If you have any questions, ask your nurse or doctor.               These medicines have changed or have updated prescriptions.        Dose/Directions    * cloZAPine 100 MG tablet   Commonly known as:  CLOZARIL   This may have changed:  Another medication with the same name was removed. Continue taking this medication, and follow the directions you see here.   Used for:  Chronic paranoid schizophrenia (H)   Changed by:  Marysol Maki MD        TAKE 4 TABLETS (400 MG) BY MOUTH EVERY NIGHT AT BEDTIME   Quantity:  28 tablet   Refills:  0       * Clozapine 200 MG tablet   Commonly known as:  CLOZARIL   This may have changed:  Another medication with the same name was removed. Continue taking this medication, and follow the directions you see here.   Used for:  Chronic paranoid schizophrenia (H)   Changed by:  Marysol Maki MD        TAKE 1 TABLET (200 MG) BY MOUTH DAILY   Quantity:  7 tablet   Refills:  0       * Notice:  This list has 2 medication(s) that are the same as other medications prescribed for you. Read the directions carefully, and ask your doctor or other care provider to review them with you.      Stop taking these  medicines if you haven't already. Please contact your care team if you have questions.     famotidine 20 MG tablet   Commonly known as:  PEPCID   Stopped by:  Marysol Maki MD                    Primary Care Provider Office Phone # Fax Nora Mcgrath [phone]       [facility] [address] [place] [address] [address] [place] [address]        Equal Access to Services     [facility]: Hadii aad ku hadasho Soomaali, waaxda luqadaha, qaybta kaalmada adeegyada, waxay idiin hayaan adeeg kharash la'aan . So [facility] [phone].    ATENCIÓN: Si habla español, tiene a colon disposición servicios gratuitos de asistencia lingüística. Chase al [phone].    We comply with applicable federal civil rights laws and [place] laws. We do not discriminate on the basis of race, color, national origin, age, disability, sex, sexual orientation, or gender identity.            Thank you!     Thank you for choosing [facility]  for your care. Our goal is always to provide you with excellent care. Hearing back from our patients is one way we can continue to improve our services. Please take a few minutes to complete the written survey that you may receive in the mail after your visit with us. Thank you!             Your Updated Medication List - Protect others around you: Learn how to safely use, store and throw away your medicines at www.disposemymeds.org.          This list is accurate as of: 1/9/18  3:11 PM.  Always use your most recent med list.                   Brand Name Dispense Instructions for use Diagnosis    benztropine 0.5 MG tablet    COGENTIN    60 tablet    TAKE 1 TABLET BY MOUTH TWICE A DAY    Schizophrenia, paranoid, subchronic with acute exacerbation (H)       cholecalciferol 2000 UNITS tablet     30 tablet    Take 2,000 Units by mouth daily    Vitamin D deficiency       cloNIDine 0.1 MG tablet    CATAPRES    60 tablet    TAKE 1 TABLET BY MOUTH TWICE A DAY    Schizophrenia, paranoid, subchronic  with acute exacerbation (H)       * cloZAPine 100 MG tablet    CLOZARIL    28 tablet    TAKE 4 TABLETS (400 MG) BY MOUTH EVERY NIGHT AT BEDTIME    Chronic paranoid schizophrenia (H)       * Clozapine 200 MG tablet    CLOZARIL    7 tablet    TAKE 1 TABLET (200 MG) BY MOUTH DAILY    Chronic paranoid schizophrenia (H)       DOCUSATE SODIUM PO      Take 100 mg by mouth 2 times daily as needed for constipation        METOPROLOL TARTRATE PO      Take 25 mg by mouth daily        polyethylene glycol Packet    MIRALAX/GLYCOLAX    30 packet    Take 17 g by mouth daily As needed for constipation    Slow transit constipation       RANITIDINE HCL PO      Take 300 mg by mouth daily        * Notice:  This list has 2 medication(s) that are the same as other medications prescribed for you. Read the directions carefully, and ask your doctor or other care provider to review them with you.

## 2018-01-12 DIAGNOSIS — F20.0 CHRONIC PARANOID SCHIZOPHRENIA (H): ICD-10-CM

## 2018-01-16 RX ORDER — CLOZAPINE 100 MG/1
TABLET ORAL
Qty: 28 TABLET | Refills: 0 | Status: SHIPPED | OUTPATIENT
Start: 2018-01-16 | End: 2018-01-19

## 2018-01-16 RX ORDER — CLOZAPINE 200 MG/1
TABLET ORAL
Qty: 7 TABLET | Refills: 0 | Status: SHIPPED | OUTPATIENT
Start: 2018-01-16 | End: 2018-01-19

## 2018-01-19 DIAGNOSIS — F20.0 CHRONIC PARANOID SCHIZOPHRENIA (H): ICD-10-CM

## 2018-01-22 RX ORDER — CLOZAPINE 200 MG/1
TABLET ORAL
Qty: 7 TABLET | Refills: 0 | Status: SHIPPED | OUTPATIENT
Start: 2018-01-22 | End: 2018-01-26

## 2018-01-22 RX ORDER — CLOZAPINE 100 MG/1
TABLET ORAL
Qty: 28 TABLET | Refills: 0 | Status: SHIPPED | OUTPATIENT
Start: 2018-01-22 | End: 2018-01-25

## 2018-01-25 DIAGNOSIS — F20.0 CHRONIC PARANOID SCHIZOPHRENIA (H): ICD-10-CM

## 2018-01-25 RX ORDER — CLOZAPINE 100 MG/1
TABLET ORAL
Qty: 28 TABLET | Refills: 0 | Status: SHIPPED | OUTPATIENT
Start: 2018-01-25 | End: 2018-02-02

## 2018-01-26 DIAGNOSIS — F20.0 CHRONIC PARANOID SCHIZOPHRENIA (H): ICD-10-CM

## 2018-01-26 RX ORDER — CLOZAPINE 200 MG/1
TABLET ORAL
Qty: 7 TABLET | Refills: 0 | Status: SHIPPED | OUTPATIENT
Start: 2018-01-26 | End: 2018-02-02

## 2018-02-02 DIAGNOSIS — F20.0 CHRONIC PARANOID SCHIZOPHRENIA (H): ICD-10-CM

## 2018-02-02 RX ORDER — CLOZAPINE 200 MG/1
TABLET ORAL
Qty: 7 TABLET | Refills: 0 | Status: SHIPPED | OUTPATIENT
Start: 2018-02-02 | End: 2018-02-09

## 2018-02-02 RX ORDER — CLOZAPINE 100 MG/1
TABLET ORAL
Qty: 28 TABLET | Refills: 0 | Status: SHIPPED | OUTPATIENT
Start: 2018-02-02 | End: 2018-02-09

## 2018-02-06 ENCOUNTER — OFFICE VISIT (OUTPATIENT)
Dept: PSYCHIATRY | Facility: OTHER | Age: 33
End: 2018-02-06
Attending: PSYCHIATRY & NEUROLOGY
Payer: COMMERCIAL

## 2018-02-06 VITALS
BODY MASS INDEX: 33.23 KG/M2 | WEIGHT: 245 LBS | DIASTOLIC BLOOD PRESSURE: 78 MMHG | HEART RATE: 95 BPM | TEMPERATURE: 97.9 F | SYSTOLIC BLOOD PRESSURE: 118 MMHG

## 2018-02-06 DIAGNOSIS — F20.0 CHRONIC PARANOID SCHIZOPHRENIA (H): Primary | ICD-10-CM

## 2018-02-06 PROCEDURE — 99214 OFFICE O/P EST MOD 30 MIN: CPT | Performed by: PSYCHIATRY & NEUROLOGY

## 2018-02-06 PROCEDURE — G0463 HOSPITAL OUTPT CLINIC VISIT: HCPCS

## 2018-02-06 ASSESSMENT — PAIN SCALES - GENERAL: PAINLEVEL: NO PAIN (0)

## 2018-02-06 NOTE — MR AVS SNAPSHOT
"              After Visit Summary   2/6/2018    Miles Montez    MRN: [id number]           Patient Information     Date Of Birth          1985        Visit Information        Provider Department      2/6/2018 3:20 PM Marysol Maki MD [facility] [place]         Follow-ups after your visit        Your next 10 appointments already scheduled     Mar 06, 2018  1:20 PM CST   (Arrive by 1:05 PM)   Return Visit with Marysol Maki MD   [facility] [place] ([facility] [place] )    [address] [place] [address]  [place] [address] [phone]              Who to contact     If you have questions or need follow up information about today's clinic visit or your schedule please contact [facility] directly at [phone].  Normal or non-critical lab and imaging results will be communicated to you by MyChart, letter or phone within 4 business days after the clinic has received the results. If you do not hear from us within 7 days, please contact the clinic through MyChart or phone. If you have a critical or abnormal lab result, we will notify you by phone as soon as possible.  Submit refill requests through [employer] or call your pharmacy and they will forward the refill request to us. Please allow 3 business days for your refill to be completed.          Additional Information About Your Visit        MyChart Information     [employer] lets you send messages to your doctor, view your test results, renew your prescriptions, schedule appointments and more. To sign up, go to [email or web address] . Click on \"Log in\" on the left side of the screen, which will take you to the Welcome page. Then click on \"Sign up Now\" on the right side of the page.     You will be asked to enter the access code listed below, as well as some personal information. Please follow the directions to create your username and password.     Your access code is: [email or web address]  Expires: 5/7/2018  4:21 PM     Your " access code will  in 90 days. If you need help or a new code, please call your [place] clinic or [phone].        Care EveryWhere ID     This is your Care EveryWhere ID. This could be used by other organizations to access your [facility] medical records  [id number]        Your Vitals Were     Pulse Temperature BMI (Body Mass Index)             95 97.9  F (36.6  C) (Tympanic) 33.23 kg/m2          Blood Pressure from Last 3 Encounters:   18 118/78   18 116/76   17 126/84    Weight from Last 3 Encounters:   18 245 lb (111.1 kg)   18 245 lb (111.1 kg)   17 249 lb (112.9 kg)              Today, you had the following     No orders found for display       Primary Care Provider Office Phone # Fax #    Maria Eugenia Mcgrath [phone]       [facility] [address] [place] [address] [address] [place] [address]        Equal Access to Services     [facility] : Hadii aad ku hadasho Soomaali, waaxda luqadaha, qaybta kaalmada adeegyada, waxay malgorzatain haytushar lopez . So [facility] [phone].    ATENCIÓN: Si habla español, tiene a colon disposición servicios gratuitos de asistencia lingüística. Llame al [phone].    We comply with applicable federal civil rights laws and [place] laws. We do not discriminate on the basis of race, color, national origin, age, disability, sex, sexual orientation, or gender identity.            Thank you!     Thank you for choosing [facility]  for your care. Our goal is always to provide you with excellent care. Hearing back from our patients is one way we can continue to improve our services. Please take a few minutes to complete the written survey that you may receive in the mail after your visit with us. Thank you!             Your Updated Medication List - Protect others around you: Learn how to safely use, store and throw away your medicines at www.disposemymeds.org.          This list is accurate as of 18  4:21 PM.  Always  use your most recent med list.                   Brand Name Dispense Instructions for use Diagnosis    benztropine 0.5 MG tablet    COGENTIN    60 tablet    TAKE 1 TABLET BY MOUTH TWICE A DAY    Schizophrenia, paranoid, subchronic with acute exacerbation (H)       cholecalciferol 2000 UNITS tablet     30 tablet    Take 2,000 Units by mouth daily    Vitamin D deficiency       cloNIDine 0.1 MG tablet    CATAPRES    60 tablet    TAKE 1 TABLET BY MOUTH TWICE A DAY    Schizophrenia, paranoid, subchronic with acute exacerbation (H)       * Clozapine 200 MG tablet    CLOZARIL    7 tablet    TAKE 1 TABLET (200 MG) BY MOUTH DAILY    Chronic paranoid schizophrenia (H)       * cloZAPine 100 MG tablet    CLOZARIL    28 tablet    TAKE 4 TABLETS (400 MG) BY MOUTH EVERY NIGHT AT BEDTIME    Chronic paranoid schizophrenia (H)       DOCUSATE SODIUM PO      Take 100 mg by mouth 2 times daily as needed for constipation        METOPROLOL TARTRATE PO      Take 25 mg by mouth daily        polyethylene glycol Packet    MIRALAX/GLYCOLAX    30 packet    Take 17 g by mouth daily As needed for constipation    Slow transit constipation       RANITIDINE HCL PO      Take 300 mg by mouth daily        * Notice:  This list has 2 medication(s) that are the same as other medications prescribed for you. Read the directions carefully, and ask your doctor or other care provider to review them with you.
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
## 2018-02-06 NOTE — NURSING NOTE
Chief Complaint   Patient presents with     RECHECK     Mental health.       Initial /78 (BP Location: Left arm, Patient Position: Sitting, Cuff Size: Adult Regular)  Pulse 95  Temp 97.9  F (36.6  C) (Tympanic)  Wt 245 lb (111.1 kg)  BMI 33.23 kg/m2 Estimated body mass index is 33.23 kg/(m^2) as calculated from the following:    Height as of 8/20/17: 6' (1.829 m).    Weight as of this encounter: 245 lb (111.1 kg).  Medication Reconciliation: complete     MARIKA OQUENDO

## 2018-02-09 DIAGNOSIS — F20.0 CHRONIC PARANOID SCHIZOPHRENIA (H): ICD-10-CM

## 2018-02-12 RX ORDER — CLOZAPINE 200 MG/1
TABLET ORAL
Qty: 7 TABLET | Refills: 0 | Status: SHIPPED | OUTPATIENT
Start: 2018-02-12 | End: 2018-02-16

## 2018-02-12 RX ORDER — CLOZAPINE 100 MG/1
TABLET ORAL
Qty: 28 TABLET | Refills: 0 | Status: SHIPPED | OUTPATIENT
Start: 2018-02-12 | End: 2018-02-16

## 2018-02-16 DIAGNOSIS — F20.0 CHRONIC PARANOID SCHIZOPHRENIA (H): ICD-10-CM

## 2018-02-19 RX ORDER — CLOZAPINE 100 MG/1
TABLET ORAL
Qty: 28 TABLET | Refills: 0 | Status: SHIPPED | OUTPATIENT
Start: 2018-02-19 | End: 2018-02-23

## 2018-02-19 RX ORDER — CLOZAPINE 200 MG/1
TABLET ORAL
Qty: 7 TABLET | Refills: 0 | Status: SHIPPED | OUTPATIENT
Start: 2018-02-19 | End: 2018-02-23

## 2018-02-19 NOTE — TELEPHONE ENCOUNTER
Clozaril 200mg  Last Written Prescription Date: 2/12/18  Last Fill Quantity: 7 # of Refills: 0  Last Office Visit: 2/6/18    Clozaril 100mg  Last Written Prescription Date: 2/12/18  Last Fill Quantity: 28 # of Refills: 0  Last Office Visit: 2/6/18

## 2018-02-23 ENCOUNTER — TELEPHONE (OUTPATIENT)
Dept: PSYCHIATRY | Facility: OTHER | Age: 33
End: 2018-02-23

## 2018-02-23 DIAGNOSIS — Z79.899 MEDICATION MANAGEMENT: Primary | ICD-10-CM

## 2018-02-23 DIAGNOSIS — F20.0 CHRONIC PARANOID SCHIZOPHRENIA (H): ICD-10-CM

## 2018-02-23 NOTE — TELEPHONE ENCOUNTER
----- Message from BALA Matta CNP sent at 2/23/2018 10:03 AM CST -----  Yes, if we could ask him to come in for labs? Thanks!     ----- Message -----     From: Martita Buneo     Sent: 2/23/2018   9:39 AM       To: BALA Matta CNP    I'm not sure what you want me to do with him? Should I order the lab and have him come in?     Price  ----- Message -----     From: Franca Burgess APRN CNP     Sent: 2/19/2018   4:57 PM       To: Marysol Maki MD, Martita Gregg, and Martita, I'm not seeing any Labs for this Clozaril patient, and he needs to come in, I think, to be tested?  It's a CBC.     Martita if you had a moment to call him? \    Thanks! Kayy

## 2018-02-23 NOTE — TELEPHONE ENCOUNTER
Patient was notified to come in for lab draw. States understands and will be in next week for labs.

## 2018-02-26 ENCOUNTER — TRANSFERRED RECORDS (OUTPATIENT)
Dept: HEALTH INFORMATION MANAGEMENT | Facility: CLINIC | Age: 33
End: 2018-02-26

## 2018-02-26 RX ORDER — CLOZAPINE 200 MG/1
TABLET ORAL
Qty: 7 TABLET | Refills: 0 | Status: SHIPPED | OUTPATIENT
Start: 2018-02-26 | End: 2018-03-02

## 2018-02-26 RX ORDER — CLOZAPINE 100 MG/1
TABLET ORAL
Qty: 28 TABLET | Refills: 0 | Status: SHIPPED | OUTPATIENT
Start: 2018-02-26 | End: 2018-03-02

## 2018-02-27 DIAGNOSIS — F20.0 CHRONIC PARANOID SCHIZOPHRENIA (H): Primary | ICD-10-CM

## 2018-02-27 DIAGNOSIS — Z79.899 MEDICATION MANAGEMENT: ICD-10-CM

## 2018-02-27 LAB
ANION GAP SERPL CALCULATED.3IONS-SCNC: 12 MMOL/L (ref 3–14)
BUN SERPL-MCNC: 11 MG/DL (ref 7–30)
CALCIUM SERPL-MCNC: 9 MG/DL (ref 8.5–10.1)
CHLORIDE SERPL-SCNC: 107 MMOL/L (ref 94–109)
CHOLEST SERPL-MCNC: 227 MG/DL
CO2 SERPL-SCNC: 21 MMOL/L (ref 20–32)
CREAT SERPL-MCNC: 0.87 MG/DL (ref 0.66–1.25)
ERYTHROCYTE [DISTWIDTH] IN BLOOD BY AUTOMATED COUNT: 13.2 % (ref 10–15)
GFR SERPL CREATININE-BSD FRML MDRD: >90 ML/MIN/1.7M2
GLUCOSE SERPL-MCNC: 93 MG/DL (ref 70–99)
HCT VFR BLD AUTO: 45.3 % (ref 40–53)
HDLC SERPL-MCNC: 28 MG/DL
HGB BLD-MCNC: 15.9 G/DL (ref 13.3–17.7)
LDLC SERPL CALC-MCNC: ABNORMAL MG/DL
MCH RBC QN AUTO: 30.1 PG (ref 26.5–33)
MCHC RBC AUTO-ENTMCNC: 35.1 G/DL (ref 31.5–36.5)
MCV RBC AUTO: 86 FL (ref 78–100)
NONHDLC SERPL-MCNC: 199 MG/DL
PLATELET # BLD AUTO: 326 10E9/L (ref 150–450)
POTASSIUM SERPL-SCNC: 3.7 MMOL/L (ref 3.4–5.3)
RBC # BLD AUTO: 5.28 10E12/L (ref 4.4–5.9)
SODIUM SERPL-SCNC: 140 MMOL/L (ref 133–144)
TRIGL SERPL-MCNC: 531 MG/DL
WBC # BLD AUTO: 8.8 10E9/L (ref 4–11)

## 2018-02-27 PROCEDURE — 80048 BASIC METABOLIC PNL TOTAL CA: CPT | Mod: ZL | Performed by: PSYCHIATRY & NEUROLOGY

## 2018-02-27 PROCEDURE — 36415 COLL VENOUS BLD VENIPUNCTURE: CPT | Mod: ZL | Performed by: NURSE PRACTITIONER

## 2018-02-27 PROCEDURE — 85027 COMPLETE CBC AUTOMATED: CPT | Mod: ZL | Performed by: NURSE PRACTITIONER

## 2018-02-27 PROCEDURE — 80061 LIPID PANEL: CPT | Mod: ZL | Performed by: PSYCHIATRY & NEUROLOGY

## 2018-02-27 NOTE — LETTER
March 5, 2018      TO: Miles Montez  [address] [place] [address]       Recent lab tests came back. Triglycerides are really high -- I would speak with your primary provider about this. It is likely that it's related to psychiatric meds -- they can certainly contribute to high triglycerides and cholesterol. That being said it comes down to risks vs. Benefits and thus far you are doing well and I wouldn't want to jeopardize your mental health by discontinuing your med(s).     Electrolytes, glucose, kidney function were normal.     Recent Labs   Lab Test  02/27/18   1300  09/27/16   0548   07/09/15   0818  10/01/10   1445   CHOL  227*  192   < >  145  167   HDL  28*  46   < >  39*  54   LDL  Cannot estimate LDL when triglyceride exceeds 400 mg/dL  107*   < >  75  100   TRIG  531*  193*   < >  154*  63   CHOLHDLRATIO   --    --    --   3.7  3.0    < > = values in this interval not displayed.     Lipid Results:    Cholesterol: Desirable is less than 200, Borderline is 200-240  HDL (Good Cholesterol): Desirable is greater than 40  LDL (Bad Cholesterol): Desirable is less than 130, Borderline 130-160  Triglycerides: Desirable is less than 200,  Borderline is 200-400    Last Basic Metabolic Panel:  Lab Results   Component Value Date     02/27/2018      Lab Results   Component Value Date    POTASSIUM 3.7 02/27/2018     Lab Results   Component Value Date    CHLORIDE 107 02/27/2018     Lab Results   Component Value Date    IMAN 9.0 02/27/2018     Lab Results   Component Value Date    CO2 21 02/27/2018     Lab Results   Component Value Date    BUN 11 02/27/2018     Lab Results   Component Value Date    CR 0.87 02/27/2018     Lab Results   Component Value Date    GLC 93 02/27/2018       Sincerely,      Heriberto Lab MD

## 2018-03-02 DIAGNOSIS — F20.0 CHRONIC PARANOID SCHIZOPHRENIA (H): ICD-10-CM

## 2018-03-06 ENCOUNTER — OFFICE VISIT (OUTPATIENT)
Dept: PSYCHIATRY | Facility: OTHER | Age: 33
End: 2018-03-06
Attending: PSYCHIATRY & NEUROLOGY
Payer: COMMERCIAL

## 2018-03-06 VITALS
DIASTOLIC BLOOD PRESSURE: 76 MMHG | TEMPERATURE: 97.9 F | SYSTOLIC BLOOD PRESSURE: 114 MMHG | HEART RATE: 119 BPM | WEIGHT: 245 LBS | BODY MASS INDEX: 33.23 KG/M2

## 2018-03-06 DIAGNOSIS — F20.0 CHRONIC PARANOID SCHIZOPHRENIA (H): Primary | ICD-10-CM

## 2018-03-06 PROCEDURE — 99214 OFFICE O/P EST MOD 30 MIN: CPT | Performed by: PSYCHIATRY & NEUROLOGY

## 2018-03-06 PROCEDURE — G0463 HOSPITAL OUTPT CLINIC VISIT: HCPCS

## 2018-03-06 RX ORDER — CLOZAPINE 200 MG/1
TABLET ORAL
Qty: 7 TABLET | Refills: 0 | Status: SHIPPED | OUTPATIENT
Start: 2018-03-06 | End: 2018-03-09

## 2018-03-06 RX ORDER — CLOZAPINE 100 MG/1
TABLET ORAL
Qty: 28 TABLET | Refills: 0 | Status: SHIPPED | OUTPATIENT
Start: 2018-03-06 | End: 2018-03-09

## 2018-03-06 ASSESSMENT — PAIN SCALES - GENERAL: PAINLEVEL: MILD PAIN (2)

## 2018-03-06 NOTE — MR AVS SNAPSHOT
"              After Visit Summary   3/6/2018    Miles Montez    MRN: [id number]           Patient Information     Date Of Birth          1985        Visit Information        Provider Department      3/6/2018 1:20 PM Marysol Maki MD [facility] [place]         Follow-ups after your visit        Your next 10 appointments already scheduled     Apr 17, 2018  1:40 PM CDT   (Arrive by 1:25 PM)   Return Visit with Marysol Maki MD   [facility] [place] ([facility] [place] )    [address] [place] [address]  [place] [address] [phone]              Who to contact     If you have questions or need follow up information about today's clinic visit or your schedule please contact [facility] directly at [phone].  Normal or non-critical lab and imaging results will be communicated to you by MyChart, letter or phone within 4 business days after the clinic has received the results. If you do not hear from us within 7 days, please contact the clinic through MyChart or phone. If you have a critical or abnormal lab result, we will notify you by phone as soon as possible.  Submit refill requests through [employer] or call your pharmacy and they will forward the refill request to us. Please allow 3 business days for your refill to be completed.          Additional Information About Your Visit        MyChart Information     [employer] lets you send messages to your doctor, view your test results, renew your prescriptions, schedule appointments and more. To sign up, go to [email or web address] . Click on \"Log in\" on the left side of the screen, which will take you to the Welcome page. Then click on \"Sign up Now\" on the right side of the page.     You will be asked to enter the access code listed below, as well as some personal information. Please follow the directions to create your username and password.     Your access code is: [email or web address]  Expires: 5/7/2018  4:21 PM     Your " access code will  in 90 days. If you need help or a new code, please call your [facility] clinic or [phone].        Care EveryWhere ID     This is your Care EveryWhere ID. This could be used by other organizations to access your [facility] medical records  [id number]        Your Vitals Were     Pulse Temperature BMI (Body Mass Index)             119 97.9  F (36.6  C) (Tympanic) 33.23 kg/m2          Blood Pressure from Last 3 Encounters:   18 114/76   18 118/78   18 116/76    Weight from Last 3 Encounters:   18 245 lb (111.1 kg)   18 245 lb (111.1 kg)   18 245 lb (111.1 kg)              Today, you had the following     No orders found for display       Primary Care Provider Office Phone # Fax #    Yovananuzhat Mckeon [phone]       [address] [address]        Equal Access to Services     KIM SCALES : Hadii rica lopez hadasho Soomaali, waaxda luqadaha, qaybta kaalmada adeegyajose m, vanessa lopez . So [facility] [phone].    ATENCIÓN: Si habla español, tiene a colon disposición servicios gratuitos de asistencia lingüística. Llame al [phone].    We comply with applicable federal civil rights laws and [place] laws. We do not discriminate on the basis of race, color, national origin, age, disability, sex, sexual orientation, or gender identity.            Thank you!     Thank you for choosing [facility]  for your care. Our goal is always to provide you with excellent care. Hearing back from our patients is one way we can continue to improve our services. Please take a few minutes to complete the written survey that you may receive in the mail after your visit with us. Thank you!             Your Updated Medication List - Protect others around you: Learn how to safely use, store and throw away your medicines at www.disposemymeds.org.          This list is accurate as of 3/6/18   2:11 PM.  Always use your most recent med list.                   Brand Name Dispense Instructions for use Diagnosis    benztropine 0.5 MG tablet    COGENTIN    60 tablet    TAKE 1 TABLET BY MOUTH TWICE A DAY    Schizophrenia, paranoid, subchronic with acute exacerbation (H)       cholecalciferol 2000 UNITS tablet     30 tablet    Take 2,000 Units by mouth daily    Vitamin D deficiency       cloNIDine 0.1 MG tablet    CATAPRES    60 tablet    TAKE 1 TABLET BY MOUTH TWICE A DAY    Schizophrenia, paranoid, subchronic with acute exacerbation (H)       * cloZAPine 100 MG tablet    CLOZARIL    28 tablet    TAKE 4 TABLETS (400 MG) BY MOUTH EVERY NIGHT AT BEDTIME    Chronic paranoid schizophrenia (H)       * Clozapine 200 MG tablet    CLOZARIL    7 tablet    TAKE 1 TABLET (200 MG) BY MOUTH DAILY    Chronic paranoid schizophrenia (H)       DOCUSATE SODIUM PO      Take 100 mg by mouth 2 times daily as needed for constipation        polyethylene glycol Packet    MIRALAX/GLYCOLAX    30 packet    Take 17 g by mouth daily As needed for constipation    Slow transit constipation       RANITIDINE HCL PO      Take 300 mg by mouth daily        * Notice:  This list has 2 medication(s) that are the same as other medications prescribed for you. Read the directions carefully, and ask your doctor or other care provider to review them with you.
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
## 2018-03-06 NOTE — NURSING NOTE
Chief Complaint   Patient presents with     RECHECK     Mental health.       Initial /76 (BP Location: Left arm, Patient Position: Sitting, Cuff Size: Adult Regular)  Pulse 119  Temp 97.9  F (36.6  C) (Tympanic)  Wt 245 lb (111.1 kg)  BMI 33.23 kg/m2 Estimated body mass index is 33.23 kg/(m^2) as calculated from the following:    Height as of 8/20/17: 6' (1.829 m).    Weight as of this encounter: 245 lb (111.1 kg).  Medication Reconciliation: complete     MARIKA OQUENDO

## 2018-03-09 DIAGNOSIS — F20.0 CHRONIC PARANOID SCHIZOPHRENIA (H): ICD-10-CM

## 2018-03-09 RX ORDER — CLOZAPINE 100 MG/1
TABLET ORAL
Qty: 28 TABLET | Refills: 0 | Status: SHIPPED | OUTPATIENT
Start: 2018-03-09 | End: 2018-03-23

## 2018-03-09 RX ORDER — CLOZAPINE 200 MG/1
TABLET ORAL
Qty: 7 TABLET | Refills: 0 | Status: SHIPPED | OUTPATIENT
Start: 2018-03-09 | End: 2018-03-23

## 2018-03-19 ENCOUNTER — TRANSFERRED RECORDS (OUTPATIENT)
Dept: HEALTH INFORMATION MANAGEMENT | Facility: CLINIC | Age: 33
End: 2018-03-19

## 2018-03-23 DIAGNOSIS — F20.0 CHRONIC PARANOID SCHIZOPHRENIA (H): ICD-10-CM

## 2018-03-23 RX ORDER — CLOZAPINE 200 MG/1
TABLET ORAL
Qty: 7 TABLET | Refills: 0 | Status: SHIPPED | OUTPATIENT
Start: 2018-03-23 | End: 2018-03-23

## 2018-03-23 RX ORDER — CLOZAPINE 100 MG/1
TABLET ORAL
Qty: 28 TABLET | Refills: 0 | Status: SHIPPED | OUTPATIENT
Start: 2018-03-23 | End: 2018-03-23

## 2018-03-26 ENCOUNTER — TRANSFERRED RECORDS (OUTPATIENT)
Dept: HEALTH INFORMATION MANAGEMENT | Facility: CLINIC | Age: 33
End: 2018-03-26

## 2018-03-26 NOTE — TELEPHONE ENCOUNTER
Clozapine (CLOZARIL) 200mg and 100mg   Last Written Prescription Date:  3/23/51821  Last Fill Quantity: 7(200mg), 28(100mg),   # refills: 0  Last Office Visit: 3/06/2018  Future Office visit:    Next 5 appointments (look out 90 days)     Apr 17, 2018  1:40 PM CDT   (Arrive by 1:25 PM)   Return Visit with Marysol Maki MD   [facility] ([facility] [place] [facility] )    [facility] [address] [place] [address] [phone]

## 2018-03-27 RX ORDER — CLOZAPINE 100 MG/1
TABLET ORAL
Qty: 28 TABLET | Refills: 0 | Status: SHIPPED | OUTPATIENT
Start: 2018-03-27 | End: 2018-03-30

## 2018-03-27 RX ORDER — CLOZAPINE 200 MG/1
TABLET ORAL
Qty: 7 TABLET | Refills: 0 | Status: SHIPPED | OUTPATIENT
Start: 2018-03-27 | End: 2018-03-30

## 2018-03-30 DIAGNOSIS — F20.0 CHRONIC PARANOID SCHIZOPHRENIA (H): ICD-10-CM

## 2018-04-02 DIAGNOSIS — F20.0 SCHIZOPHRENIA, PARANOID, SUBCHRONIC WITH ACUTE EXACERBATION (H): ICD-10-CM

## 2018-04-02 NOTE — TELEPHONE ENCOUNTER
Clozaril 100 mg  Last office visit: 3/6/18  Last refill: 3/27/18 #28, 0 R  Clozaril 200 mg   Last refill: 3/27/18 #7, 0 R  Thank you.

## 2018-04-03 DIAGNOSIS — F20.0 SCHIZOPHRENIA, PARANOID, SUBCHRONIC WITH ACUTE EXACERBATION (H): ICD-10-CM

## 2018-04-03 RX ORDER — CLOZAPINE 200 MG/1
TABLET ORAL
Qty: 7 TABLET | Refills: 0 | Status: SHIPPED | OUTPATIENT
Start: 2018-04-03 | End: 2018-04-05

## 2018-04-03 RX ORDER — CLONIDINE HYDROCHLORIDE 0.1 MG/1
TABLET ORAL
Qty: 56 TABLET | Refills: 2 | Status: SHIPPED | OUTPATIENT
Start: 2018-04-03 | End: 2018-06-15

## 2018-04-03 RX ORDER — CLOZAPINE 100 MG/1
TABLET ORAL
Qty: 28 TABLET | Refills: 0 | Status: SHIPPED | OUTPATIENT
Start: 2018-04-03 | End: 2018-04-05

## 2018-04-03 RX ORDER — BENZTROPINE MESYLATE 0.5 MG/1
TABLET ORAL
Qty: 60 TABLET | Refills: 5 | Status: SHIPPED | OUTPATIENT
Start: 2018-04-03 | End: 2018-09-11

## 2018-04-05 DIAGNOSIS — F20.0 CHRONIC PARANOID SCHIZOPHRENIA (H): ICD-10-CM

## 2018-04-06 NOTE — TELEPHONE ENCOUNTER
Clozapine 100 mg and 200 mg  Last office visit: 3/6/18  Last refill: 4/3/18 #28, 0 R for 100 mg, 4/3/18 #7, 0 R for the 200 mg  Thank you.

## 2018-04-09 RX ORDER — CLOZAPINE 100 MG/1
TABLET ORAL
Qty: 28 TABLET | Refills: 0 | Status: SHIPPED | OUTPATIENT
Start: 2018-04-09 | End: 2018-04-13

## 2018-04-09 RX ORDER — CLOZAPINE 200 MG/1
TABLET ORAL
Qty: 7 TABLET | Refills: 0 | Status: SHIPPED | OUTPATIENT
Start: 2018-04-09 | End: 2018-04-13

## 2018-04-13 DIAGNOSIS — F20.0 CHRONIC PARANOID SCHIZOPHRENIA (H): ICD-10-CM

## 2018-04-13 RX ORDER — CLOZAPINE 100 MG/1
TABLET ORAL
Qty: 28 TABLET | Refills: 0 | Status: SHIPPED | OUTPATIENT
Start: 2018-04-13 | End: 2018-04-19

## 2018-04-13 RX ORDER — CLOZAPINE 200 MG/1
TABLET ORAL
Qty: 7 TABLET | Refills: 0 | Status: SHIPPED | OUTPATIENT
Start: 2018-04-13 | End: 2018-04-19

## 2018-04-17 ENCOUNTER — OFFICE VISIT (OUTPATIENT)
Dept: PSYCHIATRY | Facility: OTHER | Age: 33
End: 2018-04-17
Attending: PSYCHIATRY & NEUROLOGY
Payer: COMMERCIAL

## 2018-04-17 VITALS
SYSTOLIC BLOOD PRESSURE: 126 MMHG | BODY MASS INDEX: 35.8 KG/M2 | WEIGHT: 264 LBS | DIASTOLIC BLOOD PRESSURE: 84 MMHG | HEART RATE: 119 BPM | TEMPERATURE: 97.8 F

## 2018-04-17 DIAGNOSIS — F20.0 CHRONIC PARANOID SCHIZOPHRENIA (H): Primary | ICD-10-CM

## 2018-04-17 PROCEDURE — 99213 OFFICE O/P EST LOW 20 MIN: CPT | Performed by: PSYCHIATRY & NEUROLOGY

## 2018-04-17 PROCEDURE — G0463 HOSPITAL OUTPT CLINIC VISIT: HCPCS

## 2018-04-17 ASSESSMENT — PAIN SCALES - GENERAL: PAINLEVEL: NO PAIN (0)

## 2018-04-17 NOTE — NURSING NOTE
Chief Complaint   Patient presents with     RECHECK     Mental health.  Patient c/o fatigue, weight gain, depression, sleeping a lot, some vomiting and nausea        Initial /84 (BP Location: Left arm, Patient Position: Sitting, Cuff Size: Adult Regular)  Pulse 119  Temp 97.8  F (36.6  C) (Tympanic)  Wt 264 lb (119.7 kg)  BMI 35.8 kg/m2 Estimated body mass index is 35.8 kg/(m^2) as calculated from the following:    Height as of 8/20/17: 6' (1.829 m).    Weight as of this encounter: 264 lb (119.7 kg).  Medication Reconciliation: complete     MARIKA OQUENDO

## 2018-04-17 NOTE — PROGRESS NOTES
"  PSYCHIATRY CLINIC PROGRESS NOTE   20 minute medication management, more than 50% of time spent counseling patient on medications, medication side effects, symptom history and management   SUBJECTIVE / INTERIM HISTORY                                                                       Last visit 3/6/18:  clozapine 200 mg am and 400 mg HS.  Continue miralax and docusate prn constipation.  continue  Cogentin 0.5 mg bid   - mom notes she has been researching studies for schizophrenia: Provigil use for weight, cognition, mood, fatigue. She takes and finds very helpful  - commitment is over. ACT  - generally doing pretty well, some mention delusions / paranoia here and there but mom notes they talk through it.   - weight gain : brings him down.   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 Nurses come out  - commitment through April of 2018 is done  - \"Juarez\" the  [employer]     SUBSTANCE USE- meth and MJ in past    SYMPTOMS-some depressed mood, low energy, weight gain, anhdeonida, no SI/HI. No evidence any delusions today.  no AH/VH, denies thought blocking, memory impairment  MEDICAL ROS-  SOB, Tired, Tremor, Fatigue, weight gain,   MEDICAL / SURGICAL HISTORY            Medical Team:     PMD-       Therapist- Brenda at [facility]   Patient Active Problem List   Diagnosis     Episodic mood disorder (H)     Poisoning by Methamphetamines     Cannabis abuse     Hypertension goal BP (blood pressure) < 140/90     Psychosis     Depression     Paranoid schizophrenia, chronic condition with acute exacerbation (H)     Schizophrenia, paranoid, subchronic with acute exacerbation (H)     Schizophrenia, paranoid type (H)     Drug eruption     ACP (advance care planning)     ALLERGY   Pcn [bicillin c-r,]; Bupropion; Depakote [valproic acid]; and Divalproex sodium-fd&c red #40  MEDICATIONS                                                                                             Current " Outpatient Prescriptions   Medication Sig     UNABLE TO FIND MEDICATION NAME: Cortizone   injection    Given at [facility]  Placed in bilateral shoulders     cloZAPine (CLOZARIL) 100 MG tablet TAKE 4 TABLETS (400 MG) BY MOUTH EVERY NIGHT AT BEDTIME     Clozapine (CLOZARIL) 200 MG tablet TAKE 1 TABLET (200 MG) BY MOUTH DAILY     cloNIDine (CATAPRES) 0.1 MG tablet TAKE 1 TABLET TWICE A DAY BY MOUTH     benztropine (COGENTIN) 0.5 MG tablet TAKE 1 TABLET BY MOUTH TWICE A DAY     RANITIDINE HCL PO Take 300 mg by mouth daily     DOCUSATE SODIUM PO Take 100 mg by mouth 2 times daily as needed for constipation     polyethylene glycol (MIRALAX/GLYCOLAX) Packet Take 17 g by mouth daily As needed for constipation     cholecalciferol 2000 UNITS tablet Take 2,000 Units by mouth daily     No current facility-administered medications for this visit.      VITALS   /84 (BP Location: Left arm, Patient Position: Sitting, Cuff Size: Adult Regular)  Pulse 119  Temp 97.8  F (36.6  C) (Tympanic)  Wt 264 lb (119.7 kg)  BMI 35.8 kg/m2     PHQ9                       PHQ-9 SCORE 12/23/2016 1/18/2017 4/5/2017   Total Score - - -   Total Score 0 11 10       LABS                                                                                                                           Last Basic Metabolic Panel:  Lab Results   Component Value Date     02/27/2018      Lab Results   Component Value Date    POTASSIUM 3.7 02/27/2018     Lab Results   Component Value Date    CHLORIDE 107 02/27/2018     Lab Results   Component Value Date    IMAN 9.0 02/27/2018     Lab Results   Component Value Date    CO2 21 02/27/2018     Lab Results   Component Value Date    BUN 11 02/27/2018     Lab Results   Component Value Date    CR 0.87 02/27/2018     Lab Results   Component Value Date    GLC 93 02/27/2018         TSH     2.58   4/10/2016    Recent Labs   Lab Test  02/27/18   1300  09/27/16   0548   07/09/15   0818  10/01/10   1445   CHOL  227*   "192   < >  145  167   HDL  28*  46   < >  39*  54   LDL  Cannot estimate LDL when triglyceride exceeds 400 mg/dL  107*   < >  75  100   TRIG  531*  193*   < >  154*  63   CHOLHDLRATIO   --    --    --   3.7  3.0    < > = values in this interval not displayed.       Liver Function Studies -   Recent Labs   Lab Test  10/11/16   0830   PROTTOTAL  6.9   ALBUMIN  3.6   BILITOTAL  0.6   ALKPHOS  52   AST  26   ALT  83*     Clozapine level 116 as of 10/2/16 and cloz and metabolites 194    MENTAL STATUS EXAM                                                                                        Alert. Oriented to person, place, and date / time. Casually groomed -  with good eye contact. No problems with speech. Psychomotor: slight tremor of bilateral hands outstretched - no abnormal involuntary movements of jaw or or mouth noted.  Mood was described as \"I'm doing okay I think\"and affect was congruent to speech content. Thought process unremarkable.. Thought content was devoid of suicidal and homicidal ideation. Thought content: no evidence of delusions today.   No hallucinations. Insight was limited. Judgment was adequate for safety. Fund of knowledge was intact. Speech intact. attention, concentration  recent or remote memory were impaired in relation to his delusional thoughts. although these were not formally tested.     ASSESSMENT                                                                                                      Historical: scanned consults from [place] into chart 4/2015. Initial psych consult was 4/22/15. Hospital stay here 4/9/16 - 4/18/16. Hospital stay [facility] June '16    Case Discussion- This patient provides a history which supports the diagnoses of paranoid schizophrenia and Capgras syndrome.  Josafat has experienced psychosis including delusions, paranoia and hallucinations since 2004 as a teen. He has history of heavy MJ use and methamphetamine . Today he denies any recent use of drugs.. Josafat " hospitalized here and started on clozapine: he seemed to be overall doing okay / stable in terms of the psychosis but SEs. Then stopped his clozapine. Today he is doing well but getting sick and tired of SEs from clozapine. He has good insight at this time - realizes he is doing well / stable , staying out of the psych hosptial    Tachycardia; was on metoprolol but fatigue so d/c. PCP aware tachy and was evaluated by cardiology.     Mom brings forth some studies and one is Provigil or Nuvigil as for weight gain assoc with antipsychotics. We could consider this however they could decrease concentration of clozapine. We are going to start by checking a clozapine level. That way if we can get provigil to go through we can then again check clozapine level. I do think good idea / would be worth it as he may end up wanting to stop clozapine given the fatigue, weight gain so if we can help try negate the neg SEs     TREATMENT RISK STATEMENT: The risks, benefits, alternatives and potential adverse effects have been explained and are understood by the pt. The pt agrees to the treatment plan with the ability to do so. The pt knows to call the clinic for any problems or access emergency care if needed.       DIAGNOSES           Schizophrenia, paranoid type  Capgras syndrome      PLAN                                                                                                                         1) MEDICATIONS:   --  clozapine 200 mg am and 400 mg HS.  Continue miralax and docusate prn constipation.  continue  Cogentin 0.5 mg bid     2) THERAPY: no change    Labs:  We are going to check a clozapine leve. CBCs since on clozapine we can now change to every other week. Lipid panel recently     4) PT MONITOR [call for probs]: Worsening symptoms, side effects from medications, SI/HI    5) REFERRALS [CD tx, medical, tests other]: none    6)  RTC: ~1  month

## 2018-04-17 NOTE — MR AVS SNAPSHOT
"              After Visit Summary   4/17/2018    Miles Montez    MRN: [id number]           Patient Information     Date Of Birth          1985        Visit Information        Provider Department      4/17/2018 1:40 PM Marysol Maki MD [facility]        Today's Diagnoses     Chronic paranoid schizophrenia (H)    -  1       Follow-ups after your visit        Your next 10 appointments already scheduled     May 18, 2018 11:20 AM CDT   (Arrive by 11:00 AM)   Return Visit with Marysol aMki MD   [facility] ([facility] )    [address] [place] [address]  [place] [address]   [phone]              Future tests that were ordered for you today     Open Future Orders        Priority Expected Expires Ordered    Clozapine and Metabolites Quant Routine  4/17/2019 4/17/2018            Who to contact     If you have questions or need follow up information about today's clinic visit or your schedule please contact [facility] directly at [phone].  Normal or non-critical lab and imaging results will be communicated to you by WangYouhart, letter or phone within 4 business days after the clinic has received the results. If you do not hear from us within 7 days, please contact the clinic through WangYouhart or phone. If you have a critical or abnormal lab result, we will notify you by phone as soon as possible.  Submit refill requests through LOANZ or call your pharmacy and they will forward the refill request to us. Please allow 3 business days for your refill to be completed.          Additional Information About Your Visit        MyChart Information     LOANZ lets you send messages to your doctor, view your test results, renew your prescriptions, schedule appointments and more. To sign up, go to [email or web address] . Click on \"Log in\" on the left side of the screen, which will take you to the Welcome page. Then click on \"Sign up Now\" on the right side " of the page.     You will be asked to enter the access code listed below, as well as some personal information. Please follow the directions to create your username and password.     Your access code is: [email or web address]  Expires: 2018  5:21 PM     Your access code will  in 90 days. If you need help or a new code, please call your [place] clinic or [phone].        Care EveryWhere ID     This is your Care EveryWhere ID. This could be used by other organizations to access your [place] medical records  [id number]        Your Vitals Were     Pulse Temperature BMI (Body Mass Index)             119 97.8  F (36.6  C) (Tympanic) 35.8 kg/m2          Blood Pressure from Last 3 Encounters:   18 126/84   18 114/76   18 118/78    Weight from Last 3 Encounters:   18 264 lb (119.7 kg)   18 245 lb (111.1 kg)   18 245 lb (111.1 kg)               Primary Care Provider Office Phone # Fax #    Yovananuzhat Mckeon [phone]       [address] [place] [address] [address]        Equal Access to Services     CARMENCITA SCALES AH: Hadii rica lopez hadasho Soomaali, waaxda luqadaha, qaybta kaalmada adeegyada, vanessa avitia. So [facility] [phone].    ATENCIÓN: Si habla español, tiene a colon disposición servicios gratuitos de asistencia lingüística. Llame al [phone].    We comply with applicable federal civil rights laws and [place] laws. We do not discriminate on the basis of race, color, national origin, age, disability, sex, sexual orientation, or gender identity.            Thank you!     Thank you for choosing [facility]  for your care. Our goal is always to provide you with excellent care. Hearing back from our patients is one way we can continue to improve our services. Please take a few minutes to complete the written survey that you may receive in the mail after your visit with us. Thank you!             Your  Updated Medication List - Protect others around you: Learn how to safely use, store and throw away your medicines at www.disposemymeds.org.          This list is accurate as of 4/17/18  2:37 PM.  Always use your most recent med list.                   Brand Name Dispense Instructions for use Diagnosis    benztropine 0.5 MG tablet    COGENTIN    60 tablet    TAKE 1 TABLET BY MOUTH TWICE A DAY    Schizophrenia, paranoid, subchronic with acute exacerbation (H)       cholecalciferol 2000 units tablet     30 tablet    Take 2,000 Units by mouth daily    Vitamin D deficiency       cloNIDine 0.1 MG tablet    CATAPRES    56 tablet    TAKE 1 TABLET TWICE A DAY BY MOUTH    Schizophrenia, paranoid, subchronic with acute exacerbation (H)       * cloZAPine 100 MG tablet    CLOZARIL    28 tablet    TAKE 4 TABLETS (400 MG) BY MOUTH EVERY NIGHT AT BEDTIME    Chronic paranoid schizophrenia (H)       * Clozapine 200 MG tablet    CLOZARIL    7 tablet    TAKE 1 TABLET (200 MG) BY MOUTH DAILY    Chronic paranoid schizophrenia (H)       DOCUSATE SODIUM PO      Take 100 mg by mouth 2 times daily as needed for constipation        polyethylene glycol Packet    MIRALAX/GLYCOLAX    30 packet    Take 17 g by mouth daily As needed for constipation    Slow transit constipation       RANITIDINE HCL PO      Take 300 mg by mouth daily        UNABLE TO FIND      MEDICATION NAME: Cortizone  injection  Given at [facility] Placed in bilateral shoulders        * Notice:  This list has 2 medication(s) that are the same as other medications prescribed for you. Read the directions carefully, and ask your doctor or other care provider to review them with you.

## 2018-04-18 DIAGNOSIS — F20.0 CHRONIC PARANOID SCHIZOPHRENIA (H): ICD-10-CM

## 2018-04-18 PROCEDURE — 80159 DRUG ASSAY CLOZAPINE: CPT | Mod: ZL | Performed by: PSYCHIATRY & NEUROLOGY

## 2018-04-18 PROCEDURE — 36415 COLL VENOUS BLD VENIPUNCTURE: CPT | Mod: ZL | Performed by: PSYCHIATRY & NEUROLOGY

## 2018-04-18 PROCEDURE — 99000 SPECIMEN HANDLING OFFICE-LAB: CPT | Performed by: PSYCHIATRY & NEUROLOGY

## 2018-04-19 ENCOUNTER — TELEPHONE (OUTPATIENT)
Dept: PSYCHIATRY | Facility: OTHER | Age: 33
End: 2018-04-19

## 2018-04-19 DIAGNOSIS — F20.0 CHRONIC PARANOID SCHIZOPHRENIA (H): ICD-10-CM

## 2018-04-19 NOTE — TELEPHONE ENCOUNTER
Malika is calling to let Dr. Maki know that Miles went to [facility] yesterday to have levels drawn for Clozaril.  In the notes, Provigil was discussed and Malika wanted us to let her know when this medication would be called / sent to [facility].  Thank you , please advise.

## 2018-04-20 RX ORDER — CLOZAPINE 100 MG/1
TABLET ORAL
Qty: 28 TABLET | Refills: 0 | Status: SHIPPED | OUTPATIENT
Start: 2018-04-20 | End: 2018-04-27

## 2018-04-20 RX ORDER — CLOZAPINE 200 MG/1
TABLET ORAL
Qty: 7 TABLET | Refills: 0 | Status: SHIPPED | OUTPATIENT
Start: 2018-04-20 | End: 2018-04-27

## 2018-04-20 NOTE — TELEPHONE ENCOUNTER
Notified Malika that it will take a while to get Clozapine level to come back.  There is a chance that insurance will not cover Provigil.  When the Clozapine level comes back will fax Provigil to pharmacy.  Malika is ok with this plan

## 2018-04-20 NOTE — TELEPHONE ENCOUNTER
gonna be awhile. Will take awhile for clozapine level to come back and there's a chance insurance isn't going to cover Provigil or Nuvigil. We are going to try using it for his fatigue, cognitive cloudiness, weight... So very likely insurance will not agree to cover it. Once clozapine level comes back I will start the process though and fax it to pharmacy.

## 2018-04-20 NOTE — TELEPHONE ENCOUNTER
Clozapine 100 mg       Last Written Prescription Date:  4/13/18  Last Fill Quantity: 28,   # refills: 0  Last Office Visit: 4/17/18  Future Office visit:    Next 5 appointments (look out 90 days)     May 18, 2018 11:20 AM CDT   (Arrive by 11:00 AM)   Return Visit with Marysol Maki MD   [facility] ([facility] )    [address] [place] [address]   [phone]                   Routing refill request to provider for review/approval because:        200 mg      Last Written Prescription Date:  4/13/18  Last Fill Quantity: 7,   # refills: 0  Last Office Visit: 4/17/18  Future Office visit:      Dr. Maki to advise.

## 2018-04-22 LAB
CLOZAPINE AND METABOLITES TOTAL: 527 NG/ML
CLOZAPINE SERPL-MCNC: 329 NG/ML
CLOZAPINE-N-OXIDE QUANT: <100 NG/ML
NORCLOZAPINE SERPL-MCNC: 198 NG/ML

## 2018-04-23 RX ORDER — ARMODAFINIL 50 MG/1
50 TABLET ORAL EVERY MORNING
Qty: 30 TABLET | Refills: 3 | Status: SHIPPED | OUTPATIENT
Start: 2018-04-23 | End: 2018-08-14

## 2018-04-27 DIAGNOSIS — F20.0 CHRONIC PARANOID SCHIZOPHRENIA (H): ICD-10-CM

## 2018-04-30 NOTE — TELEPHONE ENCOUNTER
Clozaril 100 mg    Last Written Prescription Date:  4/20/18  Last Fill Quantity: 28,   # refills: 0  Last Office Visit: 4/17/18  Future Office visit:    Next 5 appointments (look out 90 days)     May 18, 2018 11:20 AM CDT   (Arrive by 11:00 AM)   Return Visit with Marysol Maki MD   [facility] )    [address] [place], [place] [address] [address]   [phone]                 Clozaril 200 mg      Last Written Prescription Date:  4/20/18  Last Fill Quantity: 7,   # refills: 0  Future Office visit:    Next 5 appointments (look out 90 days)     May 18, 2018 11:20 AM CDT   (Arrive by 11:00 AM)   Return Visit with Marysol Maki MD   [facility] ([facility] )    [address] [place], [place] [address] [place] [address]   [phone]                   Routing refill request to provider for review/approval because:

## 2018-05-02 RX ORDER — CLOZAPINE 200 MG/1
TABLET ORAL
Qty: 7 TABLET | Refills: 0 | Status: SHIPPED | OUTPATIENT
Start: 2018-05-02 | End: 2018-05-04

## 2018-05-02 RX ORDER — CLOZAPINE 100 MG/1
TABLET ORAL
Qty: 28 TABLET | Refills: 0 | Status: SHIPPED | OUTPATIENT
Start: 2018-05-02 | End: 2018-05-04

## 2018-05-04 DIAGNOSIS — F20.0 CHRONIC PARANOID SCHIZOPHRENIA (H): ICD-10-CM

## 2018-05-07 NOTE — TELEPHONE ENCOUNTER
Clozaril 100 mg  Last office visit: 4/17/18  Last refill: 5/2/18 #28, 0 R  Clozaril 200 mg   Last refill: 5/2/18 #7, 0 R  Thank you.

## 2018-05-08 RX ORDER — CLOZAPINE 100 MG/1
TABLET ORAL
Qty: 28 TABLET | Refills: 0 | Status: SHIPPED | OUTPATIENT
Start: 2018-05-08 | End: 2018-05-11

## 2018-05-08 RX ORDER — CLOZAPINE 200 MG/1
TABLET ORAL
Qty: 7 TABLET | Refills: 0 | Status: SHIPPED | OUTPATIENT
Start: 2018-05-08 | End: 2018-05-11

## 2018-05-11 ENCOUNTER — TELEPHONE (OUTPATIENT)
Dept: PSYCHIATRY | Facility: OTHER | Age: 33
End: 2018-05-11

## 2018-05-11 DIAGNOSIS — F20.0 CHRONIC PARANOID SCHIZOPHRENIA (H): ICD-10-CM

## 2018-05-11 NOTE — TELEPHONE ENCOUNTER
Received incoming VM from Malika.  She states that Tello has been coming weekly to do blood draws and it sounds like this has been changed to weekly.  Wondering if Dr. Maki could let Tello (Rin?) know this.     Malika also states that the Nuvigil was approved, but not sure if it is helping because of the anti-psych medication.  Will wait and see on this.  Next f/u is on 5-18-18.  Thank you

## 2018-05-14 NOTE — TELEPHONE ENCOUNTER
Clozaril  Last office visit: 4/17/18  Last refill: 5/8/18 #28, 0 R 100 mg and 5/8/18 #7 for 200 mg  Thank you.

## 2018-05-16 RX ORDER — CLOZAPINE 100 MG/1
TABLET ORAL
Qty: 28 TABLET | Refills: 0 | Status: SHIPPED | OUTPATIENT
Start: 2018-05-16 | End: 2018-06-19

## 2018-05-16 RX ORDER — CLOZAPINE 200 MG/1
TABLET ORAL
Qty: 7 TABLET | Refills: 0 | Status: SHIPPED | OUTPATIENT
Start: 2018-05-16 | End: 2018-05-18

## 2018-05-18 ENCOUNTER — OFFICE VISIT (OUTPATIENT)
Dept: PSYCHIATRY | Facility: OTHER | Age: 33
End: 2018-05-18
Attending: PSYCHIATRY & NEUROLOGY
Payer: COMMERCIAL

## 2018-05-18 VITALS
TEMPERATURE: 98.2 F | BODY MASS INDEX: 35.13 KG/M2 | DIASTOLIC BLOOD PRESSURE: 92 MMHG | HEART RATE: 117 BPM | WEIGHT: 259 LBS | SYSTOLIC BLOOD PRESSURE: 136 MMHG

## 2018-05-18 DIAGNOSIS — E78.1 HYPERTRIGLYCERIDEMIA: Primary | ICD-10-CM

## 2018-05-18 DIAGNOSIS — K59.03 DRUG-INDUCED CONSTIPATION: ICD-10-CM

## 2018-05-18 DIAGNOSIS — F20.0 CHRONIC PARANOID SCHIZOPHRENIA (H): ICD-10-CM

## 2018-05-18 PROCEDURE — G0463 HOSPITAL OUTPT CLINIC VISIT: HCPCS

## 2018-05-18 PROCEDURE — 99214 OFFICE O/P EST MOD 30 MIN: CPT | Performed by: PSYCHIATRY & NEUROLOGY

## 2018-05-18 RX ORDER — PYRITHIONE ZINC 1 G/ML
1 SHAMPOO TOPICAL DAILY
Qty: 90 CAPSULE | Refills: 3 | Status: SHIPPED | OUTPATIENT
Start: 2018-05-18 | End: 2022-10-19

## 2018-05-18 ASSESSMENT — PAIN SCALES - GENERAL: PAINLEVEL: NO PAIN (0)

## 2018-05-18 NOTE — MR AVS SNAPSHOT
"              After Visit Summary   5/18/2018    Miles Montez    MRN: [id number]           Patient Information     Date Of Birth          1985        Visit Information        Provider Department      5/18/2018 11:20 AM Marysol Maki MD [facility]        Today's Diagnoses     Hypertriglyceridemia    -  1    Drug-induced constipation           Follow-ups after your visit        Your next 10 appointments already scheduled     Jun 19, 2018 11:20 AM CDT   (Arrive by 11:05 AM)   Return Visit with Marysol Maki MD   [facility] ([facility] [place] )    [address] [place] [address]  [place] [address]   [phone]              Who to contact     If you have questions or need follow up information about today's clinic visit or your schedule please contact [facility] directly at [phone].  Normal or non-critical lab and imaging results will be communicated to you by MyChart, letter or phone within 4 business days after the clinic has received the results. If you do not hear from us within 7 days, please contact the clinic through MyChart or phone. If you have a critical or abnormal lab result, we will notify you by phone as soon as possible.  Submit refill requests through QoL Meds or call your pharmacy and they will forward the refill request to us. Please allow 3 business days for your refill to be completed.          Additional Information About Your Visit        MyChart Information     QoL Meds lets you send messages to your doctor, view your test results, renew your prescriptions, schedule appointments and more. To sign up, go to [email or web address] . Click on \"Log in\" on the left side of the screen, which will take you to the Welcome page. Then click on \"Sign up Now\" on the right side of the page.     You will be asked to enter the access code listed below, as well as some personal information. Please follow the directions to create your username " and password.     Your access code is: [email or web address]  Expires: 2018  7:19 AM     Your access code will  in 90 days. If you need help or a new code, please call your [facility] or [phone].        Care EveryWhere ID     This is your Care EveryWhere ID. This could be used by other organizations to access your [place] medical records  [id number]        Your Vitals Were     Pulse Temperature BMI (Body Mass Index)             117 98.2  F (36.8  C) (Tympanic) 35.13 kg/m2          Blood Pressure from Last 3 Encounters:   18 (!) 136/92   18 126/84   18 114/76    Weight from Last 3 Encounters:   18 259 lb (117.5 kg)   18 264 lb (119.7 kg)   18 245 lb (111.1 kg)              Today, you had the following     No orders found for display         Today's Medication Changes          These changes are accurate as of 18 11:59 PM.  If you have any questions, ask your nurse or doctor.               Start taking these medicines.        Dose/Directions    EQL FISH OIL 1000 MG Caps   Used for:  Hypertriglyceridemia   Started by:  Marysol Maki MD        Dose:  1 capsule   Take 1 capsule by mouth daily   Quantity:  90 capsule   Refills:  3       methylcellulose 500 MG Tabs tablet   Commonly known as:  CITRUCEL   Used for:  Drug-induced constipation   Started by:  Marysol Maki MD        Dose:  500 mg   Take 1 tablet (500 mg) by mouth daily   Quantity:  30 each   Refills:  11            Where to get your medicines      These medications were sent to [facility] - [place], [place] - [address]  [address] [place] [address]     Phone:  [phone]     EQL FISH OIL 1000 MG Caps    methylcellulose 500 MG Tabs tablet                Primary Care Provider Office Phone # Fax #    Yovana Mckeon [phone]       [address] [address]        Equal Access to Services     CARMENCITA SCALES AH: Julián Melchor  wajudeda nidiaadaha, qaybta kaalmada sherine, vanessa olivarez patricrenny finneyaadalia ah. So [facility] [phone].    ATENCIÓN: Si ever cox, tiene a colon disposición servicios gratuitos de asistencia lingüística. Chase al [phone].    We comply with applicable federal civil rights laws and [place] laws. We do not discriminate on the basis of race, color, national origin, age, disability, sex, sexual orientation, or gender identity.            Thank you!     Thank you for choosing [facility]  for your care. Our goal is always to provide you with excellent care. Hearing back from our patients is one way we can continue to improve our services. Please take a few minutes to complete the written survey that you may receive in the mail after your visit with us. Thank you!             Your Updated Medication List - Protect others around you: Learn how to safely use, store and throw away your medicines at www.disposemymeds.org.          This list is accurate as of 5/18/18 11:59 PM.  Always use your most recent med list.                   Brand Name Dispense Instructions for use Diagnosis    armodafinil 50 MG Tabs tablet    NUVIGIL    30 tablet    Take 1 tablet (50 mg) by mouth every morning    Chronic paranoid schizophrenia (H)       benztropine 0.5 MG tablet    COGENTIN    60 tablet    TAKE 1 TABLET BY MOUTH TWICE A DAY    Schizophrenia, paranoid, subchronic with acute exacerbation (H)       cholecalciferol 2000 units tablet     30 tablet    Take 2,000 Units by mouth daily    Vitamin D deficiency       cloNIDine 0.1 MG tablet    CATAPRES    56 tablet    TAKE 1 TABLET TWICE A DAY BY MOUTH    Schizophrenia, paranoid, subchronic with acute exacerbation (H)       * cloZAPine 100 MG tablet    CLOZARIL    28 tablet    TAKE 4 TABLETS (400 MG) BY MOUTH EVERY NIGHT AT BEDTIME    Chronic paranoid schizophrenia (H)       * Clozapine 200 MG tablet    CLOZARIL    7 tablet    TAKE 1 TABLET (200 MG) BY MOUTH DAILY     Chronic paranoid schizophrenia (H)       DOCUSATE SODIUM PO      Take 100 mg by mouth 2 times daily as needed for constipation        EQL FISH OIL 1000 MG Caps     90 capsule    Take 1 capsule by mouth daily    Hypertriglyceridemia       methylcellulose 500 MG Tabs tablet    CITRUCEL    30 each    Take 1 tablet (500 mg) by mouth daily    Drug-induced constipation       polyethylene glycol Packet    MIRALAX/GLYCOLAX    30 packet    Take 17 g by mouth daily As needed for constipation    Slow transit constipation       RANITIDINE HCL PO      Take 300 mg by mouth daily        UNABLE TO FIND      MEDICATION NAME: Cortizone  injection  Given at [facility] Placed in bilateral shoulders        * Notice:  This list has 2 medication(s) that are the same as other medications prescribed for you. Read the directions carefully, and ask your doctor or other care provider to review them with you.

## 2018-05-18 NOTE — TELEPHONE ENCOUNTER
Clozaril last signed on 5/16/18 for both #28 and #7, 0 R  I see that it comes from Rin Enriquez.  Last office visit: 5/18/18  Thank you.

## 2018-05-18 NOTE — NURSING NOTE
Chief Complaint   Patient presents with     RECHECK     Mental health.       Initial BP (!) 136/92 (BP Location: Left arm, Patient Position: Sitting, Cuff Size: Adult Regular)  Pulse 117  Temp 98.2  F (36.8  C) (Tympanic)  Wt 259 lb (117.5 kg)  BMI 35.13 kg/m2 Estimated body mass index is 35.13 kg/(m^2) as calculated from the following:    Height as of 8/20/17: 6' (1.829 m).    Weight as of this encounter: 259 lb (117.5 kg).  Medication Reconciliation: complete    MARIKA OQUENDO LPN

## 2018-05-18 NOTE — PROGRESS NOTES
"  PSYCHIATRY CLINIC PROGRESS NOTE   40 minute medication management, more than 50% of time spent counseling patient on medications, medication side effects, symptom history and management   SUBJECTIVE / INTERIM HISTORY                                                                       Last visit April '18:  clozapine 200 mg am and 400 mg HS.  Continue miralax and docusate prn constipation.  continue  Cogentin 0.5 mg bid  - mom notes she has been researching studies for schizophrenia: Provigil use for weight, cognition, mood, fatigue. She takes and finds very helpful. Nuvigil went through and felt like was helping initially but then was thinking maybe SEs. There was a stressful time with his [facility] which Josafat and his brother Anuel went . Things got stressful with missing $, etc. So it's hard to say if it's med vs. Situational. People Josafat thinks people were usinig, etc.  - thinks nuvigil maybe did help with motivating him, etc. Then he just unfortunately ended up in a situation that made him more stressful.  - weight gain : brings him down.   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 Nurses come out  - commitment through April of 2018 is done but still wanting to have ACT team come out  - \"Juarez\" the  [employer]ottie     SUBSTANCE USE- meth and MJ in past. Hasn't used MJ for 8-9 months    SYMPTOMS-some depressed mood, low energy, weight gain, anhdeonida, no SI/HI. No evidence any delusions today.  no AH/VH, denies thought blocking, memory impairment  MEDICAL ROS-  SOB, Tired, Tremor, Fatigue, weight gain,   MEDICAL / SURGICAL HISTORY            Medical Team:     PMD-       Therapist- Brenda at [facility]   Patient Active Problem List   Diagnosis     Episodic mood disorder (H)     Poisoning by Methamphetamines     Cannabis abuse     Hypertension goal BP (blood pressure) < 140/90     Psychosis     Depression     Paranoid schizophrenia, chronic condition with acute " exacerbation (H)     Schizophrenia, paranoid, subchronic with acute exacerbation (H)     Schizophrenia, paranoid type (H)     Drug eruption     ACP (advance care planning)     ALLERGY   Pcn [bicillin c-r,]; Bupropion; Depakote [valproic acid]; and Divalproex sodium-fd&c red #40  MEDICATIONS                                                                                             Current Outpatient Prescriptions   Medication Sig     armodafinil (NUVIGIL) 50 MG TABS tablet Take 1 tablet (50 mg) by mouth every morning     benztropine (COGENTIN) 0.5 MG tablet TAKE 1 TABLET BY MOUTH TWICE A DAY     cholecalciferol 2000 UNITS tablet Take 2,000 Units by mouth daily     cloNIDine (CATAPRES) 0.1 MG tablet TAKE 1 TABLET TWICE A DAY BY MOUTH     cloZAPine (CLOZARIL) 100 MG tablet TAKE 4 TABLETS (400 MG) BY MOUTH EVERY NIGHT AT BEDTIME     Clozapine (CLOZARIL) 200 MG tablet TAKE 1 TABLET (200 MG) BY MOUTH DAILY     DOCUSATE SODIUM PO Take 100 mg by mouth 2 times daily as needed for constipation     polyethylene glycol (MIRALAX/GLYCOLAX) Packet Take 17 g by mouth daily As needed for constipation     RANITIDINE HCL PO Take 300 mg by mouth daily     UNABLE TO FIND MEDICATION NAME: Cortizone   injection    Given at [facility]  Placed in bilateral shoulders     No current facility-administered medications for this visit.      VITALS   BP (!) 136/92 (BP Location: Left arm, Patient Position: Sitting, Cuff Size: Adult Regular)  Pulse 117  Temp 98.2  F (36.8  C) (Tympanic)  Wt 259 lb (117.5 kg)  BMI 35.13 kg/m2     PHQ9                       PHQ-9 SCORE 12/23/2016 1/18/2017 4/5/2017   Total Score - - -   Total Score 0 11 10       LABS                                                                                                                           Last Basic Metabolic Panel:  Lab Results   Component Value Date     02/27/2018      Lab Results   Component Value Date    POTASSIUM 3.7 02/27/2018     Lab Results  "  Component Value Date    CHLORIDE 107 02/27/2018     Lab Results   Component Value Date    IMAN 9.0 02/27/2018     Lab Results   Component Value Date    CO2 21 02/27/2018     Lab Results   Component Value Date    BUN 11 02/27/2018     Lab Results   Component Value Date    CR 0.87 02/27/2018     Lab Results   Component Value Date    GLC 93 02/27/2018         TSH     2.58   4/10/2016    Recent Labs   Lab Test  02/27/18   1300  09/27/16   0548   07/09/15   0818  10/01/10   1445   CHOL  227*  192   < >  145  167   HDL  28*  46   < >  39*  54   LDL  Cannot estimate LDL when triglyceride exceeds 400 mg/dL  107*   < >  75  100   TRIG  531*  193*   < >  154*  63   CHOLHDLRATIO   --    --    --   3.7  3.0    < > = values in this interval not displayed.       Liver Function Studies -   Recent Labs   Lab Test  10/11/16   0830   PROTTOTAL  6.9   ALBUMIN  3.6   BILITOTAL  0.6   ALKPHOS  52   AST  26   ALT  83*     Clozapine level 116 as of 10/2/16 and cloz and metabolites 194  Clozapine level 329 as of 4/18/18 and clozapine and metabolites 527    MENTAL STATUS EXAM                                                                                        Alert. Oriented to person, place, and date / time. Casually groomed -  with good eye contact. No problems with speech. Psychomotor: slight tremor of bilateral hands outstretched - no abnormal involuntary movements of jaw or or mouth noted.  Mood was described as \"It's been a stressful week\"and affect was congruent to speech content. Thought process unremarkable.. Thought content was devoid of suicidal and homicidal ideation. Thought content: no evidence of delusions today.   No hallucinations. Insight was limited. Judgment was adequate for safety. Fund of knowledge was intact. Speech intact. attention, concentration  recent or remote memory were impaired in relation to his delusional thoughts. although these were not formally tested.     ASSESSMENT                                    "                                                                   Historical: scanned consults from [facility] into chart 4/2015. Initial psych consult was 4/22/15. Hospital stay here 4/9/16 - 4/18/16. Hospital stay [facility] June '16    Case Discussion- This patient provides a history which supports the diagnoses of paranoid schizophrenia and Capgras syndrome.  Josafat has experienced psychosis including delusions, paranoia and hallucinations since 2004 as a teen. He has history of heavy MJ use and methamphetamine . Today he denies any recent use of drugs.. Josafat hospitalized here and started on clozapine: he seemed to be overall doing okay / stable in terms of the psychosis but SEs. Then stopped his clozapine. Today he is doing well but getting sick and tired of SEs from clozapine. He has good insight at this time - realizes he is doing well / stable , staying out of the psych hosptial    Tachycardia; was on metoprolol but fatigue so d/c. PCP aware tachy and was evaluated by cardiology.     Mom brought forth some studies and one is Provigil or Nuvigil as for weight gain assoc with antipsychotics. Started on Nuvigil and seems it did help with increasing motivation as well as some areas of cognition. Josafat had a situation (the trip to the [place] in which his dad brought people he didn't know) really stressed him out so this was a bit of a setback and it was hard to tell today if some of his anxiety is from this vs. From Nuvigil. He stopped taking it after this and I'm fine with that: he's taking break and noted I'm keeping the script filled in case he decides to restart it.    TREATMENT RISK STATEMENT: The risks, benefits, alternatives and potential adverse effects have been explained and are understood by the pt. The pt agrees to the treatment plan with the ability to do so. The pt knows to call the clinic for any problems or access emergency care if needed.       DIAGNOSES           Schizophrenia, paranoid type  Capgras  syndrome      PLAN                                                                                                                         1) MEDICATIONS:   --  clozapine 200 mg am and 400 mg HS.  Continue miralax and docusate prn constipation.  continue  Cogentin 0.5 mg bid. Has Nuvigil 50 mg tabs if he decides to restart it.    2) THERAPY: no change    Labs:  None today. CBCs since on clozapine we can now change to every other week. Lipid panel recently     4) PT MONITOR [call for probs]: Worsening symptoms, side effects from medications, SI/HI    5) REFERRALS [CD tx, medical, tests other]: none    6)  RTC: ~1 month

## 2018-05-20 RX ORDER — CLOZAPINE 200 MG/1
TABLET ORAL
Qty: 21 TABLET | Refills: 0 | Status: SHIPPED | OUTPATIENT
Start: 2018-05-20 | End: 2018-05-30

## 2018-05-20 RX ORDER — CLOZAPINE 100 MG/1
TABLET ORAL
Qty: 28 TABLET | Refills: 0 | OUTPATIENT
Start: 2018-05-20

## 2018-05-24 DIAGNOSIS — F20.0 CHRONIC PARANOID SCHIZOPHRENIA (H): ICD-10-CM

## 2018-05-30 RX ORDER — CLOZAPINE 200 MG/1
TABLET ORAL
Qty: 21 TABLET | OUTPATIENT
Start: 2018-05-30

## 2018-05-30 RX ORDER — CLOZAPINE 200 MG/1
TABLET ORAL
Qty: 21 TABLET | Refills: 0 | Status: SHIPPED | OUTPATIENT
Start: 2018-05-30 | End: 2018-05-31

## 2018-06-15 DIAGNOSIS — F20.0 SCHIZOPHRENIA, PARANOID, SUBCHRONIC WITH ACUTE EXACERBATION (H): ICD-10-CM

## 2018-06-15 RX ORDER — CLONIDINE HYDROCHLORIDE 0.1 MG/1
TABLET ORAL
Qty: 56 TABLET | Refills: 1 | Status: SHIPPED | OUTPATIENT
Start: 2018-06-15 | End: 2018-09-11

## 2018-06-19 ENCOUNTER — OFFICE VISIT (OUTPATIENT)
Dept: PSYCHIATRY | Facility: OTHER | Age: 33
End: 2018-06-19
Attending: PSYCHIATRY & NEUROLOGY
Payer: COMMERCIAL

## 2018-06-19 VITALS
SYSTOLIC BLOOD PRESSURE: 116 MMHG | HEART RATE: 104 BPM | WEIGHT: 260 LBS | DIASTOLIC BLOOD PRESSURE: 80 MMHG | BODY MASS INDEX: 35.26 KG/M2 | TEMPERATURE: 99.1 F

## 2018-06-19 DIAGNOSIS — F20.0 CHRONIC PARANOID SCHIZOPHRENIA (H): Primary | ICD-10-CM

## 2018-06-19 PROCEDURE — 99213 OFFICE O/P EST LOW 20 MIN: CPT | Performed by: PSYCHIATRY & NEUROLOGY

## 2018-06-19 PROCEDURE — G0463 HOSPITAL OUTPT CLINIC VISIT: HCPCS

## 2018-06-19 ASSESSMENT — PAIN SCALES - GENERAL: PAINLEVEL: NO PAIN (0)

## 2018-06-19 NOTE — PROGRESS NOTES
"  PSYCHIATRY CLINIC PROGRESS NOTE   40 minute medication management, more than 50% of time spent counseling patient on medications, medication side effects, symptom history and management   SUBJECTIVE / INTERIM HISTORY                                                                       Last visit April '18:  clozapine 200 mg am and 400 mg HS.  Continue miralax and docusate prn constipation.  continue  Cogentin 0.5 mg bid  - thinks Nuvigil is helping. Feels less depressed, has more motivation. Ex) weed twan, lawn mower  - still feeling tired, thinking maybe we could reduce am dose cloazapine...  - weight gain : brings him down.   - ACT team: Mika his . Nurses come out  - commitment through April of 2018 is done but still wanting to have ACT team come out  - \"Juarez\" the  Rottie     SUBSTANCE USE- meth and MJ in past. Hasn't used MJ for 8-9 months    SYMPTOMS-some depressed mood, low energy, weight gain, anhdeonida, no SI/HI. No evidence any delusions today.  no AH/VH, denies thought blocking, memory impairment  MEDICAL ROS-  SOB, Tired, Tremor, Fatigue, weight gain,   MEDICAL / SURGICAL HISTORY            Medical Team:     PMD-       Therapist- Brenda at [facility]   Patient Active Problem List   Diagnosis     Episodic mood disorder (H)     Poisoning by Methamphetamines     Cannabis abuse     Hypertension goal BP (blood pressure) < 140/90     Psychosis     Depression     Paranoid schizophrenia, chronic condition with acute exacerbation (H)     Schizophrenia, paranoid, subchronic with acute exacerbation (H)     Schizophrenia, paranoid type (H)     Drug eruption     ACP (advance care planning)     ALLERGY   Penicillins; Bupropion; Depakote [valproic acid]; and Divalproex sodium-fd&c red #40  MEDICATIONS                                                                                             Current Outpatient Prescriptions   Medication Sig     armodafinil (NUVIGIL) 50 MG TABS tablet Take 1 tablet (50 mg) by " mouth every morning     benztropine (COGENTIN) 0.5 MG tablet TAKE 1 TABLET BY MOUTH TWICE A DAY     cholecalciferol 2000 UNITS tablet Take 2,000 Units by mouth daily     cloNIDine (CATAPRES) 0.1 MG tablet TAKE 1 TABLET TWICE A DAY BY MOUTH     Clozapine (CLOZARIL) 200 MG tablet TAKE 1 TABLET (200MG) EVERY MORNING & TAKE 2 TABLETS (400MG) EVERY EVENING FOR A TOTAL DAILY DOSE OF 600MG     DOCUSATE SODIUM PO Take 100 mg by mouth 2 times daily as needed for constipation     methylcellulose (CITRUCEL) 500 MG TABS tablet Take 1 tablet (500 mg) by mouth daily     Omega-3 Fatty Acids (EQL FISH OIL) 1000 MG CAPS Take 1 capsule by mouth daily     polyethylene glycol (MIRALAX/GLYCOLAX) Packet Take 17 g by mouth daily As needed for constipation     RANITIDINE HCL PO Take 300 mg by mouth daily     UNABLE TO FIND MEDICATION NAME: Cortizone   injection    Given at [facility]  Placed in bilateral shoulders     [DISCONTINUED] cloZAPine (CLOZARIL) 100 MG tablet TAKE 4 TABLETS (400 MG) BY MOUTH EVERY NIGHT AT BEDTIME     No current facility-administered medications for this visit.      VITALS   /80 (BP Location: Right arm, Patient Position: Sitting, Cuff Size: Adult Large)  Pulse 104  Temp 99.1  F (37.3  C) (Tympanic)  Wt 260 lb (117.9 kg)  BMI 35.26 kg/m2     PHQ9                       PHQ-9 SCORE 12/23/2016 1/18/2017 4/5/2017   Total Score - - -   Total Score 0 11 10       LABS                                                                                                                           Last Basic Metabolic Panel:  Lab Results   Component Value Date     02/27/2018      Lab Results   Component Value Date    POTASSIUM 3.7 02/27/2018     Lab Results   Component Value Date    CHLORIDE 107 02/27/2018     Lab Results   Component Value Date    IMAN 9.0 02/27/2018     Lab Results   Component Value Date    CO2 21 02/27/2018     Lab Results   Component Value Date    BUN 11 02/27/2018     Lab Results   Component  "Value Date    CR 0.87 02/27/2018     Lab Results   Component Value Date    GLC 93 02/27/2018         TSH     2.58   4/10/2016    Recent Labs   Lab Test  02/27/18   1300  09/27/16   0548   07/09/15   0818  10/01/10   1445   CHOL  227*  192   < >  145  167   HDL  28*  46   < >  39*  54   LDL  Cannot estimate LDL when triglyceride exceeds 400 mg/dL  107*   < >  75  100   TRIG  531*  193*   < >  154*  63   CHOLHDLRATIO   --    --    --   3.7  3.0    < > = values in this interval not displayed.       Liver Function Studies -   Recent Labs   Lab Test  10/11/16   0830   PROTTOTAL  6.9   ALBUMIN  3.6   BILITOTAL  0.6   ALKPHOS  52   AST  26   ALT  83*     Clozapine level 116 as of 10/2/16 and cloz and metabolites 194  Clozapine level 329 as of 4/18/18 and clozapine and metabolites 527    MENTAL STATUS EXAM                                                                                        Alert. Oriented to person, place, and date / time. Casually groomed -  with good eye contact. No problems with speech. Psychomotor:  no abnormal involuntary movements of jaw or or mouth noted.  Mood was described as \"pretty good\" and affect was congruent to speech content. Thought process unremarkable.. Thought content was devoid of suicidal and homicidal ideation. Thought content: no evidence of delusions today.   No hallucinations. Insight was fair. Judgment was adequate for safety. Fund of knowledge was intact. Speech intact. attention, concentration  recent or remote memory were impaired in relation to his delusional thoughts. although these were not formally tested.     ASSESSMENT                                                                                                      Historical: scanned consults from [facility] into chart 4/2015. Initial psych consult was 4/22/15. Hospital stay here 4/9/16 - 4/18/16. Hospital stay [facility] June '16    Case Discussion- This patient provides a history which supports the diagnoses of " paranoid schizophrenia and Capgras syndrome.  Josafat has experienced psychosis including delusions, paranoia and hallucinations since 2004 as a teen. He has history of heavy MJ use and methamphetamine . Today he denies any recent use of drugs.. Josafat hospitalized here and started on clozapine    Tachycardia; was on metoprolol but fatigue so d/c. PCP aware tachy and was evaluated by cardiology.     Mom brought forth some studies and one is Provigil or Nuvigil as for weight gain assoc with antipsychotics. Started on Nuvigil and seems it did help with increasing motivation as well as some areas of cognition. Today he feels Nuvigil is helping. He seems to me to be doing little better to me - best I've seen him in awhile!    TREATMENT RISK STATEMENT: The risks, benefits, alternatives and potential adverse effects have been explained and are understood by the pt. The pt agrees to the treatment plan with the ability to do so. The pt knows to call the clinic for any problems or access emergency care if needed.       DIAGNOSES           Schizophrenia, paranoid type  Capgras syndrome      PLAN                                                                                                                         1) MEDICATIONS:   --  clozapine 200 mg am and 400 mg HS.  Continue miralax and docusate prn constipation.  continue  Cogentin 0.5 mg bid. Has Nuvigil and we are continuing it.      2) THERAPY: no change    Labs:  None today. CBCs since on clozapine and we changed now (June '18) to  every other week. Lipid panel 2/2018     4) PT MONITOR [call for probs]: Worsening symptoms, side effects from medications, SI/HI    5) REFERRALS [CD tx, medical, tests other]: none    6)  RTC: ~1 month

## 2018-06-19 NOTE — MR AVS SNAPSHOT
After Visit Summary   6/19/2018    Miles Montez    MRN: [id number]           Patient Information     Date Of Birth          1985        Visit Information        Provider Department      6/19/2018 11:20 AM Marysol Maki MD [facility] [place]         Follow-ups after your visit        Your next 10 appointments already scheduled     Jul 20, 2018 11:20 AM CDT   (Arrive by 11:05 AM)   Return Visit with Marysol Maki MD   [facility] [place] ([facility] [place] )    [address] [place] [address]  [place] [address] [phone]              Who to contact     If you have questions or need follow up information about today's clinic visit or your schedule please contact [facility] directly at [phone].  Normal or non-critical lab and imaging results will be communicated to you by MyChart, letter or phone within 4 business days after the clinic has received the results. If you do not hear from us within 7 days, please contact the clinic through MyChart or phone. If you have a critical or abnormal lab result, we will notify you by phone as soon as possible.  Submit refill requests through [employer] or call your pharmacy and they will forward the refill request to us. Please allow 3 business days for your refill to be completed.          Additional Information About Your Visit        Care EveryWhere ID     This is your Care EveryWhere ID. This could be used by other organizations to access your [place] medical records  [id number]        Your Vitals Were     Pulse Temperature BMI (Body Mass Index)             104 99.1  F (37.3  C) (Tympanic) 35.26 kg/m2          Blood Pressure from Last 3 Encounters:   06/19/18 116/80   05/18/18 (!) 136/92   04/17/18 126/84    Weight from Last 3 Encounters:   06/19/18 260 lb (117.9 kg)   05/18/18 259 lb (117.5 kg)   04/17/18 264 lb (119.7 kg)              Today, you had the following     No orders found for display        Primary Care Provider Office Phone # Fax #    Yovana WYATT Mckeon [phone]       [address] [address]        Equal Access to Services     CARMENCITA SCALES : Julián Melchor, waaxda luqadaha, qaybta kaalmada adeterryda, vanessa malgorzatain hayaadalia spiveysusannalashae avitia. So [facility] [phone].    ATENCIÓN: Si habla español, tiene a colon disposición servicios gratuitos de asistencia lingüística. Llame al [phone].    We comply with applicable federal civil rights laws and [place] laws. We do not discriminate on the basis of race, color, national origin, age, disability, sex, sexual orientation, or gender identity.            Thank you!     Thank you for choosing [facility]  for your care. Our goal is always to provide you with excellent care. Hearing back from our patients is one way we can continue to improve our services. Please take a few minutes to complete the written survey that you may receive in the mail after your visit with us. Thank you!             Your Updated Medication List - Protect others around you: Learn how to safely use, store and throw away your medicines at www.disposemymeds.org.          This list is accurate as of 6/19/18 11:52 AM.  Always use your most recent med list.                   Brand Name Dispense Instructions for use Diagnosis    armodafinil 50 MG Tabs tablet    NUVIGIL    30 tablet    Take 1 tablet (50 mg) by mouth every morning    Chronic paranoid schizophrenia (H)       benztropine 0.5 MG tablet    COGENTIN    60 tablet    TAKE 1 TABLET BY MOUTH TWICE A DAY    Schizophrenia, paranoid, subchronic with acute exacerbation (H)       cholecalciferol 2000 units tablet     30 tablet    Take 2,000 Units by mouth daily    Vitamin D deficiency       cloNIDine 0.1 MG tablet    CATAPRES    56 tablet    TAKE 1 TABLET TWICE A DAY BY MOUTH    Schizophrenia, paranoid, subchronic with acute exacerbation (H)        Clozapine 200 MG tablet    CLOZARIL    90 tablet    TAKE 1 TABLET (200MG) EVERY MORNING & TAKE 2 TABLETS (400MG) EVERY EVENING FOR A TOTAL DAILY DOSE OF 600MG    Chronic paranoid schizophrenia (H)       DOCUSATE SODIUM PO      Take 100 mg by mouth 2 times daily as needed for constipation        EQL FISH OIL 1000 MG Caps     90 capsule    Take 1 capsule by mouth daily    Hypertriglyceridemia       methylcellulose 500 MG Tabs tablet    CITRUCEL    30 each    Take 1 tablet (500 mg) by mouth daily    Drug-induced constipation       polyethylene glycol Packet    MIRALAX/GLYCOLAX    30 packet    Take 17 g by mouth daily As needed for constipation    Slow transit constipation       RANITIDINE HCL PO      Take 300 mg by mouth daily        UNABLE TO FIND      MEDICATION NAME: Cortizone  injection  Given at [facility] Placed in bilateral shoulders

## 2018-07-20 ENCOUNTER — OFFICE VISIT (OUTPATIENT)
Dept: PSYCHIATRY | Facility: OTHER | Age: 33
End: 2018-07-20
Attending: PSYCHIATRY & NEUROLOGY
Payer: COMMERCIAL

## 2018-07-20 VITALS
BODY MASS INDEX: 36.62 KG/M2 | HEART RATE: 110 BPM | WEIGHT: 270 LBS | SYSTOLIC BLOOD PRESSURE: 142 MMHG | DIASTOLIC BLOOD PRESSURE: 88 MMHG | TEMPERATURE: 98.3 F

## 2018-07-20 DIAGNOSIS — E66.01 MORBID OBESITY (H): ICD-10-CM

## 2018-07-20 DIAGNOSIS — F20.0 CHRONIC PARANOID SCHIZOPHRENIA (H): ICD-10-CM

## 2018-07-20 PROCEDURE — 99214 OFFICE O/P EST MOD 30 MIN: CPT | Performed by: PSYCHIATRY & NEUROLOGY

## 2018-07-20 PROCEDURE — G0463 HOSPITAL OUTPT CLINIC VISIT: HCPCS

## 2018-07-20 RX ORDER — CLOZAPINE 100 MG/1
100 TABLET ORAL DAILY
Qty: 30 TABLET | Refills: 0 | Status: SHIPPED | OUTPATIENT
Start: 2018-07-20 | End: 2018-08-03

## 2018-07-20 RX ORDER — CLOZAPINE 200 MG/1
400 TABLET ORAL AT BEDTIME
Qty: 60 TABLET | Refills: 0 | Status: SHIPPED | OUTPATIENT
Start: 2018-07-20 | End: 2018-08-03

## 2018-07-20 ASSESSMENT — PAIN SCALES - GENERAL: PAINLEVEL: MILD PAIN (2)

## 2018-07-20 NOTE — NURSING NOTE
Chief Complaint   Patient presents with     RECHECK     Mental health.  Patient c/o feeling very fatigued, sleepy       Initial /88 (BP Location: Right arm, Patient Position: Sitting, Cuff Size: Adult Regular)  Pulse 110  Temp 98.3  F (36.8  C) (Tympanic)  Wt 270 lb (122.5 kg)  BMI 36.62 kg/m2 Estimated body mass index is 36.62 kg/(m^2) as calculated from the following:    Height as of 8/20/17: 6' (1.829 m).    Weight as of this encounter: 270 lb (122.5 kg).  Medication Reconciliation: complete    MARIKA OQUENDO LPN

## 2018-07-20 NOTE — MR AVS SNAPSHOT
After Visit Summary   7/20/2018    Miles Montez    MRN: [id number]           Patient Information     Date Of Birth          1985        Visit Information        Provider Department      7/20/2018 11:20 AM Marysol Maki MD [facility]        Today's Diagnoses     Chronic paranoid schizophrenia (H)        Morbid obesity (H)           Follow-ups after your visit        Your next 10 appointments already scheduled     Aug 20, 2018 11:40 AM CDT   (Arrive by 11:25 AM)   Return Visit with Marysol Maki MD   [facility] [place] ([facility] [place] )    [address] [place] [address]  [place] [address] [phone]              Who to contact     If you have questions or need follow up information about today's clinic visit or your schedule please contact [facility] directly at [phone].  Normal or non-critical lab and imaging results will be communicated to you by MyChart, letter or phone within 4 business days after the clinic has received the results. If you do not hear from us within 7 days, please contact the clinic through MyChart or phone. If you have a critical or abnormal lab result, we will notify you by phone as soon as possible.  Submit refill requests through [employer] or call your pharmacy and they will forward the refill request to us. Please allow 3 business days for your refill to be completed.          Additional Information About Your Visit        Care EveryWhere ID     This is your Care EveryWhere ID. This could be used by other organizations to access your [facility] medical records  [id number]        Your Vitals Were     Pulse Temperature BMI (Body Mass Index)             110 98.3  F (36.8  C) (Tympanic) 36.62 kg/m2          Blood Pressure from Last 3 Encounters:   07/20/18 142/88   06/19/18 116/80   05/18/18 (!) 136/92    Weight from Last 3 Encounters:   07/20/18 270 lb (122.5 kg)   06/19/18 260 lb (117.9 kg)   05/18/18 259 lb  (117.5 kg)              Today, you had the following     No orders found for display         Today's Medication Changes          These changes are accurate as of 7/20/18  2:16 PM.  If you have any questions, ask your nurse or doctor.               These medicines have changed or have updated prescriptions.        Dose/Directions    * Clozapine 200 MG tablet   Commonly known as:  CLOZARIL   This may have changed:  See the new instructions.   Used for:  Chronic paranoid schizophrenia (H)   Changed by:  Marysol Maki MD        Dose:  400 mg   Take 2 tablets (400 mg) by mouth At Bedtime   Quantity:  60 tablet   Refills:  0       * cloZAPine 100 MG tablet   Commonly known as:  CLOZARIL   This may have changed:  You were already taking a medication with the same name, and this prescription was added. Make sure you understand how and when to take each.   Used for:  Chronic paranoid schizophrenia (H)   Changed by:  Marysol Maki MD        Dose:  100 mg   Take 1 tablet (100 mg) by mouth daily   Quantity:  30 tablet   Refills:  0       * Notice:  This list has 2 medication(s) that are the same as other medications prescribed for you. Read the directions carefully, and ask your doctor or other care provider to review them with you.         Where to get your medicines      These medications were sent to [facility] [id number] [place] [address] [address]  [address] [facility] [address]     Phone:  [phone]     cloZAPine 100 MG tablet    Clozapine 200 MG tablet                Primary Care Provider Office Phone # Fax #    Yovananuzhat Mckeon [phone]       [facility] [address] [facility] [address]        Equal Access to Services     [facility]: Julián Melchor, wabrijesh luqadaha, qaybta kaalmavanessa scott. [facility] [phone].    ATENCIÓN: Si habla español, tiene a colon disposición servicios gratuitos de  asistencia lingüística. Chase al [phone].    We comply with applicable federal civil rights laws and [place] laws. We do not discriminate on the basis of race, color, national origin, age, disability, sex, sexual orientation, or gender identity.            Thank you!     Thank you for choosing [facility]  for your care. Our goal is always to provide you with excellent care. Hearing back from our patients is one way we can continue to improve our services. Please take a few minutes to complete the written survey that you may receive in the mail after your visit with us. Thank you!             Your Updated Medication List - Protect others around you: Learn how to safely use, store and throw away your medicines at www.disposemymeds.org.          This list is accurate as of 7/20/18  2:16 PM.  Always use your most recent med list.                   Brand Name Dispense Instructions for use Diagnosis    armodafinil 50 MG Tabs tablet    NUVIGIL    30 tablet    Take 1 tablet (50 mg) by mouth every morning    Chronic paranoid schizophrenia (H)       benztropine 0.5 MG tablet    COGENTIN    60 tablet    TAKE 1 TABLET BY MOUTH TWICE A DAY    Schizophrenia, paranoid, subchronic with acute exacerbation (H)       cholecalciferol 2000 units tablet     30 tablet    Take 2,000 Units by mouth daily    Vitamin D deficiency       cloNIDine 0.1 MG tablet    CATAPRES    56 tablet    TAKE 1 TABLET TWICE A DAY BY MOUTH    Schizophrenia, paranoid, subchronic with acute exacerbation (H)       * Clozapine 200 MG tablet    CLOZARIL    60 tablet    Take 2 tablets (400 mg) by mouth At Bedtime    Chronic paranoid schizophrenia (H)       * cloZAPine 100 MG tablet    CLOZARIL    30 tablet    Take 1 tablet (100 mg) by mouth daily    Chronic paranoid schizophrenia (H)       DOCUSATE SODIUM PO      Take 100 mg by mouth 2 times daily as needed for constipation        EQL FISH OIL 1000 MG Caps     90 capsule    Take 1 capsule by  mouth daily    Hypertriglyceridemia       methylcellulose 500 MG Tabs tablet    CITRUCEL    30 each    Take 1 tablet (500 mg) by mouth daily    Drug-induced constipation       polyethylene glycol Packet    MIRALAX/GLYCOLAX    30 packet    Take 17 g by mouth daily As needed for constipation    Slow transit constipation       RANITIDINE HCL PO      Take 300 mg by mouth daily        UNABLE TO FIND      MEDICATION NAME: Cortizone  injection  Given at [facility] Placed in bilateral shoulders        * Notice:  This list has 2 medication(s) that are the same as other medications prescribed for you. Read the directions carefully, and ask your doctor or other care provider to review them with you.

## 2018-07-20 NOTE — PROGRESS NOTES
"  PSYCHIATRY CLINIC PROGRESS NOTE   40 minute medication management, more than 50% of time spent counseling patient on medications, medication side effects, symptom history and management   SUBJECTIVE / INTERIM HISTORY                                                                       Last visit :--  clozapine 200 mg am and 400 mg HS.  Continue miralax and docusate prn constipation.  continue  Cogentin 0.5 mg bid. Has Nuvigil and we are continuing it.   - thinks Nuvigil is helping but making him think of years past  - been really tired sleeping 12 to 14 hours per day  - weight gain : brings him down.   - ACT team: Mika his . Nurses come out  - commitment through April of 2018 is done but still wanting to have ACT team come out  - \"Juarez\" the  Rottie     SUBSTANCE USE- meth and MJ in past. Hasn't used MJ for 8-9 months    SYMPTOMS-some depressed mood, low energy, weight gain, anhdeonida, no SI/HI. No evidence any delusions today.  no AH/VH, denies thought blocking, memory impairment  MEDICAL ROS-  SOB, Tired, Tremor, Fatigue, weight gain,   MEDICAL / SURGICAL HISTORY            Medical Team:     PMD-       Therapist- Brenda at [facility]   Patient Active Problem List   Diagnosis     Episodic mood disorder (H)     Poisoning by Methamphetamines     Cannabis abuse     Hypertension goal BP (blood pressure) < 140/90     Psychosis     Depression     Paranoid schizophrenia, chronic condition with acute exacerbation (H)     Schizophrenia, paranoid, subchronic with acute exacerbation (H)     Schizophrenia, paranoid type (H)     Drug eruption     ACP (advance care planning)     ALLERGY   Penicillins; Bupropion; Depakote [valproic acid]; and Divalproex sodium-fd&c red #40  MEDICATIONS                                                                                             Current Outpatient Prescriptions   Medication Sig     armodafinil (NUVIGIL) 50 MG TABS tablet Take 1 tablet (50 mg) by mouth every morning     " benztropine (COGENTIN) 0.5 MG tablet TAKE 1 TABLET BY MOUTH TWICE A DAY     cholecalciferol 2000 UNITS tablet Take 2,000 Units by mouth daily     cloNIDine (CATAPRES) 0.1 MG tablet TAKE 1 TABLET TWICE A DAY BY MOUTH     Clozapine (CLOZARIL) 200 MG tablet TAKE 1 TABLET (200MG) EVERY MORNING & TAKE 2 TABLETS (400MG) EVERY EVENING FOR A TOTAL DAILY DOSE OF 600MG     DOCUSATE SODIUM PO Take 100 mg by mouth 2 times daily as needed for constipation     methylcellulose (CITRUCEL) 500 MG TABS tablet Take 1 tablet (500 mg) by mouth daily     Omega-3 Fatty Acids (EQL FISH OIL) 1000 MG CAPS Take 1 capsule by mouth daily     polyethylene glycol (MIRALAX/GLYCOLAX) Packet Take 17 g by mouth daily As needed for constipation     RANITIDINE HCL PO Take 300 mg by mouth daily     UNABLE TO FIND MEDICATION NAME: Cortizone   injection    Given at [facility]  Placed in bilateral shoulders     No current facility-administered medications for this visit.      VITALS   /88 (BP Location: Right arm, Patient Position: Sitting, Cuff Size: Adult Regular)  Pulse 110  Temp 98.3  F (36.8  C) (Tympanic)  Wt 270 lb (122.5 kg)  BMI 36.62 kg/m2     PHQ9                       PHQ-9 SCORE 12/23/2016 1/18/2017 4/5/2017   Total Score - - -   Total Score 0 11 10       LABS                                                                                                                           Last Basic Metabolic Panel:  Lab Results   Component Value Date     02/27/2018      Lab Results   Component Value Date    POTASSIUM 3.7 02/27/2018     Lab Results   Component Value Date    CHLORIDE 107 02/27/2018     Lab Results   Component Value Date    IMAN 9.0 02/27/2018     Lab Results   Component Value Date    CO2 21 02/27/2018     Lab Results   Component Value Date    BUN 11 02/27/2018     Lab Results   Component Value Date    CR 0.87 02/27/2018     Lab Results   Component Value Date    GLC 93 02/27/2018         TSH     2.58    "4/10/2016    Recent Labs   Lab Test  02/27/18   1300  09/27/16   0548   07/09/15   0818  10/01/10   1445   CHOL  227*  192   < >  145  167   HDL  28*  46   < >  39*  54   LDL  Cannot estimate LDL when triglyceride exceeds 400 mg/dL  107*   < >  75  100   TRIG  531*  193*   < >  154*  63   CHOLHDLRATIO   --    --    --   3.7  3.0    < > = values in this interval not displayed.       Liver Function Studies -   Recent Labs   Lab Test  10/11/16   0830   PROTTOTAL  6.9   ALBUMIN  3.6   BILITOTAL  0.6   ALKPHOS  52   AST  26   ALT  83*     Clozapine level 116 as of 10/2/16 and cloz and metabolites 194  Clozapine level 329 as of 4/18/18 and clozapine and metabolites 527    MENTAL STATUS EXAM                                                                                        Alert. Oriented to person, place, and date / time. Casually groomed -  with good eye contact. No problems with speech. Psychomotor:  no abnormal involuntary movements of jaw or or mouth noted.  Mood was described as \"pretty good\" and affect was congruent to speech content. Thought process unremarkable.. Thought content was devoid of suicidal and homicidal ideation. Thought content: no evidence of delusions today.   No hallucinations. Insight was fair. Judgment was adequate for safety. Fund of knowledge was intact. Speech intact. attention, concentration  recent or remote memory were impaired in relation to his delusional thoughts. although these were not formally tested.     ASSESSMENT                                                                                                      Historical: scanned consults from [facility] into chart 4/2015. Initial psych consult was 4/22/15. Hospital stay here 4/9/16 - 4/18/16. Hospital stay [facility] June '16    Case Discussion- This patient provides a history which supports the diagnoses of paranoid schizophrenia and Capgras syndrome.  Josafat has experienced psychosis including delusions, paranoia and " hallucinations since 2004 as a teen. He has history of heavy MJ use and methamphetamine . Today he denies any recent use of drugs.. Josafat hospitalized here and started on clozapine    Tachycardia; was on metoprolol but fatigue so d/c. PCP aware tachy and was evaluated by cardiology.     Mom brought forth some studies and one is Provigil or Nuvigil as for weight gain assoc with antipsychotics. Started on Nuvigil and seems it did help with increasing motivation as well as some areas of cognition. We have had conversation about we need to be careful though given stimulating and we certainly don't want to exacerbate psychosis.     He is sleeping too much: I think worth here trying a dose reduction: I don't think going down by any more than 100 mg at a time so we will reduce am dose 200 mg to 100 mg and we will continue the 400 mg HS.    TREATMENT RISK STATEMENT: The risks, benefits, alternatives and potential adverse effects have been explained and are understood by the pt. The pt agrees to the treatment plan with the ability to do so. The pt knows to call the clinic for any problems or access emergency care if needed.       DIAGNOSES           Schizophrenia, paranoid type  Capgras syndrome      PLAN                                                                                                                         1) MEDICATIONS:   --  Decrease clozapine 200 mg am and 400 mg HS to 100 mg am and we will continue the 400 mg HS..  Continue miralax and docusate prn constipation.  continue  Cogentin 0.5 mg bid. Has Nuvigil and we are continuing it.      2) THERAPY: no change    Labs:  None today. CBCs since on clozapine and we changed now (June '18) to  every other week. Lipid panel 2/2018     4) PT MONITOR [call for probs]: Worsening symptoms, side effects from medications, SI/HI    5) REFERRALS [CD tx, medical, tests other]: none    6)  RTC: ~1  "month                                                                                                                                                                                            PSYCHIATRY CLINIC PROGRESS NOTE   40 minute medication management, more than 50% of time spent counseling patient on medications, medication side effects, symptom history and management   SUBJECTIVE / INTERIM HISTORY                                                                       Last visit April '18:  clozapine 200 mg am and 400 mg HS.  Continue miralax and docusate prn constipation.  continue  Cogentin 0.5 mg bid  - thinks Nuvigil is helping. Feels less depressed, has more motivation. Ex) weed ,   - still feeling tired, thinking maybe we could reduce am dose cloazapine...  - weight gain : brings him down.   - ACT team: Mika his . Nurses come out  - commitment through April of 2018 is done but still wanting to have ACT team come out  - \"Juarez\" the  Rottie     SUBSTANCE USE- meth and MJ in past. Hasn't used MJ for 8-9 months    SYMPTOMS-some depressed mood, low energy, weight gain, anhdeonida, no SI/HI. No evidence any delusions today.  no AH/VH, denies thought blocking, memory impairment  MEDICAL ROS-  SOB, Tired, Tremor, Fatigue, weight gain,   MEDICAL / SURGICAL HISTORY            Medical Team:     PMD-       Therapist- Brenda at [facility]   Patient Active Problem List   Diagnosis     Episodic mood disorder (H)     Poisoning by Methamphetamines     Cannabis abuse     Hypertension goal BP (blood pressure) < 140/90     Psychosis     Depression     Paranoid schizophrenia, chronic condition with acute exacerbation (H)     Schizophrenia, paranoid, subchronic with acute exacerbation (H)     Schizophrenia, paranoid type (H)     Drug eruption     ACP (advance care planning)     ALLERGY   Penicillins; Bupropion; Depakote [valproic acid]; and Divalproex sodium-fd&c red #40  MEDICATIONS                     "                                                                         Current Outpatient Prescriptions   Medication Sig     armodafinil (NUVIGIL) 50 MG TABS tablet Take 1 tablet (50 mg) by mouth every morning     benztropine (COGENTIN) 0.5 MG tablet TAKE 1 TABLET BY MOUTH TWICE A DAY     cholecalciferol 2000 UNITS tablet Take 2,000 Units by mouth daily     cloNIDine (CATAPRES) 0.1 MG tablet TAKE 1 TABLET TWICE A DAY BY MOUTH     Clozapine (CLOZARIL) 200 MG tablet TAKE 1 TABLET (200MG) EVERY MORNING & TAKE 2 TABLETS (400MG) EVERY EVENING FOR A TOTAL DAILY DOSE OF 600MG     DOCUSATE SODIUM PO Take 100 mg by mouth 2 times daily as needed for constipation     methylcellulose (CITRUCEL) 500 MG TABS tablet Take 1 tablet (500 mg) by mouth daily     Omega-3 Fatty Acids (EQL FISH OIL) 1000 MG CAPS Take 1 capsule by mouth daily     polyethylene glycol (MIRALAX/GLYCOLAX) Packet Take 17 g by mouth daily As needed for constipation     RANITIDINE HCL PO Take 300 mg by mouth daily     UNABLE TO FIND MEDICATION NAME: Cortizone   injection    Given at [facility]  Placed in bilateral shoulders     No current facility-administered medications for this visit.      VITALS   /88 (BP Location: Right arm, Patient Position: Sitting, Cuff Size: Adult Regular)  Pulse 110  Temp 98.3  F (36.8  C) (Tympanic)  Wt 270 lb (122.5 kg)  BMI 36.62 kg/m2     PHQ9                       PHQ-9 SCORE 12/23/2016 1/18/2017 4/5/2017   Total Score - - -   Total Score 0 11 10       LABS                                                                                                                           Last Basic Metabolic Panel:  Lab Results   Component Value Date     02/27/2018      Lab Results   Component Value Date    POTASSIUM 3.7 02/27/2018     Lab Results   Component Value Date    CHLORIDE 107 02/27/2018     Lab Results   Component Value Date    IMAN 9.0 02/27/2018     Lab Results   Component Value Date    CO2 21 02/27/2018     Lab  "Results   Component Value Date    BUN 11 02/27/2018     Lab Results   Component Value Date    CR 0.87 02/27/2018     Lab Results   Component Value Date    GLC 93 02/27/2018         TSH     2.58   4/10/2016    Recent Labs   Lab Test  02/27/18   1300  09/27/16   0548   07/09/15   0818  10/01/10   1445   CHOL  227*  192   < >  145  167   HDL  28*  46   < >  39*  54   LDL  Cannot estimate LDL when triglyceride exceeds 400 mg/dL  107*   < >  75  100   TRIG  531*  193*   < >  154*  63   CHOLHDLRATIO   --    --    --   3.7  3.0    < > = values in this interval not displayed.       Liver Function Studies -   Recent Labs   Lab Test  10/11/16   0830   PROTTOTAL  6.9   ALBUMIN  3.6   BILITOTAL  0.6   ALKPHOS  52   AST  26   ALT  83*     Clozapine level 116 as of 10/2/16 and cloz and metabolites 194  Clozapine level 329 as of 4/18/18 and clozapine and metabolites 527    MENTAL STATUS EXAM                                                                                        Alert. Oriented to person, place, and date / time. Casually groomed -  with good eye contact. No problems with speech. Psychomotor:  no abnormal involuntary movements of jaw or or mouth noted.  Mood was described as \"pretty good\" and affect was congruent to speech content. Thought process unremarkable.. Thought content was devoid of suicidal and homicidal ideation. Thought content: no evidence of delusions today.   No hallucinations. Insight was fair. Judgment was adequate for safety. Fund of knowledge was intact. Speech intact. attention, concentration  recent or remote memory were impaired in relation to his delusional thoughts. although these were not formally tested.     ASSESSMENT                                                                                                      Historical: scanned consults from [facility] into chart 4/2015. Initial psych consult was 4/22/15. Hospital stay here 4/9/16 - 4/18/16. Hospital stay [facility] June " '16    Case Discussion- This patient provides a history which supports the diagnoses of paranoid schizophrenia and Capgras syndrome.  Josafat has experienced psychosis including delusions, paranoia and hallucinations since 2004 as a teen. He has history of heavy MJ use and methamphetamine . Today he denies any recent use of drugs.. Josafat hospitalized here and started on clozapine    Tachycardia; was on metoprolol but fatigue so d/c. PCP aware tachy and was evaluated by cardiology.     Mom brought forth some studies and one is Provigil or Nuvigil as for weight gain assoc with antipsychotics. Started on Nuvigil and seems it did help with increasing motivation as well as some areas of cognition. Today he feels Nuvigil is helping. He seems to me to be doing little better to me - best I've seen him in awhile!    TREATMENT RISK STATEMENT: The risks, benefits, alternatives and potential adverse effects have been explained and are understood by the pt. The pt agrees to the treatment plan with the ability to do so. The pt knows to call the clinic for any problems or access emergency care if needed.       DIAGNOSES           Schizophrenia, paranoid type  Capgras syndrome      PLAN                                                                                                                         1) MEDICATIONS:   --  clozapine 200 mg am and 400 mg HS.  Continue miralax and docusate prn constipation.  continue  Cogentin 0.5 mg bid. Has Nuvigil and we are continuing it.      2) THERAPY: no change    Labs:  None today. CBCs since on clozapine and we changed now (June '18) to  every other week. Lipid panel 2/2018     4) PT MONITOR [call for probs]: Worsening symptoms, side effects from medications, SI/HI    5) REFERRALS [CD tx, medical, tests other]: none    6)  RTC: ~1 month

## 2018-08-03 DIAGNOSIS — F20.0 CHRONIC PARANOID SCHIZOPHRENIA (H): ICD-10-CM

## 2018-08-06 NOTE — TELEPHONE ENCOUNTER
Clozapine 100 mg  Last office visit: 7/20/18  Last refill: 7/20/18 #30, 0 R  Clozapine 400 mg at bedtime  Last refill: 7/20/18 #60, 0 R  Thank you.

## 2018-08-10 RX ORDER — CLOZAPINE 200 MG/1
TABLET ORAL
Qty: 56 TABLET | Refills: 0 | Status: SHIPPED | OUTPATIENT
Start: 2018-08-10 | End: 2018-09-14

## 2018-08-10 RX ORDER — CLOZAPINE 100 MG/1
TABLET ORAL
Qty: 28 TABLET | Refills: 0 | Status: SHIPPED | OUTPATIENT
Start: 2018-08-10 | End: 2018-09-14

## 2018-08-13 ENCOUNTER — TRANSFERRED RECORDS (OUTPATIENT)
Dept: HEALTH INFORMATION MANAGEMENT | Facility: CLINIC | Age: 33
End: 2018-08-13

## 2018-08-14 DIAGNOSIS — F20.0 CHRONIC PARANOID SCHIZOPHRENIA (H): ICD-10-CM

## 2018-08-14 NOTE — TELEPHONE ENCOUNTER
armodafinil (NUVIGIL) 50 MG TABS tablet  Last Written Prescription Date:  4/23/18  Last Fill Quantity: 30,   # refills: 3  Last Office Visit: 7/20/18  Future Office visit:    Next 5 appointments (look out 90 days)     Aug 20, 2018 11:40 AM CDT   (Arrive by 11:25 AM)   Return Visit with Marysol Maki MD   [facility] ([facility] [place] [facility]    [facility] [address] [place] [id number] [phone]

## 2018-08-15 RX ORDER — ARMODAFINIL 50 MG/1
50 TABLET ORAL EVERY MORNING
Qty: 30 TABLET | Refills: 3 | Status: SHIPPED | OUTPATIENT
Start: 2018-08-15 | End: 2018-10-05

## 2018-08-20 ENCOUNTER — OFFICE VISIT (OUTPATIENT)
Dept: PSYCHIATRY | Facility: OTHER | Age: 33
End: 2018-08-20
Attending: PSYCHIATRY & NEUROLOGY
Payer: COMMERCIAL

## 2018-08-20 VITALS
HEART RATE: 108 BPM | BODY MASS INDEX: 35.26 KG/M2 | TEMPERATURE: 98.5 F | WEIGHT: 260 LBS | SYSTOLIC BLOOD PRESSURE: 122 MMHG | DIASTOLIC BLOOD PRESSURE: 70 MMHG

## 2018-08-20 DIAGNOSIS — F20.0 CHRONIC PARANOID SCHIZOPHRENIA (H): Primary | ICD-10-CM

## 2018-08-20 PROCEDURE — 99213 OFFICE O/P EST LOW 20 MIN: CPT | Performed by: PSYCHIATRY & NEUROLOGY

## 2018-08-20 PROCEDURE — G0463 HOSPITAL OUTPT CLINIC VISIT: HCPCS

## 2018-08-20 ASSESSMENT — PAIN SCALES - GENERAL: PAINLEVEL: NO PAIN (0)

## 2018-08-20 NOTE — NURSING NOTE
Chief Complaint   Patient presents with     RECHECK     Mental health.       Initial /70 (BP Location: Right arm, Patient Position: Sitting, Cuff Size: Adult Large)  Pulse 108  Temp 98.5  F (36.9  C) (Tympanic)  Wt 260 lb (117.9 kg)  BMI 35.26 kg/m2 Estimated body mass index is 35.26 kg/(m^2) as calculated from the following:    Height as of 8/20/17: 6' (1.829 m).    Weight as of this encounter: 260 lb (117.9 kg).  Medication Reconciliation: complete    MARIKA OQUENDO LPN

## 2018-08-20 NOTE — MR AVS SNAPSHOT
After Visit Summary   8/20/2018    Miles Montez    MRN: [id number]           Patient Information     Date Of Birth          1985        Visit Information        Provider Department      8/20/2018 11:40 AM Marysol Maki MD [facility]        Today's Diagnoses     Chronic paranoid schizophrenia (H)    -  1       Follow-ups after your visit        Your next 10 appointments already scheduled     Oct 05, 2018 11:20 AM CDT   (Arrive by 11:05 AM)   Return Visit with Marysol Maki MD   [facility] [place] ([facility] [place] )    [address] [place] [address]  [place] [address] [phone]              Who to contact     If you have questions or need follow up information about today's clinic visit or your schedule please contact [facility] directly at [phone].  Normal or non-critical lab and imaging results will be communicated to you by MyChart, letter or phone within 4 business days after the clinic has received the results. If you do not hear from us within 7 days, please contact the clinic through MyChart or phone. If you have a critical or abnormal lab result, we will notify you by phone as soon as possible.  Submit refill requests through [employer] or call your pharmacy and they will forward the refill request to us. Please allow 3 business days for your refill to be completed.          Additional Information About Your Visit        Care EveryWhere ID     This is your Care EveryWhere ID. This could be used by other organizations to access your [facility] medical records  [id number]        Your Vitals Were     Pulse Temperature BMI (Body Mass Index)             108 98.5  F (36.9  C) (Tympanic) 35.26 kg/m2          Blood Pressure from Last 3 Encounters:   08/20/18 122/70   07/20/18 142/88   06/19/18 116/80    Weight from Last 3 Encounters:   08/20/18 260 lb (117.9 kg)   07/20/18 270 lb (122.5 kg)   06/19/18 260 lb (117.9 kg)               Today, you had the following     No orders found for display       Primary Care Provider Office Phone # Fax #    Yovana SCHNEIDER Kylie Mckeon [phone]       [address] [address]        Equal Access to Services     BLAIREKIM YORDY : Hadii aad ku hadjadeinder Soeula, waaxda luqadaha, qaybta kaalmada adeegyada, vanessa franco carminadalia spiveysusannalashae avitia. So [facility] [phone].    ATENCIÓN: Si habla español, tiene a colon disposición servicios gratuitos de asistencia lingüística. Llame al [phone].    We comply with applicable federal civil rights laws and [place] laws. We do not discriminate on the basis of race, color, national origin, age, disability, sex, sexual orientation, or gender identity.            Thank you!     Thank you for choosing [facility]  for your care. Our goal is always to provide you with excellent care. Hearing back from our patients is one way we can continue to improve our services. Please take a few minutes to complete the written survey that you may receive in the mail after your visit with us. Thank you!             Your Updated Medication List - Protect others around you: Learn how to safely use, store and throw away your medicines at www.disposemymeds.org.          This list is accurate as of 8/20/18 12:09 PM.  Always use your most recent med list.                   Brand Name Dispense Instructions for use Diagnosis    armodafinil 50 MG Tabs tablet    NUVIGIL    30 tablet    Take 1 tablet (50 mg) by mouth every morning    Chronic paranoid schizophrenia (H)       benztropine 0.5 MG tablet    COGENTIN    60 tablet    TAKE 1 TABLET BY MOUTH TWICE A DAY    Schizophrenia, paranoid, subchronic with acute exacerbation (H)       cholecalciferol 2000 units tablet     30 tablet    Take 2,000 Units by mouth daily    Vitamin D deficiency       cloNIDine 0.1 MG tablet    CATAPRES    56 tablet    TAKE 1 TABLET TWICE A DAY BY MOUTH     Schizophrenia, paranoid, subchronic with acute exacerbation (H)       * cloZAPine 100 MG tablet    CLOZARIL    28 tablet    TAKE 1 TABLET (100MG) BY MOUTH DAILY    Chronic paranoid schizophrenia (H)       * Clozapine 200 MG tablet    CLOZARIL    56 tablet    TAKE 2 TABLETS (400MG) BY MOUTH AT BEDTIME    Chronic paranoid schizophrenia (H)       DOCUSATE SODIUM PO      Take 100 mg by mouth 2 times daily as needed for constipation        EQL FISH OIL 1000 MG Caps     90 capsule    Take 1 capsule by mouth daily    Hypertriglyceridemia       methylcellulose 500 MG Tabs tablet    CITRUCEL    30 each    Take 1 tablet (500 mg) by mouth daily    Drug-induced constipation       polyethylene glycol Packet    MIRALAX/GLYCOLAX    30 packet    Take 17 g by mouth daily As needed for constipation    Slow transit constipation       RANITIDINE HCL PO      Take 300 mg by mouth daily        UNABLE TO FIND      MEDICATION NAME: Cortizone  injection  Given at [facility] Placed in bilateral shoulders        * Notice:  This list has 2 medication(s) that are the same as other medications prescribed for you. Read the directions carefully, and ask your doctor or other care provider to review them with you.

## 2018-08-20 NOTE — PROGRESS NOTES
PSYCHIATRY CLINIC PROGRESS NOTE   20 minute medication management, more than 50% of time spent counseling patient on medications, medication side effects, symptom history and management   SUBJECTIVE / INTERIM HISTORY                                                                       Last visit :--  clozapine 200 mg am and 400 mg HS.  Continue miralax and docusate prn constipation.  continue  Cogentin 0.5 mg bid. Has Nuvigil and we are continuing it.   - thinks Nuvigil is helping. Waking up some days 7 or 8 am even!  - been out walking [place]  - weight gain : brings him down but per chart has lost 10 lbs.  - ACT team: Mika his . Nurses come out  - commitment through April of 2018 is done but still wanting to have ACT team come out    SUBSTANCE USE- meth and MJ in past. Hasn't used MJ for 8-9 months    SYMPTOMS-some depressed mood, low energy, weight gain, anhdeonida, no SI/HI. No evidence any delusions today.  no AH/VH, denies thought blocking, memory impairment  MEDICAL ROS-  SOB, Tired, Tremor, Fatigue, weight gain,   MEDICAL / SURGICAL HISTORY            Medical Team:     PMD-       Therapist- Brenda at [facility]   Patient Active Problem List   Diagnosis     Episodic mood disorder (H)     Poisoning by Methamphetamines     Cannabis abuse     Hypertension goal BP (blood pressure) < 140/90     Psychosis     Depression     Paranoid schizophrenia, chronic condition with acute exacerbation (H)     Schizophrenia, paranoid, subchronic with acute exacerbation (H)     Schizophrenia, paranoid type (H)     Drug eruption     ACP (advance care planning)     Morbid obesity (H)     ALLERGY   Penicillins; Bupropion; Depakote [valproic acid]; and Divalproex sodium-fd&c red #40  MEDICATIONS                                                                                             Current Outpatient Prescriptions   Medication Sig     armodafinil (NUVIGIL) 50 MG TABS tablet Take 1 tablet (50 mg) by mouth every morning      benztropine (COGENTIN) 0.5 MG tablet TAKE 1 TABLET BY MOUTH TWICE A DAY     cholecalciferol 2000 UNITS tablet Take 2,000 Units by mouth daily     cloNIDine (CATAPRES) 0.1 MG tablet TAKE 1 TABLET TWICE A DAY BY MOUTH     cloZAPine (CLOZARIL) 100 MG tablet TAKE 1 TABLET (100MG) BY MOUTH DAILY     Clozapine (CLOZARIL) 200 MG tablet TAKE 2 TABLETS (400MG) BY MOUTH AT BEDTIME     DOCUSATE SODIUM PO Take 100 mg by mouth 2 times daily as needed for constipation     methylcellulose (CITRUCEL) 500 MG TABS tablet Take 1 tablet (500 mg) by mouth daily     Omega-3 Fatty Acids (EQL FISH OIL) 1000 MG CAPS Take 1 capsule by mouth daily     polyethylene glycol (MIRALAX/GLYCOLAX) Packet Take 17 g by mouth daily As needed for constipation     RANITIDINE HCL PO Take 300 mg by mouth daily     UNABLE TO FIND MEDICATION NAME: Cortizone   injection    Given at [facility]  Placed in bilateral shoulders     No current facility-administered medications for this visit.      VITALS   /70 (BP Location: Right arm, Patient Position: Sitting, Cuff Size: Adult Large)  Pulse 108  Temp 98.5  F (36.9  C) (Tympanic)  Wt 260 lb (117.9 kg)  BMI 35.26 kg/m2     PHQ9                       PHQ-9 SCORE 12/23/2016 1/18/2017 4/5/2017   Total Score - - -   Total Score 0 11 10       LABS                                                                                                                           Last Basic Metabolic Panel:  Lab Results   Component Value Date     02/27/2018      Lab Results   Component Value Date    POTASSIUM 3.7 02/27/2018     Lab Results   Component Value Date    CHLORIDE 107 02/27/2018     Lab Results   Component Value Date    IMAN 9.0 02/27/2018     Lab Results   Component Value Date    CO2 21 02/27/2018     Lab Results   Component Value Date    BUN 11 02/27/2018     Lab Results   Component Value Date    CR 0.87 02/27/2018     Lab Results   Component Value Date    GLC 93 02/27/2018         TSH     2.58    "4/10/2016    Recent Labs   Lab Test  02/27/18   1300  09/27/16   0548   07/09/15   0818  10/01/10   1445   CHOL  227*  192   < >  145  167   HDL  28*  46   < >  39*  54   LDL  Cannot estimate LDL when triglyceride exceeds 400 mg/dL  107*   < >  75  100   TRIG  531*  193*   < >  154*  63   CHOLHDLRATIO   --    --    --   3.7  3.0    < > = values in this interval not displayed.       Liver Function Studies -   Recent Labs   Lab Test  10/11/16   0830   PROTTOTAL  6.9   ALBUMIN  3.6   BILITOTAL  0.6   ALKPHOS  52   AST  26   ALT  83*     Clozapine level 116 as of 10/2/16 and cloz and metabolites 194  Clozapine level 329 as of 4/18/18 and clozapine and metabolites 527    MENTAL STATUS EXAM                                                                                        Alert. Oriented to person, place, and date / time. Casually groomed -  with good eye contact. No problems with speech. Psychomotor:  no abnormal involuntary movements of jaw or or mouth noted.  Mood was described as \"pretty good\" and affect was congruent to speech content. Thought process unremarkable.. Thought content was devoid of suicidal and homicidal ideation. Thought content: no evidence of delusions today.   No hallucinations. Insight was fair. Judgment was adequate for safety. Fund of knowledge was intact. Speech intact. attention, concentration  recent or remote memory were impaired in relation to his delusional thoughts. although these were not formally tested.     ASSESSMENT                                                                                                      Historical: scanned consults from [place] into chart 4/2015. Initial psych consult was 4/22/15. Hospital stay here 4/9/16 - 4/18/16. Hospital stay [facility] June '16    Case Discussion- This patient provides a history which supports the diagnoses of paranoid schizophrenia and Capgras syndrome.  Josafat has experienced psychosis including delusions, paranoia and " hallucinations since 2004 as a teen. He has history of heavy MJ use and methamphetamine . Today he denies any recent use of drugs.. Josafat hospitalized here and started on clozapine    Tachycardia; was on metoprolol but fatigue so d/c. PCP aware tachy and was evaluated by cardiology.     Mom brought forth some studies and one is Provigil or Nuvigil as for weight gain assoc with antipsychotics. Started on Nuvigil and seems it did help with increasing motivation as well as some areas of cognition. We have had conversation about we need to be careful though given stimulating and we certainly don't want to exacerbate psychosis.     He was  sleeping too much: I we felt trying a dose reduction: we reduced the 200 mg to 100 mg and kept later dose at 400. He is now sleeping less and thankfully really seems to be doing well. We will stick with current meds.    TREATMENT RISK STATEMENT: The risks, benefits, alternatives and potential adverse effects have been explained and are understood by the pt. The pt agrees to the treatment plan with the ability to do so. The pt knows to call the clinic for any problems or access emergency care if needed.       DIAGNOSES           Schizophrenia, paranoid type  Capgras syndrome      PLAN                                                                                                                         1) MEDICATIONS:   --  Continue clozapine 100 mg am and 400 mg HS..  Continue miralax and docusate prn constipation.  continue  Cogentin 0.5 mg bid. Has Nuvigil and we are continuing it.      2) THERAPY: no change    Labs:  None today. CBCs since on clozapine and we changed  (June '18) to  every other week. Lipid panel 2/2018     4) PT MONITOR [call for probs]: Worsening symptoms, side effects from medications, SI/HI    5) REFERRALS [CD tx, medical, tests other]: none    6)  RTC: ~6 weeks

## 2018-09-11 DIAGNOSIS — F20.0 SCHIZOPHRENIA, PARANOID, SUBCHRONIC WITH ACUTE EXACERBATION (H): ICD-10-CM

## 2018-09-12 RX ORDER — BENZTROPINE MESYLATE 0.5 MG/1
TABLET ORAL
Qty: 56 TABLET | Refills: 5 | Status: SHIPPED | OUTPATIENT
Start: 2018-09-12 | End: 2018-11-09

## 2018-09-12 RX ORDER — CLONIDINE HYDROCHLORIDE 0.1 MG/1
TABLET ORAL
Qty: 56 TABLET | Refills: 1 | Status: SHIPPED | OUTPATIENT
Start: 2018-09-12 | End: 2018-11-06

## 2018-09-14 DIAGNOSIS — F20.0 CHRONIC PARANOID SCHIZOPHRENIA (H): ICD-10-CM

## 2018-09-14 NOTE — TELEPHONE ENCOUNTER
CLOZAPINE 200MG TAB    Last Written Prescription Date:  8/10/18  Last Fill Quantity: 56,   # refills: 0  Last Office Visit: 8/20/18  Future Office visit:    Next 5 appointments (look out 90 days)     Oct 05, 2018 11:20 AM CDT   (Arrive by 11:05 AM)   Return Visit with Marysol Maki MD   [facility] [place] ([facility] [place] )    [facility] [address] [place], [place] [address] [address] [phone]                   Routing refill request to provider for review/approval because:

## 2018-09-14 NOTE — TELEPHONE ENCOUNTER
CLOZAPINE 100MG TAB    Last Written Prescription Date:  8/10/18  Last Fill Quantity: 28,   # refills: 0  Last Office Visit: 8/20/18  Future Office visit:    Next 5 appointments (look out 90 days)     Oct 05, 2018 11:20 AM CDT   (Arrive by 11:05 AM)   Return Visit with Marysol Maki MD   [facility] [place] ([facility] [place] )    [facility] [address] [place] [address]  [place] [address] [phone]                   Routing refill request to provider for review/approval because:

## 2018-09-17 RX ORDER — CLOZAPINE 100 MG/1
TABLET ORAL
Qty: 14 TABLET | Refills: 0 | Status: SHIPPED | OUTPATIENT
Start: 2018-09-17 | End: 2018-09-28

## 2018-09-17 RX ORDER — CLOZAPINE 200 MG/1
TABLET ORAL
Qty: 28 TABLET | Refills: 0 | Status: SHIPPED | OUTPATIENT
Start: 2018-09-17 | End: 2018-09-28

## 2018-10-05 ENCOUNTER — OFFICE VISIT (OUTPATIENT)
Dept: PSYCHIATRY | Facility: OTHER | Age: 33
End: 2018-10-05
Attending: PSYCHIATRY & NEUROLOGY
Payer: COMMERCIAL

## 2018-10-05 VITALS
WEIGHT: 270 LBS | TEMPERATURE: 97.4 F | DIASTOLIC BLOOD PRESSURE: 86 MMHG | HEART RATE: 115 BPM | SYSTOLIC BLOOD PRESSURE: 122 MMHG | BODY MASS INDEX: 36.62 KG/M2

## 2018-10-05 DIAGNOSIS — F20.0 CHRONIC PARANOID SCHIZOPHRENIA (H): Primary | ICD-10-CM

## 2018-10-05 PROCEDURE — 99213 OFFICE O/P EST LOW 20 MIN: CPT | Performed by: PSYCHIATRY & NEUROLOGY

## 2018-10-05 PROCEDURE — G0463 HOSPITAL OUTPT CLINIC VISIT: HCPCS

## 2018-10-05 ASSESSMENT — PAIN SCALES - GENERAL: PAINLEVEL: MILD PAIN (2)

## 2018-10-05 NOTE — NURSING NOTE
Chief Complaint   Patient presents with     RECHECK     Mental health.  Patient stopped taking Nuvigil d/t increased heart rate.       Initial /86 (BP Location: Right arm, Patient Position: Sitting, Cuff Size: Adult Large)  Pulse 115  Temp 97.4  F (36.3  C) (Tympanic)  Wt 270 lb (122.5 kg)  BMI 36.62 kg/m2 Estimated body mass index is 36.62 kg/(m^2) as calculated from the following:    Height as of 8/20/17: 6' (1.829 m).    Weight as of this encounter: 270 lb (122.5 kg).  Medication Reconciliation: complete    MARIKA OQUENDO LPN

## 2018-10-05 NOTE — PROGRESS NOTES
PSYCHIATRY CLINIC PROGRESS NOTE   20 minute medication management, more than 50% of time spent counseling patient on medications, medication side effects, symptom history and management   SUBJECTIVE / INTERIM HISTORY                                                                       Last visit :--  clozapine 200 mg am and 400 mg HS.  Continue miralax and docusate prn constipation.  continue  Cogentin 0.5 mg bid. Has Nuvigil and we are continuing it.   - stopped Nuvigil couple days ago: was having palpitations. WAS helping but didn't feel was worth with palpitations. Now is wondering if actually wasn't Nuvigil rather warm baths   - been thinking the 400 mg was 200 mg night  - ACT team: Mika his . Nurses come out  - commitment through April of 2018 is done but still wanting to have ACT team come out    SUBSTANCE USE- meth and MJ in past. Hasn't used MJ for 8-9 months    SYMPTOMS-some depressed mood, low energy, weight gain, anhdeonida, no SI/HI. No evidence any delusions today.  no AH/VH, denies thought blocking, memory impairment  MEDICAL ROS-  SOB, Tired, Tremor, Fatigue, weight gain,   MEDICAL / SURGICAL HISTORY            Medical Team:     PMD-       Therapist- Brenda at [facility]   Patient Active Problem List   Diagnosis     Episodic mood disorder (H)     Poisoning by Methamphetamines     Cannabis abuse     Hypertension goal BP (blood pressure) < 140/90     Psychosis (H)     Depression     Paranoid schizophrenia, chronic condition with acute exacerbation (H)     Schizophrenia, paranoid, subchronic with acute exacerbation (H)     Schizophrenia, paranoid type (H)     Drug eruption     ACP (advance care planning)     Morbid obesity (H)     ALLERGY   Penicillins; Bupropion; Depakote [valproic acid]; and Divalproex sodium-fd&c red #40  MEDICATIONS                                                                                             Current Outpatient Prescriptions   Medication Sig     benztropine  (COGENTIN) 0.5 MG tablet TAKE 1 TABLET BY MOUTH TWICE A DAY     cholecalciferol 2000 UNITS tablet Take 2,000 Units by mouth daily     cloNIDine (CATAPRES) 0.1 MG tablet TAKE 1 TABLET TWICE A DAY BY MOUTH     cloZAPine (CLOZARIL) 100 MG tablet TAKE 1 TABLET (100MG) BY MOUTH DAILY     Clozapine (CLOZARIL) 200 MG tablet TAKE 2 TABLETS (400MG) BY MOUTH AT BEDTIME     DOCUSATE SODIUM PO Take 100 mg by mouth 2 times daily as needed for constipation     methylcellulose (CITRUCEL) 500 MG TABS tablet Take 1 tablet (500 mg) by mouth daily     Omega-3 Fatty Acids (EQL FISH OIL) 1000 MG CAPS Take 1 capsule by mouth daily     polyethylene glycol (MIRALAX/GLYCOLAX) Packet Take 17 g by mouth daily As needed for constipation     RANITIDINE HCL PO Take 300 mg by mouth daily     UNABLE TO FIND MEDICATION NAME: Cortizone   injection    Given at [facility]  Placed in bilateral shoulders     No current facility-administered medications for this visit.      VITALS   /86 (BP Location: Right arm, Patient Position: Sitting, Cuff Size: Adult Large)  Pulse 115  Temp 97.4  F (36.3  C) (Tympanic)  Wt 270 lb (122.5 kg)  BMI 36.62 kg/m2     PHQ9                       PHQ-9 SCORE 12/23/2016 1/18/2017 4/5/2017   Total Score - - -   Total Score 0 11 10       LABS                                                                                                                           Last Basic Metabolic Panel:  Lab Results   Component Value Date     02/27/2018      Lab Results   Component Value Date    POTASSIUM 3.7 02/27/2018     Lab Results   Component Value Date    CHLORIDE 107 02/27/2018     Lab Results   Component Value Date    IMAN 9.0 02/27/2018     Lab Results   Component Value Date    CO2 21 02/27/2018     Lab Results   Component Value Date    BUN 11 02/27/2018     Lab Results   Component Value Date    CR 0.87 02/27/2018     Lab Results   Component Value Date    GLC 93 02/27/2018         TSH     2.58   4/10/2016    Recent  "Labs   Lab Test  02/27/18   1300  09/27/16   0548   07/09/15   0818  10/01/10   1445   CHOL  227*  192   < >  145  167   HDL  28*  46   < >  39*  54   LDL  Cannot estimate LDL when triglyceride exceeds 400 mg/dL  107*   < >  75  100   TRIG  531*  193*   < >  154*  63   CHOLHDLRATIO   --    --    --   3.7  3.0    < > = values in this interval not displayed.       Liver Function Studies -   Recent Labs   Lab Test  10/11/16   0830   PROTTOTAL  6.9   ALBUMIN  3.6   BILITOTAL  0.6   ALKPHOS  52   AST  26   ALT  83*     Clozapine level 116 as of 10/2/16 and cloz and metabolites 194  Clozapine level 329 as of 4/18/18 and clozapine and metabolites 527    MENTAL STATUS EXAM                                                                                        Alert. Oriented to person, place, and date / time. Casually groomed -  with good eye contact. No problems with speech. Psychomotor:  no abnormal involuntary movements of jaw or or mouth noted.  Mood was described as \"pretty good\" and affect was congruent to speech content. Thought process unremarkable.. Thought content was devoid of suicidal and homicidal ideation. Thought content: no evidence of delusions today.   No hallucinations. Insight was fair. Judgment was adequate for safety. Fund of knowledge was intact. Speech intact. attention, concentration  recent or remote memory were impaired in relation to his delusional thoughts. although these were not formally tested.     ASSESSMENT                                                                                                      Historical: scanned consults from [place] into chart 4/2015. Initial psych consult was 4/22/15. Hospital stay here 4/9/16 - 4/18/16. Hospital stay [facility] June '16    Case Discussion- This patient provides a history which supports the diagnoses of paranoid schizophrenia and Capgras syndrome.  Josafat has experienced psychosis including delusions, paranoia and hallucinations since 2004 as " a teen. He has history of heavy MJ use and methamphetamine . Today he denies any recent use of drugs.. Josafat hospitalized here and started on clozapine    Tachycardia; was on metoprolol but fatigue so d/c. PCP aware tachy and was evaluated by cardiology.     Mom brought forth some studies and one is Provigil or Nuvigil as for weight gain assoc with antipsychotics. Started on Nuvigil and seems it has helped with increasing motivation as well as some areas of cognition. We have had conversation about we need to be careful though given stimulating and we certainly don't want to exacerbate psychosis. He stopped it couple days ago as felt was causing palpitations. I'm going to leave the script however as we spoke more about we were thinking may have been other reasons for the palpitations.     He was  sleeping too much: summer '18  we reduced the 200 mg to 100 mg and kept later dose at 400. He is now sleeping less and thankfully really seems to be doing well. We will stick with current meds.    TREATMENT RISK STATEMENT: The risks, benefits, alternatives and potential adverse effects have been explained and are understood by the pt. The pt agrees to the treatment plan with the ability to do so. The pt knows to call the clinic for any problems or access emergency care if needed.       DIAGNOSES           Schizophrenia, paranoid type  Capgras syndrome      PLAN                                                                                                                         1) MEDICATIONS:   --  Continue clozapine 100 mg am and 400 mg HS..  Continue miralax and docusate prn constipation.  continue  Cogentin 0.5 mg bid. Has Nuvigil      2) THERAPY: no change    Labs:  None today. CBCs since on clozapine  Lipid panel 2/2018     4) PT MONITOR [call for probs]: Worsening symptoms, side effects from medications, SI/HI    5) REFERRALS [CD tx, medical, tests other]: none    6)  RTC: ~1  month

## 2018-10-05 NOTE — MR AVS SNAPSHOT
After Visit Summary   10/5/2018    Miles Montez    MRN: [id number]           Patient Information     Date Of Birth          1985        Visit Information        Provider Department      10/5/2018 11:20 AM Marysol Maki MD [facility]         Follow-ups after your visit        Your next 10 appointments already scheduled     Nov 05, 2018 11:20 AM CST   (Arrive by 11:05 AM)   Return Visit with Marysol Maki MD   [facility] [place] ([facility] )    [address] [place] [address]  [place] [phone]              Who to contact     If you have questions or need follow up information about today's clinic visit or your schedule please contact [facility] directly at [phone].  Normal or non-critical lab and imaging results will be communicated to you by MyChart, letter or phone within 4 business days after the clinic has received the results. If you do not hear from us within 7 days, please contact the clinic through MyChart or phone. If you have a critical or abnormal lab result, we will notify you by phone as soon as possible.  Submit refill requests through [employer] or call your pharmacy and they will forward the refill request to us. Please allow 3 business days for your refill to be completed.          Additional Information About Your Visit        Care EveryWhere ID     This is your Care EveryWhere ID. This could be used by other organizations to access your [place] medical records  [id number]        Your Vitals Were     Pulse Temperature BMI (Body Mass Index)             115 97.4  F (36.3  C) (Tympanic) 36.62 kg/m2          Blood Pressure from Last 3 Encounters:   10/05/18 122/86   08/20/18 122/70   07/20/18 142/88    Weight from Last 3 Encounters:   10/05/18 270 lb (122.5 kg)   08/20/18 260 lb (117.9 kg)   07/20/18 270 lb (122.5 kg)              Today, you had the following     No orders found  for display       Primary Care Provider Office Phone # Fax #    Yovana WYATT Mckeon [phone]       [address] [address]        Equal Access to Services     CARMENCITA SCALES : Hadii aad ku hadjadeinder Jonali, waaxda luqadaha, qaybta kaalmada adeegyada, vanessa malgorzatain hayaadalia spiveysusannalashae avitia. So [facility] [phone].    ATENCIÓN: Si habla español, tiene a colon disposición servicios gratuitos de asistencia lingüística. Llame al [phone].    We comply with applicable federal civil rights laws and [place] laws. We do not discriminate on the basis of race, color, national origin, age, disability, sex, sexual orientation, or gender identity.            Thank you!     Thank you for choosing [facility]  for your care. Our goal is always to provide you with excellent care. Hearing back from our patients is one way we can continue to improve our services. Please take a few minutes to complete the written survey that you may receive in the mail after your visit with us. Thank you!             Your Updated Medication List - Protect others around you: Learn how to safely use, store and throw away your medicines at www.disposemymeds.org.          This list is accurate as of 10/5/18 12:14 PM.  Always use your most recent med list.                   Brand Name Dispense Instructions for use Diagnosis    benztropine 0.5 MG tablet    COGENTIN    56 tablet    TAKE 1 TABLET BY MOUTH TWICE A DAY    Schizophrenia, paranoid, subchronic with acute exacerbation (H)       cholecalciferol 2000 units tablet     30 tablet    Take 2,000 Units by mouth daily    Vitamin D deficiency       cloNIDine 0.1 MG tablet    CATAPRES    56 tablet    TAKE 1 TABLET TWICE A DAY BY MOUTH    Schizophrenia, paranoid, subchronic with acute exacerbation (H)       * Clozapine 200 MG tablet    CLOZARIL    28 tablet    TAKE 2 TABLETS (400MG) BY MOUTH AT BEDTIME    Chronic paranoid  schizophrenia (H)       * cloZAPine 100 MG tablet    CLOZARIL    14 tablet    TAKE 1 TABLET (100MG) BY MOUTH DAILY    Chronic paranoid schizophrenia (H)       DOCUSATE SODIUM PO      Take 100 mg by mouth 2 times daily as needed for constipation        EQL FISH OIL 1000 MG Caps     90 capsule    Take 1 capsule by mouth daily    Hypertriglyceridemia       methylcellulose 500 MG Tabs tablet    CITRUCEL    30 each    Take 1 tablet (500 mg) by mouth daily    Drug-induced constipation       polyethylene glycol Packet    MIRALAX/GLYCOLAX    30 packet    Take 17 g by mouth daily As needed for constipation    Slow transit constipation       RANITIDINE HCL PO      Take 300 mg by mouth daily        UNABLE TO FIND      MEDICATION NAME: Cortizone  injection  Given at [facility] Placed in bilateral shoulders        * Notice:  This list has 2 medication(s) that are the same as other medications prescribed for you. Read the directions carefully, and ask your doctor or other care provider to review them with you.

## 2018-10-08 ENCOUNTER — TRANSFERRED RECORDS (OUTPATIENT)
Dept: HEALTH INFORMATION MANAGEMENT | Facility: CLINIC | Age: 33
End: 2018-10-08

## 2018-10-09 DIAGNOSIS — F20.0 CHRONIC PARANOID SCHIZOPHRENIA (H): Primary | ICD-10-CM

## 2018-10-11 DIAGNOSIS — F20.0 CHRONIC PARANOID SCHIZOPHRENIA (H): ICD-10-CM

## 2018-10-11 RX ORDER — CLOZAPINE 100 MG/1
TABLET ORAL
Qty: 14 TABLET | Refills: 0 | Status: SHIPPED | OUTPATIENT
Start: 2018-10-11 | End: 2018-10-26

## 2018-10-11 RX ORDER — CLOZAPINE 200 MG/1
TABLET ORAL
Qty: 28 TABLET | Refills: 0 | Status: SHIPPED | OUTPATIENT
Start: 2018-10-11 | End: 2018-10-26

## 2018-10-11 NOTE — TELEPHONE ENCOUNTER
Clozaril 100 mg  Last office visit: 10/5/18  Last refill: 10/1/18 #14, 0 R  Clozaril 200 mg   Last refill: 10/1/18 #28, 0 R  I have faxed labs sent from pharmacy from [facility] to [facility].  Thank you.

## 2018-10-12 NOTE — TELEPHONE ENCOUNTER
RANITIDINE HCL PO     Last Written Prescription Date:  Historical only  Last Fill Quantity: ?,   # refills: ?  Last Office Visit: 10/05/2018  Future Office visit:    Next 5 appointments (look out 90 days)     Nov 05, 2018 11:20 AM CST   (Arrive by 11:05 AM)   Return Visit with Marysol Maki MD   [facility] )    [address] [place] [id number] [phone]                   Routing refill request to provider for review/approval because:

## 2018-10-15 NOTE — TELEPHONE ENCOUNTER
Ranitidine       Last Written Prescription Date:  0  Last Fill Quantity: 0,   # refills: 0  Last Office Visit: 10/05/18  Future Office visit:    Next 5 appointments (look out 90 days)     Nov 05, 2018 11:20 AM CST   (Arrive by 11:05 AM)   Return Visit with Marysol Maki MD   [facility] ([facility] )    [address] [place] [address]   [phone]                   Routing refill request to provider for review/approval because:  Medication is reported/historical

## 2018-10-26 DIAGNOSIS — F20.0 CHRONIC PARANOID SCHIZOPHRENIA (H): ICD-10-CM

## 2018-10-26 RX ORDER — CLOZAPINE 100 MG/1
TABLET ORAL
Qty: 14 TABLET | Refills: 0 | Status: SHIPPED | OUTPATIENT
Start: 2018-10-26 | End: 2018-11-08

## 2018-10-26 RX ORDER — CLOZAPINE 200 MG/1
TABLET ORAL
Qty: 28 TABLET | Refills: 0 | Status: SHIPPED | OUTPATIENT
Start: 2018-10-26 | End: 2018-11-08

## 2018-10-26 NOTE — TELEPHONE ENCOUNTER
CLOZAPINE 100MG   Last Written Prescription Date:  10/11/18  Last Fill Quantity: 14,   # refills: 0    CLOZAPINE 200MG      Last Written Prescription Date:  10/11/18  Last Fill Quantity: 28,   # refills: 0  Last Office Visit: 4/15/15  Future Office visit:    Next 5 appointments (look out 90 days)     Nov 07, 2018  2:40 PM CST   (Arrive by 2:25 PM)   Return Visit with Marysol Maki MD   [facility] ([facility] )    [address] [place] [address] [phone]

## 2018-11-07 ENCOUNTER — OFFICE VISIT (OUTPATIENT)
Dept: PSYCHIATRY | Facility: OTHER | Age: 33
End: 2018-11-07
Attending: PSYCHIATRY & NEUROLOGY
Payer: COMMERCIAL

## 2018-11-07 VITALS
BODY MASS INDEX: 36.62 KG/M2 | TEMPERATURE: 98.1 F | HEART RATE: 106 BPM | SYSTOLIC BLOOD PRESSURE: 144 MMHG | WEIGHT: 270 LBS | DIASTOLIC BLOOD PRESSURE: 76 MMHG

## 2018-11-07 DIAGNOSIS — F20.0 SCHIZOPHRENIA, PARANOID, SUBCHRONIC WITH ACUTE EXACERBATION (H): Primary | ICD-10-CM

## 2018-11-07 PROCEDURE — G0463 HOSPITAL OUTPT CLINIC VISIT: HCPCS

## 2018-11-07 PROCEDURE — 99213 OFFICE O/P EST LOW 20 MIN: CPT | Performed by: PSYCHIATRY & NEUROLOGY

## 2018-11-07 ASSESSMENT — PAIN SCALES - GENERAL: PAINLEVEL: MILD PAIN (2)

## 2018-11-07 NOTE — PROGRESS NOTES
PSYCHIATRY CLINIC PROGRESS NOTE   20 minute medication management, more than 50% of time spent counseling patient on medications, medication side effects, symptom history and management   SUBJECTIVE / INTERIM HISTORY                                                                       Last visit :  Continue clozapine 100 mg am and 400 mg HS..  Continue miralax and docusate prn constipation.  continue  Cogentin 0.5 mg bid. Has Nuvigil  - feels generally doing well. Mom notes she feels he is still sometimes depressed. Josafat however notes he is feeling more positive.   - some joint pain.   - bathing every day without having mom needing to prompt him  - ACT team: Mika his . Nurses come out  - commitment through April of 2018 is done but still wanting to have ACT team come out    SUBSTANCE USE- meth and MJ in past. Hasn't used MJ for 8-9 months    SYMPTOMS-some depressed mood, low energy, weight gain, anhdeonida, no SI/HI. No evidence any delusions today.  no AH/VH, denies thought blocking, memory impairment  MEDICAL ROS-  SOB, Tired, Tremor, Fatigue, weight gain,   MEDICAL / SURGICAL HISTORY            Medical Team:     PMD-       Therapist- Brenda at [facility]   Patient Active Problem List   Diagnosis     Episodic mood disorder (H)     Poisoning by Methamphetamines     Cannabis abuse     Hypertension goal BP (blood pressure) < 140/90     Psychosis (H)     Depression     Paranoid schizophrenia, chronic condition with acute exacerbation (H)     Schizophrenia, paranoid, subchronic with acute exacerbation (H)     Schizophrenia, paranoid type (H)     Drug eruption     ACP (advance care planning)     Morbid obesity (H)     ALLERGY   Penicillins; Bupropion; Depakote [valproic acid]; and Divalproex sodium-fd&c red #40  MEDICATIONS                                                                                             Current Outpatient Prescriptions   Medication Sig     benztropine (COGENTIN) 0.5 MG tablet TAKE 1  TABLET BY MOUTH TWICE A DAY     cholecalciferol 2000 UNITS tablet Take 2,000 Units by mouth daily     cloNIDine (CATAPRES) 0.1 MG tablet TAKE 1 TABLET TWICE A DAY BY MOUTH     cloZAPine (CLOZARIL) 100 MG tablet TAKE 1 TABLET (100MG) BY MOUTH DAILY     Clozapine (CLOZARIL) 200 MG tablet TAKE 2 TABLETS (400MG) BY MOUTH AT BEDTIME     DOCUSATE SODIUM PO Take 100 mg by mouth 2 times daily as needed for constipation     methylcellulose (CITRUCEL) 500 MG TABS tablet Take 1 tablet (500 mg) by mouth daily     Omega-3 Fatty Acids (EQL FISH OIL) 1000 MG CAPS Take 1 capsule by mouth daily     polyethylene glycol (MIRALAX/GLYCOLAX) Packet Take 17 g by mouth daily As needed for constipation     RANITIDINE HCL PO Take 300 mg by mouth daily     UNABLE TO FIND MEDICATION NAME: Cortizone   injection    Given at [facility]  Placed in bilateral shoulders     No current facility-administered medications for this visit.      VITALS   /76 (BP Location: Left arm, Patient Position: Sitting, Cuff Size: Adult Large)  Pulse 106  Temp 98.1  F (36.7  C) (Tympanic)  Wt 122.5 kg (270 lb)  BMI 36.62 kg/m2     PHQ9                       PHQ-9 SCORE 12/23/2016 1/18/2017 4/5/2017   Total Score - - -   Total Score 0 11 10       LABS                                                                                                                           Last Basic Metabolic Panel:  Lab Results   Component Value Date     02/27/2018      Lab Results   Component Value Date    POTASSIUM 3.7 02/27/2018     Lab Results   Component Value Date    CHLORIDE 107 02/27/2018     Lab Results   Component Value Date    IMAN 9.0 02/27/2018     Lab Results   Component Value Date    CO2 21 02/27/2018     Lab Results   Component Value Date    BUN 11 02/27/2018     Lab Results   Component Value Date    CR 0.87 02/27/2018     Lab Results   Component Value Date    GLC 93 02/27/2018         TSH     2.58   4/10/2016    Recent Labs   Lab Test  02/27/18   1300   "09/27/16   0548   07/09/15   0818  10/01/10   1445   CHOL  227*  192   < >  145  167   HDL  28*  46   < >  39*  54   LDL  Cannot estimate LDL when triglyceride exceeds 400 mg/dL  107*   < >  75  100   TRIG  531*  193*   < >  154*  63   CHOLHDLRATIO   --    --    --   3.7  3.0    < > = values in this interval not displayed.       Liver Function Studies -   Recent Labs   Lab Test  10/11/16   0830   PROTTOTAL  6.9   ALBUMIN  3.6   BILITOTAL  0.6   ALKPHOS  52   AST  26   ALT  83*     Clozapine level 116 as of 10/2/16 and cloz and metabolites 194  Clozapine level 329 as of 4/18/18 and clozapine and metabolites 527    MENTAL STATUS EXAM                                                                                        Alert. Oriented to person, place, and date / time. Casually groomed -  with good eye contact. No problems with speech. Psychomotor:  no abnormal involuntary movements of jaw or or mouth noted.  Mood was described as \"pretty good\" and affect was congruent to speech content. Thought process unremarkable.. Thought content was devoid of suicidal and homicidal ideation. Thought content: no evidence of delusions today.   No hallucinations. Insight was fair. Judgment was adequate for safety. Fund of knowledge was intact. Speech intact. attention, concentration  recent or remote memory were impaired in relation to his delusional thoughts. although these were not formally tested.     ASSESSMENT                                                                                                      Historical: scanned consults from [facility] into chart 4/2015. Initial psych consult was 4/22/15. Hospital stay here 4/9/16 - 4/18/16. Hospital stay [facility] June '16    Case Discussion- This patient provides a history which supports the diagnoses of paranoid schizophrenia and Capgras syndrome.  Josafat has experienced psychosis including delusions, paranoia and hallucinations since 2004 as a teen. He has history of heavy " MJ use and methamphetamine . Today he denies any recent use of drugs.. Josafat hospitalized here and started on clozapine    Tachycardia; was on metoprolol but fatigue so d/c. PCP aware tachy and was evaluated by cardiology.     Mom brought forth some studies and one is Provigil or Nuvigil as for weight gain assoc with antipsychotics. Started on Nuvigil and seems it has helped with increasing motivation as well as some areas of cognition. We have had conversation about we need to be careful though given stimulating and we certainly don't want to exacerbate psychosis. He stopped it couple days ago as felt was causing palpitations. I'm going to leave the script however as we spoke more about we were thinking may have been other reasons for the palpitations.     He was  sleeping too much: summer '18  we reduced the 200 mg clozapine to 100 mg and kept later dose at 400. He is now sleeping less and thankfully really seems to be doing well. We will stick with current meds.    TREATMENT RISK STATEMENT: The risks, benefits, alternatives and potential adverse effects have been explained and are understood by the pt. The pt agrees to the treatment plan with the ability to do so. The pt knows to call the clinic for any problems or access emergency care if needed.       DIAGNOSES           Schizophrenia, paranoid type  Capgras syndrome      PLAN                                                                                                                         1) MEDICATIONS:   --  Continue clozapine 100 mg am and 400 mg HS..  Continue miralax and docusate prn constipation.  continue  Cogentin 0.5 mg bid. Has Nuvigil      2) THERAPY: no change    Labs:  None today. CBCs since on clozapine  Lipid panel 2/2018     4) PT MONITOR [call for probs]: Worsening symptoms, side effects from medications, SI/HI    5) REFERRALS [CD tx, medical, tests other]: none    6)  RTC: ~1  month

## 2018-11-07 NOTE — NURSING NOTE
Chief Complaint   Patient presents with     RECHECK     Mental health.       Initial /76 (BP Location: Left arm, Patient Position: Sitting, Cuff Size: Adult Large)  Pulse 106  Temp 98.1  F (36.7  C) (Tympanic)  Wt 122.5 kg (270 lb)  BMI 36.62 kg/m2 Estimated body mass index is 36.62 kg/(m^2) as calculated from the following:    Height as of 8/20/17: 1.829 m (6').    Weight as of this encounter: 122.5 kg (270 lb).  Medication Reconciliation: complete    MARIKA OQUENDO LPN

## 2018-11-07 NOTE — MR AVS SNAPSHOT
After Visit Summary   11/7/2018    Miles Montez    MRN: [id number]           Patient Information     Date Of Birth          1985        Visit Information        Provider Department      11/7/2018 2:40 PM Marysol Maki MD [facility]         Follow-ups after your visit        Your next 10 appointments already scheduled     Jan 07, 2019 10:20 AM CST   (Arrive by 10:05 AM)   Return Visit with Marysol Maki MD   [facility] [place] ([place] [facility] )    [address] [place] [address]  [place] [phone]              Who to contact     If you have questions or need follow up information about today's clinic visit or your schedule please contact [facility] directly at [phone].  Normal or non-critical lab and imaging results will be communicated to you by MyChart, letter or phone within 4 business days after the clinic has received the results. If you do not hear from us within 7 days, please contact the clinic through MyChart or phone. If you have a critical or abnormal lab result, we will notify you by phone as soon as possible.  Submit refill requests through [employer] or call your pharmacy and they will forward the refill request to us. Please allow 3 business days for your refill to be completed.          Additional Information About Your Visit        Care EveryWhere ID     This is your Care EveryWhere ID. This could be used by other organizations to access your [place] medical records  [id number]        Your Vitals Were     Pulse Temperature BMI (Body Mass Index)             106 98.1  F (36.7  C) (Tympanic) 36.62 kg/m2          Blood Pressure from Last 3 Encounters:   11/07/18 144/76   10/05/18 122/86   08/20/18 122/70    Weight from Last 3 Encounters:   11/07/18 122.5 kg (270 lb)   10/05/18 122.5 kg (270 lb)   08/20/18 117.9 kg (260 lb)              Today, you had the following     No orders found  for display       Primary Care Provider Office Phone # Fax #    Yovana WYATT Mckeon [phone]       [address] [address]        Equal Access to Services     CARMENCITA SCALES : Hadii aad ku hadjadeinder Jonali, waaxda luqadaha, qaybta kaalmada adeegyada, vanessa malgorzatain hayaadalia spiveysusannalashae avitia. So [facility] [phone].    ATENCIÓN: Si habla español, tiene a colon disposición servicios gratuitos de asistencia lingüística. Llame al [phone].    We comply with applicable federal civil rights laws and [place] laws. We do not discriminate on the basis of race, color, national origin, age, disability, sex, sexual orientation, or gender identity.            Thank you!     Thank you for choosing [facility]  for your care. Our goal is always to provide you with excellent care. Hearing back from our patients is one way we can continue to improve our services. Please take a few minutes to complete the written survey that you may receive in the mail after your visit with us. Thank you!             Your Updated Medication List - Protect others around you: Learn how to safely use, store and throw away your medicines at www.disposemymeds.org.          This list is accurate as of 11/7/18  3:29 PM.  Always use your most recent med list.                   Brand Name Dispense Instructions for use Diagnosis    benztropine 0.5 MG tablet    COGENTIN    56 tablet    TAKE 1 TABLET BY MOUTH TWICE A DAY    Schizophrenia, paranoid, subchronic with acute exacerbation (H)       cholecalciferol 2000 units tablet     30 tablet    Take 2,000 Units by mouth daily    Vitamin D deficiency       cloNIDine 0.1 MG tablet    CATAPRES    56 tablet    TAKE 1 TABLET TWICE A DAY BY MOUTH    Schizophrenia, paranoid, subchronic with acute exacerbation (H)       * Clozapine 200 MG tablet    CLOZARIL    28 tablet    TAKE 2 TABLETS (400MG) BY MOUTH AT BEDTIME    Chronic paranoid  schizophrenia (H)       * cloZAPine 100 MG tablet    CLOZARIL    14 tablet    TAKE 1 TABLET (100MG) BY MOUTH DAILY    Chronic paranoid schizophrenia (H)       DOCUSATE SODIUM PO      Take 100 mg by mouth 2 times daily as needed for constipation        EQL FISH OIL 1000 MG Caps     90 capsule    Take 1 capsule by mouth daily    Hypertriglyceridemia       methylcellulose 500 MG Tabs tablet    CITRUCEL    30 each    Take 1 tablet (500 mg) by mouth daily    Drug-induced constipation       polyethylene glycol Packet    MIRALAX/GLYCOLAX    30 packet    Take 17 g by mouth daily As needed for constipation    Slow transit constipation       RANITIDINE HCL PO      Take 300 mg by mouth daily        UNABLE TO FIND      MEDICATION NAME: Cortizone  injection  Given at [facility] Placed in bilateral shoulders        * Notice:  This list has 2 medication(s) that are the same as other medications prescribed for you. Read the directions carefully, and ask your doctor or other care provider to review them with you.

## 2018-11-08 ENCOUNTER — TELEPHONE (OUTPATIENT)
Dept: PSYCHIATRY | Facility: OTHER | Age: 33
End: 2018-11-08

## 2018-11-08 DIAGNOSIS — F20.0 SCHIZOPHRENIA, PARANOID, SUBCHRONIC WITH ACUTE EXACERBATION (H): ICD-10-CM

## 2018-11-08 DIAGNOSIS — F20.0 CHRONIC PARANOID SCHIZOPHRENIA (H): ICD-10-CM

## 2018-11-08 NOTE — TELEPHONE ENCOUNTER
Received call from Tello at [place].  He is requesting a 28 day supply of the Clozaril for Miles.  Blood draws have been changed to monthly.  Thank you, please advise.

## 2018-11-08 NOTE — TELEPHONE ENCOUNTER
Received incoming VM from Miles Daly's mother.  She is asking for Dr. Maki's opinion re:   Cogentin.  She has done some research and is wondering if Miles still needs the Cogentin since the Clozapine was decreased. She has read that the Cogentin can have side effects of fast heart rate, vomiting, eye sensitivity to light (Miles always wants the curtains closed), and irritability.  Wondering what Dr. Maki's thoughts are.  Thank you, please advise.

## 2018-11-08 NOTE — TELEPHONE ENCOUNTER
cloZAPine (CLOZARIL) 100 MG tablet      Last Written Prescription Date:  10/26/18  Last Fill Quantity: 14,   # refills: 0    Clozapine (CLOZARIL) 200 MG tablet      Last Written Prescription Date:  10/26/18  Last Fill Quantity: 28,   # refills: 0    Last Office Visit: 11/7/18  Future Office visit:    Next 5 appointments (look out 90 days)     Jan 07, 2019 10:20 AM CST   (Arrive by 10:05 AM)   Return Visit with Marysol Maki MD   [facility]    [address] [place] [address]   [phone]                   Routing refill request to provider for review/approval because:  Drug not on the [facility], [facility] or [facility] refill protocol or controlled substance

## 2018-11-09 RX ORDER — BENZTROPINE MESYLATE 0.5 MG/1
0.5 TABLET ORAL 2 TIMES DAILY PRN
Qty: 56 TABLET | Refills: 5 | Status: SHIPPED | OUTPATIENT
Start: 2018-11-09 | End: 2021-11-10

## 2018-11-09 RX ORDER — CLOZAPINE 100 MG/1
TABLET ORAL
Qty: 30 TABLET | Refills: 0 | Status: SHIPPED | OUTPATIENT
Start: 2018-11-09 | End: 2018-11-21

## 2018-11-09 RX ORDER — CLOZAPINE 200 MG/1
TABLET ORAL
Qty: 60 TABLET | Refills: 0 | Status: SHIPPED | OUTPATIENT
Start: 2018-11-09 | End: 2018-11-21

## 2018-11-09 NOTE — TELEPHONE ENCOUNTER
LM for Malika stating that could try not taking the Cogentin and could leave it as a prn medication.  Left call back # for further questions

## 2018-11-21 DIAGNOSIS — F20.0 CHRONIC PARANOID SCHIZOPHRENIA (H): ICD-10-CM

## 2018-11-23 NOTE — TELEPHONE ENCOUNTER
Clozaril   Last visit: 11.7.18  Last refill: 11.9.18 #30    Next 5 appointments (look out 90 days)     Jan 07, 2019 10:20 AM CST   (Arrive by 10:05 AM)   Return Visit with Marysol Maki MD   [facility] ([facility] )    [address] [place] [address]   [phone]

## 2018-11-26 RX ORDER — CLOZAPINE 200 MG/1
TABLET ORAL
Qty: 28 TABLET | Refills: 0 | Status: SHIPPED | OUTPATIENT
Start: 2018-11-26 | End: 2019-01-16

## 2018-11-26 RX ORDER — CLOZAPINE 100 MG/1
TABLET ORAL
Qty: 14 TABLET | Refills: 0 | Status: SHIPPED | OUTPATIENT
Start: 2018-11-26 | End: 2019-01-16

## 2018-11-30 DIAGNOSIS — F20.0 SCHIZOPHRENIA, PARANOID, SUBCHRONIC WITH ACUTE EXACERBATION (H): ICD-10-CM

## 2018-12-03 RX ORDER — CLONIDINE HYDROCHLORIDE 0.1 MG/1
TABLET ORAL
Qty: 56 TABLET | Refills: 0 | Status: SHIPPED | OUTPATIENT
Start: 2018-12-03 | End: 2019-01-02

## 2018-12-03 RX ORDER — ARMODAFINIL 50 MG/1
TABLET ORAL
Qty: 28 TABLET | Refills: 3 | Status: SHIPPED | OUTPATIENT
Start: 2018-12-03 | End: 2019-03-27

## 2018-12-03 NOTE — TELEPHONE ENCOUNTER
armodafinil (NUVIGIL) 50 MG TABS tablet  Last Written Prescription Date:  8/15/18  Last Fill Quantity: 30,   # refills: 3  Last Office Visit: 11/7/18  Future Office visit:    Next 5 appointments (look out 90 days)     Jan 07, 2019 10:20 AM CST   (Arrive by 10:05 AM)   Return Visit with Marysol Maki MD   [facility]    [address] [place] [id number] [phone]                   Routing refill request to provider for review/approval because:  Drug not active on patient's medication list. Discontinued on 10/5/18 for reason- stopped by pt. Please advise. Thank you!

## 2018-12-17 DIAGNOSIS — F20.0 CHRONIC PARANOID SCHIZOPHRENIA (H): ICD-10-CM

## 2018-12-19 RX ORDER — CLOZAPINE 100 MG/1
TABLET ORAL
Qty: 14 TABLET | Refills: 0 | Status: SHIPPED | OUTPATIENT
Start: 2018-12-19 | End: 2018-12-24

## 2018-12-19 RX ORDER — CLOZAPINE 200 MG/1
TABLET ORAL
Qty: 28 TABLET | Refills: 0 | Status: SHIPPED | OUTPATIENT
Start: 2018-12-19 | End: 2018-12-24

## 2018-12-20 DIAGNOSIS — F20.0 CHRONIC PARANOID SCHIZOPHRENIA (H): ICD-10-CM

## 2018-12-21 RX ORDER — CLOZAPINE 100 MG/1
TABLET ORAL
Qty: 14 TABLET | OUTPATIENT
Start: 2018-12-21

## 2018-12-21 RX ORDER — CLOZAPINE 200 MG/1
TABLET ORAL
Qty: 28 TABLET | OUTPATIENT
Start: 2018-12-21

## 2018-12-24 DIAGNOSIS — F20.0 CHRONIC PARANOID SCHIZOPHRENIA (H): ICD-10-CM

## 2018-12-24 NOTE — TELEPHONE ENCOUNTER
Clozapine 100mg tablet      Last Written Prescription Date:  12-  Last Fill Quantity: 14,   # refills: 0  Last Office Visit: 11-7-2018  Need a 28 day supply.    Future Office visit:    Next 5 appointments (look out 90 days)    Jan 07, 2019 10:20 AM CST  (Arrive by 10:05 AM)  Return Visit with Marysol Maki MD  [facility] [place] ([facility] [address] [place] [address]  [phone]         Clozapine 200mg tablet      Last Written Prescription Date:  12-  Last Fill Quantity: 28,   # refills: 0  Last Office Visit: 11-7-2018  Future Office visit:    Next 5 appointments (look out 90 days)    Jan 07, 2019 10:20 AM CST  (Arrive by 10:05 AM)  Return Visit with Marysol Maki MD  [facility] [place] ([facility] ) [address] [place] [address]  [phone]

## 2018-12-27 RX ORDER — CLOZAPINE 200 MG/1
TABLET ORAL
Qty: 56 TABLET | Refills: 0 | Status: SHIPPED | OUTPATIENT
Start: 2018-12-27 | End: 2019-01-17

## 2018-12-27 RX ORDER — CLOZAPINE 100 MG/1
TABLET ORAL
Qty: 28 TABLET | Refills: 0 | Status: SHIPPED | OUTPATIENT
Start: 2018-12-27 | End: 2019-01-17

## 2019-01-02 DIAGNOSIS — F20.0 SCHIZOPHRENIA, PARANOID, SUBCHRONIC WITH ACUTE EXACERBATION (H): ICD-10-CM

## 2019-01-04 RX ORDER — CLONIDINE HYDROCHLORIDE 0.1 MG/1
TABLET ORAL
Qty: 56 TABLET | Refills: 0 | Status: SHIPPED | OUTPATIENT
Start: 2019-01-04 | End: 2019-01-31

## 2019-01-04 NOTE — TELEPHONE ENCOUNTER
cloNIDine (CATAPRES) 0.1 MG tablet   Last Written Prescription Date:  12/3/18  Last Fill Quantity: 56,   # refills: 0  Last Office Visit: 11/7/18  Future Office visit:    Next 5 appointments (look out 90 days)    Jan 07, 2019 10:20 AM CST  (Arrive by 10:05 AM)  Return Visit with Marysol Maki MD  [facility] [address] [place] [id number]  [phone]           Routing refill request to provider for review/approval because:  Drug not on the [facility], [facility] or  Health refill protocol or controlled substance

## 2019-01-16 ENCOUNTER — OFFICE VISIT (OUTPATIENT)
Dept: PSYCHIATRY | Facility: OTHER | Age: 34
End: 2019-01-16
Attending: PSYCHIATRY & NEUROLOGY
Payer: COMMERCIAL

## 2019-01-16 VITALS
OXYGEN SATURATION: 97 % | TEMPERATURE: 98 F | WEIGHT: 270 LBS | HEART RATE: 111 BPM | DIASTOLIC BLOOD PRESSURE: 64 MMHG | BODY MASS INDEX: 36.62 KG/M2 | SYSTOLIC BLOOD PRESSURE: 134 MMHG

## 2019-01-16 DIAGNOSIS — F20.0 SCHIZOPHRENIA, PARANOID, SUBCHRONIC WITH ACUTE EXACERBATION (H): Primary | ICD-10-CM

## 2019-01-16 PROCEDURE — G0463 HOSPITAL OUTPT CLINIC VISIT: HCPCS

## 2019-01-16 PROCEDURE — 99213 OFFICE O/P EST LOW 20 MIN: CPT | Performed by: PSYCHIATRY & NEUROLOGY

## 2019-01-16 ASSESSMENT — PAIN SCALES - GENERAL: PAINLEVEL: NO PAIN (0)

## 2019-01-16 NOTE — NURSING NOTE
Chief Complaint   Patient presents with     RECHECK     Mental health.  Medication review.       Initial /64 (BP Location: Left arm, Patient Position: Sitting, Cuff Size: Adult Large)   Pulse 111   Temp 98  F (36.7  C) (Tympanic)   Wt 122.5 kg (270 lb)   SpO2 97%   BMI 36.62 kg/m   Estimated body mass index is 36.62 kg/m  as calculated from the following:    Height as of 8/20/17: 1.829 m (6').    Weight as of this encounter: 122.5 kg (270 lb).  Medication Reconciliation: complete    MARIKA OQUENDO LPN

## 2019-01-16 NOTE — PROGRESS NOTES
PSYCHIATRY CLINIC PROGRESS NOTE   20 minute medication management, more than 50% of time spent counseling patient on medications, medication side effects, symptom history and management   SUBJECTIVE / INTERIM HISTORY                                                                       Last visit  11/7/18:  Continue clozapine 100 mg am and 400 mg HS..  Continue miralax and docusate prn constipation.  continue  Cogentin 0.5 mg bid. Has Nuvigil    - feels generally doing well.   - physically: however is still vomiting. Some weeks it may be several days others it's once week  - some joint pain.   - holidays went okay, has stopped over at his dad's couple times.  - ACT team: Mika his . Nurses come out    SUBSTANCE USE- meth and MJ in past. Hasn't used MJ for 8-9 months    SYMPTOMS-some depressed mood, low energy, weight gain, anhdeonida, no SI/HI. No evidence any delusions today.  no AH/VH, denies thought blocking, memory impairment  MEDICAL ROS-  SOB, Tired, Tremor, Fatigue, weight gain,   MEDICAL / SURGICAL HISTORY            Medical Team:     PMD-       Therapist- Brenda at [facility]   Patient Active Problem List   Diagnosis     Episodic mood disorder (H)     Poisoning by Methamphetamines     Cannabis abuse     Hypertension goal BP (blood pressure) < 140/90     Psychosis (H)     Depression     Paranoid schizophrenia, chronic condition with acute exacerbation (H)     Schizophrenia, paranoid, subchronic with acute exacerbation (H)     Schizophrenia, paranoid type (H)     Drug eruption     ACP (advance care planning)     Morbid obesity (H)     ALLERGY   Penicillins; Bupropion; Depakote [valproic acid]; and Divalproex sodium-fd&c red #40  MEDICATIONS                                                                                             Current Outpatient Medications   Medication Sig     armodafinil (NUVIGIL) 50 MG TABS tablet TAKE 1 TABLET DAILY BY MOUTH EVERY MORNING     benztropine (COGENTIN) 0.5 MG tablet  Take 1 tablet (0.5 mg) by mouth 2 times daily as needed for agitation     cholecalciferol 2000 UNITS tablet Take 2,000 Units by mouth daily     cloNIDine (CATAPRES) 0.1 MG tablet TAKE 1 TABLET TWICE A DAY BY MOUTH     cloZAPine (CLOZARIL) 100 MG tablet TAKE 1 TABLET (100MG) BY MOUTH DAILY     cloZAPine (CLOZARIL) 200 MG tablet TAKE 2 TABLETS (400MG) BY MOUTH AT BEDTIME     DOCUSATE SODIUM PO Take 100 mg by mouth 2 times daily as needed for constipation     methylcellulose (CITRUCEL) 500 MG TABS tablet Take 1 tablet (500 mg) by mouth daily     Omega-3 Fatty Acids (EQL FISH OIL) 1000 MG CAPS Take 1 capsule by mouth daily     polyethylene glycol (MIRALAX/GLYCOLAX) Packet Take 17 g by mouth daily As needed for constipation     RANITIDINE HCL PO Take 300 mg by mouth daily     UNABLE TO FIND MEDICATION NAME: Cortizone   injection    Given at [facility]  Placed in bilateral shoulders     No current facility-administered medications for this visit.      VITALS   /64 (BP Location: Left arm, Patient Position: Sitting, Cuff Size: Adult Large)   Pulse 111   Temp 98  F (36.7  C) (Tympanic)   Wt 122.5 kg (270 lb)   SpO2 97%   BMI 36.62 kg/m       PHQ9                       PHQ-9 SCORE 12/23/2016 1/18/2017 4/5/2017   PHQ-9 Total Score - - -   PHQ-9 Total Score 0 11 10       LABS                                                                                                                           Last Basic Metabolic Panel:  Lab Results   Component Value Date     02/27/2018      Lab Results   Component Value Date    POTASSIUM 3.7 02/27/2018     Lab Results   Component Value Date    CHLORIDE 107 02/27/2018     Lab Results   Component Value Date    IMAN 9.0 02/27/2018     Lab Results   Component Value Date    CO2 21 02/27/2018     Lab Results   Component Value Date    BUN 11 02/27/2018     Lab Results   Component Value Date    CR 0.87 02/27/2018     Lab Results   Component Value Date    GLC 93 02/27/2018  "        TSH     2.58   4/10/2016    Recent Labs   Lab Test  02/27/18   1300  09/27/16   0548   07/09/15   0818  10/01/10   1445   CHOL  227*  192   < >  145  167   HDL  28*  46   < >  39*  54   LDL  Cannot estimate LDL when triglyceride exceeds 400 mg/dL  107*   < >  75  100   TRIG  531*  193*   < >  154*  63   CHOLHDLRATIO   --    --    --   3.7  3.0    < > = values in this interval not displayed.       Liver Function Studies -   Recent Labs   Lab Test  10/11/16   0830   PROTTOTAL  6.9   ALBUMIN  3.6   BILITOTAL  0.6   ALKPHOS  52   AST  26   ALT  83*     Clozapine level 116 as of 10/2/16 and cloz and metabolites 194  Clozapine level 329 as of 4/18/18 and clozapine and metabolites 527    MENTAL STATUS EXAM                                                                                        Alert. Oriented to person, place, and date / time. Casually groomed -  with good eye contact. No problems with speech. Psychomotor:  no abnormal involuntary movements of jaw or or mouth noted.  Mood was described as \"I'm good\" and affect was congruent to speech content. Thought process unremarkable.. Thought content was devoid of suicidal and homicidal ideation. Thought content: no evidence of delusions today.   No hallucinations. Insight was fair. Judgment was adequate for safety. Fund of knowledge was intact. Speech intact. attention, concentration  recent or remote memory were impaired in relation to his delusional thoughts. although these were not formally tested.     ASSESSMENT                                                                                                      Historical: scanned consults from [facility] into chart 4/2015. Initial psych consult was 4/22/15. Hospital stay here 4/9/16 - 4/18/16. Hospital stay [place] June '16. Modafanil: we tried to help improve fatigue and mood but he felt created some cravings.    Josafat Montez is a 32 yo with  schizophrenia and Capgras syndrome.  Josafat has experienced " psychosis including delusions, paranoia and hallucinations since 2004 as a teen. He has history of heavy MJ use and methamphetamine . Today he denies any recent use of drugs.. Josafat hospitalized here and started on clozapine    Tachycardia; was on metoprolol but fatigue so d/c. PCP aware tachy and was evaluated by cardiology.      He was  sleeping too much: summer '18  we reduced the 200 mg clozapine to 100 mg and kept later dose at 400. He is now sleeping less and thankfully really seems to be doing well. We will stick with current meds.    TREATMENT RISK STATEMENT: The risks, benefits, alternatives and potential adverse effects have been explained and are understood by the pt. The pt agrees to the treatment plan with the ability to do so. The pt knows to call the clinic for any problems or access emergency care if needed.       DIAGNOSES           Schizophrenia, paranoid type  Capgras syndrome      PLAN                                                                                                                         1) MEDICATIONS:   --  Continue clozapine 100 mg am and 400 mg HS..  Continue miralax and docusate prn constipation.  continue  Cogentin 0.5 mg bid. He is NOT taking Nuvigil    2) THERAPY: no change    Labs:  None today. CBCs since on clozapine  Lipid panel 2/2018     4) PT MONITOR [call for probs]: Worsening symptoms, side effects from medications, SI/HI    5) REFERRALS [CD tx, medical, tests other]: none    6)  RTC: ~1 month

## 2019-01-17 DIAGNOSIS — F20.0 CHRONIC PARANOID SCHIZOPHRENIA (H): ICD-10-CM

## 2019-01-17 RX ORDER — CLOZAPINE 200 MG/1
TABLET ORAL
Qty: 56 TABLET | Refills: 0 | Status: SHIPPED | OUTPATIENT
Start: 2019-01-17 | End: 2019-02-14

## 2019-01-17 RX ORDER — CLOZAPINE 100 MG/1
TABLET ORAL
Qty: 28 TABLET | Refills: 0 | Status: SHIPPED | OUTPATIENT
Start: 2019-01-17 | End: 2019-02-14

## 2019-01-17 NOTE — TELEPHONE ENCOUNTER
cloZAPine (CLOZARIL) 100 MG tablet      Last Written Prescription Date:  12/27/18  Last Fill Quantity: 28,   # refills: 0  Last Office Visit: 1/16/19  Future Office visit:    Next 5 appointments (look out 90 days)    Mar 19, 2019  2:40 PM CDT  (Arrive by 2:25 PM)  Return Visit with Marysol Maki MD  [facility] ([facility] ) [address] [place] [phone]  [phone]           Routing refill request to provider for review/approval because:  Drug not on the [facility], [facility] or [facility] refill protocol or controlled substance        cloZAPine (CLOZARIL) 200 MG tablet      Last Written Prescription Date:  12/27/18  Last Fill Quantity: 56,   # refills: 0  Last Office Visit: 1/16/19  Future Office visit:    Next 5 appointments (look out 90 days)    Mar 19, 2019  2:40 PM CDT  (Arrive by 2:25 PM)  Return Visit with Marysol Maki MD  [facility] ([facility] ) [address] [place] [phone]  [phone]           Routing refill request to provider for review/approval because:  Drug not on the [facility], [facility] or [facility] refill protocol or controlled substance     Metabolic encephalopathy Anemia, unspecified type

## 2019-01-31 DIAGNOSIS — F20.0 SCHIZOPHRENIA, PARANOID, SUBCHRONIC WITH ACUTE EXACERBATION (H): ICD-10-CM

## 2019-01-31 DIAGNOSIS — K59.03 DRUG-INDUCED CONSTIPATION: ICD-10-CM

## 2019-01-31 RX ORDER — CLONIDINE HYDROCHLORIDE 0.1 MG/1
TABLET ORAL
Qty: 56 TABLET | Refills: 0 | Status: SHIPPED | OUTPATIENT
Start: 2019-01-31 | End: 2019-02-26

## 2019-01-31 NOTE — TELEPHONE ENCOUNTER
Not on protocol please advise        methylcellulose (CITRUCEL) 500 MG TABS tablet      Last Written Prescription Date:  5/18/18  Last Fill Quantity: 30,   # refills: 11  Last Office Visit: 1/16/19  Future Office visit:    Next 5 appointments (look out 90 days)    Mar 19, 2019  2:40 PM CDT  (Arrive by 2:25 PM)  Return Visit with [facility]  [facility] [address] [place] [phone]           Routing refill request to provider for review/approval because:  Drug not on the [facility], [facility] or  Health refill protocol or controlled substance

## 2019-02-26 DIAGNOSIS — F20.0 SCHIZOPHRENIA, PARANOID, SUBCHRONIC WITH ACUTE EXACERBATION (H): ICD-10-CM

## 2019-02-27 NOTE — TELEPHONE ENCOUNTER
clozapine      Last Written Prescription Date:  2/15/19  Last Fill Quantity: 28,   # refills: 0  Last Office Visit: 1/16/19  Future Office visit:    Next 5 appointments (look out 90 days)    Mar 19, 2019  2:40 PM CDT  (Arrive by 2:25 PM)  Return Visit with Marysol Maki MD  [facility] ([facility] ) [address] [place] [id number] [phone]

## 2019-02-28 RX ORDER — CLONIDINE HYDROCHLORIDE 0.1 MG/1
TABLET ORAL
Qty: 56 TABLET | Refills: 3 | Status: SHIPPED | OUTPATIENT
Start: 2019-02-28 | End: 2019-06-14

## 2019-03-15 DIAGNOSIS — F20.0 CHRONIC PARANOID SCHIZOPHRENIA (H): ICD-10-CM

## 2019-03-15 NOTE — TELEPHONE ENCOUNTER
Clozapine 100  Last Written Prescription Date: 2/15/19  Last Fill Quantity: 28 # of Refills: 0  Last Office Visit: 1/16/19    Clozapine 200  Last Written Prescription Date: 2/15/19  Last Fill Quantity: 56 # of Refills: 0  Last Office Visit: 1/16/19

## 2019-03-19 ENCOUNTER — OFFICE VISIT (OUTPATIENT)
Dept: PSYCHIATRY | Facility: OTHER | Age: 34
End: 2019-03-19
Attending: PSYCHIATRY & NEUROLOGY
Payer: COMMERCIAL

## 2019-03-19 VITALS
HEART RATE: 112 BPM | TEMPERATURE: 98.4 F | WEIGHT: 285 LBS | BODY MASS INDEX: 38.65 KG/M2 | DIASTOLIC BLOOD PRESSURE: 84 MMHG | SYSTOLIC BLOOD PRESSURE: 128 MMHG

## 2019-03-19 DIAGNOSIS — F20.0 CHRONIC PARANOID SCHIZOPHRENIA (H): Primary | ICD-10-CM

## 2019-03-19 PROCEDURE — 99214 OFFICE O/P EST MOD 30 MIN: CPT | Performed by: PSYCHIATRY & NEUROLOGY

## 2019-03-19 PROCEDURE — G0463 HOSPITAL OUTPT CLINIC VISIT: HCPCS

## 2019-03-19 ASSESSMENT — PAIN SCALES - GENERAL: PAINLEVEL: WORST PAIN (10)

## 2019-03-19 NOTE — PROGRESS NOTES
PSYCHIATRY CLINIC PROGRESS NOTE   20 minute medication management, more than 50% of time spent counseling patient on medications, medication side effects, symptom history and management   SUBJECTIVE / INTERIM HISTORY                                                                       Last visit :  Continue clozapine 100 mg am and 400 mg HS..  Continue miralax and docusate prn constipation.  continue  Cogentin 0.5 mg bid. He is NOT taking Nuvigil    - mom today , Josafat has been more depressed including weight gain isn't helping  - started nuvigil again as of today in hopes of weight loss, more motivation  - physically:  still vomiting. Some weeks it may be several days others it's once week  - some joint pain.   - holidays went okay, has stopped over at his dad's couple times.  - ACT team: Mika his . Nurses come out    SUBSTANCE USE- meth and MJ in past. Hasn't used MJ for 8-9 months    SYMPTOMS-some depressed mood, low energy, weight gain, anhdeonida, no SI/HI. No evidence any delusions today.  no AH/VH, denies thought blocking, memory impairment  MEDICAL ROS-  SOB, Tired, Tremor, Fatigue, weight gain,   MEDICAL / SURGICAL HISTORY            Medical Team:     PMD-       Therapist- Brenda at [facility]   Patient Active Problem List   Diagnosis     Episodic mood disorder (H)     Poisoning by Methamphetamines     Cannabis abuse     Hypertension goal BP (blood pressure) < 140/90     Psychosis (H)     Depression     Paranoid schizophrenia, chronic condition with acute exacerbation (H)     Schizophrenia, paranoid, subchronic with acute exacerbation (H)     Schizophrenia, paranoid type (H)     Drug eruption     ACP (advance care planning)     Morbid obesity (H)     ALLERGY   Penicillins; Bupropion; Depakote [valproic acid]; and Divalproex sodium-fd&c red #40  MEDICATIONS                                                                                             Current Outpatient Medications   Medication Sig      armodafinil (NUVIGIL) 50 MG TABS tablet TAKE 1 TABLET DAILY BY MOUTH EVERY MORNING     benztropine (COGENTIN) 0.5 MG tablet Take 1 tablet (0.5 mg) by mouth 2 times daily as needed for agitation     cholecalciferol 2000 UNITS tablet Take 2,000 Units by mouth daily     cloNIDine (CATAPRES) 0.1 MG tablet TAKE 1 TABLET TWICE A DAY BY MOUTH     cloZAPine (CLOZARIL) 100 MG tablet TAKE 1 TABLET (100MG) BY MOUTH DAILY     cloZAPine (CLOZARIL) 200 MG tablet TAKE 2 TABLETS (400MG) BY MOUTH AT BEDTIME     DOCUSATE SODIUM PO Take 100 mg by mouth 2 times daily as needed for constipation     methylcellulose (CITRUCEL) 500 MG TABS tablet Take 1 tablet (500 mg) by mouth daily     Omega-3 Fatty Acids (EQL FISH OIL) 1000 MG CAPS Take 1 capsule by mouth daily     polyethylene glycol (MIRALAX/GLYCOLAX) Packet Take 17 g by mouth daily As needed for constipation     RANITIDINE HCL PO Take 300 mg by mouth daily     UNABLE TO FIND MEDICATION NAME: Cortizone   injection    Given at [facility]  Placed in bilateral shoulders     No current facility-administered medications for this visit.      VITALS   /84 (BP Location: Right arm, Patient Position: Sitting, Cuff Size: Adult Large)   Pulse 112   Temp 98.4  F (36.9  C) (Tympanic)   Wt 129.3 kg (285 lb)   BMI 38.65 kg/m       PHQ9                       PHQ-9 SCORE 12/23/2016 1/18/2017 4/5/2017   PHQ-9 Total Score - - -   PHQ-9 Total Score 0 11 10       LABS                                                                                                                           Last Basic Metabolic Panel:  Lab Results   Component Value Date     02/27/2018      Lab Results   Component Value Date    POTASSIUM 3.7 02/27/2018     Lab Results   Component Value Date    CHLORIDE 107 02/27/2018     Lab Results   Component Value Date    IMAN 9.0 02/27/2018     Lab Results   Component Value Date    CO2 21 02/27/2018     Lab Results   Component Value Date    BUN 11 02/27/2018     Lab  "Results   Component Value Date    CR 0.87 02/27/2018     Lab Results   Component Value Date    GLC 93 02/27/2018         TSH     2.58   4/10/2016    Recent Labs   Lab Test  02/27/18   1300  09/27/16   0548   07/09/15   0818  10/01/10   1445   CHOL  227*  192   < >  145  167   HDL  28*  46   < >  39*  54   LDL  Cannot estimate LDL when triglyceride exceeds 400 mg/dL  107*   < >  75  100   TRIG  531*  193*   < >  154*  63   CHOLHDLRATIO   --    --    --   3.7  3.0    < > = values in this interval not displayed.       Liver Function Studies -   Recent Labs   Lab Test  10/11/16   0830   PROTTOTAL  6.9   ALBUMIN  3.6   BILITOTAL  0.6   ALKPHOS  52   AST  26   ALT  83*     Clozapine level 116 as of 10/2/16 and cloz and metabolites 194  Clozapine level 329 as of 4/18/18 and clozapine and metabolites 527    MENTAL STATUS EXAM                                                                                        Alert. Oriented to person, place, and date / time. Casually groomed -  with good eye contact. No problems with speech. Psychomotor:  no abnormal involuntary movements of jaw or or mouth noted.  Mood was described as \"I'm doing okay\" and affect was congruent to speech content. Thought process unremarkable.. Thought content was devoid of suicidal and homicidal ideation. Thought content: no evidence of delusions today.   No hallucinations. Insight was fair. Judgment was adequate for safety. Fund of knowledge was intact. Speech intact. attention, concentration  recent or remote memory were impaired in relation to his delusional thoughts. although these were not formally tested.     ASSESSMENT                                                                                                      Historical: scanned consults from [facility] into chart 4/2015. Initial psych consult was 4/22/15. Hospital stay here 4/9/16 - 4/18/16. Hospital stay [place] June '16. Modafanil: we tried to help improve fatigue and mood but he " felt created some cravings.    Josafat Montez is a 32 yo with  schizophrenia and Capgras syndrome.  Josafat has experienced psychosis including delusions, paranoia and hallucinations since 2004 as a teen. He has history of heavy MJ use and methamphetamine . Today he denies any recent use of drugs.. Josafat hospitalized here and started on clozapine    Tachycardia; was on metoprolol but fatigue so d/c. PCP aware tachy and was evaluated by cardiology.      He was  sleeping too much: summer '18  we reduced the 200 mg clozapine to 100 mg and kept later dose at 400. He is now sleeping less and thankfully really seems to be doing well.     Weight, triglycerides, ... Yet clozapine helps his mental health significantly.     TREATMENT RISK STATEMENT: The risks, benefits, alternatives and potential adverse effects have been explained and are understood by the pt. The pt agrees to the treatment plan with the ability to do so. The pt knows to call the clinic for any problems or access emergency care if needed.       DIAGNOSES           Schizophrenia, paranoid type  Capgras syndrome      PLAN                                                                                                                         1) MEDICATIONS:   --  Continue clozapine 100 mg am and 400 mg HS..  Continue miralax and docusate prn constipation.  continue  Cogentin 0.5 mg bid. He is taking Nuvigil again 50 mg daily  2) THERAPY: no change    Labs:  None today. CBCs since on clozapine  Lipid panel 2/2018     4) PT MONITOR [call for probs]: Worsening symptoms, side effects from medications, SI/HI    5) REFERRALS [CD tx, medical, tests other]: none    6)  RTC: ~1 month

## 2019-03-19 NOTE — NURSING NOTE
Chief Complaint   Patient presents with     RECHECK     Mental health.  Discuss medications, vomiting and weight gain.       Initial /84 (BP Location: Right arm, Patient Position: Sitting, Cuff Size: Adult Large)   Pulse 112   Temp 98.4  F (36.9  C) (Tympanic)   Wt 129.3 kg (285 lb)   BMI 38.65 kg/m   Estimated body mass index is 38.65 kg/m  as calculated from the following:    Height as of 8/20/17: 1.829 m (6').    Weight as of this encounter: 129.3 kg (285 lb).  Medication Reconciliation: complete    MARIKA OQUENDO LPN

## 2019-03-21 RX ORDER — CLOZAPINE 100 MG/1
TABLET ORAL
Qty: 28 TABLET | Refills: 0 | Status: SHIPPED | OUTPATIENT
Start: 2019-03-21 | End: 2019-04-11

## 2019-03-21 RX ORDER — CLOZAPINE 200 MG/1
TABLET ORAL
Qty: 56 TABLET | Refills: 0 | Status: SHIPPED | OUTPATIENT
Start: 2019-03-21 | End: 2019-04-11

## 2019-03-25 ENCOUNTER — TRANSFERRED RECORDS (OUTPATIENT)
Dept: HEALTH INFORMATION MANAGEMENT | Facility: CLINIC | Age: 34
End: 2019-03-25

## 2019-03-27 DIAGNOSIS — F20.0 SCHIZOPHRENIA, PARANOID, SUBCHRONIC WITH ACUTE EXACERBATION (H): ICD-10-CM

## 2019-03-27 RX ORDER — ARMODAFINIL 50 MG/1
TABLET ORAL
Qty: 28 TABLET | Refills: 3 | Status: SHIPPED | OUTPATIENT
Start: 2019-03-27 | End: 2019-07-11

## 2019-03-27 NOTE — TELEPHONE ENCOUNTER
armodafinil (NUVIGIL) 50 MG TABS tablet      Last Written Prescription Date:  12/3/18  Last Fill Quantity: 28,   # refills: 3  Last Office Visit: 3/19/19  Future Office visit:    Next 5 appointments (look out 90 days)    Apr 23, 2019  1:40 PM CDT  (Arrive by 1:25 PM)  Return Visit with Marysol Maki MD  [facility] ) [facility] [address] [place] [id number] [phone]           Routing refill request to provider for review/approval because:  Drug not on the [facility], [facility] or  Health refill protocol or controlled substance

## 2019-03-29 ENCOUNTER — TRANSFERRED RECORDS (OUTPATIENT)
Dept: HEALTH INFORMATION MANAGEMENT | Facility: CLINIC | Age: 34
End: 2019-03-29

## 2019-03-29 LAB
ALT SERPL-CCNC: 68 U/L (ref 9–41)
AST SERPL-CCNC: 29 U/L (ref 12–38)
CHOLEST SERPL-MCNC: 213 MG/DL (ref 130–200)
CREAT SERPL-MCNC: 1.1 MG/DL (ref 0.7–1.4)
GFR SERPL CREATININE-BSD FRML MDRD: >60 ML/MIN/1.73M2
GLUCOSE SERPL-MCNC: 106 MG/DL (ref 64–112)
HDLC SERPL-MCNC: 31 MG/DL (ref 32–96)
LDLC SERPL CALC-MCNC: 103 MG/DL (ref 3–130)
POTASSIUM SERPL-SCNC: 3.9 MMOL/L (ref 3.5–5.3)
TRIGL SERPL-MCNC: 393 MG/DL (ref 40–160)
TSH SERPL-ACNC: 1.4 UIU/ML (ref 0.34–4.94)

## 2019-04-11 DIAGNOSIS — F20.0 CHRONIC PARANOID SCHIZOPHRENIA (H): ICD-10-CM

## 2019-04-12 RX ORDER — CLOZAPINE 200 MG/1
TABLET ORAL
Qty: 56 TABLET | Refills: 0 | Status: SHIPPED | OUTPATIENT
Start: 2019-04-12 | End: 2019-05-10

## 2019-04-12 RX ORDER — CLOZAPINE 100 MG/1
TABLET ORAL
Qty: 28 TABLET | Refills: 0 | Status: SHIPPED | OUTPATIENT
Start: 2019-04-12 | End: 2019-05-10

## 2019-04-12 NOTE — TELEPHONE ENCOUNTER
cloZAPine (CLOZARIL) 100 MG tablet      Last Written Prescription Date:  3/21/19  Last Fill Quantity: 28,   # refills: 0    cloZAPine (CLOZARIL) 200 MG tablet      Last Written Prescription Date:  3/21/19  Last Fill Quantity: 56,   # refills: 0    Last Office Visit: 3/19/19  Future Office visit:    Next 5 appointments (look out 90 days)    Apr 23, 2019  1:40 PM CDT  (Arrive by 1:25 PM)  Return Visit with Marysol Maki MD  [facility] ([facility] ) [address] [place] [phone]  [phone]           Routing refill request to provider for review/approval because:  Drug not on the [facility], [facility] or [facility] refill protocol or controlled substance

## 2019-04-22 ENCOUNTER — TRANSFERRED RECORDS (OUTPATIENT)
Dept: HEALTH INFORMATION MANAGEMENT | Facility: CLINIC | Age: 34
End: 2019-04-22

## 2019-05-10 DIAGNOSIS — F20.0 CHRONIC PARANOID SCHIZOPHRENIA (H): ICD-10-CM

## 2019-05-10 RX ORDER — CLOZAPINE 200 MG/1
TABLET ORAL
Qty: 56 TABLET | Refills: 0 | Status: SHIPPED | OUTPATIENT
Start: 2019-05-10 | End: 2019-06-07

## 2019-05-10 RX ORDER — CLOZAPINE 100 MG/1
TABLET ORAL
Qty: 28 TABLET | Refills: 0 | Status: SHIPPED | OUTPATIENT
Start: 2019-05-10 | End: 2019-06-07

## 2019-05-10 NOTE — TELEPHONE ENCOUNTER
cloZAPine (CLOZARIL) 100 MG tablet      Last Written Prescription Date:  4-12-19  Last Fill Quantity: 28,   # refills: 0  Last Office Visit: 3-19-19  Future Office visit:    Next 5 appointments (look out 90 days)    May 15, 2019  1:40 PM CDT  (Arrive by 1:25 PM)  Return Visit with Marysol Maki MD  [facility] ([facility] ) [address] [place] [phone]  [phone]           Routing refill request to provider for review/approval because:  Drug not on the [facility], [facility] or [facility] refill protocol or controlled substance      cloZAPine (CLOZARIL) 200 MG tablet      Last Written Prescription Date:  4-12-19  Last Fill Quantity: 56,   # refills: 0  Last Office Visit: 3-19-19    Routing refill request to provider for review/approval because:  Drug not on the [facility], [facility] or [facility] refill protocol or controlled substance

## 2019-05-15 ENCOUNTER — OFFICE VISIT (OUTPATIENT)
Dept: PSYCHIATRY | Facility: OTHER | Age: 34
End: 2019-05-15
Attending: PSYCHIATRY & NEUROLOGY
Payer: COMMERCIAL

## 2019-05-15 VITALS
DIASTOLIC BLOOD PRESSURE: 92 MMHG | OXYGEN SATURATION: 94 % | TEMPERATURE: 98.4 F | WEIGHT: 280 LBS | SYSTOLIC BLOOD PRESSURE: 128 MMHG | HEART RATE: 103 BPM | BODY MASS INDEX: 37.97 KG/M2

## 2019-05-15 DIAGNOSIS — F20.0 CHRONIC PARANOID SCHIZOPHRENIA (H): Primary | ICD-10-CM

## 2019-05-15 PROCEDURE — G0463 HOSPITAL OUTPT CLINIC VISIT: HCPCS

## 2019-05-15 PROCEDURE — 99214 OFFICE O/P EST MOD 30 MIN: CPT | Performed by: PSYCHIATRY & NEUROLOGY

## 2019-05-15 ASSESSMENT — PAIN SCALES - GENERAL: PAINLEVEL: MILD PAIN (2)

## 2019-05-15 NOTE — NURSING NOTE
Chief Complaint   Patient presents with     RECHECK     Mental health.  Discuss labs       Initial BP (!) 128/92 (BP Location: Right arm, Patient Position: Sitting, Cuff Size: Adult Large)   Pulse 103   Temp 98.4  F (36.9  C) (Tympanic)   Wt 127 kg (280 lb)   SpO2 94%   BMI 37.97 kg/m   Estimated body mass index is 37.97 kg/m  as calculated from the following:    Height as of 8/20/17: 1.829 m (6').    Weight as of this encounter: 127 kg (280 lb).  Medication Reconciliation: complete    MARIKA OQUENDO LPN

## 2019-05-15 NOTE — PROGRESS NOTES
PSYCHIATRY CLINIC PROGRESS NOTE   20 minute medication management, more than 50% of time spent counseling patient on medications, medication side effects, symptom history and management   SUBJECTIVE / INTERIM HISTORY                                                                       Last visit:  Continue clozapine 100 mg am and 400 mg HS..  Continue miralax and docusate prn constipation.  continue  Cogentin 0.5 mg bid. He is taking Nuvigil again 50 mg daily    - was taking modafanil but then jittery but then feels it does help him  - feels he can recognize that he is doing better  - thinking maybe try moving out from his mom and into apartment: maybe into [employer]  - is even driving a bit short distances  - ACT team: Mika his . Nurses come out    SUBSTANCE USE- meth and MJ in past. Hasn't used MJ for 8-9 months    SYMPTOMS-some depressed mood, low energy, weight gain, anhdeonida, no SI/HI. No evidence any delusions today.  no AH/VH, denies thought blocking, memory impairment  MEDICAL ROS-  SOB, Tired, Tremor, Fatigue, weight gain,   MEDICAL / SURGICAL HISTORY            Medical Team:     PMD-       Therapist- Brneda at [facility]   Patient Active Problem List   Diagnosis     Episodic mood disorder (H)     Poisoning by Methamphetamines     Cannabis abuse     Hypertension goal BP (blood pressure) < 140/90     Psychosis (H)     Depression     Paranoid schizophrenia, chronic condition with acute exacerbation (H)     Schizophrenia, paranoid, subchronic with acute exacerbation (H)     Schizophrenia, paranoid type (H)     Drug eruption     ACP (advance care planning)     Morbid obesity (H)     ALLERGY   Penicillins; Bupropion; Depakote [valproic acid]; and Divalproex sodium-fd&c red #40  MEDICATIONS                                                                                             Current Outpatient Medications   Medication Sig     armodafinil (NUVIGIL) 50 MG TABS tablet TAKE 1 TABLET DAILY BY MOUTH  EVERY MORNING     benztropine (COGENTIN) 0.5 MG tablet Take 1 tablet (0.5 mg) by mouth 2 times daily as needed for agitation     cholecalciferol 2000 UNITS tablet Take 2,000 Units by mouth daily     cloNIDine (CATAPRES) 0.1 MG tablet TAKE 1 TABLET TWICE A DAY BY MOUTH     cloZAPine (CLOZARIL) 100 MG tablet TAKE 1 TABLET (100MG) BY MOUTH DAILY     cloZAPine (CLOZARIL) 200 MG tablet TAKE 2 TABLETS (400MG) BY MOUTH AT BEDTIME     DOCUSATE SODIUM PO Take 100 mg by mouth 2 times daily as needed for constipation     methylcellulose (CITRUCEL) 500 MG TABS tablet Take 1 tablet (500 mg) by mouth daily     Omega-3 Fatty Acids (EQL FISH OIL) 1000 MG CAPS Take 1 capsule by mouth daily     polyethylene glycol (MIRALAX/GLYCOLAX) Packet Take 17 g by mouth daily As needed for constipation     RANITIDINE HCL PO Take 300 mg by mouth daily     UNABLE TO FIND MEDICATION NAME: Cortizone   injection    Given at [facility]  Placed in bilateral shoulders     No current facility-administered medications for this visit.      VITALS   BP (!) 128/92 (BP Location: Right arm, Patient Position: Sitting, Cuff Size: Adult Large)   Pulse 103   Temp 98.4  F (36.9  C) (Tympanic)   Wt 127 kg (280 lb)   SpO2 94%   BMI 37.97 kg/m       PHQ9                       PHQ-9 SCORE 12/23/2016 1/18/2017 4/5/2017   PHQ-9 Total Score - - -   PHQ-9 Total Score 0 11 10       LABS                                                                                                                           Last Basic Metabolic Panel:  Lab Results   Component Value Date     02/27/2018      Lab Results   Component Value Date    POTASSIUM 3.7 02/27/2018     Lab Results   Component Value Date    CHLORIDE 107 02/27/2018     Lab Results   Component Value Date    IMAN 9.0 02/27/2018     Lab Results   Component Value Date    CO2 21 02/27/2018     Lab Results   Component Value Date    BUN 11 02/27/2018     Lab Results   Component Value Date    CR 0.87 02/27/2018     Lab  "Results   Component Value Date    GLC 93 02/27/2018         TSH     2.58   4/10/2016    Recent Labs   Lab Test 03/29/19 02/27/18  1300  07/09/15  0818   CHOL 213* 227*   < > 145   HDL 31* 28*   < > 39*    Cannot estimate LDL when triglyceride exceeds 400 mg/dL   < > 75   TRIG 393* 531*   < > 154*   CHOLHDLRATIO  --   --   --  3.7    < > = values in this interval not displayed.     Liver Function Studies -   Recent Labs   Lab Test 03/29/19 08/20/17  1640   PROTTOTAL  --  8.1   ALBUMIN  --  4.3   BILITOTAL  --  0.8   ALKPHOS  --  61   AST 29 41   ALT 68* 95*         Clozapine level 116 as of 10/2/16 and cloz and metabolites 194  Clozapine level 329 as of 4/18/18 and clozapine and metabolites 527    MENTAL STATUS EXAM                                                                                        Alert. Oriented to person, place, and date / time. Casually groomed -  with good eye contact. No problems with speech. Psychomotor:  no abnormal involuntary movements of jaw or or mouth noted.  Mood was described as \"I'm doing okay\" and affect was congruent to speech content. Thought process unremarkable.. Thought content was devoid of suicidal and homicidal ideation. Thought content: no evidence of delusions today.   No hallucinations. Insight was fair. Judgment was adequate for safety. Fund of knowledge was intact. Speech intact. attention, concentration  recent or remote memory were impaired in relation to his delusional thoughts. although these were not formally tested.     ASSESSMENT                                                                                                      Historical: scanned consults from [place] into chart 4/2015. Initial psych consult was 4/22/15. Hospital stay here 4/9/16 - 4/18/16. Hospital stay [place] [facility] June '16. Modafanil: we tried to help improve fatigue and mood but he felt created some cravings.    Josafat Montez is a 34 yo with  schizophrenia and Capgras syndrome.  " Josafat has experienced psychosis including delusions, paranoia and hallucinations since 2004 as a teen. He has history of heavy MJ use and methamphetamine . Today he denies any recent use of drugs.. Josafat hospitalized here and started on clozapine    Tachycardia; was on metoprolol but fatigue so d/c. PCP aware tachy and was evaluated by cardiology.      He was  sleeping too much: summer '18  we reduced the 200 mg clozapine to 100 mg and kept later dose at 400. He is now sleeping less and thankfully really seems to be doing well.     Weight, triglycerides and we rechecked labs and happy to see his triglycerides have come down compared to bit over year ago... Clozapine helps his mental health significantly so Miles, his mom, and I all in agreement we feel the benefits outweigh the risks of continuing it.     TREATMENT RISK STATEMENT: The risks, benefits, alternatives and potential adverse effects have been explained and are understood by the pt. The pt agrees to the treatment plan with the ability to do so. The pt knows to call the clinic for any problems or access emergency care if needed.       DIAGNOSES           Schizophrenia, paranoid type  Capgras syndrome      PLAN                                                                                                                         1) MEDICATIONS:   --  Continue clozapine 100 mg am and 400 mg HS..  Continue miralax and docusate prn constipation.  continue  Cogentin 0.5 mg bid. He is taking Nuvigil again 50 mg daily  2) THERAPY: no change    Labs:  None today. CBCs since on clozapine  Lipid panel 3/2019.     4) PT MONITOR [call for probs]: Worsening symptoms, side effects from medications, SI/HI    5) REFERRALS [CD tx, medical, tests other]: none    6)  RTC: ~1 month

## 2019-05-20 ENCOUNTER — TRANSFERRED RECORDS (OUTPATIENT)
Dept: HEALTH INFORMATION MANAGEMENT | Facility: CLINIC | Age: 34
End: 2019-05-20

## 2019-06-07 DIAGNOSIS — F20.0 CHRONIC PARANOID SCHIZOPHRENIA (H): ICD-10-CM

## 2019-06-07 NOTE — TELEPHONE ENCOUNTER
cloZAPine (CLOZARIL) 100 MG tablet      Last Written Prescription Date:  5/10/19  Last Fill Quantity: 28,   # refills: 0    cloZAPine (CLOZARIL) 200 MG tablet      Last Written Prescription Date:  5/10/19  Last Fill Quantity: 56,   # refills: 0    Last Office Visit: 5/15/19  Future Office visit:    Next 5 appointments (look out 90 days)    Jul 15, 2019 11:40 AM CDT  (Arrive by 11:25 AM)  [facility] [address] [place] [id number]  [phone]           Routing refill request to provider for review/approval because:  Drug not on the [facility], [facility] or [facility] refill protocol or controlled substance

## 2019-06-08 RX ORDER — CLOZAPINE 200 MG/1
TABLET ORAL
Qty: 56 TABLET | Refills: 0 | Status: SHIPPED | OUTPATIENT
Start: 2019-06-08 | End: 2019-07-05

## 2019-06-08 RX ORDER — CLOZAPINE 100 MG/1
TABLET ORAL
Qty: 28 TABLET | Refills: 0 | Status: SHIPPED | OUTPATIENT
Start: 2019-06-08 | End: 2019-07-05

## 2019-06-17 ENCOUNTER — TRANSFERRED RECORDS (OUTPATIENT)
Dept: HEALTH INFORMATION MANAGEMENT | Facility: CLINIC | Age: 34
End: 2019-06-17

## 2019-06-20 NOTE — TELEPHONE ENCOUNTER
Clozaril  Last visit: 3.6.18  Last refill: 4.9.18    Next 5 appointments (look out 90 days)     Apr 17, 2018  1:40 PM CDT   (Arrive by 1:25 PM)   Return Visit with Marysol Maki MD   [facility] [place] ([facility] [place] )    [address] [place] [address]  [place] [address] [phone]                    
negative

## 2019-07-05 DIAGNOSIS — F20.0 CHRONIC PARANOID SCHIZOPHRENIA (H): ICD-10-CM

## 2019-07-05 NOTE — TELEPHONE ENCOUNTER
cloZAPine (CLOZARIL) 200 MG   Last Written Prescription Date:  6/8/2019  Last Fill Quantity: 56,   # refills: 0  Last Office Visit:     Future Office visit:    cloZAPine (CLOZARIL) 100 MG tablet  Last Written Prescription Date:  6/8/2019  Last Fill Quantity: 28,   # refills: 0  Last Office Visit: 5/15/2019  Future Office visit:    Next 5 appointments (look out 90 days)    Jul 15, 2019 11:40 AM CDT  (Arrive by 11:25 AM)  [facility] Visit with [facility]sol Maki MD  [facility] [address] [place] [id number]  [phone]           Routing refill request to provider for review/approval because:  Drug not on the [facility], [facility] or [facility] refill protocol or controlled substance

## 2019-07-06 RX ORDER — CLOZAPINE 100 MG/1
TABLET ORAL
Qty: 28 TABLET | Refills: 0 | Status: SHIPPED | OUTPATIENT
Start: 2019-07-06 | End: 2019-08-02

## 2019-07-06 RX ORDER — CLOZAPINE 200 MG/1
TABLET ORAL
Qty: 56 TABLET | Refills: 0 | Status: SHIPPED | OUTPATIENT
Start: 2019-07-06 | End: 2019-08-02

## 2019-07-11 DIAGNOSIS — F20.0 SCHIZOPHRENIA, PARANOID, SUBCHRONIC WITH ACUTE EXACERBATION (H): ICD-10-CM

## 2019-07-11 NOTE — TELEPHONE ENCOUNTER
Armodafinil      Last Written Prescription Date:  3/27/2019  Last Fill Quantity: 28,   # refills: 3  Last Office Visit: 5/15/2019  Future Office visit:    Next 5 appointments (look out 90 days)    Jul 15, 2019 11:40 AM CDT  (Arrive by 11:25 AM)  Return Visit with Marysol Maki MD  [facility] ([facility] ) [address] [place] [address]  [phone]           Routing refill request to provider for review/approval because:  Drug not on the [facility], [facility] or [facility] refill protocol or controlled substance

## 2019-07-12 ENCOUNTER — TELEPHONE (OUTPATIENT)
Dept: PSYCHIATRY | Facility: OTHER | Age: 34
End: 2019-07-12

## 2019-07-12 RX ORDER — ARMODAFINIL 50 MG/1
TABLET ORAL
Qty: 28 TABLET | Refills: 3 | Status: SHIPPED | OUTPATIENT
Start: 2019-07-12 | End: 2019-09-27

## 2019-07-12 NOTE — TELEPHONE ENCOUNTER
Received PA request from [facility] for Armodafinil 50mg tablets. Submitted as urgent request through [facility]. Waiting for response.

## 2019-07-15 ENCOUNTER — OFFICE VISIT (OUTPATIENT)
Dept: PSYCHIATRY | Facility: OTHER | Age: 34
End: 2019-07-15
Attending: PSYCHIATRY & NEUROLOGY
Payer: COMMERCIAL

## 2019-07-15 VITALS
BODY MASS INDEX: 37.93 KG/M2 | SYSTOLIC BLOOD PRESSURE: 120 MMHG | WEIGHT: 280 LBS | TEMPERATURE: 97.9 F | DIASTOLIC BLOOD PRESSURE: 84 MMHG | HEIGHT: 72 IN | OXYGEN SATURATION: 95 % | HEART RATE: 107 BPM

## 2019-07-15 DIAGNOSIS — F20.0 PARANOID SCHIZOPHRENIA, CHRONIC CONDITION (H): Primary | ICD-10-CM

## 2019-07-15 LAB
BASOPHILS # BLD AUTO: 0.1 10E9/L (ref 0–0.2)
BASOPHILS NFR BLD AUTO: 0.8 %
DIFFERENTIAL METHOD BLD: NORMAL
EOSINOPHIL # BLD AUTO: 0.1 10E9/L (ref 0–0.7)
EOSINOPHIL NFR BLD AUTO: 1.2 %
ERYTHROCYTE [DISTWIDTH] IN BLOOD BY AUTOMATED COUNT: 13.5 % (ref 10–15)
HCT VFR BLD AUTO: 48 % (ref 40–53)
HGB BLD-MCNC: 16.4 G/DL (ref 13.3–17.7)
IMM GRANULOCYTES # BLD: 0 10E9/L (ref 0–0.4)
IMM GRANULOCYTES NFR BLD: 0.6 %
LYMPHOCYTES # BLD AUTO: 2 10E9/L (ref 0.8–5.3)
LYMPHOCYTES NFR BLD AUTO: 30.1 %
MCH RBC QN AUTO: 29.9 PG (ref 26.5–33)
MCHC RBC AUTO-ENTMCNC: 34.2 G/DL (ref 31.5–36.5)
MCV RBC AUTO: 88 FL (ref 78–100)
MONOCYTES # BLD AUTO: 0.7 10E9/L (ref 0–1.3)
MONOCYTES NFR BLD AUTO: 11 %
NEUTROPHILS # BLD AUTO: 3.7 10E9/L (ref 1.6–8.3)
NEUTROPHILS NFR BLD AUTO: 56.3 %
NRBC # BLD AUTO: 0 10*3/UL
NRBC BLD AUTO-RTO: 0 /100
PLATELET # BLD AUTO: 350 10E9/L (ref 150–450)
RBC # BLD AUTO: 5.48 10E12/L (ref 4.4–5.9)
WBC # BLD AUTO: 6.6 10E9/L (ref 4–11)

## 2019-07-15 PROCEDURE — G0463 HOSPITAL OUTPT CLINIC VISIT: HCPCS

## 2019-07-15 PROCEDURE — 36415 COLL VENOUS BLD VENIPUNCTURE: CPT | Mod: ZL | Performed by: PSYCHIATRY & NEUROLOGY

## 2019-07-15 PROCEDURE — 99000 SPECIMEN HANDLING OFFICE-LAB: CPT | Performed by: PSYCHIATRY & NEUROLOGY

## 2019-07-15 PROCEDURE — 85025 COMPLETE CBC W/AUTO DIFF WBC: CPT | Mod: ZL | Performed by: PSYCHIATRY & NEUROLOGY

## 2019-07-15 PROCEDURE — 80159 DRUG ASSAY CLOZAPINE: CPT | Mod: ZL | Performed by: PSYCHIATRY & NEUROLOGY

## 2019-07-15 PROCEDURE — 99213 OFFICE O/P EST LOW 20 MIN: CPT | Performed by: PSYCHIATRY & NEUROLOGY

## 2019-07-15 ASSESSMENT — MIFFLIN-ST. JEOR: SCORE: 2263.07

## 2019-07-15 ASSESSMENT — PAIN SCALES - GENERAL: PAINLEVEL: NO PAIN (0)

## 2019-07-15 NOTE — PROGRESS NOTES
PSYCHIATRY CLINIC PROGRESS NOTE   20 minute medication management, more than 50% of time spent counseling patient on medications, medication side effects, symptom history and management   SUBJECTIVE / INTERIM HISTORY                                                                       Last visit:     - was taking modafanil but then jittery but then feels it does help him. I noted I got refill request however he hasn't been taking so likely was the pharmacy  - has been having some chest pain. Some abdominal pain  - thinking maybe try moving out from his mom and into apartment: maybe into [employer]  - is even driving a bit short distances  - ACT team finished. Mom worries about lack of accountability with him taking clozapine now    SUBSTANCE USE- meth and MJ in past. Hasn't used MJ for 8-9 months    SYMPTOMS-some depressed mood, low energy, weight gain, anhdeonida, no SI/HI. No evidence any delusions today.  no AH/VH, denies thought blocking, memory impairment  MEDICAL ROS-  SOB, Tired, Tremor, Fatigue, weight gain,   MEDICAL / SURGICAL HISTORY            Medical Team:     PMD-       Therapist- Brenda at [facility]   Patient Active Problem List   Diagnosis     Episodic mood disorder (H)     Poisoning by Methamphetamines     Cannabis abuse     Hypertension goal BP (blood pressure) < 140/90     Psychosis (H)     Depression     Paranoid schizophrenia, chronic condition with acute exacerbation (H)     Schizophrenia, paranoid, subchronic with acute exacerbation (H)     Schizophrenia, paranoid type (H)     Drug eruption     ACP (advance care planning)     Morbid obesity (H)     ALLERGY   Penicillins; Bupropion; Depakote [valproic acid]; and Divalproex sodium-fd&c red #40  MEDICATIONS                                                                                             Current Outpatient Medications   Medication Sig     armodafinil (NUVIGIL) 50 MG TABS tablet TAKE 1 TABLET DAILY BY MOUTH EVERY MORNING     benztropine  (COGENTIN) 0.5 MG tablet Take 1 tablet (0.5 mg) by mouth 2 times daily as needed for agitation     cholecalciferol 2000 UNITS tablet Take 2,000 Units by mouth daily     cloNIDine (CATAPRES) 0.1 MG tablet TAKE 1 TABLET TWICE A DAY BY MOUTH     cloZAPine (CLOZARIL) 100 MG tablet TAKE 1 TABLET (100MG) BY MOUTH DAILY     cloZAPine (CLOZARIL) 200 MG tablet TAKE 2 TABLETS (400MG) BY MOUTH AT BEDTIME     DOCUSATE SODIUM PO Take 100 mg by mouth 2 times daily as needed for constipation     methylcellulose (CITRUCEL) 500 MG TABS tablet Take 1 tablet (500 mg) by mouth daily     Omega-3 Fatty Acids (EQL FISH OIL) 1000 MG CAPS Take 1 capsule by mouth daily     polyethylene glycol (MIRALAX/GLYCOLAX) Packet Take 17 g by mouth daily As needed for constipation     RANITIDINE HCL PO Take 300 mg by mouth daily     UNABLE TO FIND MEDICATION NAME: Cortizone   injection    Given at [facility]  Placed in bilateral shoulders     No current facility-administered medications for this visit.      VITALS   /84 (BP Location: Left arm, Patient Position: Sitting)   Pulse 107   Temp 97.9  F (36.6  C) (Tympanic)   Ht 1.829 m (6')   Wt 127 kg (280 lb)   SpO2 95%   BMI 37.97 kg/m       PHQ9                       PHQ-9 SCORE 12/23/2016 1/18/2017 4/5/2017   PHQ-9 Total Score - - -   PHQ-9 Total Score 0 11 10       LABS                                                                                                                           Last Basic Metabolic Panel:  Lab Results   Component Value Date     02/27/2018      Lab Results   Component Value Date    POTASSIUM 3.7 02/27/2018     Lab Results   Component Value Date    CHLORIDE 107 02/27/2018     Lab Results   Component Value Date    IMAN 9.0 02/27/2018     Lab Results   Component Value Date    CO2 21 02/27/2018     Lab Results   Component Value Date    BUN 11 02/27/2018     Lab Results   Component Value Date    CR 0.87 02/27/2018     Lab Results   Component Value Date    GLC  93 02/27/2018         TSH     2.58   4/10/2016    Recent Labs   Lab Test 03/29/19 02/27/18  1300  07/09/15  0818   CHOL 213* 227*   < > 145   HDL 31* 28*   < > 39*    Cannot estimate LDL when triglyceride exceeds 400 mg/dL   < > 75   TRIG 393* 531*   < > 154*   CHOLHDLRATIO  --   --   --  3.7    < > = values in this interval not displayed.     Liver Function Studies -   Recent Labs   Lab Test 03/29/19 08/20/17  1640   PROTTOTAL  --  8.1   ALBUMIN  --  4.3   BILITOTAL  --  0.8   ALKPHOS  --  61   AST 29 41   ALT 68* 95*         Clozapine level 116 as of 10/2/16 and cloz and metabolites 194  Clozapine level 329 as of 4/18/18 and clozapine and metabolites 527    MENTAL STATUS EXAM                                                                                        Alert. Oriented to person, place, and date / time. Casually groomed -  with good eye contact. No problems with speech. Psychomotor:  no abnormal involuntary movements of jaw or or mouth noted.  Mood was euthymic and affect was congruent to speech content. Thought process unremarkable.. Thought content was devoid of suicidal and homicidal ideation. Thought content: no evidence of delusions today.   No hallucinations. Insight was fair. Judgment was adequate for safety. Fund of knowledge was intact. Speech intact. attention, concentration  recent or remote memory were impaired in relation to his delusional thoughts. although these were not formally tested.     ASSESSMENT                                                                                                      Historical: scanned consults from [place] into chart 4/2015. Initial psych consult was 4/22/15. Hospital stay here 4/9/16 - 4/18/16. Hospital stay [place] [facility] June '16. Modafanil: we tried to help improve fatigue and mood but he felt created some cravings.    Josafat Montez is a 34 yo with  schizophrenia and Capgras syndrome.  Josafat has experienced psychosis including delusions, paranoia  and hallucinations since 2004 as a teen. He has history of heavy MJ use and methamphetamine . Today he denies any recent use of drugs.. Josafat hospitalized here and started on clozapine    Tachycardia; was on metoprolol but fatigue so d/c. PCP aware tachy and was evaluated by cardiology. Discussed prolonged tachycardia can cause heart failure hence I feel important we have him discuss if other meds could take for tachycarida...     He was  sleeping too much: summer '18  we reduced the 200 mg clozapine to 100 mg and kept later dose at 400. He is now sleeping less and thankfully really seems to be doing well.     Weight, triglycerides and we rechecked labs and happy to see his triglycerides have come down compared to bit over year ago... Clozapine helps his mental health significantly so Miles, his mom, and I all in agreement we feel the benefits outweigh the risks of continuing it. We are going to check a level -- I will tack it on to his monthly CBC with diff.    TREATMENT RISK STATEMENT: The risks, benefits, alternatives and potential adverse effects have been explained and are understood by the pt. The pt agrees to the treatment plan with the ability to do so. The pt knows to call the clinic for any problems or access emergency care if needed.       DIAGNOSES           Schizophrenia, paranoid type  Capgras syndrome      PLAN                                                                                                                         1) MEDICATIONS:   --  Continue clozapine 100 mg am and 400 mg HS..  Continue miralax and docusate prn constipation.  continue  Cogentin 0.5 mg bid. He has Nuvigil if he decides take it.  2) THERAPY: no change    Labs:  CBC with diff and clozapine level today.   Lipid panel 3/2019.     4) PT MONITOR [call for probs]: Worsening symptoms, side effects from medications, SI/HI    5) REFERRALS [CD tx, medical, tests other]: none    6)  RTC: ~1  month

## 2019-07-15 NOTE — LETTER
July 20, 2019      TO: Miles Montez  [address]  [place] [address]         Pedro Maurice, your clozapine level was 645 ng/mL and it is generally recommended levels are 350 ng/mL and above --> you are on a sufficient dose of a clozapine.       Sincerely,      Marysol Maki MD

## 2019-07-15 NOTE — NURSING NOTE
Chief Complaint   Patient presents with     RECHECK     Mental health.       Initial /84 (BP Location: Left arm, Patient Position: Sitting)   Pulse 107   Temp 97.9  F (36.6  C) (Tympanic)   Ht 1.829 m (6')   Wt 127 kg (280 lb)   SpO2 95%   BMI 37.97 kg/m   Estimated body mass index is 37.97 kg/m  as calculated from the following:    Height as of this encounter: 1.829 m (6').    Weight as of this encounter: 127 kg (280 lb).  Medication Reconciliation: complete

## 2019-07-15 NOTE — TELEPHONE ENCOUNTER
"Received DENIAL from [employer] for Armodafinil. Denial reason: \"The requested drug has not been prescribed for a medically accepted indication. Armodafinil is not eligible for coverage because it does not hold a FDA approved indication for the treatment of paranoid schizophrenia. This would be considered an off-label use. In addition, [facility] Quantity Limit Coverage [employer] for Armodafinil is as follows: two daily for 50mg; one daily for 150mg, 200mg and 250mg.\" Per an earlier correspondence with [facility] it was noted that Provigil (QL) is listed as the preferred product. Pharmacy advised. Forms scanned to [employer].  "

## 2019-07-16 NOTE — TELEPHONE ENCOUNTER
See note below.  Nuvigil is not covered due to diagnosis associated with Rx.  Provigil is preferred alternative.     Please advise. Thank you  Kim Arshad RN-BSN  Care Coordination, Behavioral Health

## 2019-07-18 NOTE — TELEPHONE ENCOUNTER
Kim, could relay to Josafat (and his mom) that not covered? All of three of us were uncertain how much it was helping with energy and mood (we were trying to combat SEs of clozapine) anyhow so I don't think he's been taking it much. I think he has some left if he does decide try take it again.    If mom and Josafat feel otherwise and think we should re-submit we will but as I recall we weren't overly impressed with it anyway.     Thanks!

## 2019-07-19 LAB
CLOZAPINE AND METABOLITES TOTAL: 1051 NG/ML
CLOZAPINE SERPL-MCNC: 645 NG/ML
CLOZAPINE-N-OXIDE QUANT: 127 NG/ML
NORCLOZAPINE SERPL-MCNC: 279 NG/ML

## 2019-07-19 NOTE — TELEPHONE ENCOUNTER
10:01 AM    Notified patient's mother.  She stated patient does have a lot left at this time and hasn't been taking it.  Advised if anything changes and they do want to refill, they should contact us to resubmit PA.  She verbalized understanding.     Kim Arshad, RN-BSN  Care Coordination, Behavioral Health

## 2019-08-02 DIAGNOSIS — F20.0 CHRONIC PARANOID SCHIZOPHRENIA (H): ICD-10-CM

## 2019-08-02 NOTE — TELEPHONE ENCOUNTER
Clozapine 100mg      Last Written Prescription Date:  7/6/2019  Last Fill Quantity: 28,   # refills: 0  Last Office Visit: 7/15/2019  Future Office visit:    Next 5 appointments (look out 90 days)    Aug 16, 2019 11:40 AM CDT  (Arrive by 11:25 AM)  Return Visit with Marysol Maki MD  [facility] ) [address] [place] [address]  [place] [address]  [phone]           Routing refill request to provider for review/approval because:  Drug not on the [facility], [facility] or [facility] refill protocol or controlled substance    Clozapine 200mg      Last Written Prescription Date:  7/6/2019  Last Fill Quantity: 56,   # refills: 0  Last Office Visit: 7/15/2019  Future Office visit:    Next 5 appointments (look out 90 days)    Aug 16, 2019 11:40 AM CDT  (Arrive by 11:25 AM)  Return Visit with Marysol Maki MD  [facility] [place] ([facility] [place] ) [address] [place] [address]  [place] [address]  [phone]           Routing refill request to provider for review/approval because:  Drug not on the [facility], [facility] or [facility] refill protocol or controlled substance

## 2019-08-06 RX ORDER — CLOZAPINE 100 MG/1
TABLET ORAL
Qty: 28 TABLET | Refills: 0 | Status: SHIPPED | OUTPATIENT
Start: 2019-08-06 | End: 2019-08-30

## 2019-08-06 RX ORDER — CLOZAPINE 200 MG/1
TABLET ORAL
Qty: 56 TABLET | Refills: 0 | Status: SHIPPED | OUTPATIENT
Start: 2019-08-06 | End: 2019-08-30

## 2019-08-12 ENCOUNTER — TRANSFERRED RECORDS (OUTPATIENT)
Dept: HEALTH INFORMATION MANAGEMENT | Facility: CLINIC | Age: 34
End: 2019-08-12

## 2019-08-16 ENCOUNTER — OFFICE VISIT (OUTPATIENT)
Dept: PSYCHIATRY | Facility: OTHER | Age: 34
End: 2019-08-16
Attending: PSYCHIATRY & NEUROLOGY
Payer: COMMERCIAL

## 2019-08-16 VITALS
HEART RATE: 96 BPM | OXYGEN SATURATION: 94 % | DIASTOLIC BLOOD PRESSURE: 74 MMHG | SYSTOLIC BLOOD PRESSURE: 114 MMHG | TEMPERATURE: 98.3 F

## 2019-08-16 DIAGNOSIS — F20.0 CHRONIC PARANOID SCHIZOPHRENIA (H): Primary | ICD-10-CM

## 2019-08-16 PROCEDURE — 99213 OFFICE O/P EST LOW 20 MIN: CPT | Performed by: PSYCHIATRY & NEUROLOGY

## 2019-08-16 PROCEDURE — G0463 HOSPITAL OUTPT CLINIC VISIT: HCPCS

## 2019-08-16 ASSESSMENT — PAIN SCALES - GENERAL: PAINLEVEL: NO PAIN (0)

## 2019-08-16 NOTE — PROGRESS NOTES
PSYCHIATRY CLINIC PROGRESS NOTE   20 minute medication management, more than 50% of time spent counseling patient on medications, medication side effects, symptom history and management   SUBJECTIVE / INTERIM HISTORY                                                                       Last visit:   CBC with diff and clozapine level today.   Lipid panel 3/2019.     - generally doing okay. Of note discharged from [facility] services and is going okay. Reports IS taking his medications  - not taking modafanil  - got out and went to fair which mom and I agree indicative he is doing well   - still gets abdominal pain, nausea  - thinking maybe try moving out from his mom and into apartment: maybe into [facility]  - is even driving a bit short distances    SUBSTANCE USE- meth and MJ in past. Reports has not used MJ in around year    SYMPTOMS-some depressed mood, low energy, weight gain, anhdeonida, no SI/HI. No evidence any delusions today.  no AH/VH, denies thought blocking, memory impairment  MEDICAL ROS-  SOB, Tired, Tremor, Fatigue, weight gain, nausea  MEDICAL / SURGICAL HISTORY            Medical Team:     PMD-       Therapist- Brenda at [facility]   Patient Active Problem List   Diagnosis     Episodic mood disorder (H)     Poisoning by Methamphetamines     Cannabis abuse     Hypertension goal BP (blood pressure) < 140/90     Psychosis (H)     Depression     Paranoid schizophrenia, chronic condition with acute exacerbation (H)     Schizophrenia, paranoid, subchronic with acute exacerbation (H)     Schizophrenia, paranoid type (H)     Drug eruption     ACP (advance care planning)     Morbid obesity (H)     ALLERGY   Penicillins; Bupropion; Depakote [valproic acid]; and Divalproex sodium-fd&c red #40  MEDICATIONS                                                                                             Current Outpatient Medications   Medication Sig     armodafinil (NUVIGIL) 50 MG TABS tablet TAKE 1 TABLET DAILY BY MOUTH EVERY  MORNING     benztropine (COGENTIN) 0.5 MG tablet Take 1 tablet (0.5 mg) by mouth 2 times daily as needed for agitation     cholecalciferol 2000 UNITS tablet Take 2,000 Units by mouth daily     cloNIDine (CATAPRES) 0.1 MG tablet TAKE 1 TABLET TWICE A DAY BY MOUTH     cloZAPine (CLOZARIL) 100 MG tablet TAKE 1 TABLET (100MG) BY MOUTH DAILY     cloZAPine (CLOZARIL) 200 MG tablet TAKE 2 TABLETS (400MG) BY MOUTH AT BEDTIME     DOCUSATE SODIUM PO Take 100 mg by mouth 2 times daily as needed for constipation     methylcellulose (CITRUCEL) 500 MG TABS tablet Take 1 tablet (500 mg) by mouth daily     Omega-3 Fatty Acids (EQL FISH OIL) 1000 MG CAPS Take 1 capsule by mouth daily     polyethylene glycol (MIRALAX/GLYCOLAX) Packet Take 17 g by mouth daily As needed for constipation     RANITIDINE HCL PO Take 300 mg by mouth daily     UNABLE TO FIND MEDICATION NAME: Cortizone   injection    Given at [facility]  Placed in bilateral shoulders     UNKNOWN TO PATIENT Patient is taking an antibiotic and an ear drop.  Does not know the name or dose.     No current facility-administered medications for this visit.      VITALS   /74 (BP Location: Right arm, Patient Position: Sitting, Cuff Size: Adult Large)   Pulse 96   Temp 98.3  F (36.8  C) (Tympanic)   SpO2 94%      PHQ9                       PHQ-9 SCORE 12/23/2016 1/18/2017 4/5/2017   PHQ-9 Total Score - - -   PHQ-9 Total Score 0 11 10       LABS                                                                                                                           Last Basic Metabolic Panel:  Lab Results   Component Value Date     02/27/2018      Lab Results   Component Value Date    POTASSIUM 3.7 02/27/2018     Lab Results   Component Value Date    CHLORIDE 107 02/27/2018     Lab Results   Component Value Date    IMAN 9.0 02/27/2018     Lab Results   Component Value Date    CO2 21 02/27/2018     Lab Results   Component Value Date    BUN 11 02/27/2018     Lab Results    Component Value Date    CR 0.87 02/27/2018     Lab Results   Component Value Date    GLC 93 02/27/2018         TSH     2.58   4/10/2016    Recent Labs   Lab Test 03/29/19 02/27/18  1300  07/09/15  0818   CHOL 213* 227*   < > 145   HDL 31* 28*   < > 39*    Cannot estimate LDL when triglyceride exceeds 400 mg/dL   < > 75   TRIG 393* 531*   < > 154*   CHOLHDLRATIO  --   --   --  3.7    < > = values in this interval not displayed.     Liver Function Studies -   Recent Labs   Lab Test 03/29/19 08/20/17  1640   PROTTOTAL  --  8.1   ALBUMIN  --  4.3   BILITOTAL  --  0.8   ALKPHOS  --  61   AST 29 41   ALT 68* 95*         Clozapine level 116 as of 10/2/16 and cloz and metabolites 194  Clozapine level 329 as of 4/18/18 and clozapine and metabolites 527    MENTAL STATUS EXAM                                                                                        Alert. Oriented to person, place, and date / time. Casually groomed -  with good eye contact. No problems with speech. Psychomotor:  no abnormal involuntary movements of jaw or or mouth noted.  Mood was euthymic and affect was congruent to speech content. Thought process unremarkable.. Thought content was devoid of suicidal and homicidal ideation. Thought content: no evidence of delusions today.   No hallucinations. Insight was fair. Judgment was adequate for safety. Fund of knowledge was intact. Speech intact. attention, concentration  recent or remote memory were impaired in relation to his delusional thoughts. although these were not formally tested.     ASSESSMENT                                                                                                      Historical: scanned consults from [facility] into chart 4/2015. Initial psych consult was 4/22/15. Hospital stay here 4/9/16 - 4/18/16. Hospital stay [facility] June '16. Modafanil: we tried to help improve fatigue and mood but he felt created some cravings.    Josafat Montez is a 32 yo with   schizophrenia and Capgras syndrome.  Josafat has experienced psychosis including delusions, paranoia and hallucinations since 2004 as a teen. He has history of heavy MJ use and methamphetamine . Today he denies any recent use of drugs.. Josafat hospitalized here and started on clozapine    Tachycardia; was on metoprolol but fatigue so d/c. PCP aware tachy and was evaluated by cardiology. Discussed prolonged tachycardia can cause heart failure hence I feel important we have him discuss if other meds could take for tachycardia. Mom is in agreement with this and we again reviewed the risks associated with prolonged tachycardia. He has an upcoming apptmt with his primary and mom will go with and help discuss     He was  sleeping too much: summer '18  we reduced the 200 mg clozapine to 100 mg and kept later dose at 400. He is now sleeping less and thankfully really seems to be doing well.     Weight, triglycerides and we rechecked labs and happy to see his triglycerides have come down compared to bit over year ago... Clozapine helps his mental health significantly so Miles, his mom, and I all in agreement we feel the benefits outweigh the risks of continuing it. We are going to check a level -- I added lab request for clozapine level to his last CBC with diff through [facility]    TREATMENT RISK STATEMENT: The risks, benefits, alternatives and potential adverse effects have been explained and are understood by the pt. The pt agrees to the treatment plan with the ability to do so. The pt knows to call the clinic for any problems or access emergency care if needed.       DIAGNOSES           Schizophrenia, paranoid type  Capgras syndrome      PLAN                                                                                                                         1) MEDICATIONS:   --  Continue clozapine 100 mg am and 400 mg HS..  Continue miralax and docusate prn constipation.  continue  Cogentin 0.5 mg bid. He has Nuvigil if he  decides take it.  2) THERAPY: no change    Labs:  EKG next visit if his primary doesn't do an EKG. CBC with diff monthly   Lipid panel 3/2019.     4) PT MONITOR [call for probs]: Worsening symptoms, side effects from medications, SI/HI    5) REFERRALS [CD tx, medical, tests other]: none    6)  RTC: ~6 weeks

## 2019-08-16 NOTE — NURSING NOTE
Chief Complaint   Patient presents with     RECHECK     Mental health.  Discuss tachycardia and vomiting.       Initial /74 (BP Location: Right arm, Patient Position: Sitting, Cuff Size: Adult Large)   Pulse 96   Temp 98.3  F (36.8  C) (Tympanic)   SpO2 94%  Estimated body mass index is 37.97 kg/m  as calculated from the following:    Height as of 7/15/19: 1.829 m (6').    Weight as of 7/15/19: 127 kg (280 lb).  Medication Reconciliation: complete

## 2019-08-30 DIAGNOSIS — F20.0 CHRONIC PARANOID SCHIZOPHRENIA (H): ICD-10-CM

## 2019-08-30 RX ORDER — CLOZAPINE 100 MG/1
TABLET ORAL
Qty: 28 TABLET | Refills: 0 | Status: SHIPPED | OUTPATIENT
Start: 2019-08-30 | End: 2019-09-27

## 2019-08-30 RX ORDER — CLOZAPINE 200 MG/1
TABLET ORAL
Qty: 56 TABLET | Refills: 0 | Status: SHIPPED | OUTPATIENT
Start: 2019-08-30 | End: 2019-09-27

## 2019-08-30 NOTE — TELEPHONE ENCOUNTER
Cloazril 100 and 200 mg  Last visit: 8.16.19  Last refill: 8.6.19    Future appointments:   Next 5 appointments (look out 90 days)    Sep 27, 2019 11:40 AM CDT  (Arrive by 11:25 AM)  Return Visit with Marysol Maki MD  [facility] ([facility] ) [address] [place] [id number] [phone]

## 2019-09-09 ENCOUNTER — TRANSFERRED RECORDS (OUTPATIENT)
Dept: HEALTH INFORMATION MANAGEMENT | Facility: CLINIC | Age: 34
End: 2019-09-09

## 2019-09-27 ENCOUNTER — OFFICE VISIT (OUTPATIENT)
Dept: PSYCHIATRY | Facility: OTHER | Age: 34
End: 2019-09-27
Attending: PSYCHIATRY & NEUROLOGY
Payer: COMMERCIAL

## 2019-09-27 VITALS
DIASTOLIC BLOOD PRESSURE: 80 MMHG | TEMPERATURE: 98.8 F | OXYGEN SATURATION: 96 % | WEIGHT: 265 LBS | SYSTOLIC BLOOD PRESSURE: 130 MMHG | HEIGHT: 74 IN | HEART RATE: 111 BPM | BODY MASS INDEX: 34.01 KG/M2

## 2019-09-27 DIAGNOSIS — F20.0 CHRONIC PARANOID SCHIZOPHRENIA (H): ICD-10-CM

## 2019-09-27 DIAGNOSIS — F20.0 PARANOID SCHIZOPHRENIA, CHRONIC CONDITION (H): Primary | ICD-10-CM

## 2019-09-27 PROCEDURE — G0463 HOSPITAL OUTPT CLINIC VISIT: HCPCS

## 2019-09-27 PROCEDURE — 99214 OFFICE O/P EST MOD 30 MIN: CPT | Performed by: PSYCHIATRY & NEUROLOGY

## 2019-09-27 RX ORDER — CLOZAPINE 100 MG/1
TABLET ORAL
Qty: 28 TABLET | Refills: 0 | Status: SHIPPED | OUTPATIENT
Start: 2019-09-27 | End: 2019-10-25

## 2019-09-27 RX ORDER — CLOZAPINE 200 MG/1
TABLET ORAL
Qty: 56 TABLET | Refills: 0 | Status: SHIPPED | OUTPATIENT
Start: 2019-09-27 | End: 2019-10-25

## 2019-09-27 RX ORDER — HYDROXYZINE HYDROCHLORIDE 25 MG/1
TABLET, FILM COATED ORAL
Qty: 60 TABLET | Refills: 3 | Status: SHIPPED | OUTPATIENT
Start: 2019-09-27 | End: 2021-11-10

## 2019-09-27 ASSESSMENT — PAIN SCALES - GENERAL: PAINLEVEL: NO PAIN (0)

## 2019-09-27 ASSESSMENT — MIFFLIN-ST. JEOR: SCORE: 2218.84

## 2019-09-27 NOTE — PROGRESS NOTES
PSYCHIATRY CLINIC PROGRESS NOTE   20 minute medication management, more than 50% of time spent counseling patient on medications, medication side effects, symptom history and management   SUBJECTIVE / INTERIM HISTORY                                                                       Last visit 8/16/19: -  Continue clozapine 100 mg am and 400 mg HS..  Continue miralax and docusate prn constipation.  continue  Cogentin 0.5 mg bid. He has Nuvigil if he decides take it.    - generally doing okay. Of note discharged from ACT services and is going okay. Reports IS taking his medications  - sleep been an issue - asks about maybe seroquel as that helped in the past  - still gets abdominal pain, nausea  - thinking maybe try moving out from his mom and into apartment: maybe into [place]  - is even driving a bit short distances    SUBSTANCE USE- meth and MJ in past. Reports has not used MJ in around year    SYMPTOMS-some depressed mood, low energy, weight gain, anhdeonida, no SI/HI. No evidence any delusions today.  no AH/VH, denies thought blocking, memory impairment  MEDICAL ROS-  SOB, Tired, Tremor, Fatigue, weight gain, nausea  MEDICAL / SURGICAL HISTORY            Medical Team:     PMD-       Therapist- Brenda at [facility]   Patient Active Problem List   Diagnosis     Episodic mood disorder (H)     Poisoning by Methamphetamines     Cannabis abuse     Hypertension goal BP (blood pressure) < 140/90     Psychosis (H)     Depression     Paranoid schizophrenia, chronic condition with acute exacerbation (H)     Schizophrenia, paranoid, subchronic with acute exacerbation (H)     Schizophrenia, paranoid type (H)     Drug eruption     ACP (advance care planning)     Morbid obesity (H)     ALLERGY   Penicillins; Bupropion; Depakote [valproic acid]; and Divalproex sodium-fd&c red #40  MEDICATIONS                                                                                             Current Outpatient Medications   Medication  "Sig     benztropine (COGENTIN) 0.5 MG tablet Take 1 tablet (0.5 mg) by mouth 2 times daily as needed for agitation     cholecalciferol 2000 UNITS tablet Take 2,000 Units by mouth daily     cloNIDine (CATAPRES) 0.1 MG tablet TAKE 1 TABLET TWICE A DAY BY MOUTH     cloZAPine (CLOZARIL) 100 MG tablet TAKE 1 TABLET (100MG) BY MOUTH DAILY     cloZAPine (CLOZARIL) 200 MG tablet TAKE 2 TABLETS (400MG) BY MOUTH AT BEDTIME     DOCUSATE SODIUM PO Take 100 mg by mouth 2 times daily as needed for constipation     Omega-3 Fatty Acids (EQL FISH OIL) 1000 MG CAPS Take 1 capsule by mouth daily     polyethylene glycol (MIRALAX/GLYCOLAX) Packet Take 17 g by mouth daily As needed for constipation     RANITIDINE HCL PO Take 300 mg by mouth daily     UNABLE TO FIND MEDICATION NAME: Cortizone   injection    Given at [facility]  Placed in bilateral shoulders     UNKNOWN TO PATIENT Patient is taking an antibiotic and an ear drop.  Does not know the name or dose.     methylcellulose (CITRUCEL) 500 MG TABS tablet Take 1 tablet (500 mg) by mouth daily     No current facility-administered medications for this visit.      VITALS   /80 (BP Location: Right arm, Patient Position: Chair, Cuff Size: Adult Regular)   Pulse 111   Temp 98.8  F (37.1  C) (Tympanic)   Ht 1.867 m (6' 1.5\")   Wt 120.2 kg (265 lb)   SpO2 96%   BMI 34.49 kg/m       PHQ9                       PHQ-9 SCORE 12/23/2016 1/18/2017 4/5/2017   PHQ-9 Total Score - - -   PHQ-9 Total Score 0 11 10       LABS                                                                                                                           Last Basic Metabolic Panel:  Lab Results   Component Value Date     02/27/2018      Lab Results   Component Value Date    POTASSIUM 3.7 02/27/2018     Lab Results   Component Value Date    CHLORIDE 107 02/27/2018     Lab Results   Component Value Date    IMAN 9.0 02/27/2018     Lab Results   Component Value Date    CO2 21 02/27/2018     Lab " Results   Component Value Date    BUN 11 02/27/2018     Lab Results   Component Value Date    CR 0.87 02/27/2018     Lab Results   Component Value Date    GLC 93 02/27/2018         TSH     2.58   4/10/2016    Recent Labs   Lab Test 03/29/19 02/27/18  1300  07/09/15  0818   CHOL 213* 227*   < > 145   HDL 31* 28*   < > 39*    Cannot estimate LDL when triglyceride exceeds 400 mg/dL   < > 75   TRIG 393* 531*   < > 154*   CHOLHDLRATIO  --   --   --  3.7    < > = values in this interval not displayed.     Liver Function Studies -   Recent Labs   Lab Test 03/29/19 08/20/17  1640   PROTTOTAL  --  8.1   ALBUMIN  --  4.3   BILITOTAL  --  0.8   ALKPHOS  --  61   AST 29 41   ALT 68* 95*         Clozapine level 116 as of 10/2/16 and cloz and metabolites 194  Clozapine level 329 as of 4/18/18 and clozapine and metabolites 527    MENTAL STATUS EXAM                                                                                        Alert. Oriented to person, place, and date / time. Casually groomed -  with good eye contact. No problems with speech. Psychomotor:  no abnormal involuntary movements of jaw or or mouth noted.  Mood was euthymic and affect was congruent to speech content. Thought process unremarkable.. Thought content was devoid of suicidal and homicidal ideation. Thought content: no evidence of delusions today.   No hallucinations. Insight was fair. Judgment was adequate for safety. Fund of knowledge was intact. Speech intact. attention, concentration  recent or remote memory were impaired in relation to his delusional thoughts. although these were not formally tested.     ASSESSMENT                                                                                                      Historical: scanned consults from [place] into chart 4/2015. Initial psych consult was 4/22/15. Hospital stay here 4/9/16 - 4/18/16. Hospital stay [facility] June '16. Modafanil: we tried to help improve fatigue and mood but he  felt created some cravings.    Josafat Montez is a 34 yo with  schizophrenia and Capgras syndrome.  Josafat has experienced psychosis including delusions, paranoia and hallucinations since 2004 as a teen. He has history of heavy MJ use and methamphetamine . Today he denies any recent use of drugs.. Josafat hospitalized here and started on clozapine    Tachycardia; was on metoprolol but fatigue so d/c. PCP aware tachy and was evaluated by cardiology. Discussed prolonged tachycardia can cause heart failure hence I feel important we have him discuss if other meds could take for tachycardia. Mom is in agreement with this and we again reviewed the risks associated with prolonged tachycardia. He has an upcoming apptmt with his primary and mom will go with and help discuss     He was  sleeping too much: summer '18  we reduced the 200 mg clozapine to 100 mg and kept later dose at 400. He is now sleeping less and thankfully really seems to be doing well.     Weight, triglycerides and we rechecked labs and happy to see his triglycerides have come down compared to bit over year ago... Clozapine helps his mental health significantly so Miles, his mom, and I all in agreement we feel the benefits outweigh the risks of continuing it. Only change today is we are going to have him try hydroxyzine as prn for insomnia / anxiety. I noted I don't think Seroquel of which helped in past is good idea as is also antipsychotic like his clozapine is    TREATMENT RISK STATEMENT: The risks, benefits, alternatives and potential adverse effects have been explained and are understood by the pt. The pt agrees to the treatment plan with the ability to do so. The pt knows to call the clinic for any problems or access emergency care if needed.       DIAGNOSES           Schizophrenia, paranoid type  Capgras syndrome      PLAN                                                                                                                         1) MEDICATIONS:    -- Start hydroxyzine 25 mg to 50 mg bedtime as needed for insomnia. Continue clozapine 100 mg am and 400 mg HS..  Continue miralax and docusate prn constipation.  continue  Cogentin 0.5 mg bid. He has Nuvigil if he decides take it.  2) THERAPY: no change    Labs: CBC with diff monthly   Lipid panel 3/2019.     4) PT MONITOR [call for probs]: Worsening symptoms, side effects from medications, SI/HI    5) REFERRALS [CD tx, medical, tests other]: none    6)  RTC: ~6 weeks                                                                                                                                                                                                                                                                                                                                                                          PSYCHIATRY CLINIC PROGRESS NOTE   20 minute medication management, more than 50% of time spent counseling patient on medications, medication side effects, symptom history and management   SUBJECTIVE / INTERIM HISTORY                                                                       Last visit:   CBC with diff and clozapine level today.   Lipid panel 3/2019.     - generally doing okay. Of note discharged from ACT services and is going okay. Reports IS taking his medications  - not taking modafanil  - got out and went to fair which mom and I agree indicative he is doing well   - still gets abdominal pain, nausea  - thinking maybe try moving out from his mom and into apartment: maybe into [employer]  - is even driving a bit short distances    SUBSTANCE USE- meth and MJ in past. Reports has not used MJ in around year    SYMPTOMS-some depressed mood, low energy, weight gain, anhdeonida, no SI/HI. No evidence any delusions today.  no AH/VH, denies thought blocking, memory impairment  MEDICAL ROS-  SOB, Tired, Tremor, Fatigue, weight gain, nausea  MEDICAL / SURGICAL HISTORY            Medical Team:      PMD-       Therapist- Brenda at [facility]   Patient Active Problem List   Diagnosis     Episodic mood disorder (H)     Poisoning by Methamphetamines     Cannabis abuse     Hypertension goal BP (blood pressure) < 140/90     Psychosis (H)     Depression     Paranoid schizophrenia, chronic condition with acute exacerbation (H)     Schizophrenia, paranoid, subchronic with acute exacerbation (H)     Schizophrenia, paranoid type (H)     Drug eruption     ACP (advance care planning)     Morbid obesity (H)     ALLERGY   Penicillins; Bupropion; Depakote [valproic acid]; and Divalproex sodium-fd&c red #40  MEDICATIONS                                                                                             Current Outpatient Medications   Medication Sig     benztropine (COGENTIN) 0.5 MG tablet Take 1 tablet (0.5 mg) by mouth 2 times daily as needed for agitation     cholecalciferol 2000 UNITS tablet Take 2,000 Units by mouth daily     cloNIDine (CATAPRES) 0.1 MG tablet TAKE 1 TABLET TWICE A DAY BY MOUTH     cloZAPine (CLOZARIL) 100 MG tablet TAKE 1 TABLET (100MG) BY MOUTH DAILY     cloZAPine (CLOZARIL) 200 MG tablet TAKE 2 TABLETS (400MG) BY MOUTH AT BEDTIME     DOCUSATE SODIUM PO Take 100 mg by mouth 2 times daily as needed for constipation     Omega-3 Fatty Acids (EQL FISH OIL) 1000 MG CAPS Take 1 capsule by mouth daily     polyethylene glycol (MIRALAX/GLYCOLAX) Packet Take 17 g by mouth daily As needed for constipation     RANITIDINE HCL PO Take 300 mg by mouth daily     UNABLE TO FIND MEDICATION NAME: Cortizone   injection    Given at   Placed in bilateral shoulders     UNKNOWN TO PATIENT Patient is taking an antibiotic and an ear drop.  Does not know the name or dose.     methylcellulose (CITRUCEL) 500 MG TABS tablet Take 1 tablet (500 mg) by mouth daily     No current facility-administered medications for this visit.      VITALS   /80 (BP Location: Right arm, Patient Position: Chair, Cuff Size: Adult  "Regular)   Pulse 111   Temp 98.8  F (37.1  C) (Tympanic)   Ht 1.867 m (6' 1.5\")   Wt 120.2 kg (265 lb)   SpO2 96%   BMI 34.49 kg/m       PHQ9                       PHQ-9 SCORE 12/23/2016 1/18/2017 4/5/2017   PHQ-9 Total Score - - -   PHQ-9 Total Score 0 11 10       LABS                                                                                                                           Last Basic Metabolic Panel:  Lab Results   Component Value Date     02/27/2018      Lab Results   Component Value Date    POTASSIUM 3.7 02/27/2018     Lab Results   Component Value Date    CHLORIDE 107 02/27/2018     Lab Results   Component Value Date    IMAN 9.0 02/27/2018     Lab Results   Component Value Date    CO2 21 02/27/2018     Lab Results   Component Value Date    BUN 11 02/27/2018     Lab Results   Component Value Date    CR 0.87 02/27/2018     Lab Results   Component Value Date    GLC 93 02/27/2018         TSH     2.58   4/10/2016    Recent Labs   Lab Test 03/29/19 02/27/18  1300  07/09/15  0818   CHOL 213* 227*   < > 145   HDL 31* 28*   < > 39*    Cannot estimate LDL when triglyceride exceeds 400 mg/dL   < > 75   TRIG 393* 531*   < > 154*   CHOLHDLRATIO  --   --   --  3.7    < > = values in this interval not displayed.     Liver Function Studies -   Recent Labs   Lab Test 03/29/19 08/20/17  1640   PROTTOTAL  --  8.1   ALBUMIN  --  4.3   BILITOTAL  --  0.8   ALKPHOS  --  61   AST 29 41   ALT 68* 95*         Clozapine level 116 as of 10/2/16 and cloz and metabolites 194  Clozapine level 329 as of 4/18/18 and clozapine and metabolites 527    MENTAL STATUS EXAM                                                                                        Alert. Oriented to person, place, and date / time. Casually groomed -  with good eye contact. No problems with speech. Psychomotor:  no abnormal involuntary movements of jaw or or mouth noted.  Mood was euthymic and affect was congruent to speech content. " Thought process unremarkable.. Thought content was devoid of suicidal and homicidal ideation. Thought content: no evidence of delusions today.   No hallucinations. Insight was fair. Judgment was adequate for safety. Fund of knowledge was intact. Speech intact. attention, concentration  recent or remote memory were impaired in relation to his delusional thoughts. although these were not formally tested.     ASSESSMENT                                                                                                      Historical: scanned consults from [place] into chart 4/2015. Initial psych consult was 4/22/15. Hospital stay here 4/9/16 - 4/18/16. Hospital stay [facility] [place] [facility] June '16. Modafanil: we tried to help improve fatigue and mood but he felt created some cravings.    Josafat Montez is a 32 yo with  schizophrenia and Capgras syndrome.  Josafat has experienced psychosis including delusions, paranoia and hallucinations since 2004 as a teen. He has history of heavy MJ use and methamphetamine . Today he denies any recent use of drugs.. Josafat hospitalized here and started on clozapine    Tachycardia; was on metoprolol but fatigue so d/c. PCP aware tachy and was evaluated by cardiology. Discussed prolonged tachycardia can cause heart failure hence I feel important we have him discuss if other meds could take for tachycardia. Mom is in agreement with this and we again reviewed the risks associated with prolonged tachycardia. He has an upcoming apptmt with his primary and mom will go with and help discuss     He was  sleeping too much: summer '18  we reduced the 200 mg clozapine to 100 mg and kept later dose at 400. He is now sleeping less and thankfully really seems to be doing well.     Weight, triglycerides and we rechecked labs and happy to see his triglycerides have come down compared to bit over year ago... Clozapine helps his mental health significantly so Miles, his mom, and I all in agreement we feel the benefits  outweigh the risks of continuing it. We are going to check a level -- I added lab request for clozapine level to his last CBC with diff through [place]    TREATMENT RISK STATEMENT: The risks, benefits, alternatives and potential adverse effects have been explained and are understood by the pt. The pt agrees to the treatment plan with the ability to do so. The pt knows to call the clinic for any problems or access emergency care if needed.       DIAGNOSES           Schizophrenia, paranoid type  Capgras syndrome      PLAN                                                                                                                         1) MEDICATIONS:   --  Continue clozapine 100 mg am and 400 mg HS..  Continue miralax and docusate prn constipation.  continue  Cogentin 0.5 mg bid. He has Nuvigil if he decides take it.  2) THERAPY: no change    Labs:  EKG next visit if his primary doesn't do an EKG. CBC with diff monthly   Lipid panel 3/2019.     4) PT MONITOR [call for probs]: Worsening symptoms, side effects from medications, SI/HI    5) REFERRALS [CD tx, medical, tests other]: none    6)  RTC: ~6 weeks

## 2019-09-27 NOTE — TELEPHONE ENCOUNTER
Clozaril 100 and 200 mg  Last visit: 9.27.19  Last refill: 8.30.19    Last ANC: 9/9/19: 5800    Future appointments:   Next 5 appointments (look out 90 days)    Nov 08, 2019 11:40 AM CST  (Arrive by 11:25 AM)  Return Visit with Marysol Maki MD  [facility] ([facility] ) [address] [place] [address] [id number] [phone]

## 2019-09-27 NOTE — NURSING NOTE
"Chief Complaint   Patient presents with     RECHECK       Initial /80 (BP Location: Right arm, Patient Position: Chair, Cuff Size: Adult Regular)   Pulse 111   Temp 98.8  F (37.1  C) (Tympanic)   Ht 1.867 m (6' 1.5\")   Wt 120.2 kg (265 lb)   SpO2 96%   BMI 34.49 kg/m   Estimated body mass index is 34.49 kg/m  as calculated from the following:    Height as of this encounter: 1.867 m (6' 1.5\").    Weight as of this encounter: 120.2 kg (265 lb).  Medication Reconciliation: complete  SUSHIL CHILDERS LPN    "

## 2019-10-07 ENCOUNTER — TRANSFERRED RECORDS (OUTPATIENT)
Dept: HEALTH INFORMATION MANAGEMENT | Facility: CLINIC | Age: 34
End: 2019-10-07

## 2019-10-14 DIAGNOSIS — F20.0 SCHIZOPHRENIA, PARANOID, SUBCHRONIC WITH ACUTE EXACERBATION (H): ICD-10-CM

## 2019-10-14 RX ORDER — CLONIDINE HYDROCHLORIDE 0.1 MG/1
TABLET ORAL
Qty: 56 TABLET | Refills: 5 | Status: SHIPPED | OUTPATIENT
Start: 2019-10-14 | End: 2020-04-15

## 2019-10-25 DIAGNOSIS — F20.0 CHRONIC PARANOID SCHIZOPHRENIA (H): ICD-10-CM

## 2019-10-25 RX ORDER — CLOZAPINE 100 MG/1
TABLET ORAL
Qty: 28 TABLET | Refills: 0 | Status: SHIPPED | OUTPATIENT
Start: 2019-10-25 | End: 2019-11-22

## 2019-10-25 RX ORDER — CLOZAPINE 200 MG/1
TABLET ORAL
Qty: 56 TABLET | Refills: 0 | Status: SHIPPED | OUTPATIENT
Start: 2019-10-25 | End: 2019-11-22

## 2019-10-25 NOTE — TELEPHONE ENCOUNTER
Clozaril 200 and 100 mg  Last visit: 9.27.19  Last refill: 9.27.19    Last ANC: 10/7/19: 5400    Future appointments:   Next 5 appointments (look out 90 days)    Nov 08, 2019 11:40 AM CST  (Arrive by 11:25 AM)  Return Visit with Marysol Maki MD  [facility] ([facility] ) [address] [place] [id number] [phone]

## 2019-11-04 ENCOUNTER — TRANSFERRED RECORDS (OUTPATIENT)
Dept: HEALTH INFORMATION MANAGEMENT | Facility: CLINIC | Age: 34
End: 2019-11-04

## 2019-11-08 ENCOUNTER — OFFICE VISIT (OUTPATIENT)
Dept: PSYCHIATRY | Facility: OTHER | Age: 34
End: 2019-11-08
Attending: PSYCHIATRY & NEUROLOGY
Payer: COMMERCIAL

## 2019-11-08 VITALS
WEIGHT: 275 LBS | HEART RATE: 109 BPM | SYSTOLIC BLOOD PRESSURE: 130 MMHG | OXYGEN SATURATION: 97 % | DIASTOLIC BLOOD PRESSURE: 82 MMHG | BODY MASS INDEX: 35.29 KG/M2 | TEMPERATURE: 98.7 F | HEIGHT: 74 IN

## 2019-11-08 DIAGNOSIS — F20.0 SCHIZOPHRENIA, PARANOID, SUBCHRONIC WITH ACUTE EXACERBATION (H): Primary | ICD-10-CM

## 2019-11-08 PROCEDURE — G0463 HOSPITAL OUTPT CLINIC VISIT: HCPCS

## 2019-11-08 PROCEDURE — 99214 OFFICE O/P EST MOD 30 MIN: CPT | Performed by: PSYCHIATRY & NEUROLOGY

## 2019-11-08 ASSESSMENT — MIFFLIN-ST. JEOR: SCORE: 2264.2

## 2019-11-08 ASSESSMENT — PAIN SCALES - GENERAL: PAINLEVEL: NO PAIN (0)

## 2019-11-08 NOTE — NURSING NOTE
"Chief Complaint   Patient presents with     RECHECK     Mental health.       Initial /82 (BP Location: Right arm, Patient Position: Sitting, Cuff Size: Adult Regular)   Pulse 109   Temp 98.7  F (37.1  C) (Tympanic)   Ht 1.867 m (6' 1.5\")   Wt 124.7 kg (275 lb)   SpO2 97%   BMI 35.79 kg/m   Estimated body mass index is 35.79 kg/m  as calculated from the following:    Height as of this encounter: 1.867 m (6' 1.5\").    Weight as of this encounter: 124.7 kg (275 lb).  Medication Reconciliation: complete  MARIKA OQUENDO LPN    "

## 2019-11-08 NOTE — PROGRESS NOTES
PSYCHIATRY CLINIC PROGRESS NOTE   20 minute medication management, more than 50% of time spent counseling patient on medications, medication side effects, symptom history and management   SUBJECTIVE / INTERIM HISTORY                                                                       Last visit :  Start hydroxyzine 25 mg to 50 mg bedtime as needed for insomnia. Continue clozapine 100 mg am and 400 mg HS..  Continue miralax and docusate prn constipation.  continue  Cogentin 0.5 mg bid. He has Nuvigil if he decides take it.    - generally doing okay. Of note discharged from ACT services and is going okay. Reports IS taking his medications  - hydroxyzine does help bit with anxiety   - is working on old vehicle  - generally doing okay. Mom has tried helping Josafat when he is feeling stressed out: mom helps him identify what he is thinking / thoughts and then they discuss and how affects his emotions / feelings  - sleep going okay  - still gets abdominal pain, nausea  - thinking maybe try moving out from his mom and into apartment: maybe into [employer]  - is even driving a bit short distances    SUBSTANCE USE- meth and MJ in past. Reports has not used MJ in around year    SYMPTOMS-some depressed mood, low energy, weight gain, anhdeonida, no SI/HI. No evidence any delusions today.  no AH/VH, denies thought blocking, memory impairment  MEDICAL ROS-  SOB, Tired, Tremor, Fatigue, weight gain, nausea  MEDICAL / SURGICAL HISTORY            Medical Team:     PMD-       Therapist- Brenda at [facility]   Patient Active Problem List   Diagnosis     Episodic mood disorder (H)     Poisoning by Methamphetamines     Cannabis abuse     Hypertension goal BP (blood pressure) < 140/90     Psychosis (H)     Depression     Paranoid schizophrenia, chronic condition with acute exacerbation (H)     Schizophrenia, paranoid, subchronic with acute exacerbation (H)     Schizophrenia, paranoid type (H)     Drug eruption     ACP (advance care planning)      "Morbid obesity (H)     ALLERGY   Penicillins; Bupropion; Depakote [valproic acid]; and Divalproex sodium-fd&c red #40  MEDICATIONS                                                                                             Current Outpatient Medications   Medication Sig     benztropine (COGENTIN) 0.5 MG tablet Take 1 tablet (0.5 mg) by mouth 2 times daily as needed for agitation     cholecalciferol 2000 UNITS tablet Take 2,000 Units by mouth daily     cloNIDine (CATAPRES) 0.1 MG tablet TAKE 1 TABLET TWICE A DAY BY MOUTH     cloZAPine (CLOZARIL) 100 MG tablet TAKE 1 TABLET (100MG) BY MOUTH DAILY     cloZAPine (CLOZARIL) 200 MG tablet TAKE 2 TABLETS (400MG) BY MOUTH AT BEDTIME     DOCUSATE SODIUM PO Take 100 mg by mouth 2 times daily as needed for constipation     hydrOXYzine (ATARAX) 25 MG tablet Take 1 - 2 tablets bedtime as needed for insomnia     methylcellulose (CITRUCEL) 500 MG TABS tablet Take 1 tablet (500 mg) by mouth daily     Omega-3 Fatty Acids (EQL FISH OIL) 1000 MG CAPS Take 1 capsule by mouth daily     RANITIDINE HCL PO Take 300 mg by mouth daily     No current facility-administered medications for this visit.      VITALS   /82 (BP Location: Right arm, Patient Position: Sitting, Cuff Size: Adult Regular)   Pulse 109   Temp 98.7  F (37.1  C) (Tympanic)   Ht 1.867 m (6' 1.5\")   Wt 124.7 kg (275 lb)   SpO2 97%   BMI 35.79 kg/m       PHQ9                       PHQ-9 SCORE 12/23/2016 1/18/2017 4/5/2017   PHQ-9 Total Score - - -   PHQ-9 Total Score 0 11 10       LABS                                                                                                                           Last Basic Metabolic Panel:  Lab Results   Component Value Date     02/27/2018      Lab Results   Component Value Date    POTASSIUM 3.7 02/27/2018     Lab Results   Component Value Date    CHLORIDE 107 02/27/2018     Lab Results   Component Value Date    IMAN 9.0 02/27/2018     Lab Results   Component Value " Date    CO2 21 02/27/2018     Lab Results   Component Value Date    BUN 11 02/27/2018     Lab Results   Component Value Date    CR 0.87 02/27/2018     Lab Results   Component Value Date    GLC 93 02/27/2018         TSH     2.58   4/10/2016    Recent Labs   Lab Test 03/29/19 02/27/18  1300  07/09/15  0818   CHOL 213* 227*   < > 145   HDL 31* 28*   < > 39*    Cannot estimate LDL when triglyceride exceeds 400 mg/dL   < > 75   TRIG 393* 531*   < > 154*   CHOLHDLRATIO  --   --   --  3.7    < > = values in this interval not displayed.     Liver Function Studies -   Recent Labs   Lab Test 03/29/19 08/20/17  1640   PROTTOTAL  --  8.1   ALBUMIN  --  4.3   BILITOTAL  --  0.8   ALKPHOS  --  61   AST 29 41   ALT 68* 95*         Clozapine level 116 as of 10/2/16 and cloz and metabolites 194  Clozapine level 329 as of 4/18/18 and clozapine and metabolites 527    MENTAL STATUS EXAM                                                                                        Alert. Oriented to person, place, and date / time. Casually groomed -  with good eye contact. No problems with speech. Psychomotor:  no abnormal involuntary movements of jaw or or mouth noted.  Mood was euthymic and affect was congruent to speech content. Thought process unremarkable.. Thought content was devoid of suicidal and homicidal ideation. Thought content: no evidence of delusions today.   No hallucinations. Insight was fair. Judgment was adequate for safety. Fund of knowledge was intact. Speech intact. attention, concentration  recent or remote memory were impaired in relation to his delusional thoughts. although these were not formally tested.     ASSESSMENT                                                                                                      Historical: scanned consults from [facility] into chart 4/2015. Initial psych consult was 4/22/15. Hospital stay here 4/9/16 - 4/18/16. Hospital stay [facility] June '16. Modafanil: we tried to  help improve fatigue and mood but he felt created some cravings.    Josafat Montez is a 32 yo with  schizophrenia and Capgras syndrome.  Josafat has experienced psychosis including delusions, paranoia and hallucinations since 2004 as a teen. He has history of heavy MJ use and methamphetamine . Today he denies any recent use of drugs.. Josafat hospitalized here and started on clozapine    Tachycardia; was on metoprolol but fatigue so d/c. PCP aware tachy and was evaluated by cardiology. Discussed prolonged tachycardia can cause heart failure hence I feel important we have him discuss if other meds could take for tachycardia. Mom is in agreement with this and we again reviewed the risks associated with prolonged tachycardia.      He was  sleeping too much: summer '18  we reduced the 200 mg clozapine to 100 mg and kept later dose at 400. He is now sleeping less and thankfully really seems to be doing well.     Weight, triglycerides and we rechecked labs and happy to see his triglycerides have come down compared to bit over year ago... Clozapine helps his mental health significantly so Miles, his mom, and I all in agreement we feel the benefits outweigh the risks of continuing it. No changes today. Hydroxyzine has been helpful as needed for anxiety. .    TREATMENT RISK STATEMENT: The risks, benefits, alternatives and potential adverse effects have been explained and are understood by the pt. The pt agrees to the treatment plan with the ability to do so. The pt knows to call the clinic for any problems or access emergency care if needed.       DIAGNOSES           Schizophrenia, paranoid type  Capgras syndrome      PLAN                                                                                                                         1) MEDICATIONS:   -- continue hydroxyzine 25 mg to 50 mg bedtime as needed for insomnia. Continue clozapine 100 mg am and 400 mg HS..  Continue miralax and docusate prn constipation.  continue   Cogentin 0.5 mg bid.   2) THERAPY: no change    Labs: CBC with diff monthly   Lipid panel 3/2019.     4) PT MONITOR [call for probs]: Worsening symptoms, side effects from medications, SI/HI    5) REFERRALS [CD tx, medical, tests other]: none    6)  RTC: ~6 weeks                                                                                                                                                                                                                                                                                                                                                                          PSYCHIATRY CLINIC PROGRESS NOTE   20 minute medication management, more than 50% of time spent counseling patient on medications, medication side effects, symptom history and management   SUBJECTIVE / INTERIM HISTORY                                                                       Last visit:   CBC with diff and clozapine level today.   Lipid panel 3/2019.     - generally doing okay. Of note discharged from ACT services and is going okay. Reports IS taking his medications  - not taking modafanil  - got out and went to fair which mom and I agree indicative he is doing well   - still gets abdominal pain, nausea  - thinking maybe try moving out from his mom and into apartment: maybe into [employer]  - is even driving a bit short distances    SUBSTANCE USE- meth and MJ in past. Reports has not used MJ in around year    SYMPTOMS-some depressed mood, low energy, weight gain, anhdeonida, no SI/HI. No evidence any delusions today.  no AH/VH, denies thought blocking, memory impairment  MEDICAL ROS-  SOB, Tired, Tremor, Fatigue, weight gain, nausea  MEDICAL / SURGICAL HISTORY            Medical Team:     PMD-       Therapist- Brenda at [facility]   Patient Active Problem List   Diagnosis     Episodic mood disorder (H)     Poisoning by Methamphetamines     Cannabis abuse     Hypertension goal BP (blood pressure) <  "140/90     Psychosis (H)     Depression     Paranoid schizophrenia, chronic condition with acute exacerbation (H)     Schizophrenia, paranoid, subchronic with acute exacerbation (H)     Schizophrenia, paranoid type (H)     Drug eruption     ACP (advance care planning)     Morbid obesity (H)     ALLERGY   Penicillins; Bupropion; Depakote [valproic acid]; and Divalproex sodium-fd&c red #40  MEDICATIONS                                                                                             Current Outpatient Medications   Medication Sig     benztropine (COGENTIN) 0.5 MG tablet Take 1 tablet (0.5 mg) by mouth 2 times daily as needed for agitation     cholecalciferol 2000 UNITS tablet Take 2,000 Units by mouth daily     cloNIDine (CATAPRES) 0.1 MG tablet TAKE 1 TABLET TWICE A DAY BY MOUTH     cloZAPine (CLOZARIL) 100 MG tablet TAKE 1 TABLET (100MG) BY MOUTH DAILY     cloZAPine (CLOZARIL) 200 MG tablet TAKE 2 TABLETS (400MG) BY MOUTH AT BEDTIME     DOCUSATE SODIUM PO Take 100 mg by mouth 2 times daily as needed for constipation     hydrOXYzine (ATARAX) 25 MG tablet Take 1 - 2 tablets bedtime as needed for insomnia     methylcellulose (CITRUCEL) 500 MG TABS tablet Take 1 tablet (500 mg) by mouth daily     Omega-3 Fatty Acids (EQL FISH OIL) 1000 MG CAPS Take 1 capsule by mouth daily     RANITIDINE HCL PO Take 300 mg by mouth daily     No current facility-administered medications for this visit.      VITALS   /82 (BP Location: Right arm, Patient Position: Sitting, Cuff Size: Adult Regular)   Pulse 109   Temp 98.7  F (37.1  C) (Tympanic)   Ht 1.867 m (6' 1.5\")   Wt 124.7 kg (275 lb)   SpO2 97%   BMI 35.79 kg/m       PHQ9                       PHQ-9 SCORE 12/23/2016 1/18/2017 4/5/2017   PHQ-9 Total Score - - -   PHQ-9 Total Score 0 11 10       LABS                                                                                                                           Last Basic Metabolic Panel:  Lab Results "   Component Value Date     02/27/2018      Lab Results   Component Value Date    POTASSIUM 3.7 02/27/2018     Lab Results   Component Value Date    CHLORIDE 107 02/27/2018     Lab Results   Component Value Date    IMAN 9.0 02/27/2018     Lab Results   Component Value Date    CO2 21 02/27/2018     Lab Results   Component Value Date    BUN 11 02/27/2018     Lab Results   Component Value Date    CR 0.87 02/27/2018     Lab Results   Component Value Date    GLC 93 02/27/2018         TSH     2.58   4/10/2016    Recent Labs   Lab Test 03/29/19 02/27/18  1300  07/09/15  0818   CHOL 213* 227*   < > 145   HDL 31* 28*   < > 39*    Cannot estimate LDL when triglyceride exceeds 400 mg/dL   < > 75   TRIG 393* 531*   < > 154*   CHOLHDLRATIO  --   --   --  3.7    < > = values in this interval not displayed.     Liver Function Studies -   Recent Labs   Lab Test 03/29/19 08/20/17  1640   PROTTOTAL  --  8.1   ALBUMIN  --  4.3   BILITOTAL  --  0.8   ALKPHOS  --  61   AST 29 41   ALT 68* 95*         Clozapine level 116 as of 10/2/16 and cloz and metabolites 194  Clozapine level 329 as of 4/18/18 and clozapine and metabolites 527    MENTAL STATUS EXAM                                                                                        Alert. Oriented to person, place, and date / time. Casually groomed -  with good eye contact. No problems with speech. Psychomotor:  no abnormal involuntary movements of jaw or or mouth noted.  Mood was euthymic and affect was congruent to speech content. Thought process unremarkable.. Thought content was devoid of suicidal and homicidal ideation. Thought content: no evidence of delusions today.   No hallucinations. Insight was fair. Judgment was adequate for safety. Fund of knowledge was intact. Speech intact. attention, concentration  recent or remote memory were impaired in relation to his delusional thoughts. although these were not formally tested.     ASSESSMENT                                                                                                       Historical: scanned consults from [place] into chart 4/2015. Initial psych consult was 4/22/15. Hospital stay here 4/9/16 - 4/18/16. Hospital stay [facility] June '16. Modafanil: we tried to help improve fatigue and mood but he felt created some cravings.    Josafat Montez is a 34 yo with  schizophrenia and Capgras syndrome.  Josafat has experienced psychosis including delusions, paranoia and hallucinations since 2004 as a teen. He has history of heavy MJ use and methamphetamine . Today he denies any recent use of drugs.. Josafat hospitalized here and started on clozapine    Tachycardia; was on metoprolol but fatigue so d/c. PCP aware tachy and was evaluated by cardiology. Discussed prolonged tachycardia can cause heart failure hence I feel important we have him discuss if other meds could take for tachycardia. Mom is in agreement with this and we again reviewed the risks associated with prolonged tachycardia. He has an upcoming apptmt with his primary and mom will go with and help discuss     He was  sleeping too much: summer '18  we reduced the 200 mg clozapine to 100 mg and kept later dose at 400. He is now sleeping less and thankfully really seems to be doing well.     Weight, triglycerides and we rechecked labs and happy to see his triglycerides have come down compared to bit over year ago... Clozapine helps his mental health significantly so Miles, his mom, and I all in agreement we feel the benefits outweigh the risks of continuing it. We are going to check a level -- I added lab request for clozapine level to his last CBC with diff through [facility]    TREATMENT RISK STATEMENT: The risks, benefits, alternatives and potential adverse effects have been explained and are understood by the pt. The pt agrees to the treatment plan with the ability to do so. The pt knows to call the clinic for any problems or access emergency care if needed.        DIAGNOSES           Schizophrenia, paranoid type  Capgras syndrome      PLAN                                                                                                                         1) MEDICATIONS:   --  Continue clozapine 100 mg am and 400 mg HS..  Continue miralax and docusate prn constipation.  continue  Cogentin 0.5 mg bid. He has Nuvigil if he decides take it.  2) THERAPY: no change    Labs:  EKG next visit if his primary doesn't do an EKG. CBC with diff monthly   Lipid panel 3/2019.     4) PT MONITOR [call for probs]: Worsening symptoms, side effects from medications, SI/HI    5) REFERRALS [CD tx, medical, tests other]: none    6)  RTC: ~6 weeks

## 2019-11-22 DIAGNOSIS — F20.0 CHRONIC PARANOID SCHIZOPHRENIA (H): ICD-10-CM

## 2019-11-25 RX ORDER — CLOZAPINE 100 MG/1
TABLET ORAL
Qty: 28 TABLET | Refills: 0 | Status: SHIPPED | OUTPATIENT
Start: 2019-11-25 | End: 2019-12-22

## 2019-11-25 RX ORDER — CLOZAPINE 200 MG/1
TABLET ORAL
Qty: 56 TABLET | Refills: 0 | Status: SHIPPED | OUTPATIENT
Start: 2019-11-25 | End: 2019-12-22

## 2019-11-25 NOTE — TELEPHONE ENCOUNTER
Clozaril  Last visit: 11.8.19  Last refill: 10.25.19    Last ANC: 11/4/19 3600     Future appointments:   Next 5 appointments (look out 90 days)    Dec 20, 2019 11:20 AM CST  (Arrive by 11:05 AM)  [facility] [address] [place] [id number] [phone]

## 2019-12-02 ENCOUNTER — TRANSFERRED RECORDS (OUTPATIENT)
Dept: HEALTH INFORMATION MANAGEMENT | Facility: CLINIC | Age: 34
End: 2019-12-02

## 2019-12-20 DIAGNOSIS — F20.0 CHRONIC PARANOID SCHIZOPHRENIA (H): ICD-10-CM

## 2019-12-20 RX ORDER — CLOZAPINE 200 MG/1
100 TABLET ORAL DAILY
Qty: 56 TABLET | Refills: 0 | Status: CANCELLED | OUTPATIENT
Start: 2019-12-20

## 2019-12-20 RX ORDER — CLOZAPINE 100 MG/1
100 TABLET ORAL DAILY
Qty: 28 TABLET | Refills: 0 | Status: CANCELLED | OUTPATIENT
Start: 2019-12-20

## 2019-12-20 NOTE — TELEPHONE ENCOUNTER
Clozapine (Clozaril) 100 mg tab.   Take 1 tablet (100 mg) by mouth daily  Last Written Prescription Date:  11-25-19  Last Fill Quantity: 28,   # refills: 0  Last Office Visit: 11-8-19  Future Office visit:    Next 5 appointments (look out 90 days)    Jan 17, 2020 10:20 AM CST  (Arrive by 10:05 AM)  Return Visit with Marysol Maki MD  [facility] [address] [id number] [phone]           Clozapine (Clozaril) 200 mg tab.  Take 2 tablets (400 mg) by mouth at bedtime.  Last Written Prescription Date:  11-25-19  Last Fill Quantity: 56,   # refills: 0  Last Office Visit: 11-8-19  Future Office visit:    Next 5 appointments (look out 90 days)    Jan 17, 2020 10:20 AM CST  (Arrive by 10:05 AM)  Return Visit with Marysol Maki MD  [facility] ) [address] [id number] [phone]

## 2019-12-22 RX ORDER — CLOZAPINE 100 MG/1
TABLET ORAL
Qty: 28 TABLET | Refills: 0 | Status: SHIPPED | OUTPATIENT
Start: 2019-12-22 | End: 2020-01-17

## 2019-12-22 RX ORDER — CLOZAPINE 200 MG/1
TABLET ORAL
Qty: 56 TABLET | Refills: 0 | Status: SHIPPED | OUTPATIENT
Start: 2019-12-22 | End: 2020-01-17

## 2019-12-30 ENCOUNTER — TRANSFERRED RECORDS (OUTPATIENT)
Dept: HEALTH INFORMATION MANAGEMENT | Facility: CLINIC | Age: 34
End: 2019-12-30

## 2020-01-17 ENCOUNTER — OFFICE VISIT (OUTPATIENT)
Dept: PSYCHIATRY | Facility: OTHER | Age: 35
End: 2020-01-17
Attending: PSYCHIATRY & NEUROLOGY
Payer: COMMERCIAL

## 2020-01-17 VITALS
TEMPERATURE: 97.9 F | SYSTOLIC BLOOD PRESSURE: 126 MMHG | OXYGEN SATURATION: 98 % | DIASTOLIC BLOOD PRESSURE: 84 MMHG | HEIGHT: 73 IN | HEART RATE: 102 BPM | WEIGHT: 280 LBS | BODY MASS INDEX: 37.11 KG/M2

## 2020-01-17 DIAGNOSIS — F20.0 CHRONIC PARANOID SCHIZOPHRENIA (H): Primary | ICD-10-CM

## 2020-01-17 DIAGNOSIS — F20.0 CHRONIC PARANOID SCHIZOPHRENIA (H): ICD-10-CM

## 2020-01-17 PROCEDURE — G0463 HOSPITAL OUTPT CLINIC VISIT: HCPCS

## 2020-01-17 PROCEDURE — 99213 OFFICE O/P EST LOW 20 MIN: CPT | Performed by: PSYCHIATRY & NEUROLOGY

## 2020-01-17 RX ORDER — CLOZAPINE 100 MG/1
TABLET ORAL
Qty: 28 TABLET | Refills: 0 | Status: SHIPPED | OUTPATIENT
Start: 2020-01-17 | End: 2020-02-15

## 2020-01-17 RX ORDER — CLOZAPINE 200 MG/1
TABLET ORAL
Qty: 56 TABLET | Refills: 0 | Status: SHIPPED | OUTPATIENT
Start: 2020-01-17 | End: 2020-02-15

## 2020-01-17 ASSESSMENT — PAIN SCALES - GENERAL: PAINLEVEL: MILD PAIN (2)

## 2020-01-17 ASSESSMENT — MIFFLIN-ST. JEOR: SCORE: 2273.95

## 2020-01-17 NOTE — NURSING NOTE
"Chief Complaint   Patient presents with     RECHECK     Mental health.       Initial /84 (BP Location: Right arm, Patient Position: Sitting, Cuff Size: Adult Large)   Pulse 102   Temp 97.9  F (36.6  C) (Tympanic)   Ht 1.854 m (6' 1\")   Wt 127 kg (280 lb)   SpO2 98%   BMI 36.94 kg/m   Estimated body mass index is 36.94 kg/m  as calculated from the following:    Height as of this encounter: 1.854 m (6' 1\").    Weight as of this encounter: 127 kg (280 lb).  Medication Reconciliation: complete  MARIKA OQUENDO LPN    "

## 2020-01-17 NOTE — TELEPHONE ENCOUNTER
Clozapine   Last Written Prescription Date:  12/22/2019  Last Fill Quantity: 28,   # refills: 0  Last Office Visit: 11/8/2019  Future Office visit:    Next 5 appointments (look out 90 days)    Mar 18, 2020 11:20 AM CDT  (Arrive by 11:05 AM)  Return Visit with Marysol Maki MD  [facility] ([facility] ) [address] [place] [id number]  [phone]           Routing refill request to provider for review/approval because:  Drug not on the [facility], [facility] or [facility] refill protocol or controlled substance

## 2020-01-17 NOTE — PROGRESS NOTES
PSYCHIATRY CLINIC PROGRESS NOTE   20 minute medication management, more than 50% of time spent counseling patient on medications, medication side effects, symptom history and management   SUBJECTIVE / INTERIM HISTORY                                                                       Last visit November '19:  Continue clozapine 100 mg am and 400 mg HS..  Continue miralax and docusate prn constipation.  continue  Cogentin 0.5 mg bid. He has Nuvigil if he decides take it.    - generally doing okay. Taking his meds (ACT team was done ~ year ago)  - has not been taking hydroxyzine.   - generally doing okay. Mom has tried helping Josafat when he is feeling stressed out: mom helps him identify what he is thinking / thoughts and then they discuss and how affects his emotions / feelings  - sleep going okay  - still gets abdominal pain, nausea  - thinking maybe try moving out from his mom and into apartment: maybe into [employer]  - is even driving a bit short distances but notes his license is revoked for now    SUBSTANCE USE- meth and MJ in past. Reports has not used MJ in around year    SYMPTOMS-some depressed mood, low energy, weight gain, anhdeonida, no SI/HI. No evidence any delusions today.  no AH/VH, denies thought blocking, memory impairment  MEDICAL ROS-  SOB, Tired, Tremor, Fatigue, weight gain, nausea  MEDICAL / SURGICAL HISTORY            Medical Team:     PMD-       Therapist- Brenda at [facility]   Patient Active Problem List   Diagnosis     Episodic mood disorder (H)     Poisoning by Methamphetamines     Cannabis abuse     Hypertension goal BP (blood pressure) < 140/90     Psychosis (H)     Depression     Paranoid schizophrenia, chronic condition with acute exacerbation (H)     Schizophrenia, paranoid, subchronic with acute exacerbation (H)     Schizophrenia, paranoid type (H)     Drug eruption     ACP (advance care planning)     Morbid obesity (H)     ALLERGY   Penicillins; Bupropion; Depakote [valproic acid]; and  "Divalproex sodium-fd&c red #40  MEDICATIONS                                                                                             Current Outpatient Medications   Medication Sig     benztropine (COGENTIN) 0.5 MG tablet Take 1 tablet (0.5 mg) by mouth 2 times daily as needed for agitation     cholecalciferol 2000 UNITS tablet Take 2,000 Units by mouth daily     cloNIDine (CATAPRES) 0.1 MG tablet TAKE 1 TABLET TWICE A DAY BY MOUTH     cloZAPine (CLOZARIL) 100 MG tablet TAKE 1 TABLET (100MG) BY MOUTH DAILY     cloZAPine (CLOZARIL) 200 MG tablet TAKE 2 TABLETS (400MG) BY MOUTH AT BEDTIME     DOCUSATE SODIUM PO Take 100 mg by mouth 2 times daily as needed for constipation     hydrOXYzine (ATARAX) 25 MG tablet Take 1 - 2 tablets bedtime as needed for insomnia     methylcellulose (CITRUCEL) 500 MG TABS tablet Take 1 tablet (500 mg) by mouth daily     Omega-3 Fatty Acids (EQL FISH OIL) 1000 MG CAPS Take 1 capsule by mouth daily     RANITIDINE HCL PO Take 300 mg by mouth daily     No current facility-administered medications for this visit.      VITALS   /84 (BP Location: Right arm, Patient Position: Sitting, Cuff Size: Adult Large)   Pulse 102   Temp 97.9  F (36.6  C) (Tympanic)   Ht 1.854 m (6' 1\")   Wt 127 kg (280 lb)   SpO2 98%   BMI 36.94 kg/m       PHQ9                       PHQ-9 SCORE 12/23/2016 1/18/2017 4/5/2017   PHQ-9 Total Score - - -   PHQ-9 Total Score 0 11 10       LABS                                                                                                                           Last Basic Metabolic Panel:  Lab Results   Component Value Date     02/27/2018      Lab Results   Component Value Date    POTASSIUM 3.7 02/27/2018     Lab Results   Component Value Date    CHLORIDE 107 02/27/2018     Lab Results   Component Value Date    IMAN 9.0 02/27/2018     Lab Results   Component Value Date    CO2 21 02/27/2018     Lab Results   Component Value Date    BUN 11 02/27/2018     Lab " Results   Component Value Date    CR 0.87 02/27/2018     Lab Results   Component Value Date    GLC 93 02/27/2018         TSH     2.58   4/10/2016    Recent Labs   Lab Test 03/29/19 02/27/18  1300  07/09/15  0818   CHOL 213* 227*   < > 145   HDL 31* 28*   < > 39*    Cannot estimate LDL when triglyceride exceeds 400 mg/dL   < > 75   TRIG 393* 531*   < > 154*   CHOLHDLRATIO  --   --   --  3.7    < > = values in this interval not displayed.     Liver Function Studies -   Recent Labs   Lab Test 03/29/19 08/20/17  1640   PROTTOTAL  --  8.1   ALBUMIN  --  4.3   BILITOTAL  --  0.8   ALKPHOS  --  61   AST 29 41   ALT 68* 95*         Clozapine level 116 as of 10/2/16 and cloz and metabolites 194  Clozapine level 329 as of 4/18/18 and clozapine and metabolites 527    MENTAL STATUS EXAM                                                                                        Alert. Oriented to person, place, and date / time. Casually groomed -  with good eye contact. No problems with speech. Psychomotor:  no abnormal involuntary movements of jaw or or mouth noted.  Mood was euthymic and affect was congruent to speech content. Thought process unremarkable.. Thought content was devoid of suicidal and homicidal ideation. Thought content: no evidence of delusions today.   No hallucinations. Insight was fair. Judgment was adequate for safety. Fund of knowledge was intact. Speech intact. attention, concentration  recent or remote memory were impaired in relation to his delusional thoughts. although these were not formally tested.     ASSESSMENT                                                                                                      Historical: scanned consults from [place] into chart 4/2015. Initial psych consult was 4/22/15. Hospital stay here 4/9/16 - 4/18/16. Hospital stay [facility] June '16. Modafanil: we tried to help improve fatigue and mood but he felt created some cravings.    Josafat Montez is a 33 yo with   schizophrenia and Capgras syndrome.  Josafat has experienced psychosis including delusions, paranoia and hallucinations since 2004 as a teen. He has history of heavy MJ use and methamphetamine . Today he denies any recent use of drugs.. Josafat hospitalized here and started on clozapine    Tachycardia; was on metoprolol but fatigue so d/c. PCP aware tachy and was evaluated by cardiology. Discussed prolonged tachycardia can cause heart failure hence I feel important we have him discuss if other meds could take for tachycardia. Mom is in agreement with this and we again reviewed the risks associated with prolonged tachycardia.      He was  sleeping too much: summer '18  we reduced the 200 mg clozapine to 100 mg and kept later dose at 400. He is now sleeping less and thankfully really seems to be doing well.     Weight, triglycerides and we rechecked labs and happy to see his triglycerides have come down compared to bit over year ago... Clozapine helps his mental health significantly so Miles, his mom, and I all in agreement we feel the benefits outweigh the risks of continuing it. No changes today. He has not been taking hydroxyzine.     TREATMENT RISK STATEMENT: The risks, benefits, alternatives and potential adverse effects have been explained and are understood by the pt. The pt agrees to the treatment plan with the ability to do so. The pt knows to call the clinic for any problems or access emergency care if needed.       DIAGNOSES           Schizophrenia, paranoid type  Capgras syndrome      PLAN                                                                                                                         1) MEDICATIONS:   -- Continue clozapine 100 mg am and 400 mg HS..  Continue miralax and docusate prn constipation.  continue  Cogentin 0.5 mg bid.   2) THERAPY: no change    Labs: CBC with diff monthly   Lipid panel 3/2019.     4) PT MONITOR [call for probs]: Worsening symptoms, side effects from medications,  SI/HI    5) REFERRALS [CD tx, medical, tests other]: none    6)  RTC: ~2 months                                                                                                                                                                                                                                                                                                                                                                          PSYCHIATRY CLINIC PROGRESS NOTE   20 minute medication management, more than 50% of time spent counseling patient on medications, medication side effects, symptom history and management   SUBJECTIVE / INTERIM HISTORY                                                                       Last visit:   CBC with diff and clozapine level today.   Lipid panel 3/2019.     - generally doing okay. Of note discharged from ACT services and is going okay. Reports IS taking his medications  - not taking modafanil  - got out and went to [place] which mom and I agree indicative he is doing well   - still gets abdominal pain, nausea  - thinking maybe try moving out from his mom and into apartment: maybe into [employer]  - is even driving a bit short distances    SUBSTANCE USE- meth and MJ in past. Reports has not used MJ in around year    SYMPTOMS-some depressed mood, low energy, weight gain, anhdeonida, no SI/HI. No evidence any delusions today.  no AH/VH, denies thought blocking, memory impairment  MEDICAL ROS-  SOB, Tired, Tremor, Fatigue, weight gain, nausea  MEDICAL / SURGICAL HISTORY            Medical Team:     PMD-       Therapist- Brenda at [facility]   Patient Active Problem List   Diagnosis     Episodic mood disorder (H)     Poisoning by Methamphetamines     Cannabis abuse     Hypertension goal BP (blood pressure) < 140/90     Psychosis (H)     Depression     Paranoid schizophrenia, chronic condition with acute exacerbation (H)     Schizophrenia, paranoid, subchronic with acute exacerbation (H)      "Schizophrenia, paranoid type (H)     Drug eruption     ACP (advance care planning)     Morbid obesity (H)     ALLERGY   Penicillins; Bupropion; Depakote [valproic acid]; and Divalproex sodium-fd&c red #40  MEDICATIONS                                                                                             Current Outpatient Medications   Medication Sig     benztropine (COGENTIN) 0.5 MG tablet Take 1 tablet (0.5 mg) by mouth 2 times daily as needed for agitation     cholecalciferol 2000 UNITS tablet Take 2,000 Units by mouth daily     cloNIDine (CATAPRES) 0.1 MG tablet TAKE 1 TABLET TWICE A DAY BY MOUTH     cloZAPine (CLOZARIL) 100 MG tablet TAKE 1 TABLET (100MG) BY MOUTH DAILY     cloZAPine (CLOZARIL) 200 MG tablet TAKE 2 TABLETS (400MG) BY MOUTH AT BEDTIME     DOCUSATE SODIUM PO Take 100 mg by mouth 2 times daily as needed for constipation     hydrOXYzine (ATARAX) 25 MG tablet Take 1 - 2 tablets bedtime as needed for insomnia     methylcellulose (CITRUCEL) 500 MG TABS tablet Take 1 tablet (500 mg) by mouth daily     Omega-3 Fatty Acids (EQL FISH OIL) 1000 MG CAPS Take 1 capsule by mouth daily     RANITIDINE HCL PO Take 300 mg by mouth daily     No current facility-administered medications for this visit.      VITALS   /84 (BP Location: Right arm, Patient Position: Sitting, Cuff Size: Adult Large)   Pulse 102   Temp 97.9  F (36.6  C) (Tympanic)   Ht 1.854 m (6' 1\")   Wt 127 kg (280 lb)   SpO2 98%   BMI 36.94 kg/m       PHQ9                       PHQ-9 SCORE 12/23/2016 1/18/2017 4/5/2017   PHQ-9 Total Score - - -   PHQ-9 Total Score 0 11 10       LABS                                                                                                                           Last Basic Metabolic Panel:  Lab Results   Component Value Date     02/27/2018      Lab Results   Component Value Date    POTASSIUM 3.7 02/27/2018     Lab Results   Component Value Date    CHLORIDE 107 02/27/2018     Lab Results "   Component Value Date    IMAN 9.0 02/27/2018     Lab Results   Component Value Date    CO2 21 02/27/2018     Lab Results   Component Value Date    BUN 11 02/27/2018     Lab Results   Component Value Date    CR 0.87 02/27/2018     Lab Results   Component Value Date    GLC 93 02/27/2018         TSH     2.58   4/10/2016    Recent Labs   Lab Test 03/29/19 02/27/18  1300  07/09/15  0818   CHOL 213* 227*   < > 145   HDL 31* 28*   < > 39*    Cannot estimate LDL when triglyceride exceeds 400 mg/dL   < > 75   TRIG 393* 531*   < > 154*   CHOLHDLRATIO  --   --   --  3.7    < > = values in this interval not displayed.     Liver Function Studies -   Recent Labs   Lab Test 03/29/19 08/20/17  1640   PROTTOTAL  --  8.1   ALBUMIN  --  4.3   BILITOTAL  --  0.8   ALKPHOS  --  61   AST 29 41   ALT 68* 95*         Clozapine level 116 as of 10/2/16 and cloz and metabolites 194  Clozapine level 329 as of 4/18/18 and clozapine and metabolites 527    MENTAL STATUS EXAM                                                                                        Alert. Oriented to person, place, and date / time. Casually groomed -  with good eye contact. No problems with speech. Psychomotor:  no abnormal involuntary movements of jaw or or mouth noted.  Mood was euthymic and affect was congruent to speech content. Thought process unremarkable.. Thought content was devoid of suicidal and homicidal ideation. Thought content: no evidence of delusions today.   No hallucinations. Insight was fair. Judgment was adequate for safety. Fund of knowledge was intact. Speech intact. attention, concentration  recent or remote memory were impaired in relation to his delusional thoughts. although these were not formally tested.     ASSESSMENT                                                                                                      Historical: scanned consults from [place] into chart 4/2015. Initial psych consult was 4/22/15. Hospital stay here 4/9/16  - 4/18/16. Hospital stay [facility] June '16. Modafanil: we tried to help improve fatigue and mood but he felt created some cravings.    Josafat Montez is a 32 yo with  schizophrenia and Capgras syndrome.  Josafta has experienced psychosis including delusions, paranoia and hallucinations since 2004 as a teen. He has history of heavy MJ use and methamphetamine . Today he denies any recent use of drugs.. Josafat hospitalized here and started on clozapine    Tachycardia; was on metoprolol but fatigue so d/c. PCP aware tachy and was evaluated by cardiology. Discussed prolonged tachycardia can cause heart failure hence I feel important we have him discuss if other meds could take for tachycardia. Mom is in agreement with this and we again reviewed the risks associated with prolonged tachycardia. He has an upcoming apptmt with his primary and mom will go with and help discuss     He was  sleeping too much: summer '18  we reduced the 200 mg clozapine to 100 mg and kept later dose at 400. He is now sleeping less and thankfully really seems to be doing well.     Weight, triglycerides and we rechecked labs and happy to see his triglycerides have come down compared to bit over year ago... Clozapine helps his mental health significantly so Miles, his mom, and I all in agreement we feel the benefits outweigh the risks of continuing it. We are going to check a level -- I added lab request for clozapine level to his last CBC with diff through [place]    TREATMENT RISK STATEMENT: The risks, benefits, alternatives and potential adverse effects have been explained and are understood by the pt. The pt agrees to the treatment plan with the ability to do so. The pt knows to call the clinic for any problems or access emergency care if needed.       DIAGNOSES           Schizophrenia, paranoid type  Capgras syndrome      PLAN                                                                                                                          1) MEDICATIONS:   --  Continue clozapine 100 mg am and 400 mg HS..  Continue miralax and docusate prn constipation.  continue  Cogentin 0.5 mg bid. He has Nuvigil if he decides take it.  2) THERAPY: no change    Labs:  EKG next visit if his primary doesn't do an EKG. CBC with diff monthly   Lipid panel 3/2019.     4) PT MONITOR [call for probs]: Worsening symptoms, side effects from medications, SI/HI    5) REFERRALS [CD tx, medical, tests other]: none    6)  RTC: ~6 weeks

## 2020-01-27 ENCOUNTER — TRANSFERRED RECORDS (OUTPATIENT)
Dept: HEALTH INFORMATION MANAGEMENT | Facility: CLINIC | Age: 35
End: 2020-01-27

## 2020-02-14 DIAGNOSIS — F20.0 CHRONIC PARANOID SCHIZOPHRENIA (H): ICD-10-CM

## 2020-02-14 NOTE — TELEPHONE ENCOUNTER
Clozapine 100 and 200 mg  Last visit: 1.17.20  Last refill: 1.17.20    Last ANC: 1/27/20 - 4762    Future appointments:   Next 5 appointments (look out 90 days)    Mar 18, 2020 11:20 AM CDT  (Arrive by 11:05 AM)  Return Visit with Marysol Maki MD  [facility] ([facility] ) [address] [place] [id number] [phone]

## 2020-02-15 RX ORDER — CLOZAPINE 200 MG/1
TABLET ORAL
Qty: 56 TABLET | Refills: 0 | Status: SHIPPED | OUTPATIENT
Start: 2020-02-15 | End: 2020-03-16

## 2020-02-15 RX ORDER — CLOZAPINE 100 MG/1
TABLET ORAL
Qty: 28 TABLET | Refills: 0 | Status: SHIPPED | OUTPATIENT
Start: 2020-02-15 | End: 2020-03-16

## 2020-02-24 ENCOUNTER — TRANSFERRED RECORDS (OUTPATIENT)
Dept: HEALTH INFORMATION MANAGEMENT | Facility: CLINIC | Age: 35
End: 2020-02-24

## 2020-03-13 DIAGNOSIS — F20.0 CHRONIC PARANOID SCHIZOPHRENIA (H): ICD-10-CM

## 2020-03-13 NOTE — TELEPHONE ENCOUNTER
Clozapine 200 MG      Last Written Prescription Date:  2-  Last Fill Quantity: 56,   # refills: 0  Last Office Visit: 1-    Clozapine 100 MG      Last Written Prescription Date:  2-  Last Fill Quantity: 28,   # refills: 0  Last Office Visit: 1-  Future Office visit:    Next 5 appointments (look out 90 days)    Mar 18, 2020 11:20 AM CDT  (Arrive by 11:05 AM)  Return Visit with Marysol Maki MD  [facility] ([facility] ) [address] [place] [phone]  [phone]           Routing refill request to provider for review/approval because:

## 2020-03-16 RX ORDER — CLOZAPINE 100 MG/1
TABLET ORAL
Qty: 28 TABLET | Refills: 0 | Status: SHIPPED | OUTPATIENT
Start: 2020-03-16 | End: 2020-04-13

## 2020-03-16 RX ORDER — CLOZAPINE 200 MG/1
TABLET ORAL
Qty: 56 TABLET | Refills: 0 | Status: SHIPPED | OUTPATIENT
Start: 2020-03-16 | End: 2020-04-13

## 2020-03-23 ENCOUNTER — TRANSFERRED RECORDS (OUTPATIENT)
Dept: HEALTH INFORMATION MANAGEMENT | Facility: CLINIC | Age: 35
End: 2020-03-23

## 2020-03-23 ENCOUNTER — TELEPHONE (OUTPATIENT)
Dept: PSYCHIATRY | Facility: OTHER | Age: 35
End: 2020-03-23

## 2020-03-23 NOTE — TELEPHONE ENCOUNTER
I spoke with Papito who does Josafat's blood draw. They did get it figured out and DID get it done today. Thank you!

## 2020-03-23 NOTE — TELEPHONE ENCOUNTER
Received call from Miles Daly's mother.  She is wondering if Miles could forego the blood draw today.  Malika has a compromised immune system and they are not accepting any visitors.  They are hibernating.  They want to take it month by month.  Miles has been good for 2 years with the white blood count.  Thank you, please advise.

## 2020-04-13 DIAGNOSIS — K59.03 DRUG-INDUCED CONSTIPATION: ICD-10-CM

## 2020-04-13 DIAGNOSIS — F20.0 SCHIZOPHRENIA, PARANOID, SUBCHRONIC WITH ACUTE EXACERBATION (H): ICD-10-CM

## 2020-04-14 NOTE — TELEPHONE ENCOUNTER
clonidine      Last Written Prescription Date:  10/14/2019  Last Fill Quantity: 56,   # refills: 5  Last Office Visit: 3/18/2020  Future Office visit:    Next 5 appointments (look out 90 days)    May 18, 2020 11:20 AM CDT  Telephone Visit with Marysol Maki MD  [facility] [address] [place] [phone]  [phone]           Routing refill request to provider for review/approval because:  Blood pressure out of range     citrucell      Last Written Prescription Date:  2/2/2019  Last Fill Quantity: 100,   # refills: 5  Last Office Visit: 3/18/2020  Future Office visit:    Next 5 appointments (look out 90 days)    May 18, 2020 11:20 AM CDT  Telephone Visit with Marysol Maki MD  [facility] ) [address] [place] [phone]  [phone]           Routing refill request to provider for review/approval because:  Drug not on the [facility], [facility] or [facility] refill protocol or controlled substance

## 2020-04-15 RX ORDER — CLONIDINE HYDROCHLORIDE 0.1 MG/1
TABLET ORAL
Qty: 56 TABLET | Refills: 5 | Status: SHIPPED | OUTPATIENT
Start: 2020-04-15 | End: 2020-09-22

## 2020-04-15 RX ORDER — METHYLCELLULOSE 500 MG/1
TABLET ORAL
Qty: 100 TABLET | Refills: 5 | Status: SHIPPED | OUTPATIENT
Start: 2020-04-15 | End: 2021-09-23

## 2020-04-20 ENCOUNTER — TRANSFERRED RECORDS (OUTPATIENT)
Dept: HEALTH INFORMATION MANAGEMENT | Facility: CLINIC | Age: 35
End: 2020-04-20

## 2020-05-08 DIAGNOSIS — F20.0 CHRONIC PARANOID SCHIZOPHRENIA (H): ICD-10-CM

## 2020-05-11 RX ORDER — CLOZAPINE 200 MG/1
TABLET ORAL
Qty: 56 TABLET | Refills: 0 | Status: SHIPPED | OUTPATIENT
Start: 2020-05-11 | End: 2020-06-05

## 2020-05-11 RX ORDER — CLOZAPINE 100 MG/1
TABLET ORAL
Qty: 28 TABLET | Refills: 0 | Status: SHIPPED | OUTPATIENT
Start: 2020-05-11 | End: 2020-06-05

## 2020-05-11 NOTE — TELEPHONE ENCOUNTER
clozapine 100mg     Last Written Prescription Date:  4/13/20  Last Fill Quantity: 28,   # refills: 0  Last Office Visit: 3/18/20  Future Office visit:    Next 5 appointments (look out 90 days)    May 18, 2020 11:20 AM CDT  Telephone Visit with Marysol Maki MD  [facility] ([facility] ) [address] [place] [phone]  [phone]           Routing refill request to provider for review/approval because:  Drug not on the [facility], [facility] or [facility] refill protocol or controlled substance    clozapine 200mg     Last Written Prescription Date:  4/13/20  Last Fill Quantity: 56,   # refills: 0  Last Office Visit: 3/18/20  Future Office visit:    Next 5 appointments (look out 90 days)    May 18, 2020 11:20 AM CDT  Telephone Visit with Marysol Maki MD  [facility] ([facility] ) [address] [place] [phone]  [phone]           Routing refill request to provider for review/approval because:  Drug not on the [facility], [facility] or [facility] refill protocol or controlled substance

## 2020-05-18 ENCOUNTER — TRANSFERRED RECORDS (OUTPATIENT)
Dept: HEALTH INFORMATION MANAGEMENT | Facility: CLINIC | Age: 35
End: 2020-05-18

## 2020-05-18 ENCOUNTER — VIRTUAL VISIT (OUTPATIENT)
Dept: PSYCHIATRY | Facility: OTHER | Age: 35
End: 2020-05-18
Attending: PSYCHIATRY & NEUROLOGY
Payer: COMMERCIAL

## 2020-05-18 DIAGNOSIS — F20.0 SCHIZOPHRENIA, PARANOID, SUBCHRONIC WITH ACUTE EXACERBATION (H): Primary | ICD-10-CM

## 2020-05-18 PROCEDURE — 99213 OFFICE O/P EST LOW 20 MIN: CPT | Mod: TEL | Performed by: PSYCHIATRY & NEUROLOGY

## 2020-05-18 NOTE — PROGRESS NOTES
"Miles Montez is a 34 year old male who is being evaluated via a billable telephone visit.      The patient has been notified of following:     \"This telephone visit will be conducted via a call between you and your physician/provider. We have found that certain health care needs can be provided without the need for a physical exam.  This service lets us provide the care you need with a short phone conversation.  If a prescription is necessary we can send it directly to your pharmacy.  If lab work is needed we can place an order for that and you can then stop by our lab to have the test done at a later time.    Telephone visits are billed at different rates depending on your insurance coverage. During this emergency period, for some insurers they may be billed the same as an in-person visit.  Please reach out to your insurance provider with any questions.    If during the course of the call the physician/provider feels a telephone visit is not appropriate, you will not be charged for this service.\"    Patient has given verbal consent for Telephone visit?  Yes    What phone number would you like to be contacted at? [phone]        Phone call duration: 20 minutes      SUBJECTIVE / INTERIM HISTORY                                                                       Last visit November '19:  Continue clozapine 100 mg am and 400 mg HS..  Continue miralax and docusate prn constipation.  continue  Cogentin 0.5 mg bid. He has Nuvigil if he decides take it.    - Josafat notes he is starting to feel better with the weather getting nicer.  - weight gain still bothering him and they ask about meds that could help with weight gain.   - today we talked about how much sunlight affects and Josafat has harder time during fall and winter. Turns out we both have been thinking about idea of a SAD lamp   - . Taking his meds ([facility] was done ~ year ago  - still gets abdominal pain, nausea  - thinking maybe try moving out from his mom " and into apartment: maybe into [place]  - is even driving a bit short distances but notes his license is revoked for now    SUBSTANCE USE- meth and MJ in past. Reports has not used MJin 1+ year    SYMPTOMS-some depressed mood, low energy, weight gain, anhdeonida, no SI/HI. No evidence any delusions today.  no AH/VH, denies thought blocking, memory impairment  MEDICAL ROS-  SOB, Tired, Tremor, Fatigue, weight gain, nausea  MEDICAL / SURGICAL HISTORY            Medical Team:     PMD-       Therapist- Brenda at [facility]   Patient Active Problem List   Diagnosis     Episodic mood disorder (H)     Poisoning by Methamphetamines     Cannabis abuse     Hypertension goal BP (blood pressure) < 140/90     Psychosis (H)     Depression     Paranoid schizophrenia, chronic condition with acute exacerbation (H)     Schizophrenia, paranoid, subchronic with acute exacerbation (H)     Schizophrenia, paranoid type (H)     Drug eruption     ACP (advance care planning)     Morbid obesity (H)     ALLERGY   Penicillins; Bupropion; Depakote [valproic acid]; and Divalproex sodium-fd&c red #40  MEDICATIONS                                                                                             Current Outpatient Medications   Medication Sig     benztropine (COGENTIN) 0.5 MG tablet Take 1 tablet (0.5 mg) by mouth 2 times daily as needed for agitation     cholecalciferol 2000 UNITS tablet Take 2,000 Units by mouth daily     cloNIDine (CATAPRES) 0.1 MG tablet TAKE 1 TABLET TWICE A DAY BY MOUTH     cloZAPine (CLOZARIL) 100 MG tablet TAKE 1 TABLET (100MG) BY MOUTH DAILY     cloZAPine (CLOZARIL) 200 MG tablet TAKE 2 TABLETS (400MG) BY MOUTH AT BEDTIME     DOCUSATE SODIUM PO Take 100 mg by mouth 2 times daily as needed for constipation     GNP FIBER THERAPY 500 MG TABS tablet TAKE 1 TABLET (500 MG) BY MOUTH DAILY     hydrOXYzine (ATARAX) 25 MG tablet Take 1 - 2 tablets bedtime as needed for insomnia     Omega-3 Fatty Acids (EQL FISH OIL) 1000 MG CAPS  Take 1 capsule by mouth daily     omeprazole (PRILOSEC) 20 MG DR capsule Take 20 mg by mouth daily     order for DME Equipment being ordered: Seasonal affective disorder (SAD) lamp (Patient not taking: Reported on 5/18/2020)     No current facility-administered medications for this visit.      VITALS   There were no vitals taken for this visit.     PHQ9                       PHQ-9 SCORE 12/23/2016 1/18/2017 4/5/2017   PHQ-9 Total Score - - -   PHQ-9 Total Score 0 11 10       LABS                                                                                                                           Last Basic Metabolic Panel:  Lab Results   Component Value Date     02/27/2018      Lab Results   Component Value Date    POTASSIUM 3.7 02/27/2018     Lab Results   Component Value Date    CHLORIDE 107 02/27/2018     Lab Results   Component Value Date    IMAN 9.0 02/27/2018     Lab Results   Component Value Date    CO2 21 02/27/2018     Lab Results   Component Value Date    BUN 11 02/27/2018     Lab Results   Component Value Date    CR 0.87 02/27/2018     Lab Results   Component Value Date    GLC 93 02/27/2018         TSH     2.58   4/10/2016    Recent Labs   Lab Test 03/29/19 02/27/18  1300  07/09/15  0818   CHOL 213* 227*   < > 145   HDL 31* 28*   < > 39*    Cannot estimate LDL when triglyceride exceeds 400 mg/dL   < > 75   TRIG 393* 531*   < > 154*   CHOLHDLRATIO  --   --   --  3.7    < > = values in this interval not displayed.     Liver Function Studies -   Recent Labs   Lab Test 03/29/19 08/20/17  1640   PROTTOTAL  --  8.1   ALBUMIN  --  4.3   BILITOTAL  --  0.8   ALKPHOS  --  61   AST 29 41   ALT 68* 95*         Clozapine level 116 as of 10/2/16 and cloz and metabolites 194  Clozapine level 329 as of 4/18/18 and clozapine and metabolites 527      ASSESSMENT                                                                                                      Historical: scanned consults from [place] into  chart 4/2015. Initial psych consult was 4/22/15. Hospital stay here 4/9/16 - 4/18/16. Hospital stay [facility] [place] June '16. Modafanil: we tried to help improve fatigue and mood but he felt created some cravings.    Josafat Montez is a 35 yo with  schizophrenia and Capgras syndrome.  Josafat has experienced psychosis including delusions, paranoia and hallucinations since 2004 as a teen. He has history of heavy MJ use and methamphetamine . Today he denies any recent use of drugs.. Josafat hospitalized here and started on clozapine    Tachycardia; was on metoprolol but fatigue so d/c. PCP aware tachy and was evaluated by cardiology. Discussed prolonged tachycardia can cause heart failure hence I feel important we have him discuss if other meds could take for tachycardia. Mom is in agreement with this and we again reviewed the risks associated with prolonged tachycardia.      He was  sleeping too much: summer '18  we reduced the 200 mg clozapine to 100 mg and kept later dose at 400. He is now sleeping less and thankfully really seems to be doing well.     Weight, triglycerides and we rechecked labs and happy to see his triglycerides have come down compared to bit over year ago... Clozapine helps his mental health significantly so Miles, his mom, and I all in agreement we feel the benefits outweigh the risks of continuing it. Last visit we agreed try a light box for his seasonal depression of which mom and I identified he has been experiencing for years during winter. Faxed to [facility].    We discussed meds for helping appetite / weight. Began discussing Topamax however I have concern with he did have kidney stone in past. And metformin: he already has GI issues. So we ended up not adding.    TREATMENT RISK STATEMENT: The risks, benefits, alternatives and potential adverse effects have been explained and are understood by the pt. The pt agrees to the treatment plan with the ability to do so. The pt knows to call the clinic  for any problems or access emergency care if needed.       DIAGNOSES           Schizophrenia, paranoid type  Capgras syndrome      PLAN                                                                                                                         1) MEDICATIONS:   -- Faxed script for SAD lamp to [facility] last visit. Continue clozapine 100 mg am and 400 mg HS..  Continue miralax and docusate prn constipation.  continue Cogentin 0.5 mg bid.     2) THERAPY: no change    Labs: CBC with diff monthly   Lipid panel 3/2019.     4) PT MONITOR [call for probs]: Worsening symptoms, side effects from medications, SI/HI    5) REFERRALS [CD tx, medical, tests other]: none    6)  RTC: ~2 months

## 2020-05-18 NOTE — NURSING NOTE
"Chief Complaint   Patient presents with     RECHECK     Telephone visit.       Initial There were no vitals taken for this visit. Estimated body mass index is 36.94 kg/m  as calculated from the following:    Height as of 1/17/20: 1.854 m (6' 1\").    Weight as of 1/17/20: 127 kg (280 lb).  Medication Reconciliation: complete  MARIKA OQUENDO LPN    "

## 2020-06-05 DIAGNOSIS — F20.0 CHRONIC PARANOID SCHIZOPHRENIA (H): ICD-10-CM

## 2020-06-05 RX ORDER — CLOZAPINE 100 MG/1
TABLET ORAL
Qty: 28 TABLET | Refills: 0 | Status: SHIPPED | OUTPATIENT
Start: 2020-06-05 | End: 2020-07-13

## 2020-06-05 RX ORDER — CLOZAPINE 200 MG/1
TABLET ORAL
Qty: 56 TABLET | Refills: 0 | Status: SHIPPED | OUTPATIENT
Start: 2020-06-05 | End: 2020-07-13

## 2020-06-05 NOTE — TELEPHONE ENCOUNTER
Clozapine 100mg      Last Written Prescription Date:  05/11/20  Last Fill Quantity: 28,   # refills: 0  Last Office Visit: 05/18/20  Future Office visit:    Next 5 appointments (look out 90 days)    Jul 20, 2020 11:00 AM CDT  (Arrive by 10:45 AM)  Return Visit with Marysol Maki MD  [facility] ([facility] ) [address] [place], [place] [address] [place] [address]  [phone]             Clozapine 200mg      Last Written Prescription Date:  05/11/20  Last Fill Quantity: 56,   # refills: 0  Last Office Visit: 05/18/20  Future Office visit:    Next 5 appointments (look out 90 days)    Jul 20, 2020 11:00 AM CDT  (Arrive by 10:45 AM)  Return Visit with Marysol Maki MD  [facility] ([facility] ) [address] [place], [place] [address] [place] [address]  [phone]

## 2020-06-15 ENCOUNTER — TRANSFERRED RECORDS (OUTPATIENT)
Dept: HEALTH INFORMATION MANAGEMENT | Facility: CLINIC | Age: 35
End: 2020-06-15

## 2020-07-06 DIAGNOSIS — F20.0 CHRONIC PARANOID SCHIZOPHRENIA (H): ICD-10-CM

## 2020-07-09 DIAGNOSIS — F20.0 CHRONIC PARANOID SCHIZOPHRENIA (H): Primary | ICD-10-CM

## 2020-07-09 RX ORDER — CLOZAPINE 100 MG/1
100 TABLET ORAL DAILY
Qty: 30 TABLET | Refills: 0 | Status: SHIPPED | OUTPATIENT
Start: 2020-07-09 | End: 2020-08-03

## 2020-07-09 RX ORDER — CLOZAPINE 200 MG/1
400 TABLET ORAL AT BEDTIME
Qty: 60 TABLET | Refills: 0 | Status: SHIPPED | OUTPATIENT
Start: 2020-07-09 | End: 2020-08-05

## 2020-07-09 NOTE — TELEPHONE ENCOUNTER
cloZAPine (CLOZARIL) 100 MG tablet  Last Written Prescription Date:  6/5/20  Last Fill Quantity: 28,   # refills: 0  Last Office Visi5/18/2020    Future Office visit:    Next 5 appointments (look out 90 days)    Jul 20, 2020 11:00 AM CDT  (Arrive by 10:45 AM)  Return Visit with Marysol Maki MD  [facility] ([facility] ) [address] [place], [place] [address] [place] [address]  [phone]      cloZAPine (CLOZARIL) 200 MG tablet     Last Written Prescription Date:  6/5/20  Last Fill Quantity: 56,   # refills: 0  Last Office Visi5/18/2020    Future Office visit:    Next 5 appointments (look out 90 days)    Jul 20, 2020 11:00 AM CDT  (Arrive by 10:45 AM)  Return Visit with Marysol Maki MD  [facility] ([facility] ) [address] [place], [place] [address] [place] [address]  [phone]

## 2020-07-10 RX ORDER — CLOZAPINE 100 MG/1
TABLET ORAL
Qty: 28 TABLET | Refills: 0 | OUTPATIENT
Start: 2020-07-10

## 2020-07-10 RX ORDER — CLOZAPINE 200 MG/1
TABLET ORAL
Qty: 56 TABLET | Refills: 0 | OUTPATIENT
Start: 2020-07-10

## 2020-07-20 ENCOUNTER — OFFICE VISIT (OUTPATIENT)
Dept: PSYCHIATRY | Facility: OTHER | Age: 35
End: 2020-07-20
Attending: PSYCHIATRY & NEUROLOGY
Payer: COMMERCIAL

## 2020-07-20 DIAGNOSIS — Z79.899 ENCOUNTER FOR LONG-TERM CURRENT USE OF MEDICATION: Primary | ICD-10-CM

## 2020-07-20 PROCEDURE — 99213 OFFICE O/P EST LOW 20 MIN: CPT | Performed by: PSYCHIATRY & NEUROLOGY

## 2020-07-20 ASSESSMENT — PAIN SCALES - GENERAL: PAINLEVEL: NO PAIN (0)

## 2020-07-20 NOTE — NURSING NOTE
"Chief Complaint   Patient presents with     RECHECK     Telephone visit       Initial There were no vitals taken for this visit. Estimated body mass index is 36.94 kg/m  as calculated from the following:    Height as of 1/17/20: 1.854 m (6' 1\").    Weight as of 1/17/20: 127 kg (280 lb).  Medication Reconciliation: complete  MARIKA OQUENDO LPN    "

## 2020-07-20 NOTE — PROGRESS NOTES
"Miles Montez is a 34 year old male who is being evaluated via a billable telephone visit.      The patient has been notified of following:     \"This telephone visit will be conducted via a call between you and your physician/provider. We have found that certain health care needs can be provided without the need for a physical exam.  This service lets us provide the care you need with a short phone conversation.  If a prescription is necessary we can send it directly to your pharmacy.  If lab work is needed we can place an order for that and you can then stop by our lab to have the test done at a later time.    Telephone visits are billed at different rates depending on your insurance coverage. During this emergency period, for some insurers they may be billed the same as an in-person visit.  Please reach out to your insurance provider with any questions.    If during the course of the call the physician/provider feels a telephone visit is not appropriate, you will not be charged for this service.\"    Patient has given verbal consent for Telephone visit?  Yes    What phone number would you like to be contacted at? [phone]    Phone call duration:5 minutes        SUBJECTIVE / INTERIM HISTORY                                                                       Last visit May 18 2020:     - doing pretty well. Especially the weather helps.  - I spoke with his mom too. She feels he is doing well. Notes his weight gain still bothers him. She notes been 3 years since he has been in the hospital!   - tends to sleep during day and up at night. Trying to get back to on track of up during day  - weight gain still bothering him  - still gets abdominal pain, nausea but today Josafat notes it has been better.  - thinking maybe try moving out from his mom and into apartment: maybe into [place]  - is even driving a bit short distances but notes his license is revoked for now    SUBSTANCE USE- meth and MJ in past. Reports has " not used MJin 1+ year    SYMPTOMS-some depressed mood, low energy, weight gain, anhdeonida, no SI/HI. No evidence any delusions today.  no AH/VH, denies thought blocking, memory impairment  MEDICAL ROS-  SOB, Tired, Tremor, Fatigue, weight gain, nausea  MEDICAL / SURGICAL HISTORY            Medical Team:     PMD-       Therapist- Brenda at [facility]   Patient Active Problem List   Diagnosis     Episodic mood disorder (H)     Poisoning by Methamphetamines     Cannabis abuse     Hypertension goal BP (blood pressure) < 140/90     Psychosis (H)     Depression     Paranoid schizophrenia, chronic condition with acute exacerbation (H)     Schizophrenia, paranoid, subchronic with acute exacerbation (H)     Schizophrenia, paranoid type (H)     Drug eruption     ACP (advance care planning)     Morbid obesity (H)     ALLERGY   Penicillins; Bupropion; Depakote [valproic acid]; and Divalproex sodium-fd&c red #40  MEDICATIONS                                                                                             Current Outpatient Medications   Medication Sig     benztropine (COGENTIN) 0.5 MG tablet Take 1 tablet (0.5 mg) by mouth 2 times daily as needed for agitation     cholecalciferol 2000 UNITS tablet Take 2,000 Units by mouth daily     cloNIDine (CATAPRES) 0.1 MG tablet TAKE 1 TABLET TWICE A DAY BY MOUTH     cloZAPine (CLOZARIL) 100 MG tablet Take 1 tablet (100 mg) by mouth daily     cloZAPine (CLOZARIL) 200 MG tablet Take 2 tablets (400 mg) by mouth At Bedtime     DOCUSATE SODIUM PO Take 100 mg by mouth 2 times daily as needed for constipation     GNP FIBER THERAPY 500 MG TABS tablet TAKE 1 TABLET (500 MG) BY MOUTH DAILY     hydrOXYzine (ATARAX) 25 MG tablet Take 1 - 2 tablets bedtime as needed for insomnia     Omega-3 Fatty Acids (EQL FISH OIL) 1000 MG CAPS Take 1 capsule by mouth daily     omeprazole (PRILOSEC) 20 MG DR capsule Take 20 mg by mouth daily     order for DME Equipment being ordered: Seasonal affective disorder  (SAD) lamp (Patient not taking: Reported on 5/18/2020)     No current facility-administered medications for this visit.      VITALS   There were no vitals taken for this visit.     PHQ9                       PHQ-9 SCORE 12/23/2016 1/18/2017 4/5/2017   PHQ-9 Total Score - - -   PHQ-9 Total Score 0 11 10       LABS                                                                                                                           Last Basic Metabolic Panel:  Lab Results   Component Value Date     02/27/2018      Lab Results   Component Value Date    POTASSIUM 3.7 02/27/2018     Lab Results   Component Value Date    CHLORIDE 107 02/27/2018     Lab Results   Component Value Date    IMAN 9.0 02/27/2018     Lab Results   Component Value Date    CO2 21 02/27/2018     Lab Results   Component Value Date    BUN 11 02/27/2018     Lab Results   Component Value Date    CR 0.87 02/27/2018     Lab Results   Component Value Date    GLC 93 02/27/2018         TSH     2.58   4/10/2016    Recent Labs   Lab Test 03/29/19 02/27/18  1300  07/09/15  0818   CHOL 213* 227*   < > 145   HDL 31* 28*   < > 39*    Cannot estimate LDL when triglyceride exceeds 400 mg/dL   < > 75   TRIG 393* 531*   < > 154*   CHOLHDLRATIO  --   --   --  3.7    < > = values in this interval not displayed.     Liver Function Studies -   Recent Labs   Lab Test 03/29/19 08/20/17  1640   PROTTOTAL  --  8.1   ALBUMIN  --  4.3   BILITOTAL  --  0.8   ALKPHOS  --  61   AST 29 41   ALT 68* 95*       Clozapine level 116 as of 10/2/16 and cloz and metabolites 194  Clozapine level 329 as of 4/18/18 and clozapine and metabolites 527      ASSESSMENT                                                                                                      Historical: scanned consults from [place] into chart 4/2015. Initial psych consult was 4/22/15. Hospital stay here 4/9/16 - 4/18/16. Hospital stay [place] June '16. Modafanil: we tried to help improve fatigue and  mood but he felt created some cravings.    Josafat Montez is a 33 yo with  schizophrenia and Capgras syndrome.  Josafat has experienced psychosis including delusions, paranoia and hallucinations since 2004 as a teen. He has history of heavy MJ use and methamphetamine . Today he denies any recent use of drugs.. Josafat hospitalized here and started on clozapine    Tachycardia; was on metoprolol but fatigue so d/c. PCP aware tachy and was evaluated by cardiology. Discussed prolonged tachycardia can cause heart failure hence I feel important we have him discuss if other meds could take for tachycardia. Mom is in agreement with this and we again reviewed the risks associated with prolonged tachycardia.      He was  sleeping too much: summer '18  we reduced the 200 mg clozapine to 100 mg and kept later dose at 400. He is now sleeping less and thankfully really seems to be doing well.     Weight, triglycerides and we rechecked labs and happy to see his triglycerides have come down compared to bit over year ago... Clozapine helps his mental health significantly so Miles, his mom, and I all in agreement we feel the benefits outweigh the risks of continuing it.     Today I noted due for lipid profile and fasting glucose. Will get labs to [facility] so [facility] can collect them with next CBC with diff    TREATMENT RISK STATEMENT: The risks, benefits, alternatives and potential adverse effects have been explained and are understood by the pt. The pt agrees to the treatment plan with the ability to do so. The pt knows to call the clinic for any problems or access emergency care if needed.       DIAGNOSES           Schizophrenia, paranoid type  Capgras syndrome      PLAN                                                                                                                         1) MEDICATIONS:   --  Continue clozapine 100 mg am and 400 mg HS..  Continue miralax and docusate prn constipation.  continue Cogentin 0.5 mg bid.     2)  "THERAPY: no change    Labs: CBC with diff monthly   Lipid panel 3/2019. So discussed try remember fast next CBC with diff through [facility] and I\"ll fax them order for lipids and glucose.    4) PT MONITOR [call for probs]: Worsening symptoms, side effects from medications, SI/HI    5) REFERRALS [CD tx, medical, tests other]: none    6)  RTC: ~2 months                                                                                                                                                                                                                                                                                                                                                                                                                                                                                                                                                                                                                                                                                                                                      "

## 2020-07-31 DIAGNOSIS — F20.0 CHRONIC PARANOID SCHIZOPHRENIA (H): ICD-10-CM

## 2020-08-03 NOTE — TELEPHONE ENCOUNTER
cloZAPine (CLOZARIL) 100 MG tablet       Last Written Prescription Date:  7/9/2020  Last Fill Quantity: 30,   # refills: 0  Last Office Visit: 7/20/2020  Future Office visit:       cloZAPine (CLOZARIL) 200 MG tablet      Last Written Prescription Date:  7/9/2020  Last Fill Quantity: 60,   # refills: 0  Last Office Visit: 7/20/2020  Future Office visit:

## 2020-08-06 RX ORDER — CLOZAPINE 200 MG/1
TABLET ORAL
Qty: 56 TABLET | Refills: 0 | Status: SHIPPED | OUTPATIENT
Start: 2020-08-06 | End: 2020-08-29

## 2020-08-06 RX ORDER — CLOZAPINE 100 MG/1
TABLET ORAL
Qty: 28 TABLET | Refills: 0 | Status: SHIPPED | OUTPATIENT
Start: 2020-08-06 | End: 2020-08-29

## 2020-08-10 ENCOUNTER — TRANSFERRED RECORDS (OUTPATIENT)
Dept: HEALTH INFORMATION MANAGEMENT | Facility: CLINIC | Age: 35
End: 2020-08-10

## 2020-08-10 LAB
ALT SERPL-CCNC: 63 IU/L (ref 6–40)
AST SERPL-CCNC: 28 IU/L (ref 10–40)
CHOLEST SERPL-MCNC: 209 MG/DL (ref 114–200)
CREAT SERPL-MCNC: 1.1 MG/DL (ref 0.7–1.2)
GFR SERPL CREATININE-BSD FRML MDRD: >60 ML/MIN/1.73M2
GLUCOSE SERPL-MCNC: 131 MG/DL (ref 70–99)
HDLC SERPL-MCNC: 27 MG/DL (ref 40–60)
LDLC SERPL CALC-MCNC: 123 MG/DL
POTASSIUM SERPL-SCNC: 3.7 MEQ/L (ref 3.4–5.1)
TRIGL SERPL-MCNC: 517 MG/DL (ref 10–200)

## 2020-08-28 DIAGNOSIS — F20.0 CHRONIC PARANOID SCHIZOPHRENIA (H): ICD-10-CM

## 2020-08-28 NOTE — TELEPHONE ENCOUNTER
Clozapine 100 mg   Last Written Prescription Date:  8/6/2020  Last Fill Quantity: 28,   # refills: 0  Last Office Visit: 7/20/2020  Future Office visit:       Routing refill request to provider for review/approval because:  Drug not on the [facility], [facility] or [facility] refill protocol or controlled substance      Clozapine 200 mg       Last Written Prescription Date:  8/6/2020  Last Fill Quantity: 56,   # refills: 0  Last Office Visit: 7/20/2020  Future Office visit:       Routing refill request to provider for review/approval because:  Drug not on the [facility], [facility] or [facility] refill protocol or controlled substance

## 2020-08-29 RX ORDER — CLOZAPINE 200 MG/1
TABLET ORAL
Qty: 60 TABLET | Refills: 0 | Status: SHIPPED | OUTPATIENT
Start: 2020-08-29 | End: 2020-09-25

## 2020-08-29 RX ORDER — CLOZAPINE 100 MG/1
TABLET ORAL
Qty: 30 TABLET | Refills: 0 | Status: SHIPPED | OUTPATIENT
Start: 2020-08-29 | End: 2020-09-25

## 2020-09-04 ENCOUNTER — TRANSFERRED RECORDS (OUTPATIENT)
Dept: HEALTH INFORMATION MANAGEMENT | Facility: CLINIC | Age: 35
End: 2020-09-04

## 2020-09-17 ENCOUNTER — TELEPHONE (OUTPATIENT)
Dept: PSYCHIATRY | Facility: OTHER | Age: 35
End: 2020-09-17

## 2020-09-17 DIAGNOSIS — F33.1 MAJOR DEPRESSIVE DISORDER, RECURRENT EPISODE, MODERATE (H): Primary | ICD-10-CM

## 2020-09-17 NOTE — TELEPHONE ENCOUNTER
Received incoming VM from Malika.  She is requesting a new Rx for a seasonal affective disorder lamp.  Please also state a reason why he would need it.  Thank you, please advise.

## 2020-09-22 ENCOUNTER — VIRTUAL VISIT (OUTPATIENT)
Dept: PSYCHIATRY | Facility: OTHER | Age: 35
End: 2020-09-22
Attending: PSYCHIATRY & NEUROLOGY
Payer: COMMERCIAL

## 2020-09-22 VITALS — WEIGHT: 250 LBS | BODY MASS INDEX: 32.98 KG/M2

## 2020-09-22 DIAGNOSIS — F20.0 SCHIZOPHRENIA, PARANOID, SUBCHRONIC WITH ACUTE EXACERBATION (H): ICD-10-CM

## 2020-09-22 PROCEDURE — 99213 OFFICE O/P EST LOW 20 MIN: CPT | Mod: TEL | Performed by: PSYCHIATRY & NEUROLOGY

## 2020-09-22 RX ORDER — CLONIDINE HYDROCHLORIDE 0.1 MG/1
TABLET ORAL
Qty: 56 TABLET | Refills: 11 | Status: SHIPPED | OUTPATIENT
Start: 2020-09-22 | End: 2021-08-17

## 2020-09-22 ASSESSMENT — ANXIETY QUESTIONNAIRES
GAD7 TOTAL SCORE: 7
5. BEING SO RESTLESS THAT IT IS HARD TO SIT STILL: SEVERAL DAYS
2. NOT BEING ABLE TO STOP OR CONTROL WORRYING: SEVERAL DAYS
4. TROUBLE RELAXING: NOT AT ALL
7. FEELING AFRAID AS IF SOMETHING AWFUL MIGHT HAPPEN: NOT AT ALL
1. FEELING NERVOUS, ANXIOUS, OR ON EDGE: MORE THAN HALF THE DAYS
6. BECOMING EASILY ANNOYED OR IRRITABLE: SEVERAL DAYS
3. WORRYING TOO MUCH ABOUT DIFFERENT THINGS: MORE THAN HALF THE DAYS
IF YOU CHECKED OFF ANY PROBLEMS ON THIS QUESTIONNAIRE, HOW DIFFICULT HAVE THESE PROBLEMS MADE IT FOR YOU TO DO YOUR WORK, TAKE CARE OF THINGS AT HOME, OR GET ALONG WITH OTHER PEOPLE: SOMEWHAT DIFFICULT

## 2020-09-22 ASSESSMENT — PAIN SCALES - GENERAL: PAINLEVEL: MODERATE PAIN (5)

## 2020-09-22 ASSESSMENT — PATIENT HEALTH QUESTIONNAIRE - PHQ9: SUM OF ALL RESPONSES TO PHQ QUESTIONS 1-9: 12

## 2020-09-22 NOTE — PROGRESS NOTES
"Miles Montez is a 34 year old male who is being evaluated via a billable telephone visit.      The patient has been notified of following:     \"This telephone visit will be conducted via a call between you and your physician/provider. We have found that certain health care needs can be provided without the need for a physical exam.  This service lets us provide the care you need with a short phone conversation.  If a prescription is necessary we can send it directly to your pharmacy.  If lab work is needed we can place an order for that and you can then stop by our lab to have the test done at a later time.    Telephone visits are billed at different rates depending on your insurance coverage. During this emergency period, for some insurers they may be billed the same as an in-person visit.  Please reach out to your insurance provider with any questions.    If during the course of the call the physician/provider feels a telephone visit is not appropriate, you will not be charged for this service.\"    Patient has given verbal consent for Telephone visit?  Yes    What phone number would you like to be contacted at? [phone]    How would you like to obtain your AVS? Mail a copy     Time on this this telephone call: 5 minutes        SUBJECTIVE / INTERIM HISTORY                                                                       Last visit July '20: Continue clozapine 100 mg am and 400 mg HS..  Continue miralax and docusate prn constipation.  continue Cogentin 0.5 mg bid.     - \"everything seems to be going pretty good\"  - \"I feel like I'm starting to come out of the darkness\". Mentioning depression.   - Mom been gone a lot lately spending more time with her niece   - tends to sleep during day and up at night. Trying to get back to on track of up during day  - weight gain still bothering him  - thinking maybe try moving out from his mom and into apartment: maybe into [email or web address]  - is even driving a bit short " distances but notes his license is revoked for now    SUBSTANCE USE- meth and MJ in past. Reports has not used MJin 1+ year    SYMPTOMS-some depressed mood, low energy, weight gain, anhdeonida, no SI/HI. No evidence any delusions today.  no AH/VH, denies thought blocking, memory impairment  MEDICAL ROS-  SOB, Tired, Tremor, Fatigue, weight gain, nausea  MEDICAL / SURGICAL HISTORY            Medical Team:     PMD-       Therapist- Brenda at [facility]   Patient Active Problem List   Diagnosis     Episodic mood disorder (H)     Poisoning by Methamphetamines     Cannabis abuse     Hypertension goal BP (blood pressure) < 140/90     Psychosis (H)     Depression     Paranoid schizophrenia, chronic condition with acute exacerbation (H)     Schizophrenia, paranoid, subchronic with acute exacerbation (H)     Schizophrenia, paranoid type (H)     Drug eruption     ACP (advance care planning)     Morbid obesity (H)     ALLERGY   Penicillins; Bupropion; Depakote [valproic acid]; and Divalproex sodium-fd&c red #40  MEDICATIONS                                                                                             Current Outpatient Medications   Medication Sig     benztropine (COGENTIN) 0.5 MG tablet Take 1 tablet (0.5 mg) by mouth 2 times daily as needed for agitation     cholecalciferol 2000 UNITS tablet Take 2,000 Units by mouth daily     cloNIDine (CATAPRES) 0.1 MG tablet TAKE 1 TABLET TWICE A DAY BY MOUTH     cloZAPine (CLOZARIL) 100 MG tablet TAKE 1 TABLET (100MG) BY MOUTH DAILY     cloZAPine (CLOZARIL) 200 MG tablet TAKE 2 TABLETS (400MG) BY MOUTH AT BEDTIME     DOCUSATE SODIUM PO Take 100 mg by mouth 2 times daily as needed for constipation     GNP FIBER THERAPY 500 MG TABS tablet TAKE 1 TABLET (500 MG) BY MOUTH DAILY     hydrOXYzine (ATARAX) 25 MG tablet Take 1 - 2 tablets bedtime as needed for insomnia     Omega-3 Fatty Acids (EQL FISH OIL) 1000 MG CAPS Take 1 capsule by mouth daily     omeprazole (PRILOSEC) 20 MG   capsule Take 20 mg by mouth daily     order for DME Equipment being ordered: Seasonal affective disorder (SAD) lamp (Patient not taking: Reported on 5/18/2020)     No current facility-administered medications for this visit.      VITALS   Wt 113.4 kg (250 lb)   BMI 32.98 kg/m       PHQ9                       PHQ-9 SCORE 1/18/2017 4/5/2017 9/22/2020   PHQ-9 Total Score - - -   PHQ-9 Total Score 11 10 12       LABS                                                                                                                           Last Comprehensive Metabolic Panel:  Sodium   Date Value Ref Range Status   02/27/2018 140 133 - 144 mmol/L Final     Potassium   Date Value Ref Range Status   08/10/2020 3.7 3.4 - 5.1 mEq/L Final     Chloride   Date Value Ref Range Status   02/27/2018 107 94 - 109 mmol/L Final     Carbon Dioxide   Date Value Ref Range Status   02/27/2018 21 20 - 32 mmol/L Final     Anion Gap   Date Value Ref Range Status   02/27/2018 12 3 - 14 mmol/L Final     Glucose   Date Value Ref Range Status   08/10/2020 131 (H) 70 - 99 mg/dL Final     Urea Nitrogen   Date Value Ref Range Status   02/27/2018 11 7 - 30 mg/dL Final     Creatinine   Date Value Ref Range Status   08/10/2020 1.10 0.70 - 1.20 mg/dL Final     GFR Estimate   Date Value Ref Range Status   08/10/2020 >60 >60 ml/min/1.73m2 Final     Calcium   Date Value Ref Range Status   02/27/2018 9.0 8.5 - 10.1 mg/dL Final     Recent Labs   Lab Test 08/10/20 03/29/19  07/09/15  0818   CHOL 209* 213*   < > 145   HDL 27* 31*   < > 39*    103   < > 75   TRIG 517* 393*   < > 154*   CHOLHDLRATIO  --   --   --  3.7    < > = values in this interval not displayed.     Lab Results   Component Value Date    TSH 1.40 03/29/2019         Recent Labs   Lab Test 03/29/19 02/27/18  1300  07/09/15  0818   CHOL 213* 227*   < > 145   HDL 31* 28*   < > 39*    Cannot estimate LDL when triglyceride exceeds 400 mg/dL   < > 75   TRIG 393* 531*   < > 154*    CHOLHDLRATIO  --   --   --  3.7    < > = values in this interval not displayed.     Liver Function Studies -   Recent Labs   Lab Test 03/29/19 08/20/17  1640   PROTTOTAL  --  8.1   ALBUMIN  --  4.3   BILITOTAL  --  0.8   ALKPHOS  --  61   AST 29 41   ALT 68* 95*     ALT August of 2020 63. AST in normal range.    Clozapine level 116 as of 10/2/16 and cloz and metabolites 194  Clozapine level 329 as of 4/18/18 and clozapine and metabolites 527      ASSESSMENT                                                                                                      Historical: scanned consults from [place] into chart 4/2015. Initial psych consult was 4/22/15. Hospital stay here 4/9/16 - 4/18/16. Hospital stay [facility] June '16. Modafanil: we tried to help improve fatigue and mood but he felt created some cravings.    Josafat Montez is a 33 yo with  schizophrenia and Capgras syndrome.  Josafat has experienced psychosis including delusions, paranoia and hallucinations since 2004 as a teen. He has history of heavy MJ use and methamphetamine . Today he denies any recent use of drugs.. Josafat hospitalized here and started on clozapine    Tachycardia; was on metoprolol but fatigue so d/c. PCP aware tachy and was evaluated by cardiology. Discussed prolonged tachycardia can cause heart failure hence I feel important we have him discuss if other meds could take for tachycardia. Mom is in agreement with this and we again reviewed the risks associated with prolonged tachycardia.      He was sleeping too much: summer '18  we reduced the 200 mg clozapine to 100 mg and kept later dose at 400. He is now sleeping less and thankfully really seems to be doing well.     Weight, triglycerides and we rechecked labs triglycerides up again. Glucose 130.       TREATMENT RISK STATEMENT: The risks, benefits, alternatives and potential adverse effects have been explained and are understood by the pt. The pt agrees to the treatment plan with the ability  to do so. The pt knows to call the clinic for any problems or access emergency care if needed.       DIAGNOSES           Schizophrenia, paranoid type  Capgras syndrome      PLAN                                                                                                                         1) MEDICATIONS:   --  Continue clozapine 100 mg am and 400 mg HS..  Continue miralax and docusate prn constipation.  continue Cogentin 0.5 mg bid. Discuss labs (glucose, triglycerides) next visit.    2) THERAPY: no change    Labs: CBC with diff monthly   Lipid panel 8/20/20 as well as CMP and fasting glucose    4) PT MONITOR [call for probs]: Worsening symptoms, side effects from medications, SI/HI    5) REFERRALS [CD tx, medical, tests other]: none    6)  RTC: ~2 months

## 2020-09-22 NOTE — NURSING NOTE
"No chief complaint on file.      Initial Wt 113.4 kg (250 lb)   BMI 32.98 kg/m   Estimated body mass index is 32.98 kg/m  as calculated from the following:    Height as of 1/17/20: 1.854 m (6' 1\").    Weight as of this encounter: 113.4 kg (250 lb).  Medication Reconciliation: complete  Luz Kwan LPN      "

## 2020-09-23 ASSESSMENT — ANXIETY QUESTIONNAIRES: GAD7 TOTAL SCORE: 7

## 2020-09-25 DIAGNOSIS — F20.0 CHRONIC PARANOID SCHIZOPHRENIA (H): ICD-10-CM

## 2020-09-25 RX ORDER — CLOZAPINE 200 MG/1
TABLET ORAL
Qty: 56 TABLET | Refills: 0 | Status: SHIPPED | OUTPATIENT
Start: 2020-09-25 | End: 2020-10-27

## 2020-09-25 RX ORDER — CLOZAPINE 100 MG/1
TABLET ORAL
Qty: 28 TABLET | Refills: 0 | Status: SHIPPED | OUTPATIENT
Start: 2020-09-25 | End: 2020-10-27

## 2020-09-25 NOTE — TELEPHONE ENCOUNTER
Clozapine 100 mg    Last Written Prescription Date:  8.29.2020  Last Fill Quantity: 30,   # refills: 0    Clozapine      Last Written Prescription Date:  8.29.2020  Last Fill Quantity: 60,   # refills: 0  Last Office Visit: 09.22.2020

## 2020-10-05 ENCOUNTER — TRANSFERRED RECORDS (OUTPATIENT)
Dept: HEALTH INFORMATION MANAGEMENT | Facility: CLINIC | Age: 35
End: 2020-10-05

## 2020-10-23 DIAGNOSIS — F20.0 CHRONIC PARANOID SCHIZOPHRENIA (H): ICD-10-CM

## 2020-10-27 RX ORDER — CLOZAPINE 200 MG/1
TABLET ORAL
Qty: 60 TABLET | Refills: 0 | Status: SHIPPED | OUTPATIENT
Start: 2020-10-27 | End: 2020-11-23

## 2020-10-27 RX ORDER — CLOZAPINE 100 MG/1
TABLET ORAL
Qty: 30 TABLET | Refills: 0 | Status: SHIPPED | OUTPATIENT
Start: 2020-10-27 | End: 2020-11-23

## 2020-11-21 DIAGNOSIS — F20.0 CHRONIC PARANOID SCHIZOPHRENIA (H): ICD-10-CM

## 2020-11-23 ENCOUNTER — VIRTUAL VISIT (OUTPATIENT)
Dept: PSYCHIATRY | Facility: OTHER | Age: 35
End: 2020-11-23
Attending: PSYCHIATRY & NEUROLOGY
Payer: COMMERCIAL

## 2020-11-23 DIAGNOSIS — F20.0 CHRONIC PARANOID SCHIZOPHRENIA (H): Primary | ICD-10-CM

## 2020-11-23 PROCEDURE — 99213 OFFICE O/P EST LOW 20 MIN: CPT | Mod: TEL | Performed by: PSYCHIATRY & NEUROLOGY

## 2020-11-23 RX ORDER — IBUPROFEN 200 MG
200 TABLET ORAL EVERY 4 HOURS PRN
COMMUNITY

## 2020-11-23 RX ORDER — CLOZAPINE 200 MG/1
TABLET ORAL
Qty: 56 TABLET | Refills: 0 | Status: SHIPPED | OUTPATIENT
Start: 2020-11-23 | End: 2020-12-18

## 2020-11-23 RX ORDER — CLOZAPINE 100 MG/1
TABLET ORAL
Qty: 28 TABLET | Refills: 0 | Status: SHIPPED | OUTPATIENT
Start: 2020-11-23 | End: 2020-12-18

## 2020-11-23 ASSESSMENT — PAIN SCALES - GENERAL: PAINLEVEL: NO PAIN (0)

## 2020-11-23 NOTE — NURSING NOTE
"Chief Complaint   Patient presents with     RECHECK     Telephone visit       Initial There were no vitals taken for this visit. Estimated body mass index is 32.98 kg/m  as calculated from the following:    Height as of 1/17/20: 1.854 m (6' 1\").    Weight as of 9/22/20: 113.4 kg (250 lb).  Medication Reconciliation: complete  MARIKA OQUENDO LPN    "

## 2020-11-23 NOTE — TELEPHONE ENCOUNTER
clozaril      Last Written Prescription Date:  10/27/2020  Last Fill Quantity: 30,   # refills: 0  Last Office Visit: 9/22/2020  Future Office visit:    Next 5 appointments (look out 90 days)    Nov 23, 2020  4:40 PM  Telephone Visit with Marysol Maki MD  [facility] [address] [place] [id number] [phone]       clozaril 200      Last Written Prescription Date:  10/27/2020  Last Fill Quantity: 60,   # refills: 0  Last Office Visit: 9/22/2020  Future Office visit:    Next 5 appointments (look out 90 days)    Nov 23, 2020  4:40 PM  Telephone Visit with Marysol Maki MD  [facility] [address] [place] [id number] [phone]

## 2020-11-23 NOTE — PROGRESS NOTES
"Miles Montez is a 34 year old male who is being evaluated via a billable telephone visit.      The patient has been notified of following:     \"This telephone visit will be conducted via a call between you and your physician/provider. We have found that certain health care needs can be provided without the need for a physical exam.  This service lets us provide the care you need with a short phone conversation.  If a prescription is necessary we can send it directly to your pharmacy.  If lab work is needed we can place an order for that and you can then stop by our lab to have the test done at a later time.    Telephone visits are billed at different rates depending on your insurance coverage. During this emergency period, for some insurers they may be billed the same as an in-person visit.  Please reach out to your insurance provider with any questions.    If during the course of the call the physician/provider feels a telephone visit is not appropriate, you will not be charged for this service.\"    Patient has given verbal consent for Telephone visit?  Yes    What phone number would you like to be contacted at? [phone]    Phone call duration: 7 minutes          SUBJECTIVE / INTERIM HISTORY                                                                       Last visit July '20: Continue clozapine 100 mg am and 400 mg HS..  Continue miralax and docusate prn constipation.  continue Cogentin 0.5 mg bid.     - generally doing okay. Still feels doing okay and in fact depression better than was while ago  - doesn't have license because someone used his name when they got pulled over.  - weight gain: \"I'm getting chunky\". Will loose some then gain again   - will spend Thanksgiving with his mom and aunt    SUBSTANCE USE- meth and MJ in past. Reports has not used MJin 1+ year    SYMPTOMS-some depressed mood, low energy, weight gain, anhdeonida, no SI/HI. No evidence any delusions today.  no AH/VH, denies thought " blocking, memory impairment  MEDICAL ROS-  SOB, Tired, Tremor, Fatigue, weight gain,gets abdominal pain.   MEDICAL / SURGICAL HISTORY            Medical Team:     PMD-       Therapist- Brenda at [facility]   Patient Active Problem List   Diagnosis     Episodic mood disorder (H)     Poisoning by Methamphetamines     Cannabis abuse     Hypertension goal BP (blood pressure) < 140/90     Psychosis (H)     Depression     Paranoid schizophrenia, chronic condition with acute exacerbation (H)     Schizophrenia, paranoid, subchronic with acute exacerbation (H)     Schizophrenia, paranoid type (H)     Drug eruption     ACP (advance care planning)     Morbid obesity (H)     ALLERGY   Penicillins, Bupropion, Depakote [valproic acid], and Divalproex sodium-fd&c red #40  MEDICATIONS                                                                                             Current Outpatient Medications   Medication Sig     benztropine (COGENTIN) 0.5 MG tablet Take 1 tablet (0.5 mg) by mouth 2 times daily as needed for agitation     cholecalciferol 2000 UNITS tablet Take 2,000 Units by mouth daily     cloNIDine (CATAPRES) 0.1 MG tablet TAKE 1 TABLET TWICE A DAY BY MOUTH     cloZAPine (CLOZARIL) 100 MG tablet TAKE 1 TABLET (100MG) BY MOUTH DAILY     cloZAPine (CLOZARIL) 200 MG tablet TAKE 2 TABLETS (400MG) BY MOUTH AT BEDTIME     DOCUSATE SODIUM PO Take 100 mg by mouth 2 times daily as needed for constipation     GNP FIBER THERAPY 500 MG TABS tablet TAKE 1 TABLET (500 MG) BY MOUTH DAILY     hydrOXYzine (ATARAX) 25 MG tablet Take 1 - 2 tablets bedtime as needed for insomnia     ibuprofen (ADVIL/MOTRIN) 200 MG tablet Take 200 mg by mouth every 4 hours as needed for mild pain     Omega-3 Fatty Acids (EQL FISH OIL) 1000 MG CAPS Take 1 capsule by mouth daily     omeprazole (PRILOSEC) 20 MG DR capsule Take 20 mg by mouth daily     No current facility-administered medications for this visit.      VITALS   There were no vitals taken for this  visit.     PHQ9                       PHQ-9 SCORE 1/18/2017 4/5/2017 9/22/2020   PHQ-9 Total Score - - -   PHQ-9 Total Score 11 10 12       LABS                                                                                                                            Fasting glucose 131 as of 8/10/2020  Recent Labs   Lab Test 03/29/19 02/27/18  1300  07/09/15  0818   CHOL 213* 227*   < > 145   HDL 31* 28*   < > 39*    Cannot estimate LDL when triglyceride exceeds 400 mg/dL   < > 75   TRIG 393* 531*   < > 154*   CHOLHDLRATIO  --   --   --  3.7    < > = values in this interval not displayed.     Liver Function Studies -   Recent Labs   Lab Test 03/29/19 08/20/17  1640   PROTTOTAL  --  8.1   ALBUMIN  --  4.3   BILITOTAL  --  0.8   ALKPHOS  --  61   AST 29 41   ALT 68* 95*     ALT August of 2020 63. AST in normal range.    Clozapine level 116 as of 10/2/16 and cloz and metabolites 194  Clozapine level 329 as of 4/18/18 and clozapine and metabolites 527      ASSESSMENT                                                                                                      Historical: scanned consults from [place] into chart 4/2015. Initial psych consult was 4/22/15. Hospital stay here 4/9/16 - 4/18/16. Hospital stay [facility] June '16. Modafanil: we tried to help improve fatigue and mood but he felt created some cravings.    Josafat Montez is a 35 yo with  schizophrenia and Capgras syndrome.  Josafat has experienced psychosis including delusions, paranoia and hallucinations since 2004 as a teen. He has history of heavy MJ use and methamphetamine . Today he denies any recent use of drugs.. Josafat hospitalized here and started on clozapine    Tachycardia; was on metoprolol but fatigue so d/c. PCP aware tachy and was evaluated by cardiology. Discussed prolonged tachycardia can cause heart failure hence I feel important we have him discuss if other meds could take for tachycardia. Mom is in agreement with this and we again  reviewed the risks associated with prolonged tachycardia.      He was sleeping too much: summer '18  we reduced the 200 mg clozapine to 100 mg and kept later dose at 400. He is now sleeping less and thankfully really seems to be doing well.     Weight, triglycerides and we rechecked labs triglycerides up again. Glucose 131. He works with his primary care physician who follows along with these labs / issues as well. So glad to hear he feels mood-wise he is doing well. No changes today with meds.    TREATMENT RISK STATEMENT: The risks, benefits, alternatives and potential adverse effects have been explained and are understood by the pt. The pt agrees to the treatment plan with the ability to do so. The pt knows to call the clinic for any problems or access emergency care if needed.       DIAGNOSES           Schizophrenia, paranoid type  Capgras syndrome      PLAN                                                                                                                         1) MEDICATIONS:   --  Continue clozapine 100 mg am and 400 mg HS..  Continue miralax and docusate prn constipation.  continue Cogentin 0.5 mg bid.     2) THERAPY: no change    Labs: CBC with diff monthly   Lipid panel Aug of 2020 and is in [facility] Everywhere tab    4) PT MONITOR [call for probs]: Worsening symptoms, side effects from medications, SI/HI    5) REFERRALS [CD tx, medical, tests other]: none    6)  RTC: ~2 months

## 2020-12-18 DIAGNOSIS — F20.0 CHRONIC PARANOID SCHIZOPHRENIA (H): ICD-10-CM

## 2020-12-18 RX ORDER — CLOZAPINE 200 MG/1
TABLET ORAL
Qty: 56 TABLET | Refills: 0 | Status: SHIPPED | OUTPATIENT
Start: 2020-12-18 | End: 2021-01-15

## 2020-12-18 RX ORDER — CLOZAPINE 100 MG/1
TABLET ORAL
Qty: 28 TABLET | Refills: 0 | Status: SHIPPED | OUTPATIENT
Start: 2020-12-18 | End: 2021-01-15

## 2020-12-18 NOTE — TELEPHONE ENCOUNTER
Clozapine 200 mg      Last Written Prescription Date:  11/23/20  Last Fill Quantity: 56,   # refills: 0  Last Office Visit: 11/23/20  Future Office visit:       Routing refill request to provider for review/approval because:      Clozapine 100 mg      Last Written Prescription Date:  11/23/20  Last Fill Quantity: 28,   # refills: 0  Last Office Visit: 11/23/20  Future Office visit:       Routing refill request to provider for review/approval because:

## 2021-01-15 DIAGNOSIS — F20.0 CHRONIC PARANOID SCHIZOPHRENIA (H): ICD-10-CM

## 2021-01-15 RX ORDER — CLOZAPINE 200 MG/1
TABLET ORAL
Qty: 56 TABLET | Refills: 0 | Status: SHIPPED | OUTPATIENT
Start: 2021-01-15 | End: 2021-02-12

## 2021-01-15 RX ORDER — CLOZAPINE 100 MG/1
TABLET ORAL
Qty: 28 TABLET | Refills: 0 | Status: SHIPPED | OUTPATIENT
Start: 2021-01-15 | End: 2021-02-12

## 2021-01-15 NOTE — TELEPHONE ENCOUNTER
cloZAPine (CLOZARIL) 100 MG tablet      Last Written Prescription Date:  12/18/20  Last Fill Quantity: 28,   # refills: 0  Last Office Visit: 11/23/20  Future Office visit:       Routing refill request to provider for review/approval because:  Drug not on the [facility] or [facility] refill protocol or controlled substance    cloZAPine (CLOZARIL) 200 MG tablet      Last Written Prescription Date:  12/18/20  Last Fill Quantity: 56,   # refills: 0

## 2021-01-25 ENCOUNTER — VIRTUAL VISIT (OUTPATIENT)
Dept: PSYCHIATRY | Facility: OTHER | Age: 36
End: 2021-01-25
Attending: PSYCHIATRY & NEUROLOGY
Payer: COMMERCIAL

## 2021-01-25 DIAGNOSIS — F20.0 CHRONIC PARANOID SCHIZOPHRENIA (H): Primary | ICD-10-CM

## 2021-01-25 PROCEDURE — 99213 OFFICE O/P EST LOW 20 MIN: CPT | Mod: TEL | Performed by: PSYCHIATRY & NEUROLOGY

## 2021-01-25 ASSESSMENT — ANXIETY QUESTIONNAIRES
6. BECOMING EASILY ANNOYED OR IRRITABLE: NOT AT ALL
2. NOT BEING ABLE TO STOP OR CONTROL WORRYING: NOT AT ALL
GAD7 TOTAL SCORE: 1
3. WORRYING TOO MUCH ABOUT DIFFERENT THINGS: SEVERAL DAYS
7. FEELING AFRAID AS IF SOMETHING AWFUL MIGHT HAPPEN: NOT AT ALL
1. FEELING NERVOUS, ANXIOUS, OR ON EDGE: NOT AT ALL
5. BEING SO RESTLESS THAT IT IS HARD TO SIT STILL: NOT AT ALL

## 2021-01-25 ASSESSMENT — PATIENT HEALTH QUESTIONNAIRE - PHQ9
SUM OF ALL RESPONSES TO PHQ QUESTIONS 1-9: 12
5. POOR APPETITE OR OVEREATING: NOT AT ALL

## 2021-01-25 ASSESSMENT — PAIN SCALES - GENERAL: PAINLEVEL: MILD PAIN (2)

## 2021-01-25 NOTE — PROGRESS NOTES
"     What phone number would you like to be contacted at? [phone]      Phone call duration: 9 minutes. 3 minutes on reviewing chart / labs, documenting. Total time of 12 minutes    Miles Montez is a 34 year old male who is being evaluated via a billable telephone visit.      The patient has been notified of following:     \"This telephone visit will be conducted via a call between you and your physician/provider. We have found that certain health care needs can be provided without the need for a physical exam.  This service lets us provide the care you need with a short phone conversation.  If a prescription is necessary we can send it directly to your pharmacy.  If lab work is needed we can place an order for that and you can then stop by our lab to have the test done at a later time.    Telephone visits are billed at different rates depending on your insurance coverage. During this emergency period, for some insurers they may be billed the same as an in-person visit.  Please reach out to your insurance provider with any questions.    If during the course of the call the physician/provider feels a telephone visit is not appropriate, you will not be charged for this service.\"    Patient has given verbal consent for Telephone visit?  Yes    What phone number would you like to be contacted at? [phone]    Phone call duration: 8 minutes          SUBJECTIVE / INTERIM HISTORY                                                                       Last visit July '20: Continue clozapine 100 mg am and 400 mg HS..  Continue miralax and docusate prn constipation.  continue Cogentin 0.5 mg bid.     - generally doing okay. Still feels doing okay and in fact depression better than was while ago  - doesn't have license because someone used his name when they got pulled over.  - Holidays went fine  - mom still spending lot of time over at his aunt's (helping his niece / his brother kid with online " school)      SYMPTOMS-has been doing better with mood. Still at times low energy, weight gain, anhdeonida, no SI/HI. No evidence any delusions today.  no AH/VH, denies thought blocking, memory impairment  MEDICAL ROS-  SOB, Tired, Tremor, Fatigue, weight gain,gets abdominal pain.   MEDICAL / SURGICAL HISTORY            Medical Team:     PMD-       Therapist- Brenda at [facility]   Patient Active Problem List   Diagnosis     Episodic mood disorder (H)     Poisoning by Methamphetamines     Cannabis abuse     Hypertension goal BP (blood pressure) < 140/90     Psychosis (H)     Depression     Paranoid schizophrenia, chronic condition with acute exacerbation (H)     Schizophrenia, paranoid, subchronic with acute exacerbation (H)     Schizophrenia, paranoid type (H)     Drug eruption     ACP (advance care planning)     Morbid obesity (H)     ALLERGY   Penicillins, Bupropion, Depakote [valproic acid], and Divalproex sodium-fd&c red #40  MEDICATIONS                                                                                             Current Outpatient Medications   Medication Sig     benztropine (COGENTIN) 0.5 MG tablet Take 1 tablet (0.5 mg) by mouth 2 times daily as needed for agitation     cholecalciferol 2000 UNITS tablet Take 2,000 Units by mouth daily     cloNIDine (CATAPRES) 0.1 MG tablet TAKE 1 TABLET TWICE A DAY BY MOUTH     cloZAPine (CLOZARIL) 100 MG tablet TAKE 1 TABLET (100MG) BY MOUTH DAILY     cloZAPine (CLOZARIL) 200 MG tablet TAKE 2 TABLETS (400MG) BY MOUTH AT BEDTIME     DOCUSATE SODIUM PO Take 100 mg by mouth 2 times daily as needed for constipation     GNP FIBER THERAPY 500 MG TABS tablet TAKE 1 TABLET (500 MG) BY MOUTH DAILY     hydrOXYzine (ATARAX) 25 MG tablet Take 1 - 2 tablets bedtime as needed for insomnia     ibuprofen (ADVIL/MOTRIN) 200 MG tablet Take 200 mg by mouth every 4 hours as needed for mild pain     Omega-3 Fatty Acids (EQL FISH OIL) 1000 MG CAPS Take 1 capsule by mouth daily      omeprazole (PRILOSEC) 20 MG DR capsule Take 20 mg by mouth daily     No current facility-administered medications for this visit.      VITALS   There were no vitals taken for this visit.     PHQ9                       PHQ-9 SCORE 4/5/2017 9/22/2020 1/25/2021   PHQ-9 Total Score - - -   PHQ-9 Total Score 10 12 12       LABS                                                                                                                            Fasting glucose 131 as of 8/10/2020  Recent Labs   Lab Test 03/29/19 02/27/18  1300  07/09/15  0818   CHOL 213* 227*   < > 145   HDL 31* 28*   < > 39*    Cannot estimate LDL when triglyceride exceeds 400 mg/dL   < > 75   TRIG 393* 531*   < > 154*   CHOLHDLRATIO  --   --   --  3.7    < > = values in this interval not displayed.     Liver Function Studies -   Recent Labs   Lab Test 03/29/19 08/20/17  1640   PROTTOTAL  --  8.1   ALBUMIN  --  4.3   BILITOTAL  --  0.8   ALKPHOS  --  61   AST 29 41   ALT 68* 95*     ALT August of 2020 63. AST in normal range.    Clozapine level 116 as of 10/2/16 and cloz and metabolites 194  Clozapine level 329 as of 4/18/18 and clozapine and metabolites 527      ASSESSMENT                                                                                                      Historical: scanned consults from [place] into chart 4/2015. Initial psych consult was 4/22/15. Hospital stay here 4/9/16 - 4/18/16. Hospital stay [place] June '16. Modafanil: we tried to help improve fatigue and mood but he felt created some cravings.    Josafat Montez is a 36 yo with  schizophrenia and Capgras syndrome.  Josafat has experienced psychosis including delusions, paranoia and hallucinations since 2004 as a teen. He has history of heavy MJ use and methamphetamine . Today he denies any recent use of drugs.. Josafat hospitalized here and started on clozapine    Tachycardia; was on metoprolol but fatigue so d/c. PCP aware tachy and was evaluated by cardiology.  Discussed prolonged tachycardia can cause heart failure hence I feel important we have him discuss if other meds could take for tachycardia. Mom is in agreement with this and we again reviewed the risks associated with prolonged tachycardia.      He was sleeping too much: summer '18  we reduced the 200 mg clozapine to 100 mg and kept later dose at 400. He is now sleeping less and thankfully really seems to be doing well.     Labs most recently were 8/2020: weight, triglycerides and we rechecked labs triglycerides up again. Glucose 131. He works with his primary care physician who follows along with these labs / issues as well. So glad to hear he feels mood-wise he is doing well. No changes today with meds.    TREATMENT RISK STATEMENT: The risks, benefits, alternatives and potential adverse effects have been explained and are understood by the pt. The pt agrees to the treatment plan with the ability to do so. The pt knows to call the clinic for any problems or access emergency care if needed.       DIAGNOSES           Schizophrenia, paranoid type  Capgras syndrome      PLAN                                                                                                                         1) MEDICATIONS:   --  Continue clozapine 100 mg am and 400 mg HS..  Continue miralax and docusate prn constipation.  continue Cogentin 0.5 mg bid.     2) THERAPY: no change    Labs: CBC with diff monthly given on clozapine.  Lipid panel and fasting glucose Aug of 2020 and is in Care Everywhere tab    4) PT MONITOR [call for probs]: Worsening symptoms, side effects from medications, SI/HI    5) REFERRALS [CD tx, medical, tests other]: none    6)  RTC: ~2 months

## 2021-01-26 ASSESSMENT — ANXIETY QUESTIONNAIRES: GAD7 TOTAL SCORE: 1

## 2021-02-18 DIAGNOSIS — F20.0 CHRONIC PARANOID SCHIZOPHRENIA (H): ICD-10-CM

## 2021-02-18 RX ORDER — CLOZAPINE 100 MG/1
100 TABLET ORAL DAILY
Qty: 28 TABLET | Refills: 1 | Status: SHIPPED | OUTPATIENT
Start: 2021-02-18 | End: 2021-04-09

## 2021-02-18 RX ORDER — CLOZAPINE 200 MG/1
TABLET ORAL
Qty: 56 TABLET | Refills: 1 | Status: SHIPPED | OUTPATIENT
Start: 2021-02-18 | End: 2021-04-09

## 2021-02-18 NOTE — TELEPHONE ENCOUNTER
Call from [facility] in [place] requesting refills, pharmacist reporting the refills were received from Dr. Rebolledo, must go through Dr. Maki unless patient is switching provider to Dr. Rebolledo for prescribing.     Rx refill request being sent to Dr. Marysol Maki.       cloZAPine (CLOZARIL) 100 MG tablet        Last Written Prescription Date:  2/12/21  Last Fill Quantity: 28,   # refills: 1  Last Office Visit: 1/15/21  Future Office visit:       Routing refill request to provider for review/approval because:    Drug not on the [facility], [facility] or [facility] refill protocol or controlled substance    cloZAPine (CLOZARIL) 200 MG tablet    Last Written Prescription Date:  2/12/21  Last Fill Quantity: 56,   # refills: 1  Last Office Visit: 1/15/21  Future Office visit:       Routing refill request to provider for review/approval because:    Drug not on the [facility], [facility] or [facility] refill protocol or controlled substance

## 2021-02-22 ENCOUNTER — TRANSFERRED RECORDS (OUTPATIENT)
Dept: HEALTH INFORMATION MANAGEMENT | Facility: CLINIC | Age: 36
End: 2021-02-22

## 2021-03-22 ENCOUNTER — TRANSFERRED RECORDS (OUTPATIENT)
Dept: HEALTH INFORMATION MANAGEMENT | Facility: CLINIC | Age: 36
End: 2021-03-22

## 2021-04-26 ENCOUNTER — VIRTUAL VISIT (OUTPATIENT)
Dept: PSYCHIATRY | Facility: OTHER | Age: 36
End: 2021-04-26
Attending: PSYCHIATRY & NEUROLOGY
Payer: COMMERCIAL

## 2021-04-26 DIAGNOSIS — F20.0 CHRONIC PARANOID SCHIZOPHRENIA (H): Primary | ICD-10-CM

## 2021-04-26 PROCEDURE — 99214 OFFICE O/P EST MOD 30 MIN: CPT | Mod: TEL | Performed by: PSYCHIATRY & NEUROLOGY

## 2021-04-26 ASSESSMENT — PAIN SCALES - GENERAL: PAINLEVEL: NO PAIN (0)

## 2021-04-26 NOTE — PROGRESS NOTES
"Miles is a 35 year old who is being evaluated via a billable telephone visit.         The patient has been notified of following:     \"This telephone visit will be conducted via a call between you and your physician/provider. We have found that certain health care needs can be provided without the need for a physical exam.  This service lets us provide the care you need with a short phone conversation.  If a prescription is necessary we can send it directly to your pharmacy.  If lab work is needed we can place an order for that and you can then stop by our lab to have the test done at a later time.    Telephone visits are billed at different rates depending on your insurance coverage. During this emergency period, for some insurers they may be billed the same as an in-person visit.  Please reach out to your insurance provider with any questions.    If during the course of the call the physician/provider feels a telephone visit is not appropriate, you will not be charged for this service.\"    Patient has given verbal consent for Telephone visit?  Yes    What phone number would you like to be contacted at? [phone]    Phone call duration: 10 minutes. 8 minutes on reviewing chart, documenting. Total visit time of 18 minutes.       SUBJECTIVE / INTERIM HISTORY                                                                       Last visit Jan 2021: Continue clozapine 100 mg am and 400 mg HS..  Continue miralax and docusate prn constipation.  continue Cogentin 0.5 mg bid.       - generally doing okay. Still feels doing okay and in fact depression better than was while ago  - doesn't have license because someone used his name when they got pulled over.   - mom still spending lot of time over at his aunt's (helping his niece / his brother kid with online school)      SYMPTOMS-. Still at times low energy, weight gain, anhdeonida, no SI/HI. No evidence any delusions.  no AH/VH, denies thought blocking, memory " impairment  MEDICAL ROS-  SOB, Tired, Tremor, Fatigue, weight gain,gets abdominal pain.   MEDICAL / SURGICAL HISTORY            Medical Team:     PMD-       Therapist- Brenda at [facility]   Patient Active Problem List   Diagnosis     Episodic mood disorder (H)     Poisoning by Methamphetamines     Cannabis abuse     Hypertension goal BP (blood pressure) < 140/90     Psychosis (H)     Depression     Paranoid schizophrenia, chronic condition with acute exacerbation (H)     Schizophrenia, paranoid, subchronic with acute exacerbation (H)     Schizophrenia, paranoid type (H)     Drug eruption     ACP (advance care planning)     Morbid obesity (H)     ALLERGY   Penicillins, Bupropion, Depakote [valproic acid], and Divalproex sodium-fd&c red #40  MEDICATIONS                                                                                             Current Outpatient Medications   Medication Sig     benztropine (COGENTIN) 0.5 MG tablet Take 1 tablet (0.5 mg) by mouth 2 times daily as needed for agitation     cholecalciferol 2000 UNITS tablet Take 2,000 Units by mouth daily     cloNIDine (CATAPRES) 0.1 MG tablet TAKE 1 TABLET TWICE A DAY BY MOUTH     cloZAPine (CLOZARIL) 100 MG tablet TAKE 1 TABLET (100MG) BY MOUTH DAILY     cloZAPine (CLOZARIL) 200 MG tablet TAKE 2 TABLETS (400MG) BY MOUTH AT BEDTIME     DOCUSATE SODIUM PO Take 100 mg by mouth 2 times daily as needed for constipation     GNP FIBER THERAPY 500 MG TABS tablet TAKE 1 TABLET (500 MG) BY MOUTH DAILY     hydrOXYzine (ATARAX) 25 MG tablet Take 1 - 2 tablets bedtime as needed for insomnia     ibuprofen (ADVIL/MOTRIN) 200 MG tablet Take 200 mg by mouth every 4 hours as needed for mild pain     Omega-3 Fatty Acids (EQL FISH OIL) 1000 MG CAPS Take 1 capsule by mouth daily     omeprazole (PRILOSEC) 20 MG DR capsule Take 20 mg by mouth daily     No current facility-administered medications for this visit.      VITALS   There were no vitals taken for this visit.     PHQ9       "                 PHQ-9 SCORE 4/5/2017 9/22/2020 1/25/2021   PHQ-9 Total Score - - -   PHQ-9 Total Score 10 12 12       LABS                                                                                                                            Fasting glucose 131 as of 8/10/2020Tachycardia; was on metoprolol but fatigue so d/c. PCP aware tachy and was evaluated by cardiology.     Recent Labs   Lab Test 03/29/19 02/27/18  1300  07/09/15  0818   CHOL 213* 227*   < > 145   HDL 31* 28*   < > 39*    Cannot estimate LDL when triglyceride exceeds 400 mg/dL   < > 75   TRIG 393* 531*   < > 154*   CHOLHDLRATIO  --   --   --  3.7    < > = values in this interval not displayed.     Liver Function Studies -   Recent Labs   Lab Test 03/29/19 08/20/17  1640   PROTTOTAL  --  8.1   ALBUMIN  --  4.3   BILITOTAL  --  0.8   ALKPHOS  --  61   AST 29 41   ALT 68* 95*     ALT August of 2020 63. AST in normal range.    Clozapine level 116 as of 10/2/16 and cloz and metabolites 194  Clozapine level 329 as of 4/18/18 and clozapine and metabolites 527    Mental Status Exam: Mood described as \"could be better, could be worse\".     ASSESSMENT                                                                                                      Historical: scanned consults from [place] into chart 4/2015. Initial psych consult was 4/22/15. Hospital stay here 4/9/16 - 4/18/16. Hospital stay [place] June '16. Modafanil: we tried to help improve fatigue and mood but he felt created some cravings.    Josafat Montez is a 36 yo with  schizophrenia and Capgras syndrome.  Josafat has experienced psychosis including delusions, paranoia and hallucinations since 2004 as a teen. He has history of heavy MJ use and methamphetamine. Notes \"could be better, could be worse\". For one doesn't have a license and hasn't left the house in 3 months.     He was sleeping too much: summer '18  we reduced the 200 mg clozapine to 100 mg and kept later dose at 400. " "Not sleeping as much but still is tired a lot. I haven't seen Josafat in person for quite awhile given the Covid - 19 pandemic (telephone visits). Today I noted I feel important next visit he come in person if that's possible. For one we can get set vitals. Additionally, I feel I can get better assessment as to how he is doing (including his moms to visits and helps provide history). He notes he's on a \"heart med\" but was unable to tell me name.. relevant given tachycardia likely 2/2 clozapine.     Labs most recently were 8/2020: weight, triglycerides and we rechecked labs triglycerides up again. Glucose 131. He works with his primary care physician who follows along with these labs / issues as well. He plans on scheduling appmt with his primary given back pain been worse lately and notes he has been with DDD and think he has some disc issues. In past he recalls a med in past seemed to help. I encouraged Josafat to go ahead with getting a visit set up up with his primary.    TREATMENT RISK STATEMENT: The risks, benefits, alternatives and potential adverse effects have been explained and are understood by the pt. The pt agrees to the treatment plan with the ability to do so. The pt knows to call the clinic for any problems or access emergency care if needed.       DIAGNOSES           Schizophrenia, paranoid type  Capgras syndrome      PLAN                                                                                                                         1) MEDICATIONS:   --  Continue clozapine 100 mg am and 400 mg HS..  Continue miralax and docusate prn constipation.  continue Cogentin 0.5 mg bid.     2) THERAPY: no change    Labs: CBC with diff monthly given on clozapine.  Lipid panel and fasting glucose Aug of 2020 and is in Care Everywhere tab    4) PT MONITOR [call for probs]: Worsening symptoms, side effects from medications, SI/HI    5) REFERRALS [CD tx, medical, tests other]: none    6)  RTC: ~2 " months

## 2021-05-17 ENCOUNTER — TRANSFERRED RECORDS (OUTPATIENT)
Dept: HEALTH INFORMATION MANAGEMENT | Facility: CLINIC | Age: 36
End: 2021-05-17

## 2021-06-04 DIAGNOSIS — F20.0 CHRONIC PARANOID SCHIZOPHRENIA (H): ICD-10-CM

## 2021-06-04 RX ORDER — CLOZAPINE 200 MG/1
TABLET ORAL
Qty: 56 TABLET | Refills: 1 | Status: SHIPPED | OUTPATIENT
Start: 2021-06-04 | End: 2021-08-02

## 2021-06-04 RX ORDER — CLOZAPINE 100 MG/1
TABLET ORAL
Qty: 28 TABLET | Refills: 1 | Status: SHIPPED | OUTPATIENT
Start: 2021-06-04 | End: 2021-08-02

## 2021-06-04 NOTE — TELEPHONE ENCOUNTER
Clozapine 100 mg      Last Written Prescription Date:  4/09/21  Last Fill Quantity: 28,   # refills: 1  Last Office Visit: 4/26/01  Future Office visit:       Routing refill request to provider for review/approval because:  Drug not on the [facility], [facility] or [facility] refill protocol or controlled substance  Clozapine 200 mg      Last Written Prescription Date:  4/09/21  Last Fill Quantity: 56,   # refills: 1  Last Office Visit: 4/26/21  Future Office visit:       Routing refill request to provider for review/approval because:  Drug not on the [facility], [facility] or [facility] refill protocol or controlled substance

## 2021-06-14 ENCOUNTER — TRANSFERRED RECORDS (OUTPATIENT)
Dept: HEALTH INFORMATION MANAGEMENT | Facility: CLINIC | Age: 36
End: 2021-06-14

## 2021-07-12 ENCOUNTER — TRANSFERRED RECORDS (OUTPATIENT)
Dept: HEALTH INFORMATION MANAGEMENT | Facility: CLINIC | Age: 36
End: 2021-07-12

## 2021-07-30 DIAGNOSIS — F20.0 CHRONIC PARANOID SCHIZOPHRENIA (H): ICD-10-CM

## 2021-08-02 RX ORDER — CLOZAPINE 200 MG/1
TABLET ORAL
Qty: 56 TABLET | Refills: 1 | Status: SHIPPED | OUTPATIENT
Start: 2021-08-02 | End: 2021-09-02

## 2021-08-02 RX ORDER — CLOZAPINE 100 MG/1
TABLET ORAL
Qty: 28 TABLET | Refills: 1 | Status: SHIPPED | OUTPATIENT
Start: 2021-08-02 | End: 2021-09-02

## 2021-08-02 NOTE — TELEPHONE ENCOUNTER
Clozapine  Last visit: 4.26.21  Last refill: 6.4.21    Future appointments:     Last ANC: 5.17.21 - 4200     is out all week. Sending to [facility] on call provider as patient's PCP is not at [facility].     Kim Arshad, RN-BSN  Care Coordination, Behavioral Health

## 2021-08-17 ENCOUNTER — VIRTUAL VISIT (OUTPATIENT)
Dept: PSYCHIATRY | Facility: OTHER | Age: 36
End: 2021-08-17
Attending: PSYCHIATRY & NEUROLOGY
Payer: COMMERCIAL

## 2021-08-17 VITALS — OXYGEN SATURATION: 97 % | DIASTOLIC BLOOD PRESSURE: 90 MMHG | HEART RATE: 109 BPM | SYSTOLIC BLOOD PRESSURE: 123 MMHG

## 2021-08-17 DIAGNOSIS — F20.0 SCHIZOPHRENIA, PARANOID, SUBCHRONIC WITH ACUTE EXACERBATION (H): ICD-10-CM

## 2021-08-17 PROCEDURE — 99213 OFFICE O/P EST LOW 20 MIN: CPT | Mod: TEL | Performed by: PSYCHIATRY & NEUROLOGY

## 2021-08-17 RX ORDER — CLONIDINE HYDROCHLORIDE 0.1 MG/1
TABLET ORAL
Qty: 60 TABLET | Refills: 11 | Status: SHIPPED | OUTPATIENT
Start: 2021-08-17 | End: 2022-10-19

## 2021-08-17 ASSESSMENT — ANXIETY QUESTIONNAIRES
6. BECOMING EASILY ANNOYED OR IRRITABLE: SEVERAL DAYS
3. WORRYING TOO MUCH ABOUT DIFFERENT THINGS: SEVERAL DAYS
2. NOT BEING ABLE TO STOP OR CONTROL WORRYING: MORE THAN HALF THE DAYS
7. FEELING AFRAID AS IF SOMETHING AWFUL MIGHT HAPPEN: SEVERAL DAYS
5. BEING SO RESTLESS THAT IT IS HARD TO SIT STILL: NOT AT ALL
1. FEELING NERVOUS, ANXIOUS, OR ON EDGE: NEARLY EVERY DAY
GAD7 TOTAL SCORE: 8

## 2021-08-17 ASSESSMENT — PATIENT HEALTH QUESTIONNAIRE - PHQ9
5. POOR APPETITE OR OVEREATING: NOT AT ALL
SUM OF ALL RESPONSES TO PHQ QUESTIONS 1-9: 13

## 2021-08-17 ASSESSMENT — PAIN SCALES - GENERAL: PAINLEVEL: MILD PAIN (2)

## 2021-08-17 NOTE — PROGRESS NOTES
"Video visit was 5 minutes. Telephone visit then 14 minutes. Will view this as a telephone visit thus telephone duration with patent 19 minutes. Total visit time 19 minutes (documented, ordered meds during our call)      SUBJECTIVE / INTERIM HISTORY                                                                       Last visit April 2021: Continue clozapine 100 mg am and 400 mg HS..  Continue miralax and docusate prn constipation.  continue Cogentin 0.5 mg bid.     - summer going well. Went to [place] to [facility] with mom  - they measured oxygen 97%, , /90  - mom notes Josafat tends to get \"stuck in his thoughts\" at times. Gets into thinking about the past  - mom has been helping Josafat learn CBT  - generally doing okay. Still feels doing okay and in fact depression better than was while ago  - doesn't have license because someone used his name when they got pulled over.   - mom still spending lot of time over at his aunt's (helping his niece / his brother kid with online school)      SYMPTOMS-. Still at times low energy, weight gain, anhedonia, no SI/HI. No evidence any delusions.  no AH/VH, denies thought blocking, memory impairment  MEDICAL ROS-  Still constipation off and on, gets diffuse joint pain. .   MEDICAL / SURGICAL HISTORY            Medical Team:     PMD-       Therapist- Brenda at [facility]   Patient Active Problem List   Diagnosis     Episodic mood disorder (H)     Poisoning by Methamphetamines     Cannabis abuse     Hypertension goal BP (blood pressure) < 140/90     Psychosis (H)     Depression     Paranoid schizophrenia, chronic condition with acute exacerbation (H)     Schizophrenia, paranoid, subchronic with acute exacerbation (H)     Schizophrenia, paranoid type (H)     Drug eruption     ACP (advance care planning)     Morbid obesity (H)     ALLERGY   Pcn [penicillins], Bupropion, Depakote [valproic acid], and Divalproex sodium-fd&c red #40  MEDICATIONS                                        "                                                      Current Outpatient Medications   Medication Sig     benztropine (COGENTIN) 0.5 MG tablet Take 1 tablet (0.5 mg) by mouth 2 times daily as needed for agitation     cholecalciferol 2000 UNITS tablet Take 2,000 Units by mouth daily     cloNIDine (CATAPRES) 0.1 MG tablet TAKE 1 TABLET TWICE A DAY BY MOUTH     cloZAPine (CLOZARIL) 100 MG tablet TAKE 1 TABLET (100MG) BY MOUTH DAILY     cloZAPine (CLOZARIL) 200 MG tablet TAKE 2 TABLETS (400MG) BY MOUTH AT BEDTIME     GNP FIBER THERAPY 500 MG TABS tablet TAKE 1 TABLET (500 MG) BY MOUTH DAILY     hydrOXYzine (ATARAX) 25 MG tablet Take 1 - 2 tablets bedtime as needed for insomnia     Omega-3 Fatty Acids (EQL FISH OIL) 1000 MG CAPS Take 1 capsule by mouth daily     DOCUSATE SODIUM PO Take 100 mg by mouth 2 times daily as needed for constipation     ibuprofen (ADVIL/MOTRIN) 200 MG tablet Take 200 mg by mouth every 4 hours as needed for mild pain     omeprazole (PRILOSEC) 20 MG DR capsule Take 20 mg by mouth daily     No current facility-administered medications for this visit.     VITALS   BP (!) 123/90   Pulse 109   SpO2 97%      PHQ9                       PHQ-9 SCORE 9/22/2020 1/25/2021 8/17/2021   PHQ-9 Total Score - - -   PHQ-9 Total Score 12 12 13       LABS                                                                                                                            Fasting glucose 131 as of 8/10/2020Tachycardia; was on metoprolol but fatigue so d/c. PCP aware tachy and was evaluated by cardiology.     Recent Labs   Lab Test 03/29/19 02/27/18  1300  07/09/15  0818   CHOL 213* 227*   < > 145   HDL 31* 28*   < > 39*    Cannot estimate LDL when triglyceride exceeds 400 mg/dL   < > 75   TRIG 393* 531*   < > 154*   CHOLHDLRATIO  --   --   --  3.7    < > = values in this interval not displayed.     Liver Function Studies -   Recent Labs   Lab Test 03/29/19 08/20/17  1640   PROTTOTAL  --  8.1   ALBUMIN  --  " 4.3   BILITOTAL  --  0.8   ALKPHOS  --  61   AST 29 41   ALT 68* 95*     ALT August of 2020 63. AST in normal range.    Clozapine level 116 as of 10/2/16 and cloz and metabolites 194  Clozapine level 329 as of 4/18/18 and clozapine and metabolites 527    Mental Status Exam: Mood described as \"could be better, could be worse\".     ASSESSMENT                                                                                                      Historical: scanned consults from [place] into chart 4/2015. Initial psych consult was 4/22/15. Hospital stay here 4/9/16 - 4/18/16. Hospital stay [place] June '16. Modafanil: we tried to help improve fatigue and mood but he felt created some cravings.    Josafat Montez is a 34 yo with  schizophrenia and Capgras syndrome.  Josafat has experienced psychosis including delusions, paranoia and hallucinations since 2004 as a teen. He has history of heavy MJ use and methamphetamine. We got 5 minutes of a video visit in then it cut out. We finished up our visit via telephone. Overall Josafat is doing well / stable. Taking his clozapine regularly. Due for lipid panel given last was in August.       Labs most recently were 8/2020: weight, triglycerides and we rechecked labs triglycerides up again. Glucose 131. He works with his primary care physician who follows along with these labs / issues as well. He plans on scheduling appmt with his primary given back pain been worse lately and notes he has been with DDD and think he has some disc issues. In past he recalls a med in past seemed to help. I encouraged Josafat to go ahead with getting a visit set up up with his primary.    TREATMENT RISK STATEMENT: The risks, benefits, alternatives and potential adverse effects have been explained and are understood by the pt. The pt agrees to the treatment plan with the ability to do so. The pt knows to call the clinic for any problems or access emergency care if needed.       DIAGNOSES       "     Schizophrenia, paranoid type  Capgras syndrome      PLAN                                                                                                                         1) MEDICATIONS:   --  Continue clozapine 100 mg am and 400 mg HS..  Continue miralax and docusate prn constipation.  continue Cogentin 0.5 mg bid.     2) THERAPY: no change    Labs: CBC with diff monthly given on clozapine.  Lipid panel and fasting glucose Aug of 2020 and due in month or so    4) PT MONITOR [call for probs]: Worsening symptoms, side effects from medications, SI/HI    5) REFERRALS [CD tx, medical, tests other]: none    6)  RTC: ~2 months

## 2021-08-17 NOTE — NURSING NOTE
"Chief Complaint   Patient presents with     RECHECK     Virtual visit.       Initial BP (!) 123/90   Pulse 109   SpO2 97%  Estimated body mass index is 32.98 kg/m  as calculated from the following:    Height as of 1/17/20: 1.854 m (6' 1\").    Weight as of 9/22/20: 113.4 kg (250 lb).  Medication Reconciliation: complete  MARIKA OQUENDO LPN    "

## 2021-08-18 ASSESSMENT — ANXIETY QUESTIONNAIRES: GAD7 TOTAL SCORE: 8

## 2021-09-02 DIAGNOSIS — F20.0 CHRONIC PARANOID SCHIZOPHRENIA (H): ICD-10-CM

## 2021-09-02 RX ORDER — CLOZAPINE 100 MG/1
100 TABLET ORAL DAILY
Qty: 30 TABLET | Refills: 0 | Status: SHIPPED | OUTPATIENT
Start: 2021-09-02 | End: 2021-10-15

## 2021-09-02 RX ORDER — CLOZAPINE 200 MG/1
TABLET ORAL
Qty: 60 TABLET | Refills: 0 | Status: SHIPPED | OUTPATIENT
Start: 2021-09-02 | End: 2021-10-15

## 2021-09-07 ENCOUNTER — TRANSFERRED RECORDS (OUTPATIENT)
Dept: HEALTH INFORMATION MANAGEMENT | Facility: CLINIC | Age: 36
End: 2021-09-07

## 2021-09-21 DIAGNOSIS — K59.03 DRUG-INDUCED CONSTIPATION: ICD-10-CM

## 2021-09-22 NOTE — TELEPHONE ENCOUNTER
Fiber       Last Written Prescription Date:  4/15/2020  Last Fill Quantity: 100,   # refills: 5  Last Office Visit: 8/17/2021  Future Office visit:

## 2021-09-23 RX ORDER — METHYLCELLULOSE 500 MG/1
TABLET ORAL
Qty: 100 TABLET | Refills: 5 | Status: SHIPPED | OUTPATIENT
Start: 2021-09-23 | End: 2024-05-24

## 2021-09-25 ENCOUNTER — HEALTH MAINTENANCE LETTER (OUTPATIENT)
Age: 36
End: 2021-09-25

## 2021-10-15 DIAGNOSIS — F20.0 CHRONIC PARANOID SCHIZOPHRENIA (H): ICD-10-CM

## 2021-10-15 RX ORDER — CLOZAPINE 200 MG/1
TABLET ORAL
Qty: 60 TABLET | Refills: 0 | Status: SHIPPED | OUTPATIENT
Start: 2021-10-15 | End: 2021-11-12

## 2021-10-15 RX ORDER — CLOZAPINE 100 MG/1
TABLET ORAL
Qty: 30 TABLET | Refills: 0 | Status: SHIPPED | OUTPATIENT
Start: 2021-10-15 | End: 2021-11-12

## 2021-10-15 NOTE — TELEPHONE ENCOUNTER
Clozapine (Clozaril) 100 mg tablet  Take 1 tablet (100 mg) by mouth daily  Last Written Prescription Date:  9-2-21  Last Fill Quantity: 30 tablet,   # refills: 0  Last Office Visit: 8-17-21  Future Office visit:   10-18-21      Clozapine (Clozaril) 200 mg tablet  Take 2 tablets by mouth at bedtime   Last Written Prescription Date:  9-2-21  Last Fill Quantity: 60 tablet,   # refills: 0  Last Office Visit: 8-17-21  Future Office visit:  10-18-21

## 2021-10-18 ENCOUNTER — TELEPHONE (OUTPATIENT)
Dept: PSYCHIATRY | Facility: OTHER | Age: 36
End: 2021-10-18

## 2021-10-18 NOTE — TELEPHONE ENCOUNTER
Received incoming call from Malika, Miles's mother.  Miles's appt was rescheduled for 11-10-21.  Malika reports Miles is doing good.  He has been going for walks and trying to avoid pop.  Thank you

## 2021-10-18 NOTE — TELEPHONE ENCOUNTER
Oh good! Thanks CJ. I tried calling his cell and his aunt's # and no luck with either; same as you.

## 2021-10-25 ENCOUNTER — TRANSFERRED RECORDS (OUTPATIENT)
Dept: HEALTH INFORMATION MANAGEMENT | Facility: CLINIC | Age: 36
End: 2021-10-25

## 2021-11-10 ENCOUNTER — VIRTUAL VISIT (OUTPATIENT)
Dept: PSYCHIATRY | Facility: OTHER | Age: 36
End: 2021-11-10
Attending: PSYCHIATRY & NEUROLOGY
Payer: COMMERCIAL

## 2021-11-10 VITALS — WEIGHT: 268 LBS | BODY MASS INDEX: 35.36 KG/M2

## 2021-11-10 DIAGNOSIS — F20.0 CHRONIC PARANOID SCHIZOPHRENIA (H): Primary | ICD-10-CM

## 2021-11-10 PROCEDURE — G0463 HOSPITAL OUTPT CLINIC VISIT: HCPCS | Mod: TEL,95

## 2021-11-10 PROCEDURE — 99213 OFFICE O/P EST LOW 20 MIN: CPT | Mod: TEL | Performed by: PSYCHIATRY & NEUROLOGY

## 2021-11-10 ASSESSMENT — PAIN SCALES - GENERAL: PAINLEVEL: MODERATE PAIN (4)

## 2021-11-10 NOTE — NURSING NOTE
"Chief Complaint   Patient presents with     RECHECK     Telephone visit.  Schizophrenia.       Initial Wt 121.6 kg (268 lb)   BMI 35.36 kg/m   Estimated body mass index is 35.36 kg/m  as calculated from the following:    Height as of 1/17/20: 1.854 m (6' 1\").    Weight as of this encounter: 121.6 kg (268 lb).  Medication Reconciliation: complete  MARIKA OQUENDO LPN    "

## 2021-11-10 NOTE — PROGRESS NOTES
" Telephone visit then 12 minutes. 8 minutes documenting, reviewing chart. Total visit time 20 minutes.     SUBJECTIVE / INTERIM HISTORY                                                                       Last visit August 17 : Continue clozapine 100 mg am and 400 mg HS..  Continue miralax and docusate prn constipation.  continue Cogentin 0.5 mg bid.     - headaches, bodyaches, almost feels like he is having \"growing pains\" tends to go up and down  --notes looking back on PTSD feels overall doing better  - Josafat notes sometimes he wonders if he should try sertraline again, been couple years at least since last took it  - they measured oxygen 94%, , /94  - doesn't have license because someone used his name when they got pulled over.   - mom still spending lot of time over at his aunt's (helping her granddaughter with online school)    SYMPTOMS-. Still at times low energy, weight gain, anhedonia, no SI/HI. No evidence any delusions.  no AH/VH, denies thought blocking, memory impairment  MEDICAL ROS-  Still constipation off and on, gets diffuse joint pain. .   MEDICAL / SURGICAL HISTORY            Medical Team:     PMD-       Therapist- Brenda at [facility]   Patient Active Problem List   Diagnosis     Episodic mood disorder (H)     Poisoning by Methamphetamines     Cannabis abuse     Hypertension goal BP (blood pressure) < 140/90     Psychosis (H)     Depression     Paranoid schizophrenia, chronic condition with acute exacerbation (H)     Schizophrenia, paranoid, subchronic with acute exacerbation (H)     Schizophrenia, paranoid type (H)     Drug eruption     ACP (advance care planning)     Morbid obesity (H)     ALLERGY   Pcn [penicillins], Bupropion, Depakote [valproic acid], and Divalproex sodium-fd&c red #40  MEDICATIONS                                                                                             Current Outpatient Medications   Medication Sig     benztropine (COGENTIN) 0.5 MG tablet Take 1 " tablet (0.5 mg) by mouth 2 times daily as needed for agitation     cholecalciferol 2000 UNITS tablet Take 2,000 Units by mouth daily     cloNIDine (CATAPRES) 0.1 MG tablet TAKE 1 TABLET TWICE A DAY BY MOUTH     cloZAPine (CLOZARIL) 100 MG tablet TAKE 1 TABLET (100MG) BY MOUTH DAILY     cloZAPine (CLOZARIL) 200 MG tablet TAKE 2 TABLETS (400MG) BY MOUTH AT BEDTIME     DOCUSATE SODIUM PO Take 100 mg by mouth 2 times daily as needed for constipation     GNP FIBER THERAPY 500 MG TABS tablet TAKE 1 TABLET (500 MG) BY MOUTH DAILY     hydrOXYzine (ATARAX) 25 MG tablet Take 1 - 2 tablets bedtime as needed for insomnia     ibuprofen (ADVIL/MOTRIN) 200 MG tablet Take 200 mg by mouth every 4 hours as needed for mild pain     Omega-3 Fatty Acids (EQL FISH OIL) 1000 MG CAPS Take 1 capsule by mouth daily     omeprazole (PRILOSEC) 20 MG DR capsule Take 20 mg by mouth daily     No current facility-administered medications for this visit.     VITALS   Wt 121.6 kg (268 lb)   BMI 35.36 kg/m       PHQ9                       PHQ-9 SCORE 9/22/2020 1/25/2021 8/17/2021   PHQ-9 Total Score - - -   PHQ-9 Total Score 12 12 13       LABS                                                                                                                            Fasting glucose 131 as of 8/10/2020Tachycardia; was on metoprolol but fatigue so d/c. PCP aware tachy and was evaluated by cardiology.     Recent Labs   Lab Test 03/29/19 02/27/18  1300  07/09/15  0818   CHOL 213* 227*   < > 145   HDL 31* 28*   < > 39*    Cannot estimate LDL when triglyceride exceeds 400 mg/dL   < > 75   TRIG 393* 531*   < > 154*   CHOLHDLRATIO  --   --   --  3.7    < > = values in this interval not displayed.     Liver Function Studies -   Recent Labs   Lab Test 03/29/19 08/20/17  1640   PROTTOTAL  --  8.1   ALBUMIN  --  4.3   BILITOTAL  --  0.8   ALKPHOS  --  61   AST 29 41   ALT 68* 95*     ALT August of 2020 63. AST in normal range.    Clozapine level 116 as of  10/2/16 and cloz and metabolites 194  Clozapine level 329 as of 4/18/18 and clozapine and metabolites 527  \    ASSESSMENT                                                                                                      Historical: scanned consults from [facility] into chart 4/2015. Initial psych consult was 4/22/15. Hospital stay here 4/9/16 - 4/18/16. Hospital stay [facility] [place] June '16. Modafanil: we tried to help improve fatigue and mood but he felt created some cravings.    Josafat Montez is a 36 yo with paranoid schizophrenia Josafat has experienced psychosis including delusions, paranoia and hallucinations since 2004 as a teen. He has history of heavy MJ use and methamphetamine: hasn't used in years. Today visit via phone given Covid. Josafat reports feeling he is doing well overall thought notes he questions at times if he should try taking Zoloft that he took in past. We reviewed Zoloft's indications for him being anxiety, depression. He noted isn't sure if he wants to try taking it again or not -> for today we opted not restart it and I asked he think about it and discuss with his mom and get her thoughts on it. Josafat does take clonidine for anxiety. I noted he is due for fasting labs (lipid profile, hemoglobin A1C) and Josafat notes he will ask his primary at his upcoming appointment if they could check these.          TREATMENT RISK STATEMENT: The risks, benefits, alternatives and potential adverse effects have been explained and are understood by the pt. The pt agrees to the treatment plan with the ability to do so. The pt knows to call the clinic for any problems or access emergency care if needed.       DIAGNOSES           Schizophrenia, paranoid type      PLAN                                                                                                                         1) MEDICATIONS:   --  Continue clozapine 100 mg am and 400 mg HS..  Continue clonidine 0.1 twice daily.     2) THERAPY: no change    Labs:  CBC with diff monthly given on clozapine.  Lipid panel and fasting glucose Aug of 2020 thus due    4) PT MONITOR [call for probs]: Worsening symptoms, side effects from medications, SI/HI    5) REFERRALS [CD tx, medical, tests other]: none    6)  RTC: ~2 months

## 2021-11-11 DIAGNOSIS — F20.0 CHRONIC PARANOID SCHIZOPHRENIA (H): ICD-10-CM

## 2021-11-12 RX ORDER — CLOZAPINE 200 MG/1
TABLET ORAL
Qty: 60 TABLET | Refills: 0 | Status: SHIPPED | OUTPATIENT
Start: 2021-11-12 | End: 2021-12-13

## 2021-11-12 RX ORDER — CLOZAPINE 100 MG/1
TABLET ORAL
Qty: 30 TABLET | Refills: 0 | Status: SHIPPED | OUTPATIENT
Start: 2021-11-12 | End: 2021-12-13

## 2021-11-12 NOTE — TELEPHONE ENCOUNTER
Clozapine  Last visit: 11.10.21  Last refill: 10.15.21    Last ANC: 10.25.20 - 7596    Future appointments:

## 2021-11-22 ENCOUNTER — TRANSFERRED RECORDS (OUTPATIENT)
Dept: HEALTH INFORMATION MANAGEMENT | Facility: CLINIC | Age: 36
End: 2021-11-22

## 2021-12-10 DIAGNOSIS — F20.0 CHRONIC PARANOID SCHIZOPHRENIA (H): ICD-10-CM

## 2021-12-11 NOTE — TELEPHONE ENCOUNTER
Clozaril 100 mg      Last Written Prescription Date:  11/12/21  Last Fill Quantity: 30,   # refills: 0  Last Office Visit: 11/10/21  Future Office visit:       Routing refill request to provider for review/approval because:      Clozaril 200 mg      Last Written Prescription Date:  11/12/21  Last Fill Quantity: 60,   # refills: 0  Last Office Visit: 11/10/21  Future Office visit:       Routing refill request to provider for review/approval because:

## 2021-12-13 RX ORDER — CLOZAPINE 100 MG/1
TABLET ORAL
Qty: 30 TABLET | Refills: 0 | Status: SHIPPED | OUTPATIENT
Start: 2021-12-13 | End: 2022-01-12

## 2021-12-13 RX ORDER — CLOZAPINE 200 MG/1
TABLET ORAL
Qty: 60 TABLET | Refills: 0 | Status: SHIPPED | OUTPATIENT
Start: 2021-12-13 | End: 2022-01-12

## 2022-01-07 DIAGNOSIS — F20.0 CHRONIC PARANOID SCHIZOPHRENIA (H): ICD-10-CM

## 2022-01-10 ENCOUNTER — VIRTUAL VISIT (OUTPATIENT)
Dept: PSYCHIATRY | Facility: OTHER | Age: 37
End: 2022-01-10
Attending: PSYCHIATRY & NEUROLOGY
Payer: COMMERCIAL

## 2022-01-10 DIAGNOSIS — F20.0 CHRONIC PARANOID SCHIZOPHRENIA (H): Primary | ICD-10-CM

## 2022-01-10 PROCEDURE — 99213 OFFICE O/P EST LOW 20 MIN: CPT | Mod: TEL | Performed by: PSYCHIATRY & NEUROLOGY

## 2022-01-10 PROCEDURE — G0463 HOSPITAL OUTPT CLINIC VISIT: HCPCS | Mod: TEL,95

## 2022-01-10 ASSESSMENT — PAIN SCALES - GENERAL: PAINLEVEL: NO PAIN (0)

## 2022-01-10 NOTE — TELEPHONE ENCOUNTER
Clozapine (Clozaril) 200 mg tablet  Take 2 tablets (400 mg) by mouth at bedtime.  Last Written Prescription Date:  12-13-21  Last Fill Quantity: 60 tablet,   # refills: 0  Last Office Visit: 11-10-21  Future Office visit:      Clozapine (Clozaril) 100 mg tablet  Take 1 tablet (100 mg) by mouth daily  Last Written Prescription Date:  12-13-21  Last Fill Quantity: 30 tablet,   # refills: 0  Last Office Visit: 11-10-21  Future Office visit:

## 2022-01-10 NOTE — NURSING NOTE
"Chief Complaint   Patient presents with     RECHECK     Telephone visit.  Schizophrenia       Initial There were no vitals taken for this visit. Estimated body mass index is 35.36 kg/m  as calculated from the following:    Height as of 1/17/20: 1.854 m (6' 1\").    Weight as of 11/10/21: 121.6 kg (268 lb).  Medication Reconciliation: complete  MARIKA OQUENDO LPN    "

## 2022-01-12 RX ORDER — CLOZAPINE 100 MG/1
TABLET ORAL
Qty: 30 TABLET | Refills: 0 | Status: SHIPPED | OUTPATIENT
Start: 2022-01-12 | End: 2022-02-07

## 2022-01-12 RX ORDER — CLOZAPINE 200 MG/1
TABLET ORAL
Qty: 60 TABLET | Refills: 0 | Status: SHIPPED | OUTPATIENT
Start: 2022-01-12 | End: 2022-02-07

## 2022-01-17 ENCOUNTER — TRANSFERRED RECORDS (OUTPATIENT)
Dept: HEALTH INFORMATION MANAGEMENT | Facility: CLINIC | Age: 37
End: 2022-01-17

## 2022-02-04 DIAGNOSIS — F20.0 CHRONIC PARANOID SCHIZOPHRENIA (H): ICD-10-CM

## 2022-02-07 RX ORDER — CLOZAPINE 100 MG/1
TABLET ORAL
Qty: 30 TABLET | Refills: 0 | Status: SHIPPED | OUTPATIENT
Start: 2022-02-07 | End: 2022-03-10

## 2022-02-07 RX ORDER — CLOZAPINE 200 MG/1
TABLET ORAL
Qty: 60 TABLET | Refills: 0 | Status: SHIPPED | OUTPATIENT
Start: 2022-02-07 | End: 2022-03-10

## 2022-02-07 NOTE — TELEPHONE ENCOUNTER
cloZAPine (CLOZARIL) 100 MG tablet  Last Written Prescription Date:  1/12/22  Last Fill Quantity: 30,  # refills: 0     cloZAPine (CLOZARIL) 200 MG tablet  Last Written Prescription Date:  1/12/22  Last Fill Quantity: 60,  # refills: 0     Last office visit: 1/10/2022   Future Office Visit:      Routing refill request to provider for review/approval because:  Drug not on the [facility] refill protocol

## 2022-02-14 ENCOUNTER — TRANSFERRED RECORDS (OUTPATIENT)
Dept: HEALTH INFORMATION MANAGEMENT | Facility: CLINIC | Age: 37
End: 2022-02-14

## 2022-02-27 NOTE — PROGRESS NOTES
" Telephone visit then 6 minutes. 5 minutes documenting, reviewing chart. Total visit time of 11 minutes.     SUBJECTIVE / INTERIM HISTORY                                                                       Last visit 11/10/21:  Continue clozapine 100 mg am and 400 mg HS..  Continue clonidine 0.1 twice daily.     -holidays were pretty low key. Notes he hung around home with the dog  - mom still over at aunt's often (helps granddaughter with school)  --\"my brain has been thinking about past stuff\". I inquired good? Bad? Josafat notes bit of both  - mom still spending lot of time over at his aunt's (helping her granddaughter with online school)    SYMPTOMS-. + anhedonia, no SI/HI. No evidence any delusions.  no AH/VH  MEDICAL ROS-  Still constipation off and on, feels appetite and weight are stable.    MEDICAL / SURGICAL HISTORY            Medical Team:     PMD-       Therapist- Brenda at [facility]   Patient Active Problem List   Diagnosis     Episodic mood disorder (H)     Poisoning by Methamphetamines     Cannabis abuse     Hypertension goal BP (blood pressure) < 140/90     Psychosis (H)     Depression     Paranoid schizophrenia, chronic condition with acute exacerbation (H)     Schizophrenia, paranoid, subchronic with acute exacerbation (H)     Schizophrenia, paranoid type (H)     Drug eruption     ACP (advance care planning)     Morbid obesity (H)     ALLERGY   Pcn [penicillins], Bupropion, Depakote [valproic acid], and Divalproex sodium-fd&c red #40  MEDICATIONS                                                                                             Current Outpatient Medications   Medication Sig     cholecalciferol 2000 UNITS tablet Take 2,000 Units by mouth daily     cloNIDine (CATAPRES) 0.1 MG tablet TAKE 1 TABLET TWICE A DAY BY MOUTH     cloZAPine (CLOZARIL) 100 MG tablet TAKE 1 TABLET (100MG) BY MOUTH DAILY     cloZAPine (CLOZARIL) 200 MG tablet TAKE 2 TABLETS (400MG) BY MOUTH AT BEDTIME     DOCUSATE SODIUM PO " Take 100 mg by mouth 2 times daily as needed for constipation     GNP FIBER THERAPY 500 MG TABS tablet TAKE 1 TABLET (500 MG) BY MOUTH DAILY     ibuprofen (ADVIL/MOTRIN) 200 MG tablet Take 200 mg by mouth every 4 hours as needed for mild pain     Omega-3 Fatty Acids (EQL FISH OIL) 1000 MG CAPS Take 1 capsule by mouth daily     omeprazole (PRILOSEC) 20 MG DR capsule Take 20 mg by mouth daily     No current facility-administered medications for this visit.     VITALS   There were no vitals taken for this visit.     PHQ9                       PHQ-9 SCORE 9/22/2020 1/25/2021 8/17/2021   PHQ-9 Total Score - - -   PHQ-9 Total Score 12 12 13       LABS                                                                                                                            Fasting glucose 131 as of 8/10/2020Tachycardia; was on metoprolol but fatigue so d/c. PCP aware tachy and was evaluated by cardiology.     Recent Labs   Lab Test 03/29/19 02/27/18  1300  07/09/15  0818   CHOL 213* 227*   < > 145   HDL 31* 28*   < > 39*    Cannot estimate LDL when triglyceride exceeds 400 mg/dL   < > 75   TRIG 393* 531*   < > 154*   CHOLHDLRATIO  --   --   --  3.7    < > = values in this interval not displayed.     Liver Function Studies -   Recent Labs   Lab Test 03/29/19 08/20/17  1640   PROTTOTAL  --  8.1   ALBUMIN  --  4.3   BILITOTAL  --  0.8   ALKPHOS  --  61   AST 29 41   ALT 68* 95*     ALT August of 2020 63. AST in normal range.    Clozapine level 116 as of 10/2/16 and cloz and metabolites 194  Clozapine level 329 as of 4/18/18 and clozapine and metabolites 527      ASSESSMENT                                                                                                      Historical: scanned consults from [facility] into chart 4/2015. Initial psych consult was 4/22/15. Hospital stay here 4/9/16 - 4/18/16. Hospital stay [place] June '16. Modafanil: we tried to help improve fatigue and mood but he felt created some  cravings.    Josafat Montez is a 37 yo with paranoid schizophrenia. Josafat has experienced psychosis including delusions, paranoia and hallucinations since 2004 as a teen. He has history of heavy MJ use and methamphetamine: hasn't used in years. He's been stable for several years now, doing well taking clozapine. Discussed today, as we did last visit, he is due for labs (lipids, HgbA1C) and we could try to coordinate with [place], with his primary or he could consider coming to clinic for in person visit and we can draw then.         TREATMENT RISK STATEMENT: The risks, benefits, alternatives and potential adverse effects have been explained and are understood by the pt. The pt agrees to the treatment plan with the ability to do so. The pt knows to call the clinic for any problems or access emergency care if needed.       DIAGNOSES           Schizophrenia, paranoid type      PLAN                                                                                                                         1) MEDICATIONS:   --  Continue clozapine 100 mg am and 400 mg HS.  Continue clonidine 0.1 twice daily.     2) THERAPY: no change    Labs: CBC with diff monthly given on clozapine.  Lipid panel and fasting glucose Aug of 2020 thus due discussed today and at last visit    4) PT MONITOR [call for probs]: Worsening symptoms, side effects from medications, SI/HI    5) REFERRALS [CD tx, medical, tests other]: none    6)  RTC: ~3 months                                                                                                                                                                                                                                                                                                                                                                                                                                                             Abdomen soft, non-tender, no guarding.

## 2022-03-07 DIAGNOSIS — F20.0 CHRONIC PARANOID SCHIZOPHRENIA (H): ICD-10-CM

## 2022-03-09 NOTE — TELEPHONE ENCOUNTER
Clozapine 200 mg     Last Written Prescription Date:  1/10/22  Last Fill Quantity: 2/7/22,   # refills: 60  Last Office Visit: 0  Future Office visit:       Routing refill request to provider for review/approval because:      Clozapine 100 mg      Last Written Prescription Date:  2/7/22  Last Fill Quantity: 30,   # refills: 0  Last Office Visit: 1/10/22  Future Office visit:       Routing refill request to provider for review/approval because:

## 2022-03-10 RX ORDER — CLOZAPINE 200 MG/1
TABLET ORAL
Qty: 60 TABLET | Refills: 3 | Status: SHIPPED | OUTPATIENT
Start: 2022-03-10 | End: 2022-06-27

## 2022-03-10 RX ORDER — CLOZAPINE 100 MG/1
TABLET ORAL
Qty: 30 TABLET | Refills: 3 | Status: SHIPPED | OUTPATIENT
Start: 2022-03-10 | End: 2022-06-27

## 2022-03-14 ENCOUNTER — TRANSFERRED RECORDS (OUTPATIENT)
Dept: HEALTH INFORMATION MANAGEMENT | Facility: CLINIC | Age: 37
End: 2022-03-14

## 2022-05-02 ENCOUNTER — TELEPHONE (OUTPATIENT)
Dept: PSYCHIATRY | Facility: OTHER | Age: 37
End: 2022-05-02
Payer: COMMERCIAL

## 2022-05-02 ENCOUNTER — VIRTUAL VISIT (OUTPATIENT)
Dept: PSYCHIATRY | Facility: OTHER | Age: 37
End: 2022-05-02
Attending: PSYCHIATRY & NEUROLOGY
Payer: COMMERCIAL

## 2022-05-02 DIAGNOSIS — F20.0 SCHIZOPHRENIA, PARANOID, SUBCHRONIC WITH ACUTE EXACERBATION (H): Primary | ICD-10-CM

## 2022-05-02 PROCEDURE — 99213 OFFICE O/P EST LOW 20 MIN: CPT | Mod: TEL | Performed by: PSYCHIATRY & NEUROLOGY

## 2022-05-02 ASSESSMENT — PAIN SCALES - GENERAL: PAINLEVEL: NO PAIN (0)

## 2022-05-02 NOTE — TELEPHONE ENCOUNTER
Tried calling pt, can't get a dial tone. Pt needs to schedule 2 month return visit in person and fasting labs for some time in July.

## 2022-05-02 NOTE — PROGRESS NOTES
Miles is a 36 year old who is being evaluated via a billable telephone visit.      What phone number would you like to be contacted at? [phone]  How would you like to obtain your AVS? MyChart      Telephone visit then 6 minutes. 2  minutes documenting, reviewing chart. Total visit time of 8 minutes.     SUBJECTIVE / INTERIM HISTORY                                                                       Last visit 11/10/21:  Continue clozapine 100 mg am and 400 mg HS..  Continue clonidine 0.1 twice daily.     - nothing new  - hasn't been out doing anything much  - mom still over at aunt's often (helps granddaughter with school)    SYMPTOMS-. + anhedonia, no SI/HI. No evidence any delusions.  no AH/VH  MEDICAL ROS-  Still constipation off and on, feels appetite and weight are stable.    MEDICAL / SURGICAL HISTORY            Medical Team:     PMD-       Therapist- Brenda at [facility]   Patient Active Problem List   Diagnosis     Episodic mood disorder (H)     Poisoning by Methamphetamines     Cannabis abuse     Hypertension goal BP (blood pressure) < 140/90     Psychosis (H)     Depression     Paranoid schizophrenia, chronic condition with acute exacerbation (H)     Schizophrenia, paranoid, subchronic with acute exacerbation (H)     Schizophrenia, paranoid type (H)     Drug eruption     ACP (advance care planning)     Morbid obesity (H)     ALLERGY   Pcn [penicillins], Bupropion, Depakote [valproic acid], and Divalproex sodium-fd&c red #40  MEDICATIONS                                                                                             Current Outpatient Medications   Medication Sig     cholecalciferol 2000 UNITS tablet Take 2,000 Units by mouth daily     cloNIDine (CATAPRES) 0.1 MG tablet TAKE 1 TABLET TWICE A DAY BY MOUTH     cloZAPine (CLOZARIL) 100 MG tablet TAKE 1 TABLET (100MG) BY MOUTH DAILY     cloZAPine (CLOZARIL) 200 MG tablet TAKE 2 TABLETS (400MG) BY MOUTH AT BEDTIME     DOCUSATE SODIUM PO Take 100 mg  by mouth 2 times daily as needed for constipation     GNP FIBER THERAPY 500 MG TABS tablet TAKE 1 TABLET (500 MG) BY MOUTH DAILY     ibuprofen (ADVIL/MOTRIN) 200 MG tablet Take 200 mg by mouth every 4 hours as needed for mild pain     Omega-3 Fatty Acids (EQL FISH OIL) 1000 MG CAPS Take 1 capsule by mouth daily     omeprazole (PRILOSEC) 20 MG DR capsule Take 20 mg by mouth daily     No current facility-administered medications for this visit.     VITALS   There were no vitals taken for this visit.     PHQ9                       PHQ-9 SCORE 9/22/2020 1/25/2021 8/17/2021   PHQ-9 Total Score - - -   PHQ-9 Total Score 12 12 13       LABS                                                                                                                            Fasting glucose 131 as of 8/10/2020Tachycardia; was on metoprolol but fatigue so d/c. PCP aware tachy and was evaluated by cardiology.     Recent Labs   Lab Test 03/29/19 02/27/18  1300  07/09/15  0818   CHOL 213* 227*   < > 145   HDL 31* 28*   < > 39*    Cannot estimate LDL when triglyceride exceeds 400 mg/dL   < > 75   TRIG 393* 531*   < > 154*   CHOLHDLRATIO  --   --   --  3.7    < > = values in this interval not displayed.     Liver Function Studies -   Recent Labs   Lab Test 03/29/19 08/20/17  1640   PROTTOTAL  --  8.1   ALBUMIN  --  4.3   BILITOTAL  --  0.8   ALKPHOS  --  61   AST 29 41   ALT 68* 95*     ALT August of 2020 63. AST in normal range.    Clozapine level 116 as of 10/2/16 and cloz and metabolites 194  Clozapine level 329 as of 4/18/18 and clozapine and metabolites 527      ASSESSMENT                                                                                                      Historical: scanned consults from [facility] into chart 4/2015. Initial psych consult was 4/22/15. Hospital stay here 4/9/16 - 4/18/16. Hospital stay [place] June '16. Modafanil: we tried to help improve fatigue and mood but he felt created some  cravings.    Josafat Montez is a 35 yo with paranoid schizophrenia. Josafat has experienced psychosis including delusions, paranoia and hallucinations since 2004 as a teen.  He's been stable for several years now, doing well taking clozapine. Discussed today, as we did last visit, he is due for labs (lipids, HgbA1C) and I noted it's been quite awhile since he's been in person visit. Asked him to come to clinic for his next appointment and preferably morning so we can then draw fasting labs.      TREATMENT RISK STATEMENT: The risks, benefits, alternatives and potential adverse effects have been explained and are understood by the pt. The pt agrees to the treatment plan with the ability to do so. The pt knows to call the clinic for any problems or access emergency care if needed.       DIAGNOSES           Schizophrenia, paranoid type      PLAN                                                                                                                         1) MEDICATIONS:   --  Continue clozapine 100 mg am and 400 mg HS.  Continue clonidine 0.1 twice daily.     2) THERAPY: no change    Labs: CBC with diff monthly given on clozapine.  Lipid panel and fasting glucose Aug of 2020 thus due discussed today and at last visit    4) PT MONITOR [call for probs]: Worsening symptoms, side effects from medications, SI/HI    5) REFERRALS [CD tx, medical, tests other]: none    6)  RTC: ~2 months

## 2022-05-27 ENCOUNTER — TELEPHONE (OUTPATIENT)
Dept: PSYCHIATRY | Facility: OTHER | Age: 37
End: 2022-05-27
Payer: COMMERCIAL

## 2022-05-27 NOTE — TELEPHONE ENCOUNTER
Received incoming VM stating patient would like to have medications transferred from [facility] to [facility] in [facility] [place] [facility]..  Change made.

## 2022-06-06 ENCOUNTER — TRANSFERRED RECORDS (OUTPATIENT)
Dept: HEALTH INFORMATION MANAGEMENT | Facility: CLINIC | Age: 37
End: 2022-06-06

## 2022-06-10 ENCOUNTER — TELEPHONE (OUTPATIENT)
Dept: PSYCHIATRY | Facility: OTHER | Age: 37
End: 2022-06-10
Payer: COMMERCIAL

## 2022-06-10 NOTE — TELEPHONE ENCOUNTER
Received incoming VM from Malika Goode, mom of Miles.  She would like to use [facility] in [place] for medication refills if possible.  Then his medications can be brought at time of blood draw.  Thank you

## 2022-06-24 DIAGNOSIS — F20.0 CHRONIC PARANOID SCHIZOPHRENIA (H): ICD-10-CM

## 2022-06-27 RX ORDER — CLOZAPINE 200 MG/1
TABLET ORAL
Qty: 56 TABLET | Refills: 1 | Status: SHIPPED | OUTPATIENT
Start: 2022-06-27 | End: 2022-08-25

## 2022-06-27 RX ORDER — CLOZAPINE 100 MG/1
TABLET ORAL
Qty: 28 TABLET | Refills: 1 | Status: SHIPPED | OUTPATIENT
Start: 2022-06-27 | End: 2022-08-25

## 2022-06-27 NOTE — TELEPHONE ENCOUNTER
Clozapine (Clozaril) 100 mg tablet  Take 1 tablet (100 mg) by mouth daily  Last Written Prescription Date:  3-  Last Fill Quantity: 30 tablet,   # refills: 3  Last Office Visit: 5-2-2022  Future Office visit:       Clozapine (Clozaril) 200 mg tablet  Take 2 tablets (400 mg) by mouth at bedtime  Last Written Prescription Date:  3-  Last Fill Quantity: 60 tablet,   # refills: 3  Last Office Visit: 5-2-2022  Future Office visit:       Patient sees Dr. Maki

## 2022-08-01 ENCOUNTER — TRANSFERRED RECORDS (OUTPATIENT)
Dept: HEALTH INFORMATION MANAGEMENT | Facility: CLINIC | Age: 37
End: 2022-08-01

## 2022-08-25 DIAGNOSIS — F20.0 CHRONIC PARANOID SCHIZOPHRENIA (H): ICD-10-CM

## 2022-08-25 RX ORDER — CLOZAPINE 200 MG/1
TABLET ORAL
Qty: 56 TABLET | Refills: 1 | Status: SHIPPED | OUTPATIENT
Start: 2022-08-25 | End: 2022-10-18

## 2022-08-25 RX ORDER — CLOZAPINE 100 MG/1
100 TABLET ORAL DAILY
Qty: 28 TABLET | Refills: 1 | Status: SHIPPED | OUTPATIENT
Start: 2022-08-25 | End: 2022-10-18

## 2022-08-25 NOTE — TELEPHONE ENCOUNTER
Please send refills of Clozapine to [facility].  Tello from [facility] will be seeing patient on Monday.  Medication has been pended.  Thank you  MARIKA OQUENDO LPN

## 2022-10-14 DIAGNOSIS — F20.0 CHRONIC PARANOID SCHIZOPHRENIA (H): ICD-10-CM

## 2022-10-18 NOTE — TELEPHONE ENCOUNTER
Clozaril      Last Written Prescription Date:  9.16.22  Last Fill Quantity: #56, #28,   # refills: 0  Last Office Visit: 5.2.22  Future Office visit:       Routing refill request to provider for review/approval because:  Drug not on the [facility], [facility] or [facility] refill protocol or controlled substance

## 2022-10-19 ENCOUNTER — VIRTUAL VISIT (OUTPATIENT)
Dept: PSYCHIATRY | Facility: OTHER | Age: 37
End: 2022-10-19
Attending: PSYCHIATRY & NEUROLOGY
Payer: COMMERCIAL

## 2022-10-19 VITALS — SYSTOLIC BLOOD PRESSURE: 144 MMHG | HEART RATE: 111 BPM | DIASTOLIC BLOOD PRESSURE: 98 MMHG | OXYGEN SATURATION: 97 %

## 2022-10-19 DIAGNOSIS — F41.1 GENERALIZED ANXIETY DISORDER: ICD-10-CM

## 2022-10-19 DIAGNOSIS — E78.1 HYPERTRIGLYCERIDEMIA: ICD-10-CM

## 2022-10-19 DIAGNOSIS — K59.03 DRUG-INDUCED CONSTIPATION: Primary | ICD-10-CM

## 2022-10-19 DIAGNOSIS — F20.0 SCHIZOPHRENIA, PARANOID, SUBCHRONIC WITH ACUTE EXACERBATION (H): ICD-10-CM

## 2022-10-19 PROCEDURE — 99214 OFFICE O/P EST MOD 30 MIN: CPT | Mod: 93 | Performed by: PSYCHIATRY & NEUROLOGY

## 2022-10-19 RX ORDER — CLOZAPINE 200 MG/1
TABLET ORAL
Qty: 56 TABLET | Refills: 0 | Status: SHIPPED | OUTPATIENT
Start: 2022-10-19 | End: 2022-11-15

## 2022-10-19 RX ORDER — HYDROXYZINE HYDROCHLORIDE 25 MG/1
TABLET, FILM COATED ORAL
Qty: 60 TABLET | Refills: 3 | Status: SHIPPED | OUTPATIENT
Start: 2022-10-19 | End: 2024-01-15

## 2022-10-19 RX ORDER — CHOLECALCIFEROL (VITAMIN D3) 50 MCG
1 TABLET ORAL DAILY
Qty: 30 TABLET | Refills: 11 | Status: SHIPPED | OUTPATIENT
Start: 2022-10-19 | End: 2024-01-15

## 2022-10-19 RX ORDER — CLONIDINE HYDROCHLORIDE 0.1 MG/1
TABLET ORAL
Qty: 60 TABLET | Refills: 6 | Status: SHIPPED | OUTPATIENT
Start: 2022-10-19 | End: 2023-03-29

## 2022-10-19 RX ORDER — PYRITHIONE ZINC 1 G/ML
1 SHAMPOO TOPICAL DAILY
Qty: 90 CAPSULE | Refills: 3 | Status: SHIPPED | OUTPATIENT
Start: 2022-10-19 | End: 2023-11-12

## 2022-10-19 RX ORDER — CLOZAPINE 100 MG/1
TABLET ORAL
Qty: 28 TABLET | Refills: 0 | Status: SHIPPED | OUTPATIENT
Start: 2022-10-19 | End: 2022-11-15

## 2022-10-19 RX ORDER — DOCUSATE SODIUM 100 MG/1
100 CAPSULE, LIQUID FILLED ORAL 2 TIMES DAILY PRN
Qty: 60 CAPSULE | Refills: 3 | Status: SHIPPED | OUTPATIENT
Start: 2022-10-19 | End: 2023-03-29

## 2022-10-19 ASSESSMENT — ANXIETY QUESTIONNAIRES
6. BECOMING EASILY ANNOYED OR IRRITABLE: NOT AT ALL
7. FEELING AFRAID AS IF SOMETHING AWFUL MIGHT HAPPEN: NOT AT ALL
GAD7 TOTAL SCORE: 1
5. BEING SO RESTLESS THAT IT IS HARD TO SIT STILL: NOT AT ALL
1. FEELING NERVOUS, ANXIOUS, OR ON EDGE: NOT AT ALL
2. NOT BEING ABLE TO STOP OR CONTROL WORRYING: NOT AT ALL
GAD7 TOTAL SCORE: 1
3. WORRYING TOO MUCH ABOUT DIFFERENT THINGS: SEVERAL DAYS

## 2022-10-19 ASSESSMENT — PATIENT HEALTH QUESTIONNAIRE - PHQ9
SUM OF ALL RESPONSES TO PHQ QUESTIONS 1-9: 9
5. POOR APPETITE OR OVEREATING: NOT AT ALL

## 2022-10-19 ASSESSMENT — PAIN SCALES - GENERAL: PAINLEVEL: NO PAIN (0)

## 2022-10-19 NOTE — NURSING NOTE
"Chief Complaint   Patient presents with     RECHECK     Telephone visit.  Schizophrenia.       Initial BP (!) 144/98   Pulse 111   SpO2 97%  Estimated body mass index is 35.36 kg/m  as calculated from the following:    Height as of 1/17/20: 1.854 m (6' 1\").    Weight as of 11/10/21: 121.6 kg (268 lb).  Medication Reconciliation: complete  MARIKA OQUENDO LPN    "

## 2022-10-19 NOTE — PROGRESS NOTES
"Miles is a 36 year old who is being evaluated via a billable telephone visit.      What phone number would you like to be contacted at? [phone]  How would you like to obtain your AVS? Arelist      Telephone visit then 33 minutes.  Total visit time of 33 minutes.     SUBJECTIVE / INTERIM HISTORY                                                                       Last visit 5/2/22: Continue clozapine 100 mg am and 400 mg HS.  Continue clonidine 0.1 twice daily.     - at times Miles looks back to times when he was doing \"better\" and it makes him sad. Josafat notes he works on being grateful   - primary Dr. Moralez   - inquired as to his last hospital stay - was 5 years ago now  - mom still over at aunt's often (helps granddaughter Bianca with school)    SYMPTOMS-. + anhedonia, no SI/HI. No evidence any delusions.  no AH/VH  MEDICAL ROS-  Still constipation off and on, feels appetite and weight are stable.    MEDICAL / SURGICAL HISTORY            Medical Team:     PMD-       Therapist- Brenda at [facility]   Patient Active Problem List   Diagnosis     Episodic mood disorder (H)     Poisoning by Methamphetamines     Cannabis abuse     Hypertension goal BP (blood pressure) < 140/90     Psychosis (H)     Depression     Paranoid schizophrenia, chronic condition with acute exacerbation (H)     Schizophrenia, paranoid, subchronic with acute exacerbation (H)     Schizophrenia, paranoid type (H)     Drug eruption     ACP (advance care planning)     Morbid obesity (H)     ALLERGY   Pcn [penicillins], Bupropion, Depakote [valproic acid], and Divalproex sodium-fd&c red #40  MEDICATIONS                                                                                             Current Outpatient Medications   Medication Sig     cholecalciferol 2000 UNITS tablet Take 2,000 Units by mouth daily     cloNIDine (CATAPRES) 0.1 MG tablet TAKE 1 TABLET TWICE A DAY BY MOUTH     cloZAPine (CLOZARIL) 100 MG tablet TAKE 1 TABLET (100MG) BY MOUTH " DAILY     cloZAPine (CLOZARIL) 200 MG tablet TAKE 2 TABLETS (400MG) BY MOUTH AT BEDTIME     DOCUSATE SODIUM PO Take 100 mg by mouth 2 times daily as needed for constipation     GNP FIBER THERAPY 500 MG TABS tablet TAKE 1 TABLET (500 MG) BY MOUTH DAILY     ibuprofen (ADVIL/MOTRIN) 200 MG tablet Take 200 mg by mouth every 4 hours as needed for mild pain     Omega-3 Fatty Acids (EQL FISH OIL) 1000 MG CAPS Take 1 capsule by mouth daily     omeprazole (PRILOSEC) 20 MG DR capsule Take 20 mg by mouth daily     No current facility-administered medications for this visit.     VITALS   BP (!) 144/98   Pulse 111   SpO2 97%      PHQ9                       PHQ-9 SCORE 1/25/2021 8/17/2021 10/19/2022   PHQ-9 Total Score - - -   PHQ-9 Total Score 12 13 9       LABS                                                                                                                            Fasting glucose 131 as of 8/10/2020Tachycardia; was on metoprolol but fatigue so d/c. PCP aware tachy and was evaluated by cardiology.     Recent Labs   Lab Test 08/10/20  0000 03/29/19  0000 06/27/16  1010 07/09/15  0818   CHOL 209* 213*   < > 145   HDL 27* 31*   < > 39*    103   < > 75   TRIG 517* 393*   < > 154*   CHOLHDLRATIO  --   --   --  3.7    < > = values in this interval not displayed.       Liver Function Studies -   Recent Labs   Lab Test 03/29/19 08/20/17  1640   PROTTOTAL  --  8.1   ALBUMIN  --  4.3   BILITOTAL  --  0.8   ALKPHOS  --  61   AST 29 41   ALT 68* 95*     ALT August of 2020 63. AST in normal range.    Clozapine level 116 as of 10/2/16 and cloz and metabolites 194  Clozapine level 329 as of 4/18/18 and clozapine and metabolites 527      ASSESSMENT                                                                                                      Historical: scanned consults from [facility] into chart 4/2015. Initial psych consult was 4/22/15. Hospital stay here 4/9/16 - 4/18/16. Hospital stay [facility] June '16.  Modafanil: we tried to help improve fatigue and mood but he felt created some cravings.    Josafat Montez is a 35 yo with paranoid schizophrenia. Josafat has experienced psychosis including delusions, paranoia and hallucinations since 2004 as a teen.  He's been stable for several years now, doing well taking clozapine. Five years since Josafat's been in the hospital!  Discussed today,  he is due for labs (lipids, HgbA1C). Mom was on visit with us and she is going to help try get his labs drawn by his primary..    TREATMENT RISK STATEMENT: The risks, benefits, alternatives and potential adverse effects have been explained and are understood by the pt. The pt agrees to the treatment plan with the ability to do so. The pt knows to call the clinic for any problems or access emergency care if needed.       DIAGNOSES           Schizophrenia, paranoid type      PLAN                                                                                                                         1) MEDICATIONS:   --  Continue clozapine 100 mg am and 400 mg HS.  Continue clonidine 0.1 twice daily filled today to [facility]. I also filled vitamin D3, docusate 100 mg 2 times daily as needed constipation, fish oil    2) THERAPY: no change    Labs: CBC with diff monthly given on clozapine.  Lipid panel and fasting glucose Aug of 2020 thus due discussed today and at last visit    4) PT MONITOR [call for probs]: Worsening symptoms, side effects from medications, SI/HI    5) REFERRALS [CD tx, medical, tests other]: none    6)  RTC: ~3 months

## 2022-10-24 ENCOUNTER — TRANSFERRED RECORDS (OUTPATIENT)
Dept: HEALTH INFORMATION MANAGEMENT | Facility: CLINIC | Age: 37
End: 2022-10-24

## 2022-11-11 DIAGNOSIS — F20.0 CHRONIC PARANOID SCHIZOPHRENIA (H): ICD-10-CM

## 2022-11-15 RX ORDER — CLOZAPINE 100 MG/1
TABLET ORAL
Qty: 30 TABLET | Refills: 0 | Status: SHIPPED | OUTPATIENT
Start: 2022-11-15 | End: 2022-12-14

## 2022-11-15 RX ORDER — CLOZAPINE 200 MG/1
TABLET ORAL
Qty: 60 TABLET | Refills: 0 | Status: SHIPPED | OUTPATIENT
Start: 2022-11-15 | End: 2022-12-14

## 2022-11-15 NOTE — TELEPHONE ENCOUNTER
lov 10/19/22    cloZAPine (CLOZARIL) 100 MG tablet 28 tablet 0 10/19/2022     cloZAPine (CLOZARIL) 200 MG tablet 56 tablet 0 10/19/2022

## 2022-12-09 DIAGNOSIS — F20.0 CHRONIC PARANOID SCHIZOPHRENIA (H): ICD-10-CM

## 2022-12-09 NOTE — TELEPHONE ENCOUNTER
Clozapine (Clozaril) 100 MG tablet    Last Written Prescription Date:  11/15/22  Last Fill Quantity: 30,   # refills: 0  Last Office Visit: 10/19/22 Virtual  Future Office visit:           Clozapine (Clozaril)   200 MG tablets  Last Written Prescription Date:  11/15/22  Last Fill Quantity: 60,   # refills: 0  Last Office Visit: 10/19/22  Future Office visit:

## 2022-12-11 RX ORDER — CLOZAPINE 100 MG/1
TABLET ORAL
Qty: 28 TABLET | OUTPATIENT
Start: 2022-12-11

## 2022-12-11 RX ORDER — CLOZAPINE 200 MG/1
TABLET ORAL
Qty: 56 TABLET | OUTPATIENT
Start: 2022-12-11

## 2022-12-26 ENCOUNTER — HEALTH MAINTENANCE LETTER (OUTPATIENT)
Age: 37
End: 2022-12-26

## 2023-02-03 DIAGNOSIS — F20.0 CHRONIC PARANOID SCHIZOPHRENIA (H): ICD-10-CM

## 2023-02-06 NOTE — TELEPHONE ENCOUNTER
Clozapine      Last Written Prescription Date:  1.6.23  Last Fill Quantity: #28, #56,   # refills: 0  Last Office Visit: 10.19.22  Future Office visit:       Routing refill request to provider for review/approval because:  Drug not on the G, P or [facility] refill protocol or controlled substance

## 2023-02-08 RX ORDER — CLOZAPINE 200 MG/1
TABLET ORAL
Qty: 60 TABLET | Refills: 0 | Status: SHIPPED | OUTPATIENT
Start: 2023-02-08 | End: 2023-03-06

## 2023-02-08 RX ORDER — CLOZAPINE 100 MG/1
TABLET ORAL
Qty: 30 TABLET | Refills: 0 | Status: SHIPPED | OUTPATIENT
Start: 2023-02-08 | End: 2023-03-06

## 2023-03-03 DIAGNOSIS — F20.0 CHRONIC PARANOID SCHIZOPHRENIA (H): ICD-10-CM

## 2023-03-06 RX ORDER — CLOZAPINE 100 MG/1
TABLET ORAL
Qty: 28 TABLET | Refills: 0 | Status: SHIPPED | OUTPATIENT
Start: 2023-03-06 | End: 2023-03-29

## 2023-03-06 RX ORDER — CLOZAPINE 200 MG/1
TABLET ORAL
Qty: 56 TABLET | Refills: 0 | Status: SHIPPED | OUTPATIENT
Start: 2023-03-06 | End: 2023-03-29

## 2023-03-06 NOTE — TELEPHONE ENCOUNTER
Clozapine      Last Written Prescription Date:  2.8.23  Last Fill Quantity: #28, #56,   # refills: 0  Last Office Visit: 10.19.22  Future Office visit:       Routing refill request to provider for review/approval because:  Drug not on the [facility], P or [facility] refill protocol or controlled substance

## 2023-03-29 ENCOUNTER — VIRTUAL VISIT (OUTPATIENT)
Dept: PSYCHIATRY | Facility: OTHER | Age: 38
End: 2023-03-29
Attending: PSYCHIATRY & NEUROLOGY
Payer: COMMERCIAL

## 2023-03-29 DIAGNOSIS — F20.0 SCHIZOPHRENIA, PARANOID, SUBCHRONIC WITH ACUTE EXACERBATION (H): ICD-10-CM

## 2023-03-29 DIAGNOSIS — K59.03 DRUG-INDUCED CONSTIPATION: ICD-10-CM

## 2023-03-29 DIAGNOSIS — F20.0 CHRONIC PARANOID SCHIZOPHRENIA (H): ICD-10-CM

## 2023-03-29 PROCEDURE — 99212 OFFICE O/P EST SF 10 MIN: CPT | Mod: 93 | Performed by: PSYCHIATRY & NEUROLOGY

## 2023-03-29 RX ORDER — DOCUSATE SODIUM 100 MG/1
100 CAPSULE, LIQUID FILLED ORAL 2 TIMES DAILY PRN
Qty: 60 CAPSULE | Refills: 3 | Status: SHIPPED | OUTPATIENT
Start: 2023-03-29 | End: 2023-09-18

## 2023-03-29 RX ORDER — CLOZAPINE 200 MG/1
TABLET ORAL
Qty: 60 TABLET | Refills: 0 | Status: SHIPPED | OUTPATIENT
Start: 2023-04-03 | End: 2023-04-28

## 2023-03-29 RX ORDER — CLONIDINE HYDROCHLORIDE 0.1 MG/1
TABLET ORAL
Qty: 60 TABLET | Refills: 6 | Status: SHIPPED | OUTPATIENT
Start: 2023-03-29 | End: 2023-10-10

## 2023-03-29 RX ORDER — CLOZAPINE 100 MG/1
100 TABLET ORAL DAILY
Qty: 30 TABLET | Refills: 0 | Status: SHIPPED | OUTPATIENT
Start: 2023-04-03 | End: 2023-04-28

## 2023-03-29 ASSESSMENT — PAIN SCALES - GENERAL: PAINLEVEL: MILD PAIN (2)

## 2023-03-29 NOTE — PROGRESS NOTES
"Miles is a 37 year old who is being evaluated via a billable telephone visit.      What phone number would you like to be contacted at? [phone]  How would you like to obtain your AVS? MyChart      Telephone visit then 9 minutes. 6 minutes reviewing chart, documenting, refilling meds.  Total visit time of 15minutes.     SUBJECTIVE / INTERIM HISTORY                                                                       Last visit 10/2022: Continue clozapine 100 mg am and 400 mg HS.  Continue clonidine 0.1 twice daily filled today to [facility]. I also filled vitamin D3, docusate 100 mg 2 times daily as needed constipation, fish oil      - Josafat notes his \"back has been out\". He is going to be getting some imaging coming up.  - nothing too much that's new  - primary Dr. Moralez and Josafat notes it's been about year since he htinks he's seen them  - mom still over at aunt's often (helps granddaughter Bianca with school)    SYMPTOMS-. + anhedonia, no SI/HI. No evidence any delusions.  no AH/VH  MEDICAL ROS-  Still constipation off and on, weight gain over time    MEDICAL / SURGICAL HISTORY            Medical Team:     PMD-  Dr. Moralez    Therapist- none  Patient Active Problem List   Diagnosis     Episodic mood disorder (H)     Poisoning by Methamphetamines     Cannabis abuse     Hypertension goal BP (blood pressure) < 140/90     Psychosis (H)     Depression     Paranoid schizophrenia, chronic condition with acute exacerbation (H)     Schizophrenia, paranoid, subchronic with acute exacerbation (H)     Schizophrenia, paranoid type (H)     Drug eruption     ACP (advance care planning)     Morbid obesity (H)     ALLERGY   Pcn [penicillins], Bupropion, Depakote [valproic acid], and Divalproex sodium-fd&c red #40  MEDICATIONS                                                                                             Current Outpatient Medications   Medication Sig     cholecalciferol 2000 UNITS tablet Take 2,000 Units by mouth daily "     cloNIDine (CATAPRES) 0.1 MG tablet TAKE 1 TABLET TWICE A DAY BY MOUTH     cloZAPine (CLOZARIL) 100 MG tablet TAKE 1 TABLET (100MG) BY MOUTH DAILY     cloZAPine (CLOZARIL) 200 MG tablet TAKE 2 TABLETS (400MG) BY MOUTH AT BEDTIME     docusate sodium (COLACE) 100 MG capsule Take 1 capsule (100 mg) by mouth 2 times daily as needed for constipation     GNP FIBER THERAPY 500 MG TABS tablet TAKE 1 TABLET (500 MG) BY MOUTH DAILY     hydrOXYzine (ATARAX) 25 MG tablet Take 1 - 2 tablets up to 2 times daily as needed for anxiety     ibuprofen (ADVIL/MOTRIN) 200 MG tablet Take 200 mg by mouth every 4 hours as needed for mild pain     Omega-3 Fatty Acids (EQL FISH OIL) 1000 MG CAPS Take 1 capsule by mouth daily     omeprazole (PRILOSEC) 20 MG DR capsule Take 20 mg by mouth daily     vitamin D3 (CHOLECALCIFEROL) 50 mcg (2000 units) tablet Take 1 tablet (50 mcg) by mouth daily     No current facility-administered medications for this visit.     VITALS   There were no vitals taken for this visit.     PHQ9                       PHQ-9 SCORE 1/25/2021 8/17/2021 10/19/2022   PHQ-9 Total Score - - -   PHQ-9 Total Score 12 13 9       LABS                                                                                                                            Tachycardia; was on metoprolol but fatigue so d/c. PCP aware tachy and was evaluated by cardiology.       Clozapine level 116 as of 10/2/16 and cloz and metabolites 194  Clozapine level 329 as of 4/18/18 and clozapine and metabolites 527      ASSESSMENT                                                                                                      Historical: scanned consults from [place] into chart 4/2015. Initial psych consult was 4/22/15. Hospital stay here 4/9/16 - 4/18/16. Hospital stay [facility] June '16. Modafanil: we tried to help improve fatigue and mood but he felt created some cravings.    Josafat Montez is a 38 yo with paranoid schizophrenia. Josafat has  experienced psychosis including delusions, paranoia and hallucinations since 2004 as a teen.  He's been stable for several years now, doing well taking clozapine.   Discussed today again,  he is due for labs (lipids, HgbA1C) and we agreed today have him try to come in person for next visit and fast so we can get labs. We also need to check vitals (weight, BP / HR since he is on clonidine). Additionally, would be nice to see him and his mom given we haven't seen each other in person in quite awhile! Josafat notes sometimes it's hard for him to think about things that happened in the past and things he has been through but he has managed to thus far work through it.    TREATMENT RISK STATEMENT: The risks, benefits, alternatives and potential adverse effects have been explained and are understood by the pt. The pt agrees to the treatment plan with the ability to do so. The pt knows to call the clinic for any problems or access emergency care if needed.       DIAGNOSES           Schizophrenia, paranoid type      PLAN                                                                                                                         1) MEDICATIONS:   --  Continue clozapine 100 mg am and 400 mg HS set to refill next in April.  Continue clonidine 0.1 twice daily filled today to [facility]. Docusate 100 mg 2 times daily as needed for constipation    2) THERAPY: no change    Labs: CBC with diff monthly given on clozapine most recent are in [facility] 6000 as of 3/13.  Lipid panel and fasting glucose last I have  Aug of 2020 thus due discussed today and at last visit    4) PT MONITOR [call for probs]: Worsening symptoms, side effects from medications, SI/HI    5) REFERRALS [CD tx, medical, tests other]: none    6)  RTC: ~2 months in person

## 2023-03-31 ENCOUNTER — TELEPHONE (OUTPATIENT)
Dept: PSYCHIATRY | Facility: OTHER | Age: 38
End: 2023-03-31

## 2023-03-31 NOTE — TELEPHONE ENCOUNTER
LVM for pt to schedule a 2m follow up with Marysol Maki. Marysol is requesting this follow up be in person.

## 2023-04-20 ENCOUNTER — MEDICAL CORRESPONDENCE (OUTPATIENT)
Dept: HEALTH INFORMATION MANAGEMENT | Facility: CLINIC | Age: 38
End: 2023-04-20

## 2023-04-28 DIAGNOSIS — F20.0 CHRONIC PARANOID SCHIZOPHRENIA (H): ICD-10-CM

## 2023-04-28 NOTE — TELEPHONE ENCOUNTER
Clozaril      Last Written Prescription Date:  3.30.23  Last Fill Quantity: #28, #56,   # refills: 0  Last Office Visit: 3.29.23  Future Office visit:    Next 5 appointments (look out 90 days)    May 10, 2023  3:40 PM  (Arrive by 3:25 PM)  Return Visit with Marysol Maki MD  [facility] ([facility] ) [address] [place] [id number]  [phone]           Routing refill request to provider for review/approval because:  Drug not on the [facility], [facility] or [facility] refill protocol or controlled substance

## 2023-04-30 RX ORDER — CLOZAPINE 100 MG/1
TABLET ORAL
Qty: 30 TABLET | Refills: 1 | Status: SHIPPED | OUTPATIENT
Start: 2023-04-30 | End: 2023-06-26

## 2023-04-30 RX ORDER — CLOZAPINE 200 MG/1
TABLET ORAL
Qty: 60 TABLET | Refills: 1 | Status: SHIPPED | OUTPATIENT
Start: 2023-04-30 | End: 2023-06-26

## 2023-05-08 ENCOUNTER — TRANSFERRED RECORDS (OUTPATIENT)
Dept: HEALTH INFORMATION MANAGEMENT | Facility: CLINIC | Age: 38
End: 2023-05-08

## 2023-05-10 ENCOUNTER — OFFICE VISIT (OUTPATIENT)
Dept: PSYCHIATRY | Facility: OTHER | Age: 38
End: 2023-05-10
Attending: PSYCHIATRY & NEUROLOGY
Payer: COMMERCIAL

## 2023-05-10 VITALS
TEMPERATURE: 98.1 F | DIASTOLIC BLOOD PRESSURE: 90 MMHG | HEART RATE: 95 BPM | BODY MASS INDEX: 34.57 KG/M2 | SYSTOLIC BLOOD PRESSURE: 126 MMHG | OXYGEN SATURATION: 96 % | WEIGHT: 262 LBS

## 2023-05-10 DIAGNOSIS — Z79.899 ENCOUNTER FOR LONG-TERM (CURRENT) USE OF MEDICATIONS: Primary | ICD-10-CM

## 2023-05-10 PROCEDURE — 99213 OFFICE O/P EST LOW 20 MIN: CPT | Performed by: PSYCHIATRY & NEUROLOGY

## 2023-05-10 ASSESSMENT — PAIN SCALES - GENERAL: PAINLEVEL: MODERATE PAIN (4)

## 2023-05-10 NOTE — PROGRESS NOTES
SUBJECTIVE / INTERIM HISTORY                                                                       Last visit March ' 23: Continue clozapine 100 mg am and 400 mg HS set to refill next in April.  Continue clonidine 0.1 twice daily filled today to [facility]. Docusate 100 mg 2 times daily as needed for constipation    - generally, doing well. Mom is with today too.  - has started thinking would maybe like to get a part-time job  - has been helping keep up on the house, wash dishes, etc  - mom still over at aunt's often (helps granddaughter Bianca with school)     MEDICAL ROS-  Still constipation off and on, weight gain over time    MEDICAL / SURGICAL HISTORY            Medical Team:     PMD-  Dr. Moralez    Therapist- none  Patient Active Problem List   Diagnosis     Episodic mood disorder (H)     Poisoning by Methamphetamines     Cannabis abuse     Hypertension goal BP (blood pressure) < 140/90     Psychosis (H)     Depression     Paranoid schizophrenia, chronic condition with acute exacerbation (H)     Schizophrenia, paranoid, subchronic with acute exacerbation (H)     Schizophrenia, paranoid type (H)     Drug eruption     ACP (advance care planning)     Morbid obesity (H)     ALLERGY   Pcn [penicillins], Bupropion, Depakote [valproic acid], and Divalproex sodium  MEDICATIONS                                                                                             Current Outpatient Medications   Medication Sig     cholecalciferol 2000 UNITS tablet Take 2,000 Units by mouth daily     cloNIDine (CATAPRES) 0.1 MG tablet TAKE 1 TABLET TWICE A DAY BY MOUTH     cloZAPine (CLOZARIL) 100 MG tablet TAKE 1 TABLET (100MG) BY MOUTH DAILY     cloZAPine (CLOZARIL) 200 MG tablet TAKE 2 TABLETS (400MG) BY MOUTH AT BEDTIME     docusate sodium (COLACE) 100 MG capsule Take 1 capsule (100 mg) by mouth 2 times daily as needed for constipation     GNP FIBER THERAPY 500 MG TABS tablet TAKE 1 TABLET (500 MG) BY MOUTH DAILY      hydrOXYzine (ATARAX) 25 MG tablet Take 1 - 2 tablets up to 2 times daily as needed for anxiety     ibuprofen (ADVIL/MOTRIN) 200 MG tablet Take 200 mg by mouth every 4 hours as needed for mild pain     Omega-3 Fatty Acids (EQL FISH OIL) 1000 MG CAPS Take 1 capsule by mouth daily     omeprazole (PRILOSEC) 20 MG DR capsule Take 20 mg by mouth daily     vitamin D3 (CHOLECALCIFEROL) 50 mcg (2000 units) tablet Take 1 tablet (50 mcg) by mouth daily     No current facility-administered medications for this visit.     VITALS   BP (!) 126/90 (BP Location: Right arm, Patient Position: Sitting, Cuff Size: Adult Regular)   Pulse 95   Temp 98.1  F (36.7  C) (Tympanic)   Wt 118.8 kg (262 lb)   SpO2 96%   BMI 34.57 kg/m       PHQ9                           1/25/2021     4:19 PM 8/17/2021     8:24 AM 10/19/2022     3:14 PM   PHQ-9 SCORE   PHQ-9 Total Score 12 13 9       LABS                                                                                                                            Tachycardia; was on metoprolol but fatigue so d/c. PCP aware tachy and was evaluated by cardiology.       Clozapine level 116 as of 10/2/16 and cloz and metabolites 194  Clozapine level 329 as of 4/18/18 and clozapine and metabolites 527      ASSESSMENT                                                                                                      Historical: scanned consults from [place] into chart 4/2015. Initial psych consult was 4/22/15. Hospital stay here 4/9/16 - 4/18/16. Hospital stay [facility] June '16. Modafanil: we tried to help improve fatigue and mood but he felt created some cravings.    Josafat Montez is a 38 yo with paranoid schizophrenia. Josafat has experienced psychosis including delusions, paranoia and hallucinations since 2004 as a teen.  He's been stable for several years now, doing well taking clozapine.   Discussed today again,  he is due for labs (lipids, HgbA1C) and we agreed today have him try to come in  person for next visit and fast so we can get labs. We also need to check vitals (weight, BP / HR since he is on clonidine). Additionally, would be nice to see him and his mom given we haven't seen each other in person in quite awhile! Josafat notes sometimes it's hard for him to think about things that happened in the past and things he has been through but he has managed to thus far work through it.    TREATMENT RISK STATEMENT: The risks, benefits, alternatives and potential adverse effects have been explained and are understood by the pt. The pt agrees to the treatment plan with the ability to do so. The pt knows to call the clinic for any problems or access emergency care if needed.       DIAGNOSES           Schizophrenia, paranoid type      PLAN                                                                                                                         1) MEDICATIONS:   --  Continue clozapine 100 mg am and 400 mg HS filled 4/30/23 with 1 refill.  Continue clonidine 0.1 twice daily. . Docusate 100 mg 2 times daily as needed for constipation    2) THERAPY: no change    Labs: lipids, hgbA1C, CMP I will order and have faxed to [facility]    4) PT MONITOR [call for probs]: Worsening symptoms, side effects from medications, SI/HI    5) REFERRALS [CD tx, medical, tests other]: none    6)  RTC: ~2 months

## 2023-05-19 ENCOUNTER — MEDICAL CORRESPONDENCE (OUTPATIENT)
Dept: HEALTH INFORMATION MANAGEMENT | Facility: CLINIC | Age: 38
End: 2023-05-19

## 2023-06-07 NOTE — ADDENDUM NOTE
Addended by: MATEO FERRO on: 5/30/2017 12:59 PM     Modules accepted: Orders     Decreased functional mobility

## 2023-06-25 DIAGNOSIS — F20.0 CHRONIC PARANOID SCHIZOPHRENIA (H): ICD-10-CM

## 2023-06-26 RX ORDER — CLOZAPINE 200 MG/1
TABLET ORAL
Qty: 60 TABLET | Refills: 0 | Status: SHIPPED | OUTPATIENT
Start: 2023-06-26 | End: 2023-07-21

## 2023-06-26 RX ORDER — CLOZAPINE 100 MG/1
TABLET ORAL
Qty: 30 TABLET | Refills: 0 | Status: SHIPPED | OUTPATIENT
Start: 2023-06-26 | End: 2023-07-21

## 2023-06-26 NOTE — TELEPHONE ENCOUNTER
Clozapine      Last Written Prescription Date:  5.26.23  Last Fill Quantity: #28, #56,   # refills: 0  Last Office Visit: 5.10.23  Future Office visit:    Next 5 appointments (look out 90 days)    Jul 10, 2023  3:20 PM  (Arrive by 3:05 PM)  Return Visit with Marysol Maki MD  [facility] ([facility] ) [address] [place] [id number] [phone]           Routing refill request to provider for review/approval because:  Drug not on the [facility], P or [facility] refill protocol or controlled substance

## 2023-07-10 ENCOUNTER — OFFICE VISIT (OUTPATIENT)
Dept: PSYCHIATRY | Facility: OTHER | Age: 38
End: 2023-07-10
Attending: PSYCHIATRY & NEUROLOGY
Payer: COMMERCIAL

## 2023-07-10 VITALS
BODY MASS INDEX: 33.75 KG/M2 | DIASTOLIC BLOOD PRESSURE: 84 MMHG | TEMPERATURE: 97 F | WEIGHT: 263 LBS | HEIGHT: 74 IN | OXYGEN SATURATION: 97 % | SYSTOLIC BLOOD PRESSURE: 132 MMHG | HEART RATE: 111 BPM

## 2023-07-10 DIAGNOSIS — F20.0 CHRONIC PARANOID SCHIZOPHRENIA (H): Primary | ICD-10-CM

## 2023-07-10 PROCEDURE — 99213 OFFICE O/P EST LOW 20 MIN: CPT | Performed by: PSYCHIATRY & NEUROLOGY

## 2023-07-10 ASSESSMENT — PATIENT HEALTH QUESTIONNAIRE - PHQ9
SUM OF ALL RESPONSES TO PHQ QUESTIONS 1-9: 1
10. IF YOU CHECKED OFF ANY PROBLEMS, HOW DIFFICULT HAVE THESE PROBLEMS MADE IT FOR YOU TO DO YOUR WORK, TAKE CARE OF THINGS AT HOME, OR GET ALONG WITH OTHER PEOPLE: SOMEWHAT DIFFICULT
SUM OF ALL RESPONSES TO PHQ QUESTIONS 1-9: 1

## 2023-07-10 ASSESSMENT — ANXIETY QUESTIONNAIRES
3. WORRYING TOO MUCH ABOUT DIFFERENT THINGS: SEVERAL DAYS
2. NOT BEING ABLE TO STOP OR CONTROL WORRYING: SEVERAL DAYS
5. BEING SO RESTLESS THAT IT IS HARD TO SIT STILL: NOT AT ALL
IF YOU CHECKED OFF ANY PROBLEMS ON THIS QUESTIONNAIRE, HOW DIFFICULT HAVE THESE PROBLEMS MADE IT FOR YOU TO DO YOUR WORK, TAKE CARE OF THINGS AT HOME, OR GET ALONG WITH OTHER PEOPLE: SOMEWHAT DIFFICULT
1. FEELING NERVOUS, ANXIOUS, OR ON EDGE: SEVERAL DAYS
GAD7 TOTAL SCORE: 5
4. TROUBLE RELAXING: NOT AT ALL
6. BECOMING EASILY ANNOYED OR IRRITABLE: SEVERAL DAYS
GAD7 TOTAL SCORE: 5
7. FEELING AFRAID AS IF SOMETHING AWFUL MIGHT HAPPEN: SEVERAL DAYS

## 2023-07-10 ASSESSMENT — PAIN SCALES - GENERAL: PAINLEVEL: MODERATE PAIN (4)

## 2023-07-10 NOTE — PROGRESS NOTES
SUBJECTIVE / INTERIM HISTORY                                                                       Last visit 5/2023: Continue clozapine 100 mg am and 400 mg HS filled 4/30/23 with 1 refill.  Continue clonidine 0.1 twice daily. . Docusate 100 mg 2 times daily as needed for constipation    - mom with today and notes she has tried to get Josafat out (of the house) but he prefers stay home  - hasn't had his license in couple years -> brother Anuel was using Josafat's info.   - dog, Douglas Piazrro  - has been helping keep up on the house, wash dishes, etc  - mom still over at aunt's often (helps granddaughter Bianca with school)     MEDICAL ROS-  Still constipation off and on, weight gain over time    MEDICAL / SURGICAL HISTORY            Medical Team:     PMD-  Dr. Moralez    Therapist- none  Patient Active Problem List   Diagnosis     Episodic mood disorder (H)     Poisoning by Methamphetamines     Cannabis abuse     Hypertension goal BP (blood pressure) < 140/90     Psychosis (H)     Depression     Paranoid schizophrenia, chronic condition with acute exacerbation (H)     Schizophrenia, paranoid, subchronic with acute exacerbation (H)     Schizophrenia, paranoid type (H)     Drug eruption     ACP (advance care planning)     Morbid obesity (H)     ALLERGY   Pcn [penicillins], Bupropion, Depakote [valproic acid], and Divalproex sodium  MEDICATIONS                                                                                             Current Outpatient Medications   Medication Sig     cholecalciferol 2000 UNITS tablet Take 2,000 Units by mouth daily     cloNIDine (CATAPRES) 0.1 MG tablet TAKE 1 TABLET TWICE A DAY BY MOUTH     cloZAPine (CLOZARIL) 100 MG tablet TAKE 1 TABLET (100MG) BY MOUTH DAILY     cloZAPine (CLOZARIL) 200 MG tablet TAKE 2 TABLETS (400MG) BY MOUTH AT BEDTIME     docusate sodium (COLACE) 100 MG capsule Take 1 capsule (100 mg) by mouth 2 times daily as needed for constipation     GNP FIBER THERAPY 500 MG  "TABS tablet TAKE 1 TABLET (500 MG) BY MOUTH DAILY     hydrOXYzine (ATARAX) 25 MG tablet Take 1 - 2 tablets up to 2 times daily as needed for anxiety     ibuprofen (ADVIL/MOTRIN) 200 MG tablet Take 200 mg by mouth every 4 hours as needed for mild pain     Omega-3 Fatty Acids (EQL FISH OIL) 1000 MG CAPS Take 1 capsule by mouth daily     omeprazole (PRILOSEC) 20 MG DR capsule Take 20 mg by mouth daily     vitamin D3 (CHOLECALCIFEROL) 50 mcg (2000 units) tablet Take 1 tablet (50 mcg) by mouth daily     No current facility-administered medications for this visit.     VITALS   /84 (Cuff Size: Adult Large)   Pulse 111   Temp 97  F (36.1  C) (Tympanic)   Ht 1.88 m (6' 2\")   Wt 119.3 kg (263 lb)   SpO2 97%   BMI 33.77 kg/m       PHQ9                           8/17/2021     8:24 AM 10/19/2022     3:14 PM 7/10/2023     3:21 PM   PHQ-9 SCORE   PHQ-9 Total Score MyChart   1 (Minimal depression)   PHQ-9 Total Score 13 9 1       LABS                                                                                                                            Tachycardia; was on metoprolol but fatigue so d/c. PCP aware tachy and was evaluated by cardiology.       Clozapine level 116 as of 10/2/16 and cloz and metabolites 194  Clozapine level 329 as of 4/18/18 and clozapine and metabolites 527    Lipid panel 6/5/23: HDL 28, , triglycerides 483.   CMP 6/5/23: normal  HgbA1C 5.8  ASSESSMENT                                                                                                      Historical: scanned consults from [place] into chart 4/2015. Initial psych consult was 4/22/15. Hospital stay here 4/9/16 - 4/18/16. Hospital stay [facility] June '16. Modafanil: we tried to help improve fatigue and mood but he felt created some cravings.    Josafat Montez is a 36 yo with paranoid schizophrenia. Josafat has experienced psychosis including delusions, paranoia and hallucinations since 2004 as a teen.  He's been stable for " several years now, doing well taking clozapine.  We yonatan labs: reviewed these today. In particular HgbA1C is in impaired glucose tolerance range and triglcerides continue to run high (they were in 500s couple years ago in 2020). Discussed I encourage him to discuss labs with his primary. I printed his labs and will have them faxed to [facility] in [facility]    TREATMENT RISK STATEMENT: The risks, benefits, alternatives and potential adverse effects have been explained and are understood by the pt. The pt agrees to the treatment plan with the ability to do so. The pt knows to call the clinic for any problems or access emergency care if needed.       DIAGNOSES           Schizophrenia, paranoid type      PLAN                                                                                                                         1) MEDICATIONS:   --  Continue clozapine 100 mg am and 400 mg HS filled 4/30/23 with 1 refill.  Continue clonidine 0.1 twice daily. . Docusate 100 mg 2 times daily as needed for constipation    2) THERAPY: no change    Labs: lipids, hgbA1C, CMP 6/5/23     4) PT MONITOR [call for probs]: Worsening symptoms, side effects from medications, SI/HI    5) REFERRALS [CD tx, medical, tests other]: none    6)  RTC: ~3 months months                                                                                                                                                                                                                                                                                                                                                                                                                                                                                                                                                                                                                                                                                                                                                   Answers for HPI/ROS submitted by the patient on 7/10/2023  If you checked off any problems, how difficult have these problems made it for you to do your work, take care of things at home, or get along with other people?: Somewhat difficult  PHQ9 TOTAL SCORE: 1  SUSANNAH 7 TOTAL SCORE: 5

## 2023-07-20 DIAGNOSIS — F20.0 CHRONIC PARANOID SCHIZOPHRENIA (H): ICD-10-CM

## 2023-07-21 RX ORDER — CLOZAPINE 200 MG/1
TABLET ORAL
Qty: 60 TABLET | Refills: 1 | Status: SHIPPED | OUTPATIENT
Start: 2023-07-21 | End: 2023-09-19

## 2023-07-21 RX ORDER — CLOZAPINE 100 MG/1
TABLET ORAL
Qty: 30 TABLET | Refills: 1 | Status: SHIPPED | OUTPATIENT
Start: 2023-07-21 | End: 2023-09-19

## 2023-07-21 NOTE — TELEPHONE ENCOUNTER
cloZAPine (CLOZARIL) 100 MG tablet 30 tablet 0 6/26/2023     cloZAPine (CLOZARIL) 200 MG tablet 60 tablet 0 6/26/2023   Last Office Visit: 07/10/23  Future Office visit:    Next 5 appointments (look out 90 days)    Oct 10, 2023  3:00 PM  (Arrive by 2:45 PM)  Return Visit with Marysol Maki MD  [facility] ([facility] ) [address] [place] [id number] [phone]           Routing refill request to provider for review/approval because:

## 2023-08-24 DIAGNOSIS — K21.9 GASTROESOPHAGEAL REFLUX DISEASE WITHOUT ESOPHAGITIS: Primary | ICD-10-CM

## 2023-09-15 DIAGNOSIS — F20.0 CHRONIC PARANOID SCHIZOPHRENIA (H): ICD-10-CM

## 2023-09-15 DIAGNOSIS — K59.03 DRUG-INDUCED CONSTIPATION: ICD-10-CM

## 2023-09-18 RX ORDER — DOCUSATE SODIUM 100 MG/1
CAPSULE, LIQUID FILLED ORAL
Qty: 56 CAPSULE | Refills: 8 | Status: SHIPPED | OUTPATIENT
Start: 2023-09-18 | End: 2024-03-08

## 2023-09-18 NOTE — TELEPHONE ENCOUNTER
Clozaril 200mg      Last Written Prescription Date:  7/21/23  Last Fill Quantity: 60,   # refills: 1  Last Office Visit: 7/10/23  Future Office visit:    Next 5 appointments (look out 90 days)      Oct 10, 2023  3:00 PM  (Arrive by 2:45 PM)  Return Visit with Marysol Maki MD  [facility] ([facility] ) [address] [place] [id number]  [phone]             Routing refill request to provider for review/approval because:      Clozaril 100mg      Last Written Prescription Date:  7/21/23  Last Fill Quantity: 30,   # refills: 1  Last Office Visit: 7/10/23  Future Office visit:    Next 5 appointments (look out 90 days)      Oct 10, 2023  3:00 PM  (Arrive by 2:45 PM)  Return Visit with Marysol Maki MD  [facility] ([facility] ) [address] [place] [phone]  [phone]             Routing refill request to provider for review/approval because:      Colace      Last Written Prescription Date:  3/29/23  Last Fill Quantity: 60,   # refills: 3  Last Office Visit: 7/10/23  Future Office visit:    Next 5 appointments (look out 90 days)      Oct 10, 2023  3:00 PM  (Arrive by 2:45 PM)  Return Visit with Marysol Maki MD  [facility] ([facility] ) [address] [place] [phone]  [phone]             Routing refill request to provider for review/approval because:

## 2023-09-19 RX ORDER — CLOZAPINE 100 MG/1
TABLET ORAL
Qty: 30 TABLET | Refills: 1 | Status: SHIPPED | OUTPATIENT
Start: 2023-09-19 | End: 2023-11-12

## 2023-09-19 RX ORDER — CLOZAPINE 200 MG/1
TABLET ORAL
Qty: 60 TABLET | Refills: 1 | Status: SHIPPED | OUTPATIENT
Start: 2023-09-19 | End: 2023-11-12

## 2023-10-10 ENCOUNTER — VIRTUAL VISIT (OUTPATIENT)
Dept: PSYCHIATRY | Facility: OTHER | Age: 38
End: 2023-10-10
Attending: PSYCHIATRY & NEUROLOGY
Payer: COMMERCIAL

## 2023-10-10 DIAGNOSIS — F20.0 SCHIZOPHRENIA, PARANOID, SUBCHRONIC WITH ACUTE EXACERBATION (H): ICD-10-CM

## 2023-10-10 PROCEDURE — 99441 PR PHYSICIAN TELEPHONE EVALUATION 5-10 MIN: CPT | Mod: 93 | Performed by: PSYCHIATRY & NEUROLOGY

## 2023-10-10 RX ORDER — CLONIDINE HYDROCHLORIDE 0.1 MG/1
TABLET ORAL
Qty: 60 TABLET | Refills: 6 | Status: SHIPPED | OUTPATIENT
Start: 2023-10-10 | End: 2024-03-08

## 2023-10-10 ASSESSMENT — PAIN SCALES - GENERAL: PAINLEVEL: NO PAIN (0)

## 2023-10-10 NOTE — PROGRESS NOTES
"  Telephone visit 9 minutes. 7 minutes reviewing chart, documenting. Total visit time of 16 minutes.     SUBJECTIVE / INTERIM HISTORY                                                                       Last visit 7/10/23: Continue clozapine 100 mg am and 400 mg HS filled 4/30/23 with 1 refill.  Continue clonidine 0.1 twice daily. . Docusate 100 mg 2 times daily as needed for constipation    - I asked how summer went. Josafat notes \"it went\"  - Josafat notes he is learning to enjoy seasonal changes  - back issues \"my back gets kind of tweaked at times\"  - hasn't had his license in couple years -> brother Anuel was using Josafat's info.   - dog, Douglas Pizarro  - mom still over at aunt's often (helps granddaughter Bianca with school)     MEDICAL ROS-  Still constipation off and on, weight gain over time    MEDICAL / SURGICAL HISTORY            Medical Team:     PMD-  Dr. Moralez    Therapist- none  Patient Active Problem List   Diagnosis    Episodic mood disorder (H24)    Poisoning by Methamphetamines    Cannabis abuse    Hypertension goal BP (blood pressure) < 140/90    Psychosis (H)    Depression    Paranoid schizophrenia, chronic condition with acute exacerbation (H)    Schizophrenia, paranoid, subchronic with acute exacerbation (H)    Schizophrenia, paranoid type (H)    Drug eruption    ACP (advance care planning)    Morbid obesity (H)     ALLERGY   Pcn [penicillins], Bupropion, Depakote [valproic acid], and Divalproex sodium  MEDICATIONS                                                                                             Current Outpatient Medications   Medication Sig    cholecalciferol 2000 UNITS tablet Take 2,000 Units by mouth daily    cloNIDine (CATAPRES) 0.1 MG tablet TAKE 1 TABLET TWICE A DAY BY MOUTH    cloZAPine (CLOZARIL) 100 MG tablet TAKE 1 TABLET (100MG) BY MOUTH DAILY    cloZAPine (CLOZARIL) 200 MG tablet TAKE 2 TABLETS (400MG) BY MOUTH AT BEDTIME    docusate sodium (COLACE) 100 MG capsule TAKE 1 CAPSULE BY " MOUTH TWICE A DAY AS NEEDED FOR CONSTIPATION    GNP FIBER THERAPY 500 MG TABS tablet TAKE 1 TABLET (500 MG) BY MOUTH DAILY    hydrOXYzine (ATARAX) 25 MG tablet Take 1 - 2 tablets up to 2 times daily as needed for anxiety    ibuprofen (ADVIL/MOTRIN) 200 MG tablet Take 200 mg by mouth every 4 hours as needed for mild pain    Omega-3 Fatty Acids (EQL FISH OIL) 1000 MG CAPS Take 1 capsule by mouth daily    omeprazole (PRILOSEC) 20 MG DR capsule Take 1 capsule (20 mg) by mouth daily    vitamin D3 (CHOLECALCIFEROL) 50 mcg (2000 units) tablet Take 1 tablet (50 mcg) by mouth daily     No current facility-administered medications for this visit.     VITALS   There were no vitals taken for this visit.     PHQ9                           8/17/2021     8:24 AM 10/19/2022     3:14 PM 7/10/2023     3:21 PM   PHQ-9 SCORE   PHQ-9 Total Score MyChart   1 (Minimal depression)   PHQ-9 Total Score 13 9 1       LABS                                                                                                                            Tachycardia; was on metoprolol but fatigue so d/c. PCP aware tachy and was evaluated by cardiology.       Clozapine level 116 as of 10/2/16 and cloz and metabolites 194  Clozapine level 329 as of 4/18/18 and clozapine and metabolites 527    Lipid panel 6/5/23: HDL 28, , triglycerides 483.   CMP 6/5/23: normal  HgbA1C 5.8  ASSESSMENT                                                                                                      Historical: scanned consults from [place] into chart 4/2015. Initial psych consult was 4/22/15. Hospital stay here 4/9/16 - 4/18/16. Hospital stay [place] June '16. Modafanil: we tried to help improve fatigue and mood but he felt created some cravings.    Josafat Montez is a 36 yo with paranoid schizophrenia. Josafat has experienced psychosis including delusions, paranoia and hallucinations since 2004 as a teen.  He's been stable for several years now, doing well taking  clozapine.  We reviewed labs last visit and discussed I would have faxed to his primary Lindsay [facility]gg [facility] [place]. Today Josafat notes overall mental health stable. He has had some chest pain as of lately. I suggested as I did last visit he set up an appointment with his primary. Last visit I had noted this including he discuss high triglycerides and his HgbA1C.     TREATMENT RISK STATEMENT: The risks, benefits, alternatives and potential adverse effects have been explained and are understood by the pt. The pt agrees to the treatment plan with the ability to do so. The pt knows to call the clinic for any problems or access emergency care if needed.       DIAGNOSES           Schizophrenia, paranoid type      PLAN                                                                                                                         1) MEDICATIONS:   --  Continue clozapine 100 mg am and 400 mg HS filled 4/30/23 with 1 refill.  Continue clonidine 0.1 twice daily. . Docusate 100 mg 2 times daily as needed for constipation    2) THERAPY: no change    Labs: lipids, hgbA1C, CMP 6/5/23     4) PT MONITOR [call for probs]: Worsening symptoms, side effects from medications, SI/HI    5) REFERRALS [CD tx, medical, tests other]: none    6)  RTC: ~3 months

## 2023-11-10 DIAGNOSIS — E78.1 HYPERTRIGLYCERIDEMIA: ICD-10-CM

## 2023-11-10 DIAGNOSIS — F20.0 CHRONIC PARANOID SCHIZOPHRENIA (H): ICD-10-CM

## 2023-11-10 NOTE — TELEPHONE ENCOUNTER
Clozapine 100 mg      Last Written Prescription Date:  9-19-23  Last Fill Quantity: 30,   # refills: 1    Clozapine 200 mg      Last Written Prescription Date:  9-19-23  Last Fill Quantity: 60,   # refills: 1    Fish oil-omega 3 fatty acids 100 mg      Last Written Prescription Date:  10-19-22  Last Fill Quantity: 90,   # refills: 3    Vit D3 high potency 125 mcg      Last Written Prescription Date:  10-19-22  Last Fill Quantity: 30,   # refills: 11  Last Office Visit: 10-10-23

## 2023-11-12 RX ORDER — CLOZAPINE 100 MG/1
TABLET ORAL
Qty: 30 TABLET | Refills: 3 | Status: SHIPPED | OUTPATIENT
Start: 2023-11-12 | End: 2024-03-05

## 2023-11-12 RX ORDER — CHLORAL HYDRATE 500 MG
1 CAPSULE ORAL DAILY
Qty: 30 CAPSULE | Refills: 11 | Status: SHIPPED | OUTPATIENT
Start: 2023-11-12

## 2023-11-12 RX ORDER — CLOZAPINE 200 MG/1
TABLET ORAL
Qty: 60 TABLET | Refills: 3 | Status: SHIPPED | OUTPATIENT
Start: 2023-11-12 | End: 2024-03-05

## 2023-11-12 RX ORDER — CHOLECALCIFEROL (VITAMIN D3) 25 MCG
2 CAPSULE ORAL DAILY
Qty: 60 CAPSULE | Refills: 11 | Status: SHIPPED | OUTPATIENT
Start: 2023-11-12

## 2024-01-15 ENCOUNTER — VIRTUAL VISIT (OUTPATIENT)
Dept: PSYCHIATRY | Facility: OTHER | Age: 39
End: 2024-01-15
Attending: PSYCHIATRY & NEUROLOGY
Payer: COMMERCIAL

## 2024-01-15 DIAGNOSIS — F33.1 MAJOR DEPRESSIVE DISORDER, RECURRENT EPISODE, MODERATE (H): Primary | ICD-10-CM

## 2024-01-15 DIAGNOSIS — F41.1 GENERALIZED ANXIETY DISORDER: ICD-10-CM

## 2024-01-15 PROCEDURE — 99442 PR PHYSICIAN TELEPHONE EVALUATION 11-20 MIN: CPT | Mod: 93 | Performed by: PSYCHIATRY & NEUROLOGY

## 2024-01-15 RX ORDER — HYDROXYZINE HYDROCHLORIDE 25 MG/1
TABLET, FILM COATED ORAL
Qty: 60 TABLET | Refills: 11 | Status: SHIPPED | OUTPATIENT
Start: 2024-01-15

## 2024-01-15 ASSESSMENT — ANXIETY QUESTIONNAIRES
2. NOT BEING ABLE TO STOP OR CONTROL WORRYING: NOT AT ALL
GAD7 TOTAL SCORE: 4
GAD7 TOTAL SCORE: 4
6. BECOMING EASILY ANNOYED OR IRRITABLE: SEVERAL DAYS
3. WORRYING TOO MUCH ABOUT DIFFERENT THINGS: SEVERAL DAYS
5. BEING SO RESTLESS THAT IT IS HARD TO SIT STILL: NOT AT ALL
7. FEELING AFRAID AS IF SOMETHING AWFUL MIGHT HAPPEN: SEVERAL DAYS
1. FEELING NERVOUS, ANXIOUS, OR ON EDGE: NOT AT ALL

## 2024-01-15 ASSESSMENT — PATIENT HEALTH QUESTIONNAIRE - PHQ9
SUM OF ALL RESPONSES TO PHQ QUESTIONS 1-9: 7
5. POOR APPETITE OR OVEREATING: SEVERAL DAYS

## 2024-01-15 ASSESSMENT — PAIN SCALES - GENERAL: PAINLEVEL: NO PAIN (0)

## 2024-01-15 NOTE — PROGRESS NOTES
"Type of service: telephone visit 17 minutes. 10 minutes reviewing chart, documenting, ordering / refilling meds. Total vsiit time of 27 minutes     start time: 3:49      end time: 4:07    Originating location (pt location): home    Distant location (provider location): home        SUBJECTIVE / INTERIM HISTORY                                                                       Last visit 10/10/23:  Continue clozapine 100 mg am and 400 mg HS , clonidine 0.1 twice daily. . Docusate 100 mg 2 times daily as needed for constipation    - Josafat notes \"it goes\"  - Malika on phone with us and notes they have frozen water lines going...   - Malika feels Josafat has struggled some this winter.. yet he knows to work on being grateful for things  - they both inquire about med for depression / irritability and Malika notes she's been on Zoloft since her late 20s and helps.  - hasn't had his license in couple years -> brother Anuel was using Josafat's info.   - dog, Douglas Pizarro  - mom still over at aunt's often (helps granddaughter Bianca with school)     MEDICAL ROS-  Still constipation off and on, weight gain over time    MEDICAL / SURGICAL HISTORY            Medical Team:     PMD-  Dr. Moralez    Therapist- none  Patient Active Problem List   Diagnosis    Episodic mood disorder (H24)    Poisoning by Methamphetamines    Cannabis abuse    Hypertension goal BP (blood pressure) < 140/90    Psychosis (H)    Depression    Paranoid schizophrenia, chronic condition with acute exacerbation (H)    Schizophrenia, paranoid, subchronic with acute exacerbation (H)    Schizophrenia, paranoid type (H)    Drug eruption    ACP (advance care planning)    Morbid obesity (H)     ALLERGY   Pcn [penicillins], Bupropion, Depakote [valproic acid], and Divalproex sodium  MEDICATIONS                                                                                             Current Outpatient Medications   Medication Sig    Cholecalciferol (D3 HIGH POTENCY) 25 MCG (1000 UT) " CAPS TAKE 2 CAPSULES BY MOUTH ONCE DAILY    cholecalciferol 2000 UNITS tablet Take 2,000 Units by mouth daily    cloNIDine (CATAPRES) 0.1 MG tablet TAKE 1 TABLET TWICE A DAY BY MOUTH    cloZAPine (CLOZARIL) 100 MG tablet TAKE 1 TABLET (100MG) BY MOUTH DAILY    cloZAPine (CLOZARIL) 200 MG tablet TAKE 2 TABLETS (400MG) BY MOUTH AT BEDTIME    docusate sodium (COLACE) 100 MG capsule TAKE 1 CAPSULE BY MOUTH TWICE A DAY AS NEEDED FOR CONSTIPATION    fish oil-omega-3 fatty acids 1000 MG capsule TAKE 1 CAPSULE BY MOUTH ONCE DAILY    GNP FIBER THERAPY 500 MG TABS tablet TAKE 1 TABLET (500 MG) BY MOUTH DAILY    hydrOXYzine (ATARAX) 25 MG tablet Take 1 - 2 tablets up to 2 times daily as needed for anxiety    ibuprofen (ADVIL/MOTRIN) 200 MG tablet Take 200 mg by mouth every 4 hours as needed for mild pain    omeprazole (PRILOSEC) 20 MG DR capsule Take 1 capsule (20 mg) by mouth daily    vitamin D3 (CHOLECALCIFEROL) 50 mcg (2000 units) tablet Take 1 tablet (50 mcg) by mouth daily     No current facility-administered medications for this visit.     VITALS   There were no vitals taken for this visit.     PHQ9                           10/19/2022     3:14 PM 7/10/2023     3:21 PM 1/15/2024     3:40 PM   PHQ-9 SCORE   PHQ-9 Total Score MyChart  1 (Minimal depression)    PHQ-9 Total Score 9 1 7       LABS                                                                                                                            Tachycardia; was on metoprolol but fatigue so d/c. PCP aware tachy and was evaluated by cardiology.       Clozapine level 116 as of 10/2/16 and cloz and metabolites 194  Clozapine level 329 as of 4/18/18 and clozapine and metabolites 527    Lipid panel 6/5/23: HDL 28, , triglycerides 483.   CMP 6/5/23: normal  HgbA1C 5.8  ASSESSMENT                                                                                                      Historical: scanned consults from [place] into chart 4/2015. Initial psych  consult was 4/22/15. Hospital stay here 4/9/16 - 4/18/16. Hospital stay [facility] June '16. Modafanil: we tried to help improve fatigue and mood but he felt created some cravings.    Josafat Montez is a 37 yo with paranoid schizophrenia. Josafat has experienced psychosis including delusions, paranoia and hallucinations since 2004 as a teen.  He's been stable for quite awhile  taking clozapine.  Today he and his mom Malika inquire about medication for depression given Miles has aviva feeling more depressed. Malika has taken Zoloft for many years. We reviewed most common SEs. Zoloft can increase serum concentration clozapine: will start lower dose with 25 mg for week then up to 50 mg.    TREATMENT RISK STATEMENT: The risks, benefits, alternatives and potential adverse effects have been explained and are understood by the pt. The pt agrees to the treatment plan with the ability to do so. The pt knows to call the clinic for any problems or access emergency care if needed.       DIAGNOSES           Schizophrenia, paranoid type  MDD, recurrent, moderate      PLAN                                                                                                                         1) MEDICATIONS:   --  Start Zoloft 25 mg daily x 7 days then take 50 mg daily. Continue clozapine 100 mg am and 400 mg HS   Continue clonidine 0.1 twice daily. . Docusate 100 mg 2 times daily as needed for constipation    2) THERAPY: no change    Labs: lipids, hgbA1C, CMP 6/5/23     4) PT MONITOR [call for probs]: Worsening symptoms, side effects from medications, SI/HI    5) REFERRALS [CD tx, medical, tests other]: none    6)  RTC: ~2  months

## 2024-02-04 ENCOUNTER — HEALTH MAINTENANCE LETTER (OUTPATIENT)
Age: 39
End: 2024-02-04

## 2024-03-01 DIAGNOSIS — K21.9 GASTROESOPHAGEAL REFLUX DISEASE WITHOUT ESOPHAGITIS: ICD-10-CM

## 2024-03-01 DIAGNOSIS — F20.0 CHRONIC PARANOID SCHIZOPHRENIA (H): ICD-10-CM

## 2024-03-01 NOTE — TELEPHONE ENCOUNTER
prilosec      Last Written Prescription Date:  8-24-23  Last Fill Quantity: 30,   # refills: 6  Last Office Visit: 1-15-24  Future Office visit:       Routing refill request to provider for review/approval because:      CLOZIRIL 100MG    AND  200MG      Last Written Prescription Date:  11-12-23  Last Fill Quantity: 30,   # refills: 3  Last Office Visit: 1-15-24  Future Office visit:       Routing refill request to provider for review/approval because:  TWO DOSES

## 2024-03-05 RX ORDER — CLOZAPINE 100 MG/1
TABLET ORAL
Qty: 30 TABLET | Refills: 3 | Status: SHIPPED | OUTPATIENT
Start: 2024-03-05 | End: 2024-06-20

## 2024-03-05 RX ORDER — CLOZAPINE 200 MG/1
TABLET ORAL
Qty: 60 TABLET | Refills: 3 | Status: SHIPPED | OUTPATIENT
Start: 2024-03-05 | End: 2024-06-20

## 2024-03-08 DIAGNOSIS — K59.03 DRUG-INDUCED CONSTIPATION: ICD-10-CM

## 2024-03-08 DIAGNOSIS — F33.1 MAJOR DEPRESSIVE DISORDER, RECURRENT EPISODE, MODERATE (H): ICD-10-CM

## 2024-03-08 DIAGNOSIS — F20.0 SCHIZOPHRENIA, PARANOID, SUBCHRONIC WITH ACUTE EXACERBATION (H): ICD-10-CM

## 2024-03-08 RX ORDER — CLONIDINE HYDROCHLORIDE 0.1 MG/1
TABLET ORAL
Qty: 60 TABLET | Refills: 6 | Status: SHIPPED | OUTPATIENT
Start: 2024-03-08

## 2024-03-08 RX ORDER — DOCUSATE SODIUM 100 MG/1
CAPSULE, LIQUID FILLED ORAL
Qty: 60 CAPSULE | Refills: 6 | Status: SHIPPED | OUTPATIENT
Start: 2024-03-08

## 2024-03-18 ENCOUNTER — VIRTUAL VISIT (OUTPATIENT)
Dept: PSYCHIATRY | Facility: OTHER | Age: 39
End: 2024-03-18
Attending: PSYCHIATRY & NEUROLOGY
Payer: COMMERCIAL

## 2024-03-18 DIAGNOSIS — F20.0 CHRONIC PARANOID SCHIZOPHRENIA (H): Primary | ICD-10-CM

## 2024-03-18 PROCEDURE — 99441 PR PHYSICIAN TELEPHONE EVALUATION 5-10 MIN: CPT | Mod: 93 | Performed by: PSYCHIATRY & NEUROLOGY

## 2024-03-18 ASSESSMENT — PAIN SCALES - GENERAL: PAINLEVEL: NO PAIN (0)

## 2024-03-18 NOTE — PROGRESS NOTES
Patient declined the depression, anxiety questionnaires at this time.  He will plan to do them when he comes in-person to see Marysol.

## 2024-03-18 NOTE — PROGRESS NOTES
"Virtual Visit Details    Type of service:  Telephone Visit   Phone call duration: 6 minutes   Originating Location (pt. Location): Home    Distant Location (provider location):  Off-site     Start time: 3:44 pm  End time: 3:50 pm     [phone]    SUBJECTIVE / INTERIM HISTORY                                                                       Last visit 1/15/24: Start Zoloft 25 mg daily x 7 days then take 50 mg daily. Continue clozapine 100 mg am and 400 mg HS   Continue clonidine 0.1 twice daily. . Docusate 100 mg 2 times daily as needed for constipation    - I asked Josafat how he feels with Zoloft he started on: \"it seems to be lifting my mood a bit\".   - for the first couple days starting Zoloft \"a little bit fluttery\" but resolved.   - I inquired if as part of feeling better he has been into any of hi interests / hobbies more:  Josafat played hockey growing up. (Been watching hockey games)  -- hasn't had his license in couple years -> brother Anuel was using Josafat's info.   - dog, Rottessa Juarez  - mom still over at aunt's often (helps granddaughter Bianca with school)     MEDICAL ROS-  Still constipation off and on, weight gain over time    MEDICAL / SURGICAL HISTORY            Medical Team:     PMD-  Dr. Moralez    Therapist- none  Patient Active Problem List   Diagnosis    Episodic mood disorder (H24)    Poisoning by Methamphetamines    Cannabis abuse    Hypertension goal BP (blood pressure) < 140/90    Psychosis (H)    Depression    Paranoid schizophrenia, chronic condition with acute exacerbation (H)    Schizophrenia, paranoid, subchronic with acute exacerbation (H)    Schizophrenia, paranoid type (H)    Drug eruption    ACP (advance care planning)    Morbid obesity (H)     ALLERGY   Pcn [penicillins], Bupropion, Depakote [valproic acid], and Divalproex sodium  MEDICATIONS                                                                                             Current Outpatient Medications   Medication Sig    " Cholecalciferol (D3 HIGH POTENCY) 25 MCG (1000 UT) CAPS TAKE 2 CAPSULES BY MOUTH ONCE DAILY    cholecalciferol 2000 UNITS tablet Take 2,000 Units by mouth daily    cloNIDine (CATAPRES) 0.1 MG tablet TAKE 1 TABLET TWICE A DAY BY MOUTH    cloZAPine (CLOZARIL) 100 MG tablet TAKE 1 TABLET (100MG) BY MOUTH DAILY    cloZAPine (CLOZARIL) 200 MG tablet TAKE 2 TABLETS (400MG) BY MOUTH AT BEDTIME    docusate sodium (COLACE) 100 MG capsule TAKE 1 CAPSULE BY MOUTH TWICE A DAY AS NEEDED FOR CONSTIPATION    fish oil-omega-3 fatty acids 1000 MG capsule TAKE 1 CAPSULE BY MOUTH ONCE DAILY    GNP FIBER THERAPY 500 MG TABS tablet TAKE 1 TABLET (500 MG) BY MOUTH DAILY    hydrOXYzine HCl (ATARAX) 25 MG tablet Take 1 - 2 tablets up to 2 times daily as needed for anxiety    ibuprofen (ADVIL/MOTRIN) 200 MG tablet Take 200 mg by mouth every 4 hours as needed for mild pain    omeprazole (PRILOSEC) 20 MG DR capsule TAKE 1 CAPSULE BY MOUTH DAILY    sertraline (ZOLOFT) 50 MG tablet Take 1 tablet (50 mg) by mouth daily     No current facility-administered medications for this visit.     VITALS   There were no vitals taken for this visit.     PHQ9                           10/19/2022     3:14 PM 7/10/2023     3:21 PM 1/15/2024     3:40 PM   PHQ-9 SCORE   PHQ-9 Total Score MyChart  1 (Minimal depression)    PHQ-9 Total Score 9 1 7       LABS                                                                                                                            Tachycardia; was on metoprolol but fatigue so d/c. PCP aware tachy and was evaluated by cardiology.       Clozapine level 116 as of 10/2/16 and cloz and metabolites 194  Clozapine level 329 as of 4/18/18 and clozapine and metabolites 527    Lipid panel 6/5/23: HDL 28, , triglycerides 483.   CMP 6/5/23: normal  HgbA1C 5.8  ASSESSMENT                                                                                                      Historical: scanned consults from [facility] into chart  4/2015. Initial psych consult was 4/22/15. Hospital stay here 4/9/16 - 4/18/16. Hospital stay [facility] June '16. Modafanil: we tried to help improve fatigue and mood but he felt created some cravings.    Josafat Montez is a 37 yo with paranoid schizophrenia. Joasfat has experienced psychosis including delusions, paranoia and hallucinations since 2004 as a teen.  He's been stable for quite awhile  taking clozapine. Last visit Josafat and his mom Malika inquired about medication for depression .  Malika has taken Zoloft for many years. We started Zoloft and Josafat is taking 50 mg daily. Today he reports he feels his mood has improved. No SEs that he is aware of. We agreed on continuing at 50 mg daily and have him RTC in 3 months.    TREATMENT RISK STATEMENT: The risks, benefits, alternatives and potential adverse effects have been explained and are understood by the pt. The pt agrees to the treatment plan with the ability to do so. The pt knows to call the clinic for any problems or access emergency care if needed.       DIAGNOSES           Schizophrenia, paranoid type  MDD, recurrent, moderate      PLAN                                                                                                                         1) MEDICATIONS:   -- Continue Zoloft 50 mg daily. Continue clozapine 100 mg am and 400 mg HS   Continue clonidine 0.1 twice daily. . Docusate 100 mg 2 times daily as needed for constipation    2) THERAPY: no change    Labs: lipids, hgbA1C, CMP 6/5/23     4) PT MONITOR [call for probs]: Worsening symptoms, side effects from medications, SI/HI    5) REFERRALS [CD tx, medical, tests other]: none    6)  RTC: ~3 months

## 2024-05-24 DIAGNOSIS — K59.03 DRUG-INDUCED CONSTIPATION: ICD-10-CM

## 2024-05-24 RX ORDER — METHYLCELLULOSE 500 MG/1
TABLET ORAL
Qty: 30 TABLET | Refills: 11 | Status: SHIPPED | OUTPATIENT
Start: 2024-05-24

## 2024-05-24 NOTE — TELEPHONE ENCOUNTER
[facility]  Last Written Prescription Date: 9/23/21  Last Fill Quantity: 100 # of Refills: 5  Last Office Visit: 3/18/24

## 2024-06-20 DIAGNOSIS — F20.0 CHRONIC PARANOID SCHIZOPHRENIA (H): ICD-10-CM

## 2024-06-20 DIAGNOSIS — K21.9 GASTROESOPHAGEAL REFLUX DISEASE WITHOUT ESOPHAGITIS: ICD-10-CM

## 2024-06-20 RX ORDER — CLOZAPINE 200 MG/1
TABLET ORAL
Qty: 60 TABLET | Refills: 3 | Status: SHIPPED | OUTPATIENT
Start: 2024-06-20

## 2024-06-20 RX ORDER — CLOZAPINE 100 MG/1
TABLET ORAL
Qty: 30 TABLET | Refills: 3 | Status: SHIPPED | OUTPATIENT
Start: 2024-06-20

## 2024-06-20 NOTE — TELEPHONE ENCOUNTER
Clozapine 200mg      Last Written Prescription Date:  3/5/24  Last Fill Quantity: 60,   # refills: 3  Last Office Visit: 3/18/24  Future Office visit:       Routing refill request to provider for review/approval because:      Clozapine 100mg      Last Written Prescription Date:  3/5/24  Last Fill Quantity: 30,   # refills: 3  Last Office Visit: 3/18/24  Future Office visit:       Routing refill request to provider for review/approval because:
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
## 2024-08-26 ENCOUNTER — TRANSFERRED RECORDS (OUTPATIENT)
Dept: HEALTH INFORMATION MANAGEMENT | Facility: CLINIC | Age: 39
End: 2024-08-26

## 2024-10-12 DIAGNOSIS — K59.03 DRUG-INDUCED CONSTIPATION: ICD-10-CM

## 2024-10-12 DIAGNOSIS — F33.1 MAJOR DEPRESSIVE DISORDER, RECURRENT EPISODE, MODERATE (H): ICD-10-CM

## 2024-10-12 DIAGNOSIS — F20.0 CHRONIC PARANOID SCHIZOPHRENIA (H): ICD-10-CM

## 2024-10-12 DIAGNOSIS — F20.0 SCHIZOPHRENIA, PARANOID, SUBCHRONIC WITH ACUTE EXACERBATION (H): ICD-10-CM

## 2024-10-12 DIAGNOSIS — E78.1 HYPERTRIGLYCERIDEMIA: ICD-10-CM

## 2024-10-14 RX ORDER — DOCUSATE SODIUM 100 MG/1
CAPSULE, LIQUID FILLED ORAL
Qty: 60 CAPSULE | Refills: 1 | Status: SHIPPED | OUTPATIENT
Start: 2024-10-14

## 2024-10-14 RX ORDER — CHLORAL HYDRATE 500 MG
1 CAPSULE ORAL DAILY
Qty: 30 CAPSULE | Refills: 1 | Status: SHIPPED | OUTPATIENT
Start: 2024-10-14

## 2024-10-14 RX ORDER — CLONIDINE HYDROCHLORIDE 0.1 MG/1
TABLET ORAL
Qty: 60 TABLET | Refills: 1 | Status: SHIPPED | OUTPATIENT
Start: 2024-10-14

## 2024-10-14 RX ORDER — CLOZAPINE 200 MG/1
TABLET ORAL
Qty: 60 TABLET | Refills: 1 | Status: SHIPPED | OUTPATIENT
Start: 2024-10-14

## 2024-10-14 RX ORDER — CLOZAPINE 100 MG/1
TABLET ORAL
Qty: 30 TABLET | Refills: 1 | Status: SHIPPED | OUTPATIENT
Start: 2024-10-14

## 2024-10-14 RX ORDER — CHOLECALCIFEROL (VITAMIN D3) 25 MCG
2 CAPSULE ORAL DAILY
Qty: 60 CAPSULE | Refills: 1 | Status: SHIPPED | OUTPATIENT
Start: 2024-10-14

## 2024-11-25 ENCOUNTER — VIRTUAL VISIT (OUTPATIENT)
Dept: PSYCHIATRY | Facility: OTHER | Age: 39
End: 2024-11-25
Attending: PSYCHIATRY & NEUROLOGY
Payer: COMMERCIAL

## 2024-11-25 DIAGNOSIS — E78.1 HYPERTRIGLYCERIDEMIA: ICD-10-CM

## 2024-11-25 DIAGNOSIS — F33.1 MAJOR DEPRESSIVE DISORDER, RECURRENT EPISODE, MODERATE (H): ICD-10-CM

## 2024-11-25 DIAGNOSIS — K59.03 DRUG-INDUCED CONSTIPATION: ICD-10-CM

## 2024-11-25 DIAGNOSIS — F20.0 SCHIZOPHRENIA, PARANOID, SUBCHRONIC WITH ACUTE EXACERBATION (H): ICD-10-CM

## 2024-11-25 DIAGNOSIS — F20.0 CHRONIC PARANOID SCHIZOPHRENIA (H): Primary | ICD-10-CM

## 2024-11-25 DIAGNOSIS — F41.1 GENERALIZED ANXIETY DISORDER: ICD-10-CM

## 2024-11-25 RX ORDER — CHOLECALCIFEROL (VITAMIN D3) 25 MCG
2 CAPSULE ORAL DAILY
Qty: 60 CAPSULE | Refills: 11 | Status: SHIPPED | OUTPATIENT
Start: 2024-11-25

## 2024-11-25 RX ORDER — CLOZAPINE 200 MG/1
TABLET ORAL
Qty: 60 TABLET | Refills: 1 | Status: SHIPPED | OUTPATIENT
Start: 2024-11-25

## 2024-11-25 RX ORDER — HYDROXYZINE HYDROCHLORIDE 25 MG/1
TABLET, FILM COATED ORAL
Qty: 60 TABLET | Refills: 11 | Status: SHIPPED | OUTPATIENT
Start: 2024-11-25

## 2024-11-25 RX ORDER — CLONIDINE HYDROCHLORIDE 0.1 MG/1
TABLET ORAL
Qty: 60 TABLET | Refills: 4 | Status: SHIPPED | OUTPATIENT
Start: 2024-11-25

## 2024-11-25 RX ORDER — CLOZAPINE 100 MG/1
100 TABLET ORAL DAILY
Qty: 30 TABLET | Refills: 1 | Status: SHIPPED | OUTPATIENT
Start: 2024-11-25

## 2024-11-25 RX ORDER — DOCUSATE SODIUM 100 MG/1
CAPSULE, LIQUID FILLED ORAL
Qty: 60 CAPSULE | Refills: 6 | Status: SHIPPED | OUTPATIENT
Start: 2024-11-25

## 2024-11-25 RX ORDER — CHLORAL HYDRATE 500 MG
1 CAPSULE ORAL DAILY
Qty: 30 CAPSULE | Refills: 11 | Status: SHIPPED | OUTPATIENT
Start: 2024-11-25

## 2024-11-25 ASSESSMENT — PAIN SCALES - GENERAL: PAINLEVEL_OUTOF10: NO PAIN (0)

## 2024-11-25 NOTE — PROGRESS NOTES
"Virtual Visit Details    Type of service:  Telephone Visit   Phone call duration: 12 minutes   Originating Location (pt. Location): Home    Distant Location (provider location):  Off-site     Start time:4:24 pm  End time: 4:36 pm     [phone]    SUBJECTIVE / INTERIM HISTORY                                                                       Last visit 3/2024: Continue Zoloft 50 mg daily. Continue clozapine 100 mg am and 400 mg HS   Continue clonidine 0.1 twice daily. . Docusate 100 mg 2 times daily as needed for constipation    - \"everything has been going pretty good\"  - mood-wise \"been doing pretty good\"  - Thanksgiving this year he isn't sure.   - appetite has been \"all right\"  - today we talked about sports bit; he played hockey, football into [employer]  - Josafat set a goal to get outside more.   - dog, Douglas Pizarro  - mom at aunt's often (helps granddaughter Bianca with school)     MEDICAL ROS-  Still constipation off and on, weight gain over time    MEDICAL / SURGICAL HISTORY            Medical Team:     PMD-  Dr. Moralez    Therapist- none  Patient Active Problem List   Diagnosis    Episodic mood disorder (H)    Poisoning by Methamphetamines    Cannabis abuse    Hypertension goal BP (blood pressure) < 140/90    Psychosis (H)    Depression    Paranoid schizophrenia, chronic condition with acute exacerbation (H)    Schizophrenia, paranoid, subchronic with acute exacerbation (H)    Schizophrenia, paranoid type (H)    Drug eruption    ACP (advance care planning)    Morbid obesity (H)     ALLERGY   Pcn [penicillins], Bupropion, Depakote [valproic acid], and Divalproex sodium  MEDICATIONS                                                                                             Current Outpatient Medications   Medication Sig Dispense Refill    Cholecalciferol (D3 HIGH POTENCY) 25 MCG (1000 UT) CAPS TAKE 2 CAPSULES BY MOUTH ONCE DAILY 60 capsule 1    cholecalciferol 2000 UNITS tablet Take 2,000 Units by mouth " daily 30 tablet 0    cloNIDine (CATAPRES) 0.1 MG tablet TAKE 1 TABLET BY MOUTH TWICE A DAY * due for appt [phone] 60 tablet 1    cloZAPine (CLOZARIL) 100 MG tablet TAKE 1 TABLET (100MG) BY MOUTH DAILY 30 tablet 1    cloZAPine (CLOZARIL) 200 MG tablet TAKE 2 TABLETS (400MG) BY MOUTH AT BEDTIME 60 tablet 1    docusate sodium (COLACE) 100 MG capsule TAKE 1 CAPSULE BY MOUTH TWICE A DAY AS NEEDED FOR CONSTIPATION 60 capsule 1    fish oil-omega-3 fatty acids 1000 MG capsule TAKE 1 CAPSULE BY MOUTH ONCE DAILY 30 capsule 1    hydrOXYzine HCl (ATARAX) 25 MG tablet Take 1 - 2 tablets up to 2 times daily as needed for anxiety 60 tablet 11    ibuprofen (ADVIL/MOTRIN) 200 MG tablet Take 200 mg by mouth every 4 hours as needed for mild pain      methylcellulose (CITRUCEL) 500 MG TABS tablet TAKE 1 TABLET (500MG) BY MOUTH DAILY 30 tablet 11    omeprazole (PRILOSEC) 20 MG DR capsule TAKE 1 CAPSULE BY MOUTH DAILY 90 capsule 2    sertraline (ZOLOFT) 50 MG tablet Take 1 tablet (50 mg) by mouth daily. * due for appt [phone] 30 tablet 1     No current facility-administered medications for this visit.     VITALS   There were no vitals taken for this visit.     PHQ9                           10/19/2022     3:14 PM 7/10/2023     3:21 PM 1/15/2024     3:40 PM   PHQ-9 SCORE   PHQ-9 Total Score MyChart  1 (Minimal depression)    PHQ-9 Total Score 9 1 7       LABS                                                                                                                            Tachycardia; was on metoprolol but fatigue so d/c. PCP aware tachy and was evaluated by cardiology.       Clozapine level 116 as of 10/2/16 and cloz and metabolites 194  Clozapine level 329 as of 4/18/18 and clozapine and metabolites 527    Lipid panel 6/5/23: HDL 28, , triglycerides 483.   CMP 6/5/23: normal  HgbA1C 5.8  ASSESSMENT                                                                                                      Historical:  scanned consults from [facility] into chart 4/2015. Initial psych consult was 4/22/15. Hospital stay here 4/9/16 - 4/18/16. Hospital stay [facility] June '16. Modafanil: we tried to help improve fatigue and mood but he felt created some cravings.    Josafat Montez is a 37 yo with paranoid schizophrenia. Josafat has experienced psychosis including delusions, paranoia and hallucinations since 2004 as a teen.  He's been stable for quite awhile  taking clozapine. Jan'24 we started Zoloft and it has helped Josafat with mood. Today he reports things generally going well. I noted he is due for labs and he notes he is due to set follow-up with his primary. Discussed ask primary to please draw labs for cholesterol / triglycerides as well as A1C.     Haven't seen Josafat in person in over year hence would be ideal for next visit to be in-person if possible.    TREATMENT RISK STATEMENT: The risks, benefits, alternatives and potential adverse effects have been explained and are understood by the pt. The pt agrees to the treatment plan with the ability to do so. The pt knows to call the clinic for any problems or access emergency care if needed.       DIAGNOSES           Schizophrenia, paranoid type  MDD, recurrent, moderate      PLAN                                                                                                                         1) MEDICATIONS:   -- Continue Zoloft 50 mg daily. Continue clozapine 100 mg am and 400 mg HS   Continue clonidine 0.1 twice daily. . Docusate 100 mg 2 times daily as needed for constipation    2) THERAPY: no change    Labs: lipids, hgbA1C, CMP 6/5/23 discussed due for these labs and he notes he needs set follow-up with primary. I suggested he ask his primary to draw these labs (noted he can say his psychiatrist requested)    4) PT MONITOR [call for probs]: Worsening symptoms, side effects from medications, SI/HI    5) REFERRALS [CD tx, medical, tests other]: none    6)  RTC: ~3 months

## 2025-01-27 ENCOUNTER — TRANSFERRED RECORDS (OUTPATIENT)
Dept: HEALTH INFORMATION MANAGEMENT | Facility: CLINIC | Age: 40
End: 2025-01-27

## 2025-01-31 DIAGNOSIS — F20.0 CHRONIC PARANOID SCHIZOPHRENIA (H): ICD-10-CM

## 2025-01-31 NOTE — TELEPHONE ENCOUNTER
cloZAPine (CLOZARIL) 200 MG tablet 60 tablet 1 11/25/2024     cloZAPine (CLOZARIL) 100 MG tablet 30 tablet 1 11/25/2024       Last Office Visit: 11/25/2024  Future Office visit:       Routing refill request to provider for review/approval because:

## 2025-02-04 RX ORDER — CLOZAPINE 100 MG/1
100 TABLET ORAL DAILY
Qty: 30 TABLET | Refills: 4 | Status: SHIPPED | OUTPATIENT
Start: 2025-02-04

## 2025-02-04 RX ORDER — CLOZAPINE 200 MG/1
TABLET ORAL
Qty: 60 TABLET | Refills: 4 | Status: SHIPPED | OUTPATIENT
Start: 2025-02-04

## 2025-02-24 ENCOUNTER — VIRTUAL VISIT (OUTPATIENT)
Dept: PSYCHIATRY | Facility: OTHER | Age: 40
End: 2025-02-24
Attending: PSYCHIATRY & NEUROLOGY
Payer: COMMERCIAL

## 2025-02-24 DIAGNOSIS — F20.0 CHRONIC PARANOID SCHIZOPHRENIA (H): Primary | ICD-10-CM

## 2025-02-24 ASSESSMENT — PAIN SCALES - GENERAL: PAINLEVEL_OUTOF10: NO PAIN (0)

## 2025-02-24 NOTE — PROGRESS NOTES
"Virtual Visit Details    Type of service:  Telephone Visit   Phone call duration:  13 minutes   Originating Location (pt. Location): Home    Distant Location (provider location):  Off-site     Start time:4:31 pm  End time: 4:44 pm     [phone]    SUBJECTIVE / INTERIM HISTORY                                                                       Last visit 11/2024: Continue Zoloft 50 mg daily. Continue clozapine 100 mg am and 400 mg HS   Continue clonidine 0.1 twice daily. . Docusate 100 mg 2 times daily as needed for constipation    -  has an appointment coming up with his primary. HIs back \"pops out and pops back in\". Josafat notes he was diagnosed with degenerative disc disease in the past and plans on talking to his primary about this at appointment. Thinks it's been awhile since had any imaging.  - \"I've got projects going and never get anything done\"  -  their water froze up. Pump stopped working. Josafat put some parts of pumps together and got things working  - dog, Rottie Zambian  Juarez  - mom at aunt's often (helps granddaughter Bianca with school)     MEDICAL ROS-  constipation off and on    MEDICAL / SURGICAL HISTORY            Medical Team:     PMD-  Dr. Moralez   Therapist- none  Patient Active Problem List   Diagnosis    Episodic mood disorder    Poisoning by Methamphetamines    Cannabis abuse    Hypertension goal BP (blood pressure) < 140/90    Psychosis (H)    Depression    Paranoid schizophrenia, chronic condition with acute exacerbation (H)    Schizophrenia, paranoid, subchronic with acute exacerbation (H)    Schizophrenia, paranoid type (H)    Drug eruption    ACP (advance care planning)    Morbid obesity (H)     ALLERGY   Pcn [penicillins], Bupropion, Depakote [valproic acid], and Divalproex sodium  MEDICATIONS                                                                                             Current Outpatient Medications   Medication Sig Dispense Refill    Cholecalciferol (D3 HIGH POTENCY) 25 " MCG (1000 UT) CAPS Take 2 capsules by mouth daily. 60 capsule 11    cholecalciferol 2000 UNITS tablet Take 2,000 Units by mouth daily 30 tablet 0    cloNIDine (CATAPRES) 0.1 MG tablet TAKE 1 TABLET BY MOUTH TWICE A DAY * due for apptmt [id number] 60 tablet 4    cloZAPine (CLOZARIL) 100 MG tablet TAKE 1 TABLET (100MG) BY MOUTH DAILY 30 tablet 4    cloZAPine (CLOZARIL) 200 MG tablet TAKE 2 TABLETS (400MG) BY MOUTH AT BEDTIME 60 tablet 4    docusate sodium (COLACE) 100 MG capsule TAKE 1 CAPSULE BY MOUTH TWICE A DAY AS NEEDED FOR CONSTIPATION 60 capsule 6    fish oil-omega-3 fatty acids 1000 MG capsule Take 1 capsule (1 g) by mouth daily. 30 capsule 11    hydrOXYzine HCl (ATARAX) 25 MG tablet Take 1 - 2 tablets up to 2 times daily as needed for anxiety 60 tablet 11    ibuprofen (ADVIL/MOTRIN) 200 MG tablet Take 200 mg by mouth every 4 hours as needed for mild pain      methylcellulose (CITRUCEL) 500 MG TABS tablet TAKE 1 TABLET (500MG) BY MOUTH DAILY 30 tablet 11    omeprazole (PRILOSEC) 20 MG DR capsule TAKE 1 CAPSULE BY MOUTH DAILY 90 capsule 2    sertraline (ZOLOFT) 50 MG tablet Take 1 tablet (50 mg) by mouth daily. 30 tablet 11     No current facility-administered medications for this visit.     VITALS   There were no vitals taken for this visit.     PHQ9                           10/19/2022     3:14 PM 7/10/2023     3:21 PM 1/15/2024     3:40 PM   PHQ-9 SCORE   PHQ-9 Total Score MyChart  1 (Minimal depression)    PHQ-9 Total Score 9 1 7       LABS                                                                                                                            Tachycardia; was on metoprolol but fatigue so d/c. PCP aware tachy and was evaluated by cardiology.       Clozapine level 116 as of 10/2/16 and cloz and metabolites 194  Clozapine level 329 as of 4/18/18 and clozapine and metabolites 527    Lipid panel 6/5/23: HDL 28, , triglycerides 483.   CMP 6/5/23: normal  HgbA1C 5.8  ASSESSMENT                                                                                                       Historical: scanned consults from [place] into chart 4/2015. Initial psych consult was 4/22/15. Hospital stay here 4/9/16 - 4/18/16. Hospital stay [facility] June '16. Modafanil: we tried to help improve fatigue and mood but he felt created some cravings.    Josafat Montez is a 38 yo with paranoid schizophrenia. Josafat has experienced psychosis including delusions, paranoia and hallucinations since 2004 as a teen.  He's been stable for quite awhile  taking clozapine. Jan'24 we started Zoloft and it has helped Josafat with mood. Today he notes his back has been bothering him and he reports he was diagnosed with degenerative disc disease in the past. He has an appointment with his pirmary coming up. I noted I'm happy to hear because should get his vitals checked - I reminded him history of fast heart rate and I prescribwe clonidine which is a med that affects blood pressure. I also reminded him he also is due for labs and I asked he relay to his primary.     TREATMENT RISK STATEMENT: The risks, benefits, alternatives and potential adverse effects have been explained and are understood by the pt. The pt agrees to the treatment plan with the ability to do so. The pt knows to call the clinic for any problems or access emergency care if needed.       DIAGNOSES           Schizophrenia, paranoid type  MDD, recurrent, moderate      PLAN                                                                                                                         1) MEDICATIONS:   -- Continue Zoloft 50 mg daily. Continue clozapine 100 mg am and 400 mg HS   Continue clonidine 0.1 twice daily . Docusate 100 mg 2 times daily as needed for constipation    2) THERAPY: no change    Labs: lipids, hgbA1C, CMP 6/5/23 discussed due for these labs and he notes he needs set follow-up with primary. I suggested he ask his primary to draw these labs     4) PT MONITOR  [call for probs]: Worsening symptoms, side effects from medications, SI/HI    5) REFERRALS [CD tx, medical, tests other]: none    6)  RTC: ~3 months

## 2025-03-02 ENCOUNTER — HEALTH MAINTENANCE LETTER (OUTPATIENT)
Age: 40
End: 2025-03-02

## 2025-04-25 DIAGNOSIS — F20.0 SCHIZOPHRENIA, PARANOID, SUBCHRONIC WITH ACUTE EXACERBATION (H): ICD-10-CM

## 2025-04-25 NOTE — TELEPHONE ENCOUNTER
Clonidine (Catapres) 0.1 mg tablet  Take 1 tablet by mouth twice a day    Last Written Prescription Date:  11-25-24  Last Fill Quantity: 60 tablet,   # refills: 4  Last Office Visit: 2-24-25  Future Office visit:

## 2025-04-25 NOTE — TELEPHONE ENCOUNTER
Antihypertensive Agents [id number]/25/2025 01:01 PM   Protocol Details Most recent blood pressure under 140/90 in past 12 months    Medication is active on med list    Has GFR on file in past 12 months and most recent value is normal    Medication indicated for associated diagnosis

## 2025-04-26 RX ORDER — CLONIDINE HYDROCHLORIDE 0.1 MG/1
0.1 TABLET ORAL
Qty: 60 TABLET | Refills: 1 | Status: SHIPPED | OUTPATIENT
Start: 2025-04-26

## 2025-04-29 ENCOUNTER — TELEPHONE (OUTPATIENT)
Dept: PSYCHIATRY | Facility: OTHER | Age: 40
End: 2025-04-29

## 2025-04-29 NOTE — TELEPHONE ENCOUNTER
LM for return call to change appt scheduled for 6-2-25 from a video visit to an in-person visit.  Call back # given

## 2025-05-28 DIAGNOSIS — K59.03 DRUG-INDUCED CONSTIPATION: ICD-10-CM

## 2025-05-29 NOTE — TELEPHONE ENCOUNTER
Citrucel tabs      Last Written Prescription Date:  5-24-24  Last Fill Quantity: 30,   # refills: 11  Last Office Visit: 2-24-25  Future Office visit:    Next 5 appointments (look out 90 days)      Jun 04, 2025 4:00 PM  (Arrive by 3:45 PM)  Return Visit with Marysol Maki MD  [facility] ([facility] [address] [place] [id number] [phone]             Routing refill request to provider for review/approval because:  Drug not on the [facility], [facility] or [facility] refill protocol or controlled substance

## 2025-06-04 ENCOUNTER — OFFICE VISIT (OUTPATIENT)
Dept: PSYCHIATRY | Facility: OTHER | Age: 40
End: 2025-06-04
Attending: PSYCHIATRY & NEUROLOGY
Payer: COMMERCIAL

## 2025-06-04 VITALS
WEIGHT: 271.4 LBS | TEMPERATURE: 97.7 F | RESPIRATION RATE: 16 BRPM | OXYGEN SATURATION: 95 % | HEART RATE: 89 BPM | BODY MASS INDEX: 34.85 KG/M2 | SYSTOLIC BLOOD PRESSURE: 128 MMHG | DIASTOLIC BLOOD PRESSURE: 78 MMHG

## 2025-06-04 DIAGNOSIS — Z79.899 ENCOUNTER FOR LONG-TERM (CURRENT) USE OF MEDICATIONS: Primary | ICD-10-CM

## 2025-06-04 DIAGNOSIS — F33.1 MAJOR DEPRESSIVE DISORDER, RECURRENT EPISODE, MODERATE (H): ICD-10-CM

## 2025-06-04 DIAGNOSIS — F20.0 SCHIZOPHRENIA, PARANOID, SUBCHRONIC WITH ACUTE EXACERBATION (H): ICD-10-CM

## 2025-06-04 DIAGNOSIS — F20.0 CHRONIC PARANOID SCHIZOPHRENIA (H): ICD-10-CM

## 2025-06-04 DIAGNOSIS — E78.1 HYPERTRIGLYCERIDEMIA: ICD-10-CM

## 2025-06-04 DIAGNOSIS — F41.1 GENERALIZED ANXIETY DISORDER: ICD-10-CM

## 2025-06-04 DIAGNOSIS — K59.03 DRUG-INDUCED CONSTIPATION: ICD-10-CM

## 2025-06-04 LAB
BASOPHILS # BLD AUTO: 0.1 10E3/UL (ref 0–0.2)
BASOPHILS NFR BLD AUTO: 1 %
EOSINOPHIL # BLD AUTO: 0.2 10E3/UL (ref 0–0.7)
EOSINOPHIL NFR BLD AUTO: 2 %
ERYTHROCYTE [DISTWIDTH] IN BLOOD BY AUTOMATED COUNT: 13.5 % (ref 10–15)
HCT VFR BLD AUTO: 47.2 % (ref 40–53)
HGB BLD-MCNC: 16 G/DL (ref 13.3–17.7)
IMM GRANULOCYTES # BLD: 0 10E3/UL
IMM GRANULOCYTES NFR BLD: 1 %
LYMPHOCYTES # BLD AUTO: 2.7 10E3/UL (ref 0.8–5.3)
LYMPHOCYTES NFR BLD AUTO: 31 %
MCH RBC QN AUTO: 29.7 PG (ref 26.5–33)
MCHC RBC AUTO-ENTMCNC: 33.9 G/DL (ref 31.5–36.5)
MCV RBC AUTO: 88 FL (ref 78–100)
MONOCYTES # BLD AUTO: 0.9 10E3/UL (ref 0–1.3)
MONOCYTES NFR BLD AUTO: 11 %
NEUTROPHILS # BLD AUTO: 4.8 10E3/UL (ref 1.6–8.3)
NEUTROPHILS NFR BLD AUTO: 55 %
NRBC # BLD AUTO: 0 10E3/UL
NRBC BLD AUTO-RTO: 0 /100
PLATELET # BLD AUTO: 332 10E3/UL (ref 150–450)
RBC # BLD AUTO: 5.38 10E6/UL (ref 4.4–5.9)
WBC # BLD AUTO: 8.7 10E3/UL (ref 4–11)

## 2025-06-04 RX ORDER — METHYLCELLULOSE 500 MG/1
500 TABLET ORAL DAILY
Qty: 90 TABLET | Refills: 3 | Status: SHIPPED | OUTPATIENT
Start: 2025-06-04

## 2025-06-04 RX ORDER — DOCUSATE SODIUM 100 MG/1
CAPSULE, LIQUID FILLED ORAL
Qty: 60 CAPSULE | Refills: 6 | Status: SHIPPED | OUTPATIENT
Start: 2025-06-04

## 2025-06-04 RX ORDER — CLONIDINE HYDROCHLORIDE 0.1 MG/1
0.1 TABLET ORAL
Qty: 60 TABLET | Refills: 11 | Status: SHIPPED | OUTPATIENT
Start: 2025-06-04

## 2025-06-04 RX ORDER — CLOZAPINE 200 MG/1
TABLET ORAL
Qty: 60 TABLET | Refills: 5 | Status: SHIPPED | OUTPATIENT
Start: 2025-06-04

## 2025-06-04 RX ORDER — HYDROXYZINE HYDROCHLORIDE 25 MG/1
TABLET, FILM COATED ORAL
Qty: 60 TABLET | Refills: 11 | Status: SHIPPED | OUTPATIENT
Start: 2025-06-04

## 2025-06-04 RX ORDER — CHLORAL HYDRATE 500 MG
1 CAPSULE ORAL DAILY
Qty: 90 CAPSULE | Refills: 3 | Status: SHIPPED | OUTPATIENT
Start: 2025-06-04

## 2025-06-04 RX ORDER — CLOZAPINE 100 MG/1
100 TABLET ORAL DAILY
Qty: 30 TABLET | Refills: 5 | Status: SHIPPED | OUTPATIENT
Start: 2025-06-04

## 2025-06-04 RX ORDER — METHYLCELLULOSE 500 MG/1
500 TABLET ORAL
Qty: 28 TABLET | OUTPATIENT
Start: 2025-06-04

## 2025-06-04 ASSESSMENT — PATIENT HEALTH QUESTIONNAIRE - PHQ9
SUM OF ALL RESPONSES TO PHQ QUESTIONS 1-9: 9
10. IF YOU CHECKED OFF ANY PROBLEMS, HOW DIFFICULT HAVE THESE PROBLEMS MADE IT FOR YOU TO DO YOUR WORK, TAKE CARE OF THINGS AT HOME, OR GET ALONG WITH OTHER PEOPLE: SOMEWHAT DIFFICULT
SUM OF ALL RESPONSES TO PHQ QUESTIONS 1-9: 9

## 2025-06-04 ASSESSMENT — PAIN SCALES - GENERAL: PAINLEVEL_OUTOF10: MILD PAIN (2)

## 2025-06-04 NOTE — PROGRESS NOTES
SUBJECTIVE / INTERIM HISTORY                                                                       Last visit 2/2025: Continue Zoloft 50 mg daily. Continue clozapine 100 mg am and 400 mg HS   Continue clonidine 0.1 twice daily . Docusate 100 mg 2 times daily as needed for constipation    - mom is with us today  - back still  bothers him at times  - doing well  - mom with today  - plan will be do labs in [place] lab every month and  meds [facility]  - dog, Douglas Lithuanian  Juarez  - mom at aunt's often (helps granddaughter Bianca with school)     MEDICAL / SURGICAL HISTORY              Patient Active Problem List   Diagnosis    Episodic mood disorder    Poisoning by Methamphetamines    Cannabis abuse    Hypertension goal BP (blood pressure) < 140/90    Psychosis (H)    Depression    Paranoid schizophrenia, chronic condition with acute exacerbation (H)    Schizophrenia, paranoid, subchronic with acute exacerbation (H)    Schizophrenia, paranoid type (H)    Drug eruption    ACP (advance care planning)    Morbid obesity (H)     ALLERGY   Pcn [penicillins], Bupropion, Depakote [valproic acid], and Divalproex sodium  MEDICATIONS                                                                                             Current Outpatient Medications   Medication Sig Dispense Refill    Cholecalciferol (D3 HIGH POTENCY) 25 MCG (1000 UT) CAPS Take 2 capsules by mouth daily. 60 capsule 11    cholecalciferol 2000 UNITS tablet Take 2,000 Units by mouth daily 30 tablet 0    cloNIDine (CATAPRES) 0.1 MG tablet TAKE 1 TABLET BY MOUTH TWICE A DAY 60 tablet 1    cloZAPine (CLOZARIL) 100 MG tablet TAKE 1 TABLET (100MG) BY MOUTH DAILY 30 tablet 4    cloZAPine (CLOZARIL) 200 MG tablet TAKE 2 TABLETS (400MG) BY MOUTH AT BEDTIME 60 tablet 4    docusate sodium (COLACE) 100 MG capsule TAKE 1 CAPSULE BY MOUTH TWICE A DAY AS NEEDED FOR CONSTIPATION 60 capsule 6    fish oil-omega-3 fatty acids 1000 MG capsule Take 1 capsule (1 g)  by mouth daily. 30 capsule 11    hydrOXYzine HCl (ATARAX) 25 MG tablet Take 1 - 2 tablets up to 2 times daily as needed for anxiety 60 tablet 11    ibuprofen (ADVIL/MOTRIN) 200 MG tablet Take 200 mg by mouth every 4 hours as needed for mild pain      methylcellulose (CITRUCEL) 500 MG TABS tablet TAKE 1 TABLET (500MG) BY MOUTH DAILY 30 tablet 11    omeprazole (PRILOSEC) 20 MG DR capsule TAKE 1 CAPSULE BY MOUTH DAILY 90 capsule 3    sertraline (ZOLOFT) 50 MG tablet Take 1 tablet (50 mg) by mouth daily. 30 tablet 11     No current facility-administered medications for this visit.     VITALS   /78 (BP Location: Right arm, Patient Position: Sitting, Cuff Size: Adult Regular)   Pulse 89   Temp 97.7  F (36.5  C) (Tympanic)   Resp 16   Wt 123.1 kg (271 lb 6.4 oz)   SpO2 95%   BMI 34.85 kg/m       PHQ9                           10/19/2022     3:14 PM 7/10/2023     3:21 PM 1/15/2024     3:40 PM   PHQ-9 SCORE   PHQ-9 Total Score MyChart  1 (Minimal depression)    PHQ-9 Total Score 9 1 7       LABS                                                                                                                            Tachycardia; was on metoprolol but fatigue so d/c. PCP aware tachy and was evaluated by cardiology.       Clozapine level 116 as of 10/2/16 and cloz and metabolites 194  Clozapine level 329 as of 4/18/18 and clozapine and metabolites 527    Lipid panel 6/5/23: HDL 28, , triglycerides 483.   CMP 6/5/23: normal  HgbA1C 5.8  ASSESSMENT                                                                                                      Historical: scanned consults from [place] into chart 4/2015. Initial psych consult was 4/22/15. Hospital stay here 4/9/16 - 4/18/16. Hospital stay [facility] June '16. Modafanil: we tried to help improve fatigue and mood but he felt created some cravings.    Josafat Montez is a 40 yo with paranoid schizophrenia. Josafat has experienced psychosis including delusions,  paranoia and hallucinations since 2004 as a teen.  He's been stable for many years now- taking clozapine. Jan'24 we started Zoloft and it has helped Josafat with mood.    For years Rin went to Josafat's house and yonatan blood and adminstered clozapine. This has ceased just last month. His mom Malika is with us today nad we reviewed guidelinesstill to get CBC with diff (discussed the neutrophil count what we are looking at given clozapine can decrease to critical levels) thus we need to come up with  anew plan. We agreed on having Josafat go to [facility] to get monthly CBC with diff and I put standing / monthly order in. He will get his clozapine (and other meds) from [facility]. Filled today clozapine (for schizophrenia), Zoloft for MDD and clonidine for anxiety (SUSANNAH).    TREATMENT RISK STATEMENT: The risks, benefits, alternatives and potential adverse effects have been explained and are understood by the pt. The pt agrees to the treatment plan with the ability to do so. The pt knows to call the clinic for any problems or access emergency care if needed.       DIAGNOSES           Schizophrenia, paranoid type  MDD, recurrent, moderate  SUSANNAH      PLAN                                                                                                                         1) MEDICATIONS:   -- Continue Zoloft 50 mg daily. Continue clozapine 100 mg am and 400 mg HS   Continue clonidine 0.1 twice daily . Docusate 100 mg 2 times daily as needed for constipation    2) THERAPY: no change    Labs: CBC with diff today and now standing monthly though [facility]    4) PT MONITOR [call for probs]: Worsening symptoms, side effects from medications, SI/HI    5) REFERRALS [CD tx, medical, tests other]: none    6)  RTC: ~2 months                                                                                                      "                                                                                                                Virtual Visit Details    Type of service:  Telephone Visit   Phone call duration:  13 minutes   Originating Location (pt. Location): Home    Distant Location (provider location):  Off-site     Start time:4:31 pm  End time: 4:44 pm     [phone]    SUBJECTIVE / INTERIM HISTORY                                                                       Last visit 11/2024: Continue Zoloft 50 mg daily. Continue clozapine 100 mg am and 400 mg HS   Continue clonidine 0.1 twice daily. . Docusate 100 mg 2 times daily as needed for constipation    -  has an appointment coming up with his primary. HIs back \"pops out and pops back in\". Josafat notes he was diagnosed with degenerative disc disease in the past and plans on talking to his primary about this at appointment. Thinks it's been awhile since had any imaging.  - \"I've got projects going and never get anything done\"  -  their water froze up. Pump stopped working. Josafat put some parts of pumps together and got things working  - dog, Rottie Croatian  Juarez  - mom at aunt's often (helps granddaughter Bianca with school)     MEDICAL ROS-  constipation off and on    MEDICAL / SURGICAL HISTORY            Medical Team:     PMD-  Dr. Moralez   Therapist- none  Patient Active Problem List   Diagnosis    Episodic mood disorder    Poisoning by Methamphetamines    Cannabis abuse    Hypertension goal BP (blood pressure) < 140/90    Psychosis (H)    Depression    Paranoid schizophrenia, chronic condition with acute exacerbation (H)    Schizophrenia, paranoid, subchronic with acute exacerbation (H)    Schizophrenia, paranoid type (H)    Drug eruption    ACP (advance care planning)    Morbid obesity (H)     ALLERGY   Pcn [penicillins], Bupropion, Depakote [valproic acid], and Divalproex sodium  MEDICATIONS                                                                                       "       Current Outpatient Medications   Medication Sig Dispense Refill    Cholecalciferol (D3 HIGH POTENCY) 25 MCG (1000 UT) CAPS Take 2 capsules by mouth daily. 60 capsule 11    cholecalciferol 2000 UNITS tablet Take 2,000 Units by mouth daily 30 tablet 0    cloNIDine (CATAPRES) 0.1 MG tablet TAKE 1 TABLET BY MOUTH TWICE A DAY 60 tablet 1    cloZAPine (CLOZARIL) 100 MG tablet TAKE 1 TABLET (100MG) BY MOUTH DAILY 30 tablet 4    cloZAPine (CLOZARIL) 200 MG tablet TAKE 2 TABLETS (400MG) BY MOUTH AT BEDTIME 60 tablet 4    docusate sodium (COLACE) 100 MG capsule TAKE 1 CAPSULE BY MOUTH TWICE A DAY AS NEEDED FOR CONSTIPATION 60 capsule 6    fish oil-omega-3 fatty acids 1000 MG capsule Take 1 capsule (1 g) by mouth daily. 30 capsule 11    hydrOXYzine HCl (ATARAX) 25 MG tablet Take 1 - 2 tablets up to 2 times daily as needed for anxiety 60 tablet 11    ibuprofen (ADVIL/MOTRIN) 200 MG tablet Take 200 mg by mouth every 4 hours as needed for mild pain      methylcellulose (CITRUCEL) 500 MG TABS tablet TAKE 1 TABLET (500MG) BY MOUTH DAILY 30 tablet 11    omeprazole (PRILOSEC) 20 MG DR capsule TAKE 1 CAPSULE BY MOUTH DAILY 90 capsule 3    sertraline (ZOLOFT) 50 MG tablet Take 1 tablet (50 mg) by mouth daily. 30 tablet 11     No current facility-administered medications for this visit.     VITALS   /78 (BP Location: Right arm, Patient Position: Sitting, Cuff Size: Adult Regular)   Pulse 89   Temp 97.7  F (36.5  C) (Tympanic)   Resp 16   Wt 123.1 kg (271 lb 6.4 oz)   SpO2 95%   BMI 34.85 kg/m       PHQ9                           10/19/2022     3:14 PM 7/10/2023     3:21 PM 1/15/2024     3:40 PM   PHQ-9 SCORE   PHQ-9 Total Score MyChart  1 (Minimal depression)    PHQ-9 Total Score 9 1 7       LABS                                                                                                                            Tachycardia; was on metoprolol but fatigue so d/c. PCP aware tachy and was evaluated by cardiology.        Clozapine level 116 as of 10/2/16 and cloz and metabolites 194  Clozapine level 329 as of 4/18/18 and clozapine and metabolites 527    Lipid panel 6/5/23: HDL 28, , triglycerides 483.   CMP 6/5/23: normal  HgbA1C 5.8  ASSESSMENT                                                                                                      Historical: scanned consults from [place] into chart 4/2015. Initial psych consult was 4/22/15. Hospital stay here 4/9/16 - 4/18/16. Hospital stay [facility] June '16. Modafanil: we tried to help improve fatigue and mood but he felt created some cravings.    Josafat Montez is a 38 yo with paranoid schizophrenia. Josafat has experienced psychosis including delusions, paranoia and hallucinations since 2004 as a teen.  He's been stable for quite awhile  taking clozapine. Jan'24 we started Zoloft and it has helped Josafat with mood. Today he notes his back has been bothering him and he reports he was diagnosed with degenerative disc disease in the past. He has an appointment with his pirmary coming up. I noted I'm happy to hear because should get his vitals checked - I reminded him history of fast heart rate and I prescribwe clonidine which is a med that affects blood pressure. I also reminded him he also is due for labs and I asked he relay to his primary.     TREATMENT RISK STATEMENT: The risks, benefits, alternatives and potential adverse effects have been explained and are understood by the pt. The pt agrees to the treatment plan with the ability to do so. The pt knows to call the clinic for any problems or access emergency care if needed.       DIAGNOSES           Schizophrenia, paranoid type  MDD, recurrent, moderate      PLAN                                                                                                                         1) MEDICATIONS:   -- Continue Zoloft 50 mg daily. Continue clozapine 100 mg am and 400 mg HS   Continue clonidine 0.1 twice daily . Docusate  100 mg 2 times daily as needed for constipation    2) THERAPY: no change    Labs: lipids, hgbA1C, CMP 6/5/23 discussed due for these labs and he notes he needs set follow-up with primary. I suggested he ask his primary to draw these labs     4) PT MONITOR [call for probs]: Worsening symptoms, side effects from medications, SI/HI    5) REFERRALS [CD tx, medical, tests other]: none    6)  RTC: ~3 months                                                                                                                                                                                                                       Answers submitted by the patient for this visit:  Patient Health Questionnaire (Submitted on 6/4/2025)  If you checked off any problems, how difficult have these problems made it for you to do your work, take care of things at home, or get along with other people?: Somewhat difficult  PHQ9 TOTAL SCORE: 9

## 2025-07-01 NOTE — PLAN OF CARE
Problem: General Plan of Care (Inpatient Behavioral)  Goal: Individualization/Patient Specific Goal (IP Behavioral)  The patient and/or their representative will achieve their patient-specific goals related to the plan of care.    The patient-specific goals include:   Patient will have an absence or decrease in hallucinations by time of discharge.  Patient will be medication compliant while hospitalized.  Patient will be able to have a reality based conversation prior to discharge.  Patient will be independent with his ADL s while hospitalized and maintain hygiene.     Pt appeared to be sleeping throughout shift, normal respirations and position changes noted.        Followed protocol

## 2025-07-21 ENCOUNTER — LAB (OUTPATIENT)
Dept: LAB | Facility: OTHER | Age: 40
End: 2025-07-21
Payer: COMMERCIAL

## 2025-07-21 DIAGNOSIS — Z79.899 ENCOUNTER FOR LONG-TERM (CURRENT) USE OF MEDICATIONS: ICD-10-CM

## 2025-07-21 LAB
BASOPHILS # BLD AUTO: 0.1 10E3/UL (ref 0–0.2)
BASOPHILS NFR BLD AUTO: 1 %
EOSINOPHIL # BLD AUTO: 0.1 10E3/UL (ref 0–0.7)
EOSINOPHIL NFR BLD AUTO: 1 %
ERYTHROCYTE [DISTWIDTH] IN BLOOD BY AUTOMATED COUNT: 13.4 % (ref 10–15)
HCT VFR BLD AUTO: 46.8 % (ref 40–53)
HGB BLD-MCNC: 16.3 G/DL (ref 13.3–17.7)
IMM GRANULOCYTES # BLD: 0 10E3/UL
IMM GRANULOCYTES NFR BLD: 0 %
LYMPHOCYTES # BLD AUTO: 2.4 10E3/UL (ref 0.8–5.3)
LYMPHOCYTES NFR BLD AUTO: 24 %
MCH RBC QN AUTO: 30.2 PG (ref 26.5–33)
MCHC RBC AUTO-ENTMCNC: 34.8 G/DL (ref 31.5–36.5)
MCV RBC AUTO: 87 FL (ref 78–100)
MONOCYTES # BLD AUTO: 1 10E3/UL (ref 0–1.3)
MONOCYTES NFR BLD AUTO: 9 %
NEUTROPHILS # BLD AUTO: 6.5 10E3/UL (ref 1.6–8.3)
NEUTROPHILS NFR BLD AUTO: 65 %
PLATELET # BLD AUTO: 330 10E3/UL (ref 150–450)
RBC # BLD AUTO: 5.39 10E6/UL (ref 4.4–5.9)
WBC # BLD AUTO: 10.1 10E3/UL (ref 4–11)

## 2025-07-21 PROCEDURE — 36415 COLL VENOUS BLD VENIPUNCTURE: CPT

## 2025-07-21 PROCEDURE — 85025 COMPLETE CBC W/AUTO DIFF WBC: CPT
